# Patient Record
Sex: MALE | Race: WHITE | NOT HISPANIC OR LATINO | Employment: OTHER | URBAN - METROPOLITAN AREA
[De-identification: names, ages, dates, MRNs, and addresses within clinical notes are randomized per-mention and may not be internally consistent; named-entity substitution may affect disease eponyms.]

---

## 2017-08-01 ENCOUNTER — RECORDS - HEALTHEAST (OUTPATIENT)
Dept: LAB | Facility: CLINIC | Age: 67
End: 2017-08-01

## 2017-08-01 LAB
CHOLEST SERPL-MCNC: 177 MG/DL
FASTING STATUS PATIENT QL REPORTED: NO
HDLC SERPL-MCNC: 50 MG/DL
LDLC SERPL CALC-MCNC: 82 MG/DL
TRIGL SERPL-MCNC: 227 MG/DL

## 2018-07-18 ENCOUNTER — RECORDS - HEALTHEAST (OUTPATIENT)
Dept: LAB | Facility: CLINIC | Age: 68
End: 2018-07-18

## 2018-07-18 LAB
ALBUMIN SERPL-MCNC: 4.1 G/DL (ref 3.5–5)
ALP SERPL-CCNC: 67 U/L (ref 45–120)
ALT SERPL W P-5'-P-CCNC: 47 U/L (ref 0–45)
ANION GAP SERPL CALCULATED.3IONS-SCNC: 7 MMOL/L (ref 5–18)
AST SERPL W P-5'-P-CCNC: 34 U/L (ref 0–40)
BILIRUB SERPL-MCNC: 0.9 MG/DL (ref 0–1)
BUN SERPL-MCNC: 19 MG/DL (ref 8–22)
CALCIUM SERPL-MCNC: 9.8 MG/DL (ref 8.5–10.5)
CHLORIDE BLD-SCNC: 103 MMOL/L (ref 98–107)
CHOLEST SERPL-MCNC: 181 MG/DL
CO2 SERPL-SCNC: 27 MMOL/L (ref 22–31)
CREAT SERPL-MCNC: 1.2 MG/DL (ref 0.7–1.3)
FASTING STATUS PATIENT QL REPORTED: YES
GFR SERPL CREATININE-BSD FRML MDRD: 60 ML/MIN/1.73M2
GLUCOSE BLD-MCNC: 102 MG/DL (ref 70–125)
HDLC SERPL-MCNC: 55 MG/DL
LDLC SERPL CALC-MCNC: 93 MG/DL
POTASSIUM BLD-SCNC: 4.9 MMOL/L (ref 3.5–5)
PROT SERPL-MCNC: 7 G/DL (ref 6–8)
PSA SERPL-MCNC: 0.3 NG/ML (ref 0–4.5)
SODIUM SERPL-SCNC: 137 MMOL/L (ref 136–145)
TRIGL SERPL-MCNC: 163 MG/DL

## 2019-07-15 ENCOUNTER — RECORDS - HEALTHEAST (OUTPATIENT)
Dept: LAB | Facility: CLINIC | Age: 69
End: 2019-07-15

## 2019-07-15 LAB
ALBUMIN SERPL-MCNC: 4.1 G/DL (ref 3.5–5)
ALP SERPL-CCNC: 66 U/L (ref 45–120)
ALT SERPL W P-5'-P-CCNC: 36 U/L (ref 0–45)
ANION GAP SERPL CALCULATED.3IONS-SCNC: 11 MMOL/L (ref 5–18)
AST SERPL W P-5'-P-CCNC: 28 U/L (ref 0–40)
BILIRUB SERPL-MCNC: 0.9 MG/DL (ref 0–1)
BUN SERPL-MCNC: 16 MG/DL (ref 8–28)
CALCIUM SERPL-MCNC: 10.2 MG/DL (ref 8.5–10.5)
CHLORIDE BLD-SCNC: 102 MMOL/L (ref 98–107)
CHOLEST SERPL-MCNC: 194 MG/DL
CO2 SERPL-SCNC: 25 MMOL/L (ref 22–31)
CREAT SERPL-MCNC: 1.19 MG/DL (ref 0.7–1.3)
FASTING STATUS PATIENT QL REPORTED: NO
GFR SERPL CREATININE-BSD FRML MDRD: 60 ML/MIN/1.73M2
GLUCOSE BLD-MCNC: 96 MG/DL (ref 70–125)
HDLC SERPL-MCNC: 63 MG/DL
LDLC SERPL CALC-MCNC: 86 MG/DL
POTASSIUM BLD-SCNC: 4.9 MMOL/L (ref 3.5–5)
PROT SERPL-MCNC: 7.2 G/DL (ref 6–8)
SODIUM SERPL-SCNC: 138 MMOL/L (ref 136–145)
TRIGL SERPL-MCNC: 223 MG/DL

## 2020-07-15 ENCOUNTER — RECORDS - HEALTHEAST (OUTPATIENT)
Dept: LAB | Facility: CLINIC | Age: 70
End: 2020-07-15

## 2020-07-15 LAB
ALBUMIN SERPL-MCNC: 3.9 G/DL (ref 3.5–5)
ALP SERPL-CCNC: 72 U/L (ref 45–120)
ALT SERPL W P-5'-P-CCNC: 41 U/L (ref 0–45)
ANION GAP SERPL CALCULATED.3IONS-SCNC: 9 MMOL/L (ref 5–18)
AST SERPL W P-5'-P-CCNC: 33 U/L (ref 0–40)
BILIRUB SERPL-MCNC: 0.6 MG/DL (ref 0–1)
BUN SERPL-MCNC: 14 MG/DL (ref 8–28)
CALCIUM SERPL-MCNC: 9.4 MG/DL (ref 8.5–10.5)
CHLORIDE BLD-SCNC: 103 MMOL/L (ref 98–107)
CHOLEST SERPL-MCNC: 184 MG/DL
CO2 SERPL-SCNC: 27 MMOL/L (ref 22–31)
CREAT SERPL-MCNC: 1.14 MG/DL (ref 0.7–1.3)
FASTING STATUS PATIENT QL REPORTED: NO
GFR SERPL CREATININE-BSD FRML MDRD: >60 ML/MIN/1.73M2
GLUCOSE BLD-MCNC: 115 MG/DL (ref 70–125)
HDLC SERPL-MCNC: 63 MG/DL
LDLC SERPL CALC-MCNC: 88 MG/DL
POTASSIUM BLD-SCNC: 5 MMOL/L (ref 3.5–5)
PROT SERPL-MCNC: 7 G/DL (ref 6–8)
PSA SERPL-MCNC: 0.3 NG/ML (ref 0–6.5)
SODIUM SERPL-SCNC: 139 MMOL/L (ref 136–145)
TRIGL SERPL-MCNC: 167 MG/DL

## 2021-06-01 ENCOUNTER — RECORDS - HEALTHEAST (OUTPATIENT)
Dept: ADMINISTRATIVE | Facility: CLINIC | Age: 71
End: 2021-06-01

## 2021-07-15 ENCOUNTER — HOSPITAL ENCOUNTER (EMERGENCY)
Dept: RADIOLOGY | Facility: CLINIC | Age: 71
End: 2021-07-15
Attending: EMERGENCY MEDICINE
Payer: COMMERCIAL

## 2021-07-15 ENCOUNTER — HOSPITAL ENCOUNTER (EMERGENCY)
Facility: CLINIC | Age: 71
Discharge: HOME | End: 2021-07-16
Attending: EMERGENCY MEDICINE | Admitting: EMERGENCY MEDICINE
Payer: COMMERCIAL

## 2021-07-15 VITALS
TEMPERATURE: 98.5 F | RESPIRATION RATE: 28 BRPM | OXYGEN SATURATION: 95 % | SYSTOLIC BLOOD PRESSURE: 174 MMHG | HEIGHT: 71 IN | WEIGHT: 245 LBS | BODY MASS INDEX: 34.3 KG/M2 | DIASTOLIC BLOOD PRESSURE: 94 MMHG | HEART RATE: 89 BPM

## 2021-07-15 DIAGNOSIS — J06.9 URI WITH COUGH AND CONGESTION: ICD-10-CM

## 2021-07-15 PROCEDURE — 99284 EMERGENCY DEPT VISIT MOD MDM: CPT | Mod: 25

## 2021-07-15 PROCEDURE — C9803 HOPD COVID-19 SPEC COLLECT: HCPCS

## 2021-07-15 PROCEDURE — 87635 SARS-COV-2 COVID-19 AMP PRB: CPT | Performed by: EMERGENCY MEDICINE

## 2021-07-15 PROCEDURE — 71045 X-RAY EXAM CHEST 1 VIEW: CPT

## 2021-07-15 ASSESSMENT — MIFFLIN-ST. JEOR: SCORE: 1883.44

## 2021-07-15 ASSESSMENT — ENCOUNTER SYMPTOMS
COUGH: 1
FEVER: 1
DYSURIA: 0
HEMATURIA: 0
SORE THROAT: 1
SHORTNESS OF BREATH: 0
DIZZINESS: 0
ABDOMINAL PAIN: 0
CHILLS: 0
JOINT SWELLING: 0
DIARRHEA: 0
VOMITING: 0
CONFUSION: 0
NAUSEA: 0

## 2021-07-16 LAB — SARS-COV-2 RNA RESP QL NAA+PROBE: NEGATIVE

## 2021-07-16 NOTE — ED PROVIDER NOTES
Emergency Department Encounter     Evaluation Date & Time:   7/15/2021 11:00 PM    CHIEF COMPLAINT:  Cough, Fever, and Pharyngitis      Triage Note:The patient presents to the ED with c/o cough, sore throat and fever (100 degrees) x3 days. States  night he slept with his fan on and woke the next day with these symptoms. Does report coughing up phlegm. Took Ibuprofen around .        ED COURSE & MEDICAL DECISION MAKIN:20 PM I met with the patient for the initial interview and physical examination. Discussed plan for treatment and workup in the ED. PPE: Provider wore eye protection, N95 mask and gloves.  Pt here with cough, congestion for the past 3-4 days, temp of 99 today, so he came in for evaluation.  He is fully vaccinated for covid with moderna since February, but was concerned it could be covid with temp of 99.  Pt afebrile, normal RA O2 sats. Will get covid test, CXR.  He has no real cp/sob, but cough.  If negative workup, anticipate discharge.    12:15 AM CXR clear. Covid negative.  Will discharge, instruct on f/u, return precautions.    12:18 AM I rechecked and updated the patient. We discussed plans for discharge including supportive cares, symptomatic treatment, outpatient follow up, and reasons to return to the emergency department.     At the conclusion of the encounter I discussed the results of all the tests and the disposition. The questions were answered. The patient or family acknowledged understanding and was agreeable with the care plan.      MEDICATIONS GIVEN IN THE EMERGENCY DEPARTMENT:  Medications - No data to display    NEW PRESCRIPTIONS STARTED AT TODAY'S ED VISIT:  Discharge Medication List as of 2021 12:30 AM          HPI     HPI     Alek Mcneill is a 71 year old male with a pertinent history of hyperlipidemia who presents to this ED by walk in for evaluation of sore throat, congestion, and cough.    Patient reports that he developed sore throat, congestion, and  mild cough on the Monday morning (3 days ago). His cough has persisted since onset. He initially attributed his symptoms to throat irritation caused by sleeping with a fan on at night. The patient takes his temperature everyday and tonight he recorded a temperature of 99 F, causing him to become concerned for COVID-19. He took ibuprofen earlier this evening. He has been fully-vaccinated against COVID-19 with the Moderna two-dose vaccine since February 2021. No known sick contacts. He denies loss of taste or smell, nausea, vomiting, diarrhea, chest pain, shortness of breath, or additional symptoms at this time.            REVIEW OF SYSTEMS:    Review of Systems   Constitutional: Positive for fever. Negative for chills.   HENT: Positive for congestion and sore throat.    Eyes: Negative for visual disturbance.   Respiratory: Positive for cough. Negative for shortness of breath.    Cardiovascular: Negative for chest pain.   Gastrointestinal: Negative for abdominal pain, diarrhea, nausea and vomiting.   Endocrine: Negative for polyuria.   Genitourinary: Negative for dysuria and hematuria.        - urinary changes   Musculoskeletal: Negative for joint swelling.   Skin: Negative for rash.   Neurological: Negative for dizziness.        Negative for loss of taste or smell   Psychiatric/Behavioral: Negative for confusion.   All other systems reviewed and are negative.      Medical History     Past Medical History:   Diagnosis Date     Hypercholesteremia        No past surgical history on file.    No family history on file.    Social History     Tobacco Use     Smoking status: Never Smoker   Substance Use Topics     Alcohol use: Not on file     Drug use: Not on file       aspirin 81 mg chewable tablet        Physical Exam     Triage Vitals:  ED Triage Vitals [07/15/21 2303]   Enc Vitals Group      BP (!) 174/94      Pulse 89      Resp 28      Temp 98.5  F (36.9  C)      Temp src Oral      SpO2 95 %      Weight 111.1 kg (245  "lb)      Height 1.803 m (5' 11\")      Head Circumference       Peak Flow       Pain Score       Pain Loc       Pain Edu?       Excl. in GC?         Vitals:  BP (!) 174/94   Pulse 89   Temp 98.5  F (36.9  C) (Oral)   Resp 28   Ht 1.803 m (5' 11\")   Wt 111.1 kg (245 lb)   SpO2 95%   BMI 34.17 kg/m      PHYSICAL EXAM:   Physical Exam  Vitals and nursing note reviewed.   Constitutional:       General: He is not in acute distress.     Appearance: Normal appearance.   HENT:      Head: Normocephalic and atraumatic.      Nose: Nose normal.      Mouth/Throat:      Mouth: Mucous membranes are moist.      Pharynx: Posterior oropharyngeal erythema (minimal) present. No pharyngeal swelling or oropharyngeal exudate.   Eyes:      Pupils: Pupils are equal, round, and reactive to light.   Neck:      Vascular: No JVD.   Cardiovascular:      Rate and Rhythm: Normal rate and regular rhythm.      Pulses: Normal pulses.           Radial pulses are 2+ on the right side and 2+ on the left side.        Dorsalis pedis pulses are 2+ on the right side and 2+ on the left side.   Pulmonary:      Effort: Pulmonary effort is normal. No respiratory distress.      Breath sounds: Normal breath sounds.   Abdominal:      Palpations: Abdomen is soft.      Tenderness: There is no abdominal tenderness.   Musculoskeletal:      Cervical back: Full passive range of motion without pain and neck supple.      Comments: No calf tenderness or swelling b/l   Skin:     General: Skin is warm.      Findings: No rash.   Neurological:      General: No focal deficit present.      Mental Status: He is alert. Mental status is at baseline.      Comments: Fluent speech, no acute lateralizing deficits   Psychiatric:         Mood and Affect: Mood normal.         Behavior: Behavior normal.         Results     LAB:  All pertinent labs reviewed and interpreted  Labs Ordered and Resulted from Time of ED Arrival Up to the Time of Departure from the ED   SARS-COV2 (COVID-19) " VIRUS RT-PCR - Normal    Narrative:     Testing was performed using the liudmila  SARS-CoV-2 & Influenza A/B Assay on the liudmila  Silvia  System.  This test should be ordered for the detection of SARS-COV-2 in individuals who meet SARS-CoV-2 clinical and/or epidemiological criteria. Test performance is unknown in asymptomatic patients.  This test is for in vitro diagnostic use under the FDA EUA for laboratories certified under CLIA to perform moderate and/or high complexity testing. This test has not been FDA cleared or approved.  A negative test does not rule out the presence of PCR inhibitors in the specimen or target RNA in concentration below the limit of detection for the assay. The possibility of a false negative should be considered if the patient's recent exposure or clinical presentation suggests COVID-19.  [facility] are certified under the Clinical Laboratory Improvement Amendments of 1988 (CLIA-88) as qualified to perform moderate and/or high complexity laboratory testing.   COVID-19 VIRUS (CORONAVIRUS) BY PCR    Narrative:     The following orders were created for panel order Symptomatic COVID-19 Virus (Coronavirus) by PCR Nasopharyngeal.  Procedure                               Abnormality         Status                     ---------                               -----------         ------                     SARS-COV2 (COVID-19) Vir...[[id number]]  Normal              Final result                 Please view results for these tests on the individual orders.       RADIOLOGY:  XR Chest Port 1 View   Final Result   IMPRESSION: Negative chest.                 ECG:  None    PROCEDURES:  None      FINAL IMPRESSION:    ICD-10-CM    1. URI with cough and congestion  J06.9        I, Svetlana Parham, am serving as a scribe to document services personally performed by Dr. Anthony Pierson, based on my observations and the provider's statements to me. I, Anthony Pierson, DO attest that Svetlana Parham is  acting in a scribe capacity, has observed my performance of the services and has documented them in accordance with my direction.      Anthony Pierson DO  Emergency Medicine  [facility] EMERGENCY ROOM  7/15/2021       Anthony Pierson MD  07/16/21 0143

## 2021-07-16 NOTE — DISCHARGE INSTRUCTIONS
Follow up with primary clinic. Try over the counter Zyrtec daily with ibuprofen/tylenol as needed and follow up with primary clinic. Covid test negative and chest xray clear today. Return for new/worsening symptoms/concerns.

## 2021-07-16 NOTE — ED TRIAGE NOTES
The patient presents to the ED with c/o cough, sore throat and fever (100 degrees) x3 days. States Sunday night he slept with his fan on and woke the next day with these symptoms. Does report coughing up phlegm. Took Ibuprofen around 2030.

## 2021-07-19 ENCOUNTER — LAB REQUISITION (OUTPATIENT)
Dept: LAB | Facility: CLINIC | Age: 71
End: 2021-07-19

## 2021-07-19 LAB
ALBUMIN SERPL-MCNC: 4 G/DL (ref 3.5–5)
ALP SERPL-CCNC: 83 U/L (ref 45–120)
ALT SERPL W P-5'-P-CCNC: 53 U/L (ref 0–45)
ANION GAP SERPL CALCULATED.3IONS-SCNC: 12 MMOL/L (ref 5–18)
AST SERPL W P-5'-P-CCNC: 47 U/L (ref 0–40)
BILIRUB SERPL-MCNC: 0.8 MG/DL (ref 0–1)
BUN SERPL-MCNC: 17 MG/DL (ref 8–28)
CALCIUM SERPL-MCNC: 9.9 MG/DL (ref 8.5–10.5)
CHLORIDE BLD-SCNC: 99 MMOL/L (ref 98–107)
CHOLEST SERPL-MCNC: 152 MG/DL
CO2 SERPL-SCNC: 25 MMOL/L (ref 22–31)
CREAT SERPL-MCNC: 1.09 MG/DL (ref 0.7–1.3)
GFR SERPL CREATININE-BSD FRML MDRD: 67 ML/MIN/1.73M2
GLUCOSE BLD-MCNC: 86 MG/DL (ref 70–125)
HDLC SERPL-MCNC: 53 MG/DL
LDLC SERPL CALC-MCNC: 81 MG/DL
POTASSIUM BLD-SCNC: 4.9 MMOL/L (ref 3.5–5)
PROT SERPL-MCNC: 7.3 G/DL (ref 6–8)
SODIUM SERPL-SCNC: 136 MMOL/L (ref 136–145)
TRIGL SERPL-MCNC: 90 MG/DL

## 2021-07-19 PROCEDURE — 36415 COLL VENOUS BLD VENIPUNCTURE: CPT | Performed by: PHYSICIAN ASSISTANT

## 2021-07-19 PROCEDURE — 80061 LIPID PANEL: CPT | Performed by: PHYSICIAN ASSISTANT

## 2021-07-19 PROCEDURE — 80053 COMPREHEN METABOLIC PANEL: CPT | Performed by: PHYSICIAN ASSISTANT

## 2022-08-18 ENCOUNTER — LAB REQUISITION (OUTPATIENT)
Dept: LAB | Facility: CLINIC | Age: 72
End: 2022-08-18

## 2022-08-18 DIAGNOSIS — Z80.42 FAMILY HISTORY OF MALIGNANT NEOPLASM OF PROSTATE: ICD-10-CM

## 2022-08-18 DIAGNOSIS — E78.5 HYPERLIPIDEMIA, UNSPECIFIED: ICD-10-CM

## 2022-08-18 DIAGNOSIS — E66.9 OBESITY, UNSPECIFIED: ICD-10-CM

## 2022-08-18 LAB
ALBUMIN SERPL BCG-MCNC: 4.6 G/DL (ref 3.5–5.2)
ALP SERPL-CCNC: 88 U/L (ref 40–129)
ALT SERPL W P-5'-P-CCNC: 52 U/L (ref 10–50)
ANION GAP SERPL CALCULATED.3IONS-SCNC: 9 MMOL/L (ref 7–15)
AST SERPL W P-5'-P-CCNC: 42 U/L (ref 10–50)
BILIRUB SERPL-MCNC: 0.7 MG/DL
BUN SERPL-MCNC: 14.3 MG/DL (ref 8–23)
CALCIUM SERPL-MCNC: 9.9 MG/DL (ref 8.8–10.2)
CHLORIDE SERPL-SCNC: 97 MMOL/L (ref 98–107)
CHOLEST SERPL-MCNC: 167 MG/DL
CREAT SERPL-MCNC: 1.2 MG/DL (ref 0.67–1.17)
DEPRECATED HCO3 PLAS-SCNC: 29 MMOL/L (ref 22–29)
GFR SERPL CREATININE-BSD FRML MDRD: 64 ML/MIN/1.73M2
GLUCOSE SERPL-MCNC: 139 MG/DL (ref 70–99)
HDLC SERPL-MCNC: 56 MG/DL
LDLC SERPL CALC-MCNC: 72 MG/DL
NONHDLC SERPL-MCNC: 111 MG/DL
POTASSIUM SERPL-SCNC: 4.9 MMOL/L (ref 3.4–5.3)
PROT SERPL-MCNC: 7.1 G/DL (ref 6.4–8.3)
PSA SERPL-MCNC: 0.3 NG/ML (ref 0–6.5)
SODIUM SERPL-SCNC: 135 MMOL/L (ref 136–145)
TRIGL SERPL-MCNC: 193 MG/DL
TSH SERPL DL<=0.005 MIU/L-ACNC: 3.7 UIU/ML (ref 0.3–4.2)

## 2022-08-18 PROCEDURE — G0103 PSA SCREENING: HCPCS | Performed by: NURSE PRACTITIONER

## 2022-08-18 PROCEDURE — 80053 COMPREHEN METABOLIC PANEL: CPT | Performed by: NURSE PRACTITIONER

## 2022-08-18 PROCEDURE — 84443 ASSAY THYROID STIM HORMONE: CPT | Performed by: NURSE PRACTITIONER

## 2022-08-18 PROCEDURE — 80061 LIPID PANEL: CPT | Performed by: NURSE PRACTITIONER

## 2022-08-18 PROCEDURE — 84403 ASSAY OF TOTAL TESTOSTERONE: CPT | Performed by: NURSE PRACTITIONER

## 2022-08-22 LAB — TESTOST SERPL-MCNC: 423 NG/DL (ref 240–950)

## 2022-12-13 ENCOUNTER — LAB REQUISITION (OUTPATIENT)
Dept: LAB | Facility: CLINIC | Age: 72
End: 2022-12-13

## 2022-12-13 DIAGNOSIS — I10 ESSENTIAL (PRIMARY) HYPERTENSION: ICD-10-CM

## 2022-12-13 LAB
ALBUMIN SERPL BCG-MCNC: 4.5 G/DL (ref 3.5–5.2)
ALP SERPL-CCNC: 74 U/L (ref 40–129)
ALT SERPL W P-5'-P-CCNC: 51 U/L (ref 10–50)
ANION GAP SERPL CALCULATED.3IONS-SCNC: 13 MMOL/L (ref 7–15)
AST SERPL W P-5'-P-CCNC: 41 U/L (ref 10–50)
BILIRUB SERPL-MCNC: 0.7 MG/DL
BUN SERPL-MCNC: 19.2 MG/DL (ref 8–23)
CALCIUM SERPL-MCNC: 9.9 MG/DL (ref 8.8–10.2)
CHLORIDE SERPL-SCNC: 95 MMOL/L (ref 98–107)
CREAT SERPL-MCNC: 1.24 MG/DL (ref 0.67–1.17)
DEPRECATED HCO3 PLAS-SCNC: 27 MMOL/L (ref 22–29)
GFR SERPL CREATININE-BSD FRML MDRD: 61 ML/MIN/1.73M2
GLUCOSE SERPL-MCNC: 136 MG/DL (ref 70–99)
POTASSIUM SERPL-SCNC: 5 MMOL/L (ref 3.4–5.3)
PROT SERPL-MCNC: 7.2 G/DL (ref 6.4–8.3)
SODIUM SERPL-SCNC: 135 MMOL/L (ref 136–145)

## 2022-12-13 PROCEDURE — 82040 ASSAY OF SERUM ALBUMIN: CPT | Performed by: NURSE PRACTITIONER

## 2022-12-13 PROCEDURE — 80053 COMPREHEN METABOLIC PANEL: CPT | Performed by: NURSE PRACTITIONER

## 2023-09-08 ENCOUNTER — LAB REQUISITION (OUTPATIENT)
Dept: LAB | Facility: CLINIC | Age: 73
End: 2023-09-08
Payer: COMMERCIAL

## 2023-09-08 DIAGNOSIS — E11.9 TYPE 2 DIABETES MELLITUS WITHOUT COMPLICATIONS (H): ICD-10-CM

## 2023-09-08 DIAGNOSIS — Z11.59 ENCOUNTER FOR SCREENING FOR OTHER VIRAL DISEASES: ICD-10-CM

## 2023-09-08 DIAGNOSIS — Z12.5 ENCOUNTER FOR SCREENING FOR MALIGNANT NEOPLASM OF PROSTATE: ICD-10-CM

## 2023-09-08 LAB
ALBUMIN SERPL BCG-MCNC: 4.6 G/DL (ref 3.5–5.2)
ALP SERPL-CCNC: 59 U/L (ref 40–129)
ALT SERPL W P-5'-P-CCNC: 53 U/L (ref 0–70)
ANION GAP SERPL CALCULATED.3IONS-SCNC: 13 MMOL/L (ref 7–15)
AST SERPL W P-5'-P-CCNC: 47 U/L (ref 0–45)
BILIRUB SERPL-MCNC: 0.7 MG/DL
BUN SERPL-MCNC: 17 MG/DL (ref 8–23)
CALCIUM SERPL-MCNC: 9.8 MG/DL (ref 8.8–10.2)
CHLORIDE SERPL-SCNC: 99 MMOL/L (ref 98–107)
CHOLEST SERPL-MCNC: 156 MG/DL
CREAT SERPL-MCNC: 1.18 MG/DL (ref 0.67–1.17)
DEPRECATED HCO3 PLAS-SCNC: 25 MMOL/L (ref 22–29)
EGFRCR SERPLBLD CKD-EPI 2021: 65 ML/MIN/1.73M2
GLUCOSE SERPL-MCNC: 106 MG/DL (ref 70–99)
HCV AB SERPL QL IA: NONREACTIVE
HDLC SERPL-MCNC: 61 MG/DL
LDLC SERPL CALC-MCNC: 72 MG/DL
NONHDLC SERPL-MCNC: 95 MG/DL
POTASSIUM SERPL-SCNC: 4.7 MMOL/L (ref 3.4–5.3)
PROT SERPL-MCNC: 7.1 G/DL (ref 6.4–8.3)
PSA SERPL DL<=0.01 NG/ML-MCNC: 0.3 NG/ML (ref 0–6.5)
SODIUM SERPL-SCNC: 137 MMOL/L (ref 136–145)
TRIGL SERPL-MCNC: 113 MG/DL

## 2023-09-08 PROCEDURE — 80053 COMPREHEN METABOLIC PANEL: CPT | Mod: ORL | Performed by: STUDENT IN AN ORGANIZED HEALTH CARE EDUCATION/TRAINING PROGRAM

## 2023-09-08 PROCEDURE — 86803 HEPATITIS C AB TEST: CPT | Mod: ORL | Performed by: STUDENT IN AN ORGANIZED HEALTH CARE EDUCATION/TRAINING PROGRAM

## 2023-09-08 PROCEDURE — G0103 PSA SCREENING: HCPCS | Mod: ORL | Performed by: STUDENT IN AN ORGANIZED HEALTH CARE EDUCATION/TRAINING PROGRAM

## 2023-09-08 PROCEDURE — 80061 LIPID PANEL: CPT | Mod: ORL | Performed by: STUDENT IN AN ORGANIZED HEALTH CARE EDUCATION/TRAINING PROGRAM

## 2023-11-19 ENCOUNTER — HOSPITAL ENCOUNTER (EMERGENCY)
Facility: CLINIC | Age: 73
Discharge: HOME | End: 2023-11-19
Attending: EMERGENCY MEDICINE | Admitting: EMERGENCY MEDICINE
Payer: COMMERCIAL

## 2023-11-19 ENCOUNTER — APPOINTMENT (OUTPATIENT)
Dept: CT IMAGING | Facility: CLINIC | Age: 73
End: 2023-11-19
Attending: EMERGENCY MEDICINE
Payer: COMMERCIAL

## 2023-11-19 VITALS
WEIGHT: 233 LBS | DIASTOLIC BLOOD PRESSURE: 100 MMHG | RESPIRATION RATE: 20 BRPM | OXYGEN SATURATION: 97 % | HEART RATE: 72 BPM | SYSTOLIC BLOOD PRESSURE: 180 MMHG | HEIGHT: 68 IN | BODY MASS INDEX: 35.31 KG/M2 | TEMPERATURE: 97.2 F

## 2023-11-19 DIAGNOSIS — M54.50 BILATERAL LOW BACK PAIN WITHOUT SCIATICA, UNSPECIFIED CHRONICITY: ICD-10-CM

## 2023-11-19 DIAGNOSIS — R93.41 ABNORMAL CT SCAN, BLADDER: ICD-10-CM

## 2023-11-19 DIAGNOSIS — R31.0 GROSS HEMATURIA: ICD-10-CM

## 2023-11-19 LAB
ABO/RH(D): NORMAL
ALBUMIN UR-MCNC: 10 MG/DL
ANION GAP SERPL CALCULATED.3IONS-SCNC: 9 MMOL/L (ref 7–15)
ANTIBODY SCREEN: NEGATIVE
APPEARANCE UR: ABNORMAL
APTT PPP: 26 SECONDS (ref 22–38)
BASOPHILS # BLD AUTO: 0.1 10E3/UL (ref 0–0.2)
BASOPHILS NFR BLD AUTO: 1 %
BILIRUB UR QL STRIP: NEGATIVE
BUN SERPL-MCNC: 17.6 MG/DL (ref 8–23)
CALCIUM SERPL-MCNC: 9.7 MG/DL (ref 8.8–10.2)
CHLORIDE SERPL-SCNC: 100 MMOL/L (ref 98–107)
CK SERPL-CCNC: 224 U/L (ref 39–308)
COLOR UR AUTO: ABNORMAL
CREAT SERPL-MCNC: 1.13 MG/DL (ref 0.67–1.17)
DEPRECATED HCO3 PLAS-SCNC: 29 MMOL/L (ref 22–29)
EGFRCR SERPLBLD CKD-EPI 2021: 68 ML/MIN/1.73M2
EOSINOPHIL # BLD AUTO: 0.4 10E3/UL (ref 0–0.7)
EOSINOPHIL NFR BLD AUTO: 4 %
ERYTHROCYTE [DISTWIDTH] IN BLOOD BY AUTOMATED COUNT: 12.3 % (ref 10–15)
GLUCOSE SERPL-MCNC: 158 MG/DL (ref 70–99)
GLUCOSE UR STRIP-MCNC: NEGATIVE MG/DL
HCT VFR BLD AUTO: 44.5 % (ref 40–53)
HGB BLD-MCNC: 15.7 G/DL (ref 13.3–17.7)
HGB UR QL STRIP: ABNORMAL
IMM GRANULOCYTES # BLD: 0 10E3/UL
IMM GRANULOCYTES NFR BLD: 0 %
INR PPP: 0.82 (ref 0.85–1.15)
KETONES UR STRIP-MCNC: NEGATIVE MG/DL
LEUKOCYTE ESTERASE UR QL STRIP: NEGATIVE
LYMPHOCYTES # BLD AUTO: 3.6 10E3/UL (ref 0.8–5.3)
LYMPHOCYTES NFR BLD AUTO: 36 %
MAGNESIUM SERPL-MCNC: 2.3 MG/DL (ref 1.7–2.3)
MCH RBC QN AUTO: 32.6 PG (ref 26.5–33)
MCHC RBC AUTO-ENTMCNC: 35.3 G/DL (ref 31.5–36.5)
MCV RBC AUTO: 93 FL (ref 78–100)
MONOCYTES # BLD AUTO: 1.2 10E3/UL (ref 0–1.3)
MONOCYTES NFR BLD AUTO: 12 %
NEUTROPHILS # BLD AUTO: 4.7 10E3/UL (ref 1.6–8.3)
NEUTROPHILS NFR BLD AUTO: 47 %
NITRATE UR QL: NEGATIVE
NRBC # BLD AUTO: 0 10E3/UL
NRBC BLD AUTO-RTO: 0 /100
PH UR STRIP: 7 [PH] (ref 5–7)
PLATELET # BLD AUTO: 242 10E3/UL (ref 150–450)
POTASSIUM SERPL-SCNC: 4.2 MMOL/L (ref 3.4–5.3)
RBC # BLD AUTO: 4.81 10E6/UL (ref 4.4–5.9)
RBC URINE: >182 /HPF
SODIUM SERPL-SCNC: 138 MMOL/L (ref 135–145)
SP GR UR STRIP: 1.01 (ref 1–1.03)
SPECIMEN EXPIRATION DATE: NORMAL
UROBILINOGEN UR STRIP-MCNC: <2 MG/DL
WBC # BLD AUTO: 10 10E3/UL (ref 4–11)
WBC URINE: 1 /HPF

## 2023-11-19 PROCEDURE — 85610 PROTHROMBIN TIME: CPT | Performed by: EMERGENCY MEDICINE

## 2023-11-19 PROCEDURE — 82550 ASSAY OF CK (CPK): CPT | Performed by: EMERGENCY MEDICINE

## 2023-11-19 PROCEDURE — 86850 RBC ANTIBODY SCREEN: CPT | Performed by: EMERGENCY MEDICINE

## 2023-11-19 PROCEDURE — 80048 BASIC METABOLIC PNL TOTAL CA: CPT | Performed by: EMERGENCY MEDICINE

## 2023-11-19 PROCEDURE — 36415 COLL VENOUS BLD VENIPUNCTURE: CPT | Performed by: EMERGENCY MEDICINE

## 2023-11-19 PROCEDURE — 74177 CT ABD & PELVIS W/CONTRAST: CPT

## 2023-11-19 PROCEDURE — 250N000011 HC RX IP 250 OP 636: Performed by: EMERGENCY MEDICINE

## 2023-11-19 PROCEDURE — 83735 ASSAY OF MAGNESIUM: CPT | Performed by: EMERGENCY MEDICINE

## 2023-11-19 PROCEDURE — 85730 THROMBOPLASTIN TIME PARTIAL: CPT | Performed by: EMERGENCY MEDICINE

## 2023-11-19 PROCEDURE — 81001 URINALYSIS AUTO W/SCOPE: CPT | Performed by: EMERGENCY MEDICINE

## 2023-11-19 PROCEDURE — 86901 BLOOD TYPING SEROLOGIC RH(D): CPT | Performed by: EMERGENCY MEDICINE

## 2023-11-19 PROCEDURE — 99285 EMERGENCY DEPT VISIT HI MDM: CPT | Mod: 25

## 2023-11-19 PROCEDURE — 85004 AUTOMATED DIFF WBC COUNT: CPT | Performed by: EMERGENCY MEDICINE

## 2023-11-19 RX ORDER — IOPAMIDOL 755 MG/ML
100 INJECTION, SOLUTION INTRAVASCULAR ONCE
Status: COMPLETED | OUTPATIENT
Start: 2023-11-19 | End: 2023-11-19

## 2023-11-19 RX ADMIN — IOPAMIDOL 100 ML: 755 INJECTION, SOLUTION INTRAVENOUS at 02:22

## 2023-11-19 ASSESSMENT — ACTIVITIES OF DAILY LIVING (ADL): ADLS_ACUITY_SCORE: 35

## 2023-11-19 NOTE — ED TRIAGE NOTES
PT is coming in tonight with blood in his urine and biladeral flank pain that started on Thursday. PT first thought the pain was from moving furniture but has since had a few episodes of blood in his urine.     No HX of kidney stones.      Triage Assessment (Adult)       Row Name 11/19/23 0129          Triage Assessment    Airway WDL WDL        Respiratory WDL    Respiratory WDL WDL        Skin Circulation/Temperature WDL    Skin Circulation/Temperature WDL WDL        Cardiac WDL    Cardiac WDL WDL        Peripheral/Neurovascular WDL    Peripheral Neurovascular WDL WDL        Cognitive/Neuro/Behavioral WDL    Cognitive/Neuro/Behavioral WDL WDL

## 2023-11-19 NOTE — ED PROVIDER NOTES
Emergency Department Encounter     Evaluation Date & Time:   No admission date for patient encounter.    CHIEF COMPLAINT:  Hematuria and Flank Pain      Triage Note:              ED COURSE & MEDICAL DECISION MAKING:     ED Course as of 11/19/23 0311   Sun Nov 19, 2023   0215 UA with RBCs, no infection. CK wnl.  Hgb 15.7.  Renal function intact. Awaiting CT imaging.     0250 CT results reviewed. Will have pt follow up closely with urology this week for further outpt workup and cares.   0309 Discussed with pt results, including bladder nodules and concerning hematuria that could be from bladder cancer.  Pt needs urology follow up, understands how to arrange for this.  Suspect low back pain is unrelated to hematuria and from moving furniture recently. Advised to hold his prophylactic baby aspirin given hematuria.  Understands return precautions.         Pt here for evaluation of gross hematuria that he noticed once on Friday with a clot as well. Pt able to urinate with no dysuria or frequency.  Having some low back and b/l side pain since moving furniture Monday, but again had some darker urine early this morning, so he came in now for evaluation. Denies fevers, n/v, anticoagulation use. Will get labs, UA, CT abd/pelvis and reassess.  Consideration for UTI, bladder pathology, incidental hematuria, rhabdo and/or ureteral stone.      1:27 AM I met with the patient, obtained history, performed an initial exam, and discussed options and plan for diagnostics and treatment here in the ED.       Medical Decision Making    History:  Supplemental history from: Documented in chart, if applicable  External Record(s) reviewed: Documented in chart, if applicable.    Work Up:  Chart documentation includes differential considered and any EKGs or imaging independently interpreted by provider, where specified.  In additional to work up documented, I considered the following work up: Documented in chart, if applicable.    External  "consultation:  Discussion of management with another provider: Documented in chart, if applicable    Complicating factors:  Care impacted by chronic illness: Diabetes, Hyperlipidemia, and Hypertension  Care affected by social determinants of health: Access to Medical Care    Disposition considerations: Discharge. No recommendations on prescription strength medication(s). I considered admission, but ultimately discharged patient after evaluation, workup, outpatient follow up and treatment plan.      At the conclusion of the encounter I discussed the results of all the tests and the disposition. The questions were answered. The patient or family acknowledged understanding and was agreeable with the care plan.      MEDICATIONS GIVEN IN THE EMERGENCY DEPARTMENT:  Medications   iopamidol (ISOVUE-370) solution 100 mL (100 mLs Intravenous $Given 11/19/23 0222)       NEW PRESCRIPTIONS STARTED AT TODAY'S ED VISIT:  New Prescriptions    No medications on file       HPI   HPI     Alek Mcneill is a 73 year old male with a pertinent history of T2DM, hypertension, hyperlipidemia, who presents to this ED via walk-in for evaluation of hematuria and flank pain.     The patient had an episode of hematuria with small blood clots on 11/17/2023 (2 days ago) and just passed dark urine tonight. He is complaining of lower back pain that wraps to the side of his abdomen bilaterally since 11/13/2023 (6 days ago).    Otherwise, the patient denied having any other medical complaints or concerns at this time.    REVIEW OF SYSTEMS:  See HPI      Medical History     Past Medical History:   Diagnosis Date    Hypercholesteremia        History reviewed. No pertinent surgical history.    No family history on file.    Social History     Tobacco Use    Smoking status: Never       aspirin 81 mg chewable tablet        Physical Exam     Vitals:  BP (!) 172/88   Pulse 80   Temp 97.2  F (36.2  C) (Temporal)   Resp 20   Ht 1.727 m (5' 8\")   Wt 105.7 kg " (233 lb)   SpO2 95%   BMI 35.43 kg/m      PHYSICAL EXAM:   Physical Exam  Vitals and nursing note reviewed.   Constitutional:       General: He is not in acute distress.     Appearance: Normal appearance.   HENT:      Head: Normocephalic and atraumatic.      Nose: Nose normal.      Mouth/Throat:      Mouth: Mucous membranes are moist.   Eyes:      Pupils: Pupils are equal, round, and reactive to light.   Cardiovascular:      Rate and Rhythm: Normal rate and regular rhythm.      Pulses: Normal pulses.           Radial pulses are 2+ on the right side and 2+ on the left side.        Dorsalis pedis pulses are 2+ on the right side and 2+ on the left side.   Pulmonary:      Effort: Pulmonary effort is normal. No respiratory distress.      Breath sounds: Normal breath sounds.   Abdominal:      Palpations: Abdomen is soft.      Tenderness: There is no abdominal tenderness. There is no right CVA tenderness or left CVA tenderness.   Musculoskeletal:      Cervical back: Full passive range of motion without pain and neck supple.      Lumbar back: No tenderness or bony tenderness.      Comments: No calf tenderness or swelling b/l. No CVA tenderness   Skin:     General: Skin is warm.      Findings: No rash.   Neurological:      General: No focal deficit present.      Mental Status: He is alert. Mental status is at baseline.      Comments: Fluent speech, no acute lateralizing deficits   Psychiatric:         Mood and Affect: Mood normal.         Behavior: Behavior normal.         Results     LAB:  All pertinent labs reviewed and interpreted  Labs Ordered and Resulted from Time of ED Arrival to Time of ED Departure   ROUTINE UA WITH MICROSCOPIC REFLEX TO CULTURE - Abnormal       Result Value    Color Urine Pink (*)     Appearance Urine Turbid (*)     Glucose Urine Negative      Bilirubin Urine Negative      Ketones Urine Negative      Specific Gravity Urine 1.011      Blood Urine >1.0 mg/dL (*)     pH Urine 7.0      Protein  Albumin Urine 10 (*)     Urobilinogen Urine <2.0      Nitrite Urine Negative      Leukocyte Esterase Urine Negative      RBC Urine >182 (*)     WBC Urine 1     BASIC METABOLIC PANEL - Abnormal    Sodium 138      Potassium 4.2      Chloride 100      Carbon Dioxide (CO2) 29      Anion Gap 9      Urea Nitrogen 17.6      Creatinine 1.13      GFR Estimate 68      Calcium 9.7      Glucose 158 (*)    INR - Abnormal    INR 0.82 (*)    MAGNESIUM - Normal    Magnesium 2.3     PARTIAL THROMBOPLASTIN TIME - Normal    aPTT 26     CK TOTAL - Normal         CBC WITH PLATELETS AND DIFFERENTIAL    WBC Count 10.0      RBC Count 4.81      Hemoglobin 15.7      Hematocrit 44.5      MCV 93      MCH 32.6      MCHC 35.3      RDW 12.3      Platelet Count 242      % Neutrophils 47      % Lymphocytes 36      % Monocytes 12      % Eosinophils 4      % Basophils 1      % Immature Granulocytes 0      NRBCs per 100 WBC 0      Absolute Neutrophils 4.7      Absolute Lymphocytes 3.6      Absolute Monocytes 1.2      Absolute Eosinophils 0.4      Absolute Basophils 0.1      Absolute Immature Granulocytes 0.0      Absolute NRBCs 0.0     TYPE AND SCREEN, ADULT    ABO/RH(D) A POS      Antibody Screen Negative      SPECIMEN EXPIRATION DATE [id number]     ABO/RH TYPE AND SCREEN       RADIOLOGY:  CT Abdomen Pelvis w Contrast   Final Result   IMPRESSION:    1.  Multiple indeterminate nodular densities in the bladder suspicious for enhancing urothelial lesions. Malignancy not excluded. Given the history, cystoscopy is recommended for further evaluation.      2.  Kidneys and ureters are negative. No hydronephrosis.      3.  Diffuse fatty infiltration of the liver.      4.  No acute bowel findings. Normal appendix.      5.  Fat-containing bilateral inguinal hernias, left greater than right.                   ECG:  none    PROCEDURES:  Procedures:  none      FINAL IMPRESSION:    ICD-10-CM    1. Gross hematuria  R31.0 Adult Urology  Referral       2. Abnormal CT scan, bladder  R93.41 Adult Urology  Referral    nodules seen in bladder      3. Bilateral low back pain without sciatica, unspecified chronicity  M54.50           0 minutes of critical care time      I, Woo Atkins, am serving as a scribe to document services personally performed by Dr. Anthony Pierson, based on my observations and the provider's statements to me. I, Anthony Pierson, DO attest that Woo Atkins is acting in a scribe capacity, has observed my performance of the services and has documented them in accordance with my direction.      Anthony Pierson DO  Emergency Medicine  [facility] EMERGENCY ROOM  11/19/2023  1:27 AM          Anthoyn Pierson MD  11/19/23 0311

## 2023-11-19 NOTE — DISCHARGE INSTRUCTIONS
Stop taking aspirin for now. Follow up with urology - referral placed, but you can also call urology number provided to see if they can see you sooner, as it's a different urology group.  Return for inability to urinate, significant abdominal pain, fevers or other worsening concerns.  Ok to take Tylenol 500-1000mg up to 4 times a day for pain.  You can also use over the counter lidocaine patches for pain as directed.

## 2023-11-20 ENCOUNTER — TELEPHONE (OUTPATIENT)
Dept: UROLOGY | Facility: CLINIC | Age: 73
End: 2023-11-20

## 2023-11-20 NOTE — TELEPHONE ENCOUNTER
This encounter is being sent to inform the clinic that this patient has a referral from Anthony Pierson MD  for the diagnoses of gross hematuria and has requested that this patient be seen within 3-5 days.     Based on the availability of our provider(s), we are unable to accommodate this request.    Were all sites offered this patient?  Yes    Does scheduling algorithm request to schedule next available?  Patient has been scheduled for the first available opening with [facility] on 1/18/24.  We have informed the patient that the clinic will review their referral and reach out if a sooner appointment is medically necessary.

## 2023-11-20 NOTE — TELEPHONE ENCOUNTER
11/20 Patient rescheduled to 11/21/2023 with Dr. Philippe.     [facility]   Dermatology, Surgery, Urology  [facility]

## 2023-11-21 ENCOUNTER — OFFICE VISIT (OUTPATIENT)
Dept: UROLOGY | Facility: CLINIC | Age: 73
End: 2023-11-21
Attending: EMERGENCY MEDICINE
Payer: COMMERCIAL

## 2023-11-21 DIAGNOSIS — R31.0 GROSS HEMATURIA: ICD-10-CM

## 2023-11-21 DIAGNOSIS — R93.41 ABNORMAL CT SCAN, BLADDER: ICD-10-CM

## 2023-11-21 PROCEDURE — 99204 OFFICE O/P NEW MOD 45 MIN: CPT | Performed by: UROLOGY

## 2023-11-21 RX ORDER — HYDROCHLOROTHIAZIDE 25 MG/1
25 TABLET ORAL EVERY MORNING
Status: ON HOLD | COMMUNITY
End: 2024-01-20

## 2023-11-21 RX ORDER — OMEGA-3/DHA/EPA/FISH OIL 60 MG-90MG
2 CAPSULE ORAL DAILY
COMMUNITY

## 2023-11-21 RX ORDER — KETOCONAZOLE 20 MG/G
CREAM TOPICAL PRN
COMMUNITY
Start: 2023-01-30

## 2023-11-21 RX ORDER — SIMVASTATIN 40 MG
40 TABLET ORAL AT BEDTIME
COMMUNITY

## 2023-11-21 NOTE — NURSING NOTE
Alek Mcneill's chief complaint for this visit includes:    Chief Complaint   Patient presents with    New Patient     Alek is a new patient presenting for Gross Hematuria referred by Dr. Pierson.       PCP: Clinic, [facility]         Referring Provider:    Anthony Pierson MD  [address] [place] [address]         There were no vitals taken for this visit.    Data Unavailable          No Known Allergies              Do you need any medication refills at today's visit? No    Dhara Arango LPN on 11/21/2023 at 2:31 PM

## 2023-11-21 NOTE — PROGRESS NOTES
Urology Consult History and Physical  DEJA REESE   Name: Alek Mcneill    MRN: [id number]   YOB: 1950       We were asked to see Alek Mcneill at the request of Dr. Pierson for evaluation and treatment of gross hematuria.        Chief Complaint:   Gross hematuria     History is obtained from the patient            History of Present Illness:   Alek Mcneill is a 73 year old male who is being seen for evaluation of gross hematuria     He started to notice darking urine on 11/16/023  Became more bloody with gross hematuria and blood clots and presented to the ER on 11/19/2023  He has stopped his ASA which he was on for prophylaxis  His urine has improved with no further passage of clots since yesterday    Quit smoking in 1993  Start at age 15  30 pack/year history           Past Medical History:     Past Medical History:   Diagnosis Date    Hypercholesteremia             Past Surgical History:   No past surgical history on file.         Social History:     Social History     Tobacco Use    Smoking status: Never    Smokeless tobacco: Not on file   Substance Use Topics    Alcohol use: Not on file       History   Smoking Status    Never   Smokeless Tobacco    Not on file            Family History:   No family history on file.           Allergies:   No Known Allergies         Medications:     Current Outpatient Medications   Medication Sig    aspirin 81 mg chewable tablet [ASPIRIN 81 MG CHEWABLE TABLET] Chew 81 mg daily.    fish oil-omega-3 fatty acids 500 MG capsule 2 capsules    hydrochlorothiazide (HYDRODIURIL) 25 MG tablet Take 25 mg by mouth every morning    ketoconazole (NIZORAL) 2 % external cream Apply topically as needed    simvastatin (ZOCOR) 40 MG tablet Take 40 mg by mouth at bedtime     No current facility-administered medications for this visit.             Review of Systems:     Negative except for as noted above          Physical Exam:   No data found.  There is no height or weight on file  to calculate BMI.     General: age-appropriate appearing male in NAD  HEENT: Head AT/NC, EOMI, CN Grossly intact  Lungs: no respiratory distress, or pursed lip breathing  Heart: No obvious jugular venous distension present  Back: no bony midline tenderness, no CVAT bilaterally.  Abdomen: soft, obesely-distended, non-tender. No organomegaly  Neuro: Alert, oriented, speech and mentation normal;  moving all 4 extremities equally.  Psych: affect and mood normal          Data:   All laboratory data reviewed:    UA RESULTS:  Recent Labs   Lab Test 11/19/23  0150   COLOR Pink*   APPEARANCE Turbid*   URINEGLC Negative   URINEBILI Negative   URINEKETONE Negative   SG 1.011   UBLD >1.0 mg/dL*   URINEPH 7.0   PROTEIN 10*   NITRITE Negative   LEUKEST Negative   RBCU >182*   WBCU 1      Prostate Specific Antigen Screen   Date Value Ref Range Status   09/08/2023 0.30 0.00 - 6.50 ng/mL Final   07/15/2020 0.3 0.0 - 6.5 ng/mL Final      Lab Results   Component Value Date    CR 1.13 11/19/2023      IMAGING:  All pertinent imaging reviewed:    All imaging studies reviewed by me.  I personally reviewed these imaging films.  A formal report from radiology will follow.    FINDINGS:   LOWER CHEST: Normal.     HEPATOBILIARY: Diffuse fatty infiltration of the liver. Gallbladder is contracted. No calcified gallstones or biliary dilatation.     PANCREAS: Normal.     SPLEEN: Normal.     ADRENAL GLANDS: Normal.     KIDNEYS/BLADDER: Kidneys are negative. No hydronephrosis. There are several nodular foci of increased density seen along the wall of the bladder inferiorly measuring up to 1.2 cm. These are concerning for enhancing urothelial lesions with malignancy not   excluded. Cystoscopy recommended in further evaluation.     BOWEL: Bowel is normal in caliber with no evidence for obstruction. Normal appendix. Moderate to large amount of stool in the colon. No acute bowel findings.     LYMPH NODES: Normal.     VASCULATURE: Aortic calcification  without aneurysm.     PELVIC ORGANS: Fat-containing bilateral inguinal hernias, left greater than right.     MUSCULOSKELETAL: Marked degenerative changes in the spine. Bilateral pars defect at the L4 level with grade 1-2 anterolisthesis of L4 on L5.                                                                      IMPRESSION:   1.  Multiple indeterminate nodular densities in the bladder suspicious for enhancing urothelial lesions. Malignancy not excluded. Given the history, cystoscopy is recommended for further evaluation.     2.  Kidneys and ureters are negative. No hydronephrosis.     3.  Diffuse fatty infiltration of the liver.     4.  No acute bowel findings. Normal appendix.     5.  Fat-containing bilateral inguinal hernias, left greater than right.         Impression and Plan:   Impression:   73-year-old man with recent gross hematuria and concern for a few small bladder masses on CT scan      Plan:   Gross hematuria with concern for bladder masses on CT  - I reviewed his labs which are notable for urinalysis with large red blood cell count and serum creatinine of 1.13 on 11/19/2023  - I reviewed his CT scan from 11/19/2023 and reviewed these images personally.  I agree with radiologist interpretation.  There are a few small nodules on the anterior bladder wall on both the left and right sides of the bladder consistent with urothelial carcinoma  - We discussed the need for an operative cystoscopy with TURBT and we will work to schedule this in the next few weeks  - [place], and I discussed that it would be reasonable to have him have the surgery with my partner Dr. Layton and have further follow-up closer to his home  - I messaged with Dr. Layton who will place surgery orders and arrange for scheduling  - I recommend that he remain off of his aspirin prophylaxis  - This is a new problem with an uncertain prognosis     Thank you for the kind consultation.    Time spent: 20  minutes spent on the date of the encounter doing chart review, history and exam, documentation and further activities as noted above.    Ky Philippe MD   Urology  [facility]  [facility] Phone: [phone]  [facility] Phone: [phone]

## 2023-11-22 ENCOUNTER — PREP FOR PROCEDURE (OUTPATIENT)
Dept: SURGERY | Facility: CLINIC | Age: 73
End: 2023-11-22

## 2023-11-22 DIAGNOSIS — C67.8 MALIGNANT NEOPLASM OF OVERLAPPING SITES OF BLADDER (H): Primary | ICD-10-CM

## 2023-11-22 RX ORDER — CEFAZOLIN SODIUM/WATER 2 G/20 ML
2 SYRINGE (ML) INTRAVENOUS
Status: CANCELLED | OUTPATIENT
Start: 2023-12-06

## 2023-11-22 RX ORDER — CEFAZOLIN SODIUM/WATER 2 G/20 ML
2 SYRINGE (ML) INTRAVENOUS SEE ADMIN INSTRUCTIONS
Status: CANCELLED | OUTPATIENT
Start: 2023-12-06

## 2023-11-27 ENCOUNTER — TELEPHONE (OUTPATIENT)
Dept: UROLOGY | Facility: CLINIC | Age: 73
End: 2023-11-27

## 2023-11-27 NOTE — TELEPHONE ENCOUNTER
Called patient to inform him of the time change for surgery tomorrow 12-6 at [facility] with Dr Layton 9am start arrive 7am patient good with the change- JODY      Scheduled surgery for pt 12/6 at [facility]. Went through instructions over the phone, I told him he doesn't need a pre-op due to recent ER visit. Surgery info emailed via secure email.

## 2023-12-06 ENCOUNTER — TELEPHONE (OUTPATIENT)
Dept: NURSING | Facility: CLINIC | Age: 73
End: 2023-12-06

## 2023-12-06 ENCOUNTER — ANESTHESIA (OUTPATIENT)
Dept: SURGERY | Facility: HOSPITAL | Age: 73
End: 2023-12-06
Payer: COMMERCIAL

## 2023-12-06 ENCOUNTER — ANESTHESIA EVENT (OUTPATIENT)
Dept: SURGERY | Facility: HOSPITAL | Age: 73
End: 2023-12-06
Payer: COMMERCIAL

## 2023-12-06 ENCOUNTER — HOSPITAL ENCOUNTER (OUTPATIENT)
Facility: HOSPITAL | Age: 73
Discharge: HOME | End: 2023-12-06
Attending: STUDENT IN AN ORGANIZED HEALTH CARE EDUCATION/TRAINING PROGRAM | Admitting: STUDENT IN AN ORGANIZED HEALTH CARE EDUCATION/TRAINING PROGRAM
Payer: COMMERCIAL

## 2023-12-06 VITALS
TEMPERATURE: 97.4 F | RESPIRATION RATE: 16 BRPM | DIASTOLIC BLOOD PRESSURE: 93 MMHG | SYSTOLIC BLOOD PRESSURE: 171 MMHG | HEART RATE: 80 BPM | BODY MASS INDEX: 36.13 KG/M2 | WEIGHT: 237.6 LBS | OXYGEN SATURATION: 94 %

## 2023-12-06 DIAGNOSIS — C67.3 MALIGNANT NEOPLASM OF ANTERIOR WALL OF URINARY BLADDER (H): Primary | ICD-10-CM

## 2023-12-06 LAB
GLUCOSE BLDC GLUCOMTR-MCNC: 112 MG/DL (ref 70–99)
GLUCOSE BLDC GLUCOMTR-MCNC: 185 MG/DL (ref 70–99)

## 2023-12-06 PROCEDURE — 710N000012 HC RECOVERY PHASE 2, PER MINUTE: Performed by: STUDENT IN AN ORGANIZED HEALTH CARE EDUCATION/TRAINING PROGRAM

## 2023-12-06 PROCEDURE — 370N000017 HC ANESTHESIA TECHNICAL FEE, PER MIN: Performed by: STUDENT IN AN ORGANIZED HEALTH CARE EDUCATION/TRAINING PROGRAM

## 2023-12-06 PROCEDURE — 250N000011 HC RX IP 250 OP 636: Performed by: STUDENT IN AN ORGANIZED HEALTH CARE EDUCATION/TRAINING PROGRAM

## 2023-12-06 PROCEDURE — 258N000001 HC RX 258: Performed by: STUDENT IN AN ORGANIZED HEALTH CARE EDUCATION/TRAINING PROGRAM

## 2023-12-06 PROCEDURE — 250N000011 HC RX IP 250 OP 636: Performed by: ANESTHESIOLOGY

## 2023-12-06 PROCEDURE — 272N000001 HC OR GENERAL SUPPLY STERILE: Performed by: STUDENT IN AN ORGANIZED HEALTH CARE EDUCATION/TRAINING PROGRAM

## 2023-12-06 PROCEDURE — 999N000141 HC STATISTIC PRE-PROCEDURE NURSING ASSESSMENT: Performed by: STUDENT IN AN ORGANIZED HEALTH CARE EDUCATION/TRAINING PROGRAM

## 2023-12-06 PROCEDURE — 82962 GLUCOSE BLOOD TEST: CPT

## 2023-12-06 PROCEDURE — 88307 TISSUE EXAM BY PATHOLOGIST: CPT | Mod: TC | Performed by: STUDENT IN AN ORGANIZED HEALTH CARE EDUCATION/TRAINING PROGRAM

## 2023-12-06 PROCEDURE — 258N000003 HC RX IP 258 OP 636: Performed by: ANESTHESIOLOGY

## 2023-12-06 PROCEDURE — 52235 CYSTOSCOPY AND TREATMENT: CPT | Performed by: STUDENT IN AN ORGANIZED HEALTH CARE EDUCATION/TRAINING PROGRAM

## 2023-12-06 PROCEDURE — 250N000009 HC RX 250: Performed by: NURSE ANESTHETIST, CERTIFIED REGISTERED

## 2023-12-06 PROCEDURE — 710N000009 HC RECOVERY PHASE 1, LEVEL 1, PER MIN: Performed by: STUDENT IN AN ORGANIZED HEALTH CARE EDUCATION/TRAINING PROGRAM

## 2023-12-06 PROCEDURE — 250N000025 HC SEVOFLURANE, PER MIN: Performed by: STUDENT IN AN ORGANIZED HEALTH CARE EDUCATION/TRAINING PROGRAM

## 2023-12-06 PROCEDURE — 258N000003 HC RX IP 258 OP 636: Performed by: NURSE ANESTHETIST, CERTIFIED REGISTERED

## 2023-12-06 PROCEDURE — 250N000011 HC RX IP 250 OP 636: Performed by: NURSE ANESTHETIST, CERTIFIED REGISTERED

## 2023-12-06 PROCEDURE — 360N000076 HC SURGERY LEVEL 3, PER MIN: Performed by: STUDENT IN AN ORGANIZED HEALTH CARE EDUCATION/TRAINING PROGRAM

## 2023-12-06 RX ORDER — ONDANSETRON 4 MG/1
4 TABLET, ORALLY DISINTEGRATING ORAL EVERY 30 MIN PRN
Status: DISCONTINUED | OUTPATIENT
Start: 2023-12-06 | End: 2023-12-06 | Stop reason: HOSPADM

## 2023-12-06 RX ORDER — SODIUM CHLORIDE, SODIUM LACTATE, POTASSIUM CHLORIDE, CALCIUM CHLORIDE 600; 310; 30; 20 MG/100ML; MG/100ML; MG/100ML; MG/100ML
INJECTION, SOLUTION INTRAVENOUS CONTINUOUS
Status: DISCONTINUED | OUTPATIENT
Start: 2023-12-06 | End: 2023-12-06 | Stop reason: HOSPADM

## 2023-12-06 RX ORDER — CEFAZOLIN SODIUM/WATER 2 G/20 ML
2 SYRINGE (ML) INTRAVENOUS
Status: DISCONTINUED | OUTPATIENT
Start: 2023-12-06 | End: 2023-12-06 | Stop reason: HOSPADM

## 2023-12-06 RX ORDER — KETAMINE HYDROCHLORIDE 10 MG/ML
INJECTION INTRAMUSCULAR; INTRAVENOUS PRN
Status: DISCONTINUED | OUTPATIENT
Start: 2023-12-06 | End: 2023-12-06

## 2023-12-06 RX ORDER — CEFAZOLIN SODIUM/WATER 2 G/20 ML
2 SYRINGE (ML) INTRAVENOUS SEE ADMIN INSTRUCTIONS
Status: DISCONTINUED | OUTPATIENT
Start: 2023-12-06 | End: 2023-12-06 | Stop reason: HOSPADM

## 2023-12-06 RX ORDER — ONDANSETRON 2 MG/ML
4 INJECTION INTRAMUSCULAR; INTRAVENOUS EVERY 30 MIN PRN
Status: DISCONTINUED | OUTPATIENT
Start: 2023-12-06 | End: 2023-12-06 | Stop reason: HOSPADM

## 2023-12-06 RX ORDER — ONDANSETRON 4 MG/1
4 TABLET, ORALLY DISINTEGRATING ORAL EVERY 8 HOURS PRN
Qty: 4 TABLET | Refills: 0 | Status: SHIPPED | OUTPATIENT
Start: 2023-12-06 | End: 2024-01-18

## 2023-12-06 RX ORDER — PROPOFOL 10 MG/ML
INJECTION, EMULSION INTRAVENOUS PRN
Status: DISCONTINUED | OUTPATIENT
Start: 2023-12-06 | End: 2023-12-06

## 2023-12-06 RX ORDER — FENTANYL CITRATE 50 UG/ML
50 INJECTION, SOLUTION INTRAMUSCULAR; INTRAVENOUS EVERY 5 MIN PRN
Status: DISCONTINUED | OUTPATIENT
Start: 2023-12-06 | End: 2023-12-06 | Stop reason: HOSPADM

## 2023-12-06 RX ORDER — OXYBUTYNIN CHLORIDE 5 MG/1
5 TABLET ORAL 3 TIMES DAILY PRN
Qty: 10 TABLET | Refills: 0 | Status: SHIPPED | OUTPATIENT
Start: 2023-12-06 | End: 2023-12-20

## 2023-12-06 RX ORDER — FENTANYL CITRATE 50 UG/ML
INJECTION, SOLUTION INTRAMUSCULAR; INTRAVENOUS PRN
Status: DISCONTINUED | OUTPATIENT
Start: 2023-12-06 | End: 2023-12-06

## 2023-12-06 RX ORDER — SODIUM CHLORIDE, SODIUM LACTATE, POTASSIUM CHLORIDE, CALCIUM CHLORIDE 600; 310; 30; 20 MG/100ML; MG/100ML; MG/100ML; MG/100ML
INJECTION, SOLUTION INTRAVENOUS CONTINUOUS PRN
Status: DISCONTINUED | OUTPATIENT
Start: 2023-12-06 | End: 2023-12-06

## 2023-12-06 RX ORDER — LIDOCAINE HYDROCHLORIDE 10 MG/ML
INJECTION, SOLUTION INFILTRATION; PERINEURAL PRN
Status: DISCONTINUED | OUTPATIENT
Start: 2023-12-06 | End: 2023-12-06

## 2023-12-06 RX ORDER — OXYCODONE HYDROCHLORIDE 5 MG/1
10 TABLET ORAL
Status: DISCONTINUED | OUTPATIENT
Start: 2023-12-06 | End: 2023-12-06 | Stop reason: HOSPADM

## 2023-12-06 RX ORDER — FENTANYL CITRATE 50 UG/ML
25 INJECTION, SOLUTION INTRAMUSCULAR; INTRAVENOUS EVERY 5 MIN PRN
Status: DISCONTINUED | OUTPATIENT
Start: 2023-12-06 | End: 2023-12-06 | Stop reason: HOSPADM

## 2023-12-06 RX ORDER — ACETAMINOPHEN 325 MG/1
650 TABLET ORAL EVERY 4 HOURS PRN
Qty: 50 TABLET | Refills: 0 | Status: SHIPPED | OUTPATIENT
Start: 2023-12-06 | End: 2024-02-09

## 2023-12-06 RX ORDER — AMOXICILLIN 250 MG
1-2 CAPSULE ORAL 2 TIMES DAILY
Qty: 30 TABLET | Refills: 0 | Status: SHIPPED | OUTPATIENT
Start: 2023-12-06 | End: 2023-12-20

## 2023-12-06 RX ORDER — LIDOCAINE 40 MG/G
CREAM TOPICAL
Status: DISCONTINUED | OUTPATIENT
Start: 2023-12-06 | End: 2023-12-06 | Stop reason: HOSPADM

## 2023-12-06 RX ORDER — CIPROFLOXACIN 500 MG/1
500 TABLET, FILM COATED ORAL ONCE
Qty: 1 TABLET | Refills: 0 | Status: SHIPPED | OUTPATIENT
Start: 2023-12-06 | End: 2023-12-06

## 2023-12-06 RX ORDER — HYDROMORPHONE HYDROCHLORIDE 1 MG/ML
0.2 INJECTION, SOLUTION INTRAMUSCULAR; INTRAVENOUS; SUBCUTANEOUS EVERY 5 MIN PRN
Status: DISCONTINUED | OUTPATIENT
Start: 2023-12-06 | End: 2023-12-06 | Stop reason: HOSPADM

## 2023-12-06 RX ORDER — OXYCODONE HYDROCHLORIDE 5 MG/1
5 TABLET ORAL
Status: DISCONTINUED | OUTPATIENT
Start: 2023-12-06 | End: 2023-12-06 | Stop reason: HOSPADM

## 2023-12-06 RX ORDER — HYDROMORPHONE HYDROCHLORIDE 1 MG/ML
0.4 INJECTION, SOLUTION INTRAMUSCULAR; INTRAVENOUS; SUBCUTANEOUS EVERY 5 MIN PRN
Status: DISCONTINUED | OUTPATIENT
Start: 2023-12-06 | End: 2023-12-06 | Stop reason: HOSPADM

## 2023-12-06 RX ADMIN — Medication 2 G: at 09:58

## 2023-12-06 RX ADMIN — PROPOFOL 200 MG: 10 INJECTION, EMULSION INTRAVENOUS at 09:55

## 2023-12-06 RX ADMIN — SODIUM CHLORIDE, POTASSIUM CHLORIDE, SODIUM LACTATE AND CALCIUM CHLORIDE: 600; 310; 30; 20 INJECTION, SOLUTION INTRAVENOUS at 09:43

## 2023-12-06 RX ADMIN — FENTANYL CITRATE 25 MCG: 50 INJECTION, SOLUTION INTRAMUSCULAR; INTRAVENOUS at 11:34

## 2023-12-06 RX ADMIN — SODIUM CHLORIDE, POTASSIUM CHLORIDE, SODIUM LACTATE AND CALCIUM CHLORIDE: 600; 310; 30; 20 INJECTION, SOLUTION INTRAVENOUS at 08:25

## 2023-12-06 RX ADMIN — KETAMINE HYDROCHLORIDE 50 MG: 10 INJECTION INTRAMUSCULAR; INTRAVENOUS at 09:55

## 2023-12-06 RX ADMIN — FENTANYL CITRATE 50 MCG: 50 INJECTION INTRAMUSCULAR; INTRAVENOUS at 10:09

## 2023-12-06 RX ADMIN — LIDOCAINE HYDROCHLORIDE 5 ML: 10 INJECTION, SOLUTION INFILTRATION; PERINEURAL at 09:55

## 2023-12-06 RX ADMIN — FENTANYL CITRATE 25 MCG: 50 INJECTION, SOLUTION INTRAMUSCULAR; INTRAVENOUS at 11:27

## 2023-12-06 ASSESSMENT — ACTIVITIES OF DAILY LIVING (ADL)
ADLS_ACUITY_SCORE: 20
ADLS_ACUITY_SCORE: 20
ADLS_ACUITY_SCORE: 35
ADLS_ACUITY_SCORE: 20

## 2023-12-06 NOTE — ANESTHESIA PREPROCEDURE EVALUATION
Anesthesia Pre-Procedure Evaluation    Patient: Alek Mcneill   MRN: [id number] : 1950        Procedure : Procedure(s):  CYSTOSCOPY, WITH TRANSURETHRAL RESECTION BLADDER TUMOR, (BIPOLAR TURB)          Past Medical History:   Diagnosis Date    Complication of anesthesia     DM2 (diabetes mellitus, type 2) (H)     Hypercholesteremia     Hyperlipidemia     Hypertension       Past Surgical History:   Procedure Laterality Date    COLON SURGERY        No Known Allergies   Social History     Tobacco Use    Smoking status: Former     Types: Cigarettes     Quit date:      Years since quittin.9    Smokeless tobacco: Not on file   Substance Use Topics    Alcohol use: Not on file      Wt Readings from Last 1 Encounters:   23 107.8 kg (237 lb 9.6 oz)        Anesthesia Evaluation   Pt has had prior anesthetic.         ROS/MED HX  ENT/Pulmonary:       Neurologic:       Cardiovascular:     (+)  hypertension- -   -  - -                                      METS/Exercise Tolerance:     Hematologic:       Musculoskeletal:       GI/Hepatic:       Renal/Genitourinary:       Endo:     (+) type I DM,              Obesity,       Psychiatric/Substance Use:       Infectious Disease:       Malignancy:       Other:            Physical Exam    Airway        Mallampati: II    Neck ROM: full     Respiratory Devices and Support         Dental       (+) Minor Abnormalities - some fillings, tiny chips      Cardiovascular   cardiovascular exam normal          Pulmonary   pulmonary exam normal                OUTSIDE LABS:  CBC:   Lab Results   Component Value Date    WBC 10.0 2023    WBC 10.8 2020    HGB 15.7 2023    HGB 15.5 2020    HCT 44.5 2023    HCT 44.2 2020     2023     2020     BMP:   Lab Results   Component Value Date     2023     2023    POTASSIUM 4.2 2023    POTASSIUM 4.7 2023    CHLORIDE 100 2023    CHLORIDE 99  "09/08/2023    CO2 29 11/19/2023    CO2 25 09/08/2023    BUN 17.6 11/19/2023    BUN 17.0 09/08/2023    CR 1.13 11/19/2023    CR 1.18 (H) 09/08/2023     (H) 12/06/2023     (H) 11/19/2023     COAGS:   Lab Results   Component Value Date    PTT 26 11/19/2023    INR 0.82 (L) 11/19/2023     POC: No results found for: \"BGM\", \"HCG\", \"HCGS\"  HEPATIC:   Lab Results   Component Value Date    ALBUMIN 4.6 09/08/2023    PROTTOTAL 7.1 09/08/2023    ALT 53 09/08/2023    AST 47 (H) 09/08/2023    ALKPHOS 59 09/08/2023    BILITOTAL 0.7 09/08/2023     OTHER:   Lab Results   Component Value Date    A1C 5.6 03/10/2016    MILLI 9.7 11/19/2023    MAG 2.3 11/19/2023    TSH 3.70 08/18/2022       Anesthesia Plan    ASA Status:  2       Anesthesia Type: General.     - Airway: LMA              Consents    Anesthesia Plan(s) and associated risks, benefits, and realistic alternatives discussed. Questions answered and patient/representative(s) expressed understanding.     - Discussed: Risks, Benefits and Alternatives for the PROCEDURE were discussed     - Discussed with:  Patient      - Extended Intubation/Ventilatory Support Discussed: No.      - Patient is DNR/DNI Status: No     Use of blood products discussed: No .     Postoperative Care    Pain management: Multi-modal analgesia.   PONV prophylaxis: Ondansetron (or other 5HT-3), Dexamethasone or Solumedrol     Comments:               Vilma Boucher MD    I have reviewed the pertinent notes and labs in the chart from the past 30 days and (re)examined the patient.  Any updates or changes from those notes are reflected in this note.             # Drug Induced Platelet Defect: home medication list includes an antiplatelet medication  # Obesity: Estimated body mass index is 36.13 kg/m  as calculated from the following:    Height as of 11/19/23: 1.727 m (5' 8\").    Weight as of this encounter: 107.8 kg (237 lb 9.6 oz).      "

## 2023-12-06 NOTE — H&P
Urology H and P Update    I have evaluated Alek Mcneill  today and examined him. He does not note any significant issues since her last evaluation. Based on my evaluation, he is fit to proceed ahead with the planned procedure.    Damien Layton MD

## 2023-12-06 NOTE — ANESTHESIA PROCEDURE NOTES
Airway       Patient location during procedure: OR       Procedure Start/Stop Times: 12/6/2023 9:57 AM  Staff -        CRNA: Nilesh Flor APRN CRNA       Performed By: CRNA  Consent for Airway        Urgency: elective  Indications and Patient Condition       Indications for airway management: bernadette-procedural       Induction type:intravenous       Mask difficulty assessment: 1 - vent by mask    Final Airway Details       Final airway type: supraglottic airway    Supraglottic Airway Details        Type: LMA       Brand: Ambu AuraGain       LMA size: 5    Post intubation assessment        Placement verified by: capnometry and chest rise        Number of attempts at approach: 1       Number of other approaches attempted: 0       Secured with: silk tape       Ease of procedure: easy       Dentition: Intact    Medication(s) Administered   Medication Administration Time: 12/6/2023 9:57 AM

## 2023-12-06 NOTE — ANESTHESIA POSTPROCEDURE EVALUATION
Patient: Alek Mcneill    Procedure: Procedure(s):  CYSTOSCOPY, WITH TRANSURETHRAL RESECTION BLADDER TUMOR, (BIPOLAR TURB)       Anesthesia Type:  General    Note:  Disposition: Outpatient   Postop Pain Control: Uneventful            Sign Out: Well controlled pain   PONV: No   Neuro/Psych: Uneventful            Sign Out: Acceptable/Baseline neuro status   Airway/Respiratory: Uneventful            Sign Out: Acceptable/Baseline resp. status   CV/Hemodynamics: Uneventful            Sign Out: Acceptable CV status; No obvious hypovolemia; No obvious fluid overload   Other NRE: NONE   DID A NON-ROUTINE EVENT OCCUR? No           Last vitals:  Vitals Value Taken Time   /92 12/06/23 1130   Temp 36.3  C (97.4  F) 12/06/23 1100   Pulse 69 12/06/23 1144   Resp 14 12/06/23 1144   SpO2 96 % 12/06/23 1144   Vitals shown include unfiled device data.    Electronically Signed By: Vilma Boucher MD  December 6, 2023  11:45 AM   Pt attended afternoon health and wellness group on [employer].

## 2023-12-06 NOTE — OP NOTE
OPERATIVE REPORT    PREOPERATIVE DIAGNOSIS:   1) Bladder tumor      POSTOPERATIVE DIAGNOSIS:  Same    PROCEDURE PERFORMED: Transurethral resection of Bladder Tumor 2 cm to 5 cm     ATTENDING SURGEON: Damien Layton MD  FINDINGS:  multiple 1-2  cm papillary tumors located on the  anterior wall of the urinary bladder    ANESTHESIA: General   INTRAVENOUS FLUIDS: See dictated anesthesia record  ESTIMATED BLOOD LOSS: 5 mL.     SPECIMENS:   1. Bladder tumor chips  2. Base of bladder tumor    DRAINS:   18-Greenlandic 2 way catheter.       INDICATIONS FOR PROCEDURE: Alek Mcneill is a(n) 73 year old male who was seen in consultation for Bladder tumor. After discussion of the risks, benefits and alternatives of the procedure, the patient agreed to proceed with transurethral resection of his bladder tumor.     DESCRIPTION OF PROCEDURE: After verifying informed consent, the patient was taken to the operating room. Adequate anesthesia was induced, the patient was placed in the dorsal lithotomy position and prepped and draped in standard sterile fashion. A timeout was performed to verify correct patient and procedure. Pneumo boots and perioperative antibiotics were in place before the procedure commenced.     A 22-Greenlandic rigid cystoscope was inserted into a well lubricated urethra. We began by performing a white light cystourethroscopy. The urethra was unremarkable. The ureteral orifices were in their orthotopic positions bilaterally. Multiple Bladder tumors appeared papillary and were located on the anterior wall and had some superficial calcifications.    We then introduced the 26-Greenlandic bipolar Gyrus resectoscope. We resected tissue down to muscle using cut electrocautery. Once we had completed our dissection, we evacuated the specimens. We then reintroduced the resectoscope to ensure meticulous hemostasis.     A 18-Greenlandic 2-way catheter was placed, and 10 mL was instilled into the balloon. Catheter was hooked up to continuous  irrigation which was clear;  the patient was awoken from anesthesia and transferred to the recovery room in stable condition.     POSTOPERATIVE PLAN:   1. Radford catheter to remain for 48 hours and to be removed in the clinic.  2. Follow up in 1-2 weeks with me in clinic to review pathology    Damien Layton MD

## 2023-12-06 NOTE — TELEPHONE ENCOUNTER
Nurse Triage SBAR    Is this a 2nd Level Triage? NO    Situation: Trujillo    Background: Pt had a trujillo placed this AM after sx and was sent home with it. Tonight he noticed a little leakage from the tip of his penis.     Assessment: C/o small leakage from the tip of penis. States that it is clear and thin. Denies any pus or yellow/white discharge. Denies any trama or concern that the catheter got pulled out a little. State's the catheter is still filling up well and he feels that his bladder is emptying.     Protocol Recommended Disposition:   No disposition on file.    Recommendation: Nursing judgement used. It doesn't sound like pt's catheter is dislodged at this time. Advised pt to watch for any pus like discharge, and to make sure is bag is always filling up. Advised he call back if leakage becomes worse, if bag starts not filling up, or if he starts to feel like his bladder isn't emptying.  Care advice reviewed. Advised to call back with any new or worsening signs, symptoms, concerns, or questions. They verbalized understanding and agreed to follow advice given.          Taina Pacheco RN on 12/6/2023 at 5:30 PM

## 2023-12-06 NOTE — ANESTHESIA CARE TRANSFER NOTE
Patient: Alek Mcneill    Procedure: Procedure(s):  CYSTOSCOPY, WITH TRANSURETHRAL RESECTION BLADDER TUMOR, (BIPOLAR TURB)       Diagnosis: Malignant neoplasm of overlapping sites of bladder (H) [C67.8]  Diagnosis Additional Information: No value filed.    Anesthesia Type:   General     Note:    Oropharynx: oropharynx clear of all foreign objects and spontaneously breathing  Level of Consciousness: awake  Oxygen Supplementation: face mask  Level of Supplemental Oxygen (L/min / FiO2): 8  Independent Airway: airway patency satisfactory and stable  Dentition: dentition unchanged  Vital Signs Stable: post-procedure vital signs reviewed and stable  Report to RN Given: handoff report given  Patient transferred to: PACU    Handoff Report: Identifed the Patient, Identified the Reponsible Provider, Reviewed the pertinent medical history, Discussed the surgical course, Reviewed Intra-OP anesthesia mangement and issues during anesthesia, Set expectations for post-procedure period and Allowed opportunity for questions and acknowledgement of understanding      Vitals:  Vitals Value Taken Time   BP     Temp     Pulse     Resp     SpO2 86 % 12/06/23 1100   Vitals shown include unfiled device data.    Electronically Signed By: MARIANA Anthony CRNA  December 6, 2023  11:01 AM

## 2023-12-07 LAB
PATH REPORT.COMMENTS IMP SPEC: ABNORMAL
PATH REPORT.COMMENTS IMP SPEC: ABNORMAL
PATH REPORT.COMMENTS IMP SPEC: YES
PATH REPORT.FINAL DX SPEC: ABNORMAL
PATH REPORT.GROSS SPEC: ABNORMAL
PATH REPORT.MICROSCOPIC SPEC OTHER STN: ABNORMAL
PATH REPORT.RELEVANT HX SPEC: ABNORMAL
PATHOLOGY SYNOPTIC REPORT: ABNORMAL
PHOTO IMAGE: ABNORMAL

## 2023-12-07 PROCEDURE — 88307 TISSUE EXAM BY PATHOLOGIST: CPT | Mod: 26 | Performed by: PATHOLOGY

## 2023-12-08 ENCOUNTER — ALLIED HEALTH/NURSE VISIT (OUTPATIENT)
Dept: UROLOGY | Facility: CLINIC | Age: 73
End: 2023-12-08
Payer: COMMERCIAL

## 2023-12-08 DIAGNOSIS — R31.0 GROSS HEMATURIA: Primary | ICD-10-CM

## 2023-12-08 DIAGNOSIS — R93.41 ABNORMAL CT SCAN, BLADDER: ICD-10-CM

## 2023-12-08 PROCEDURE — 99207 PR NO CHARGE NURSE ONLY: CPT

## 2023-12-08 ASSESSMENT — PAIN SCALES - GENERAL: PAINLEVEL: MILD PAIN (2)

## 2023-12-08 NOTE — PROGRESS NOTES
Patient presents to the office today for a trujillo catheter removal post bladder TURBT per Dr. Layton.  Patient has been tolerating the trujillo catheter generally well.  Catheter removal education was reviewed with patient with understanding.  With patient undressed waist down and laying on exam bed the leg bag was disconnected and discarded.  225ml of sterile water was instilled into bladder via trujillo catheter.  The trujillo catheter balloon was deflated and catheter was then removed without difficulty.  Patient tolerated well.  Patient was able to void back 300ml urine.  He is will watch for any urinary retention concerns in the next few days and contact office.  Will follow up with Dr. Layton on 12/20/23.

## 2023-12-20 ENCOUNTER — OFFICE VISIT (OUTPATIENT)
Dept: UROLOGY | Facility: CLINIC | Age: 73
End: 2023-12-20
Payer: COMMERCIAL

## 2023-12-20 VITALS — DIASTOLIC BLOOD PRESSURE: 79 MMHG | SYSTOLIC BLOOD PRESSURE: 143 MMHG | TEMPERATURE: 98 F | HEART RATE: 64 BPM

## 2023-12-20 DIAGNOSIS — C67.3 MALIGNANT NEOPLASM OF ANTERIOR WALL OF URINARY BLADDER (H): Primary | ICD-10-CM

## 2023-12-20 PROCEDURE — 99214 OFFICE O/P EST MOD 30 MIN: CPT | Performed by: STUDENT IN AN ORGANIZED HEALTH CARE EDUCATION/TRAINING PROGRAM

## 2023-12-20 RX ORDER — CEFAZOLIN SODIUM/WATER 2 G/20 ML
2 SYRINGE (ML) INTRAVENOUS SEE ADMIN INSTRUCTIONS
Status: CANCELLED | OUTPATIENT
Start: 2024-01-17

## 2023-12-20 RX ORDER — CEFAZOLIN SODIUM/WATER 2 G/20 ML
2 SYRINGE (ML) INTRAVENOUS
Status: CANCELLED | OUTPATIENT
Start: 2024-01-17

## 2023-12-20 ASSESSMENT — PAIN SCALES - GENERAL: PAINLEVEL: NO PAIN (0)

## 2023-12-20 NOTE — PROGRESS NOTES
CHIEF COMPLAINT   It was my pleasure to see Alek Mcneill who is a 73 year old male for follow-up of bladder cancer.      HPI:  Alek Mcneill is a 73 year old male being seen for follow-up after recent TURBT.  Duration of problem: 2 weeks  Previous treatments: TURBT     accompanied by his spouse  Reviewed previous notes   Alek presents today with his wife  He has been doing well after the surgery  He did have some blood for the first few days but now he has been doing okay  He has some urgency but no incontinence    Exam:  BP (!) 143/79 (BP Location: Right arm, Patient Position: Sitting, Cuff Size: Adult Regular)   Pulse 64   Temp 98  F (36.7  C) (Oral)   General: age-appropriate appearing male in NAD sitting in an exam chair  Resp: no respiratory distress  CV: heart rate regular  Abdomen: Degree of obesity is moderate. Abdomen is soft and nontender. No organomegaly.   Neuro: grossly non focal. Normal reflexes  Motor: excellent strength throughout    Review of Imaging:  The following imaging exams were independently viewed and interpreted by me and discussed with patient:  CT Scan Abd/Pelvis: Abnormal: Multiple indeterminate nodular densities in the bladder suspicious for enhancing urothelial lesions. Malignancy not excluded. Given the history, cystoscopy is recommended for further evaluation.     2.  Kidneys and ureters are negative. No hydronephrosis.     3.  Diffuse fatty infiltration of the liver.     4.  No acute bowel findings. Normal appendix.     5.  Fat-containing bilateral inguinal hernias, left greater than right.    Review of Labs:  The following labs were reviewed by me and discussed with the patient:  Surgical pathology: Abnormal:   PECIMEN   Procedure  Transurethral resection of bladder (TURBT)   TUMOR   Tumor Site  Anterior wall   Histologic Type  Papillary urothelial carcinoma, invasive   Histologic Grade  High-grade   Tumor Extent  Invades lamina propria (subepithelial connective tissue)    Lymphovascular Invasion  Not identified   Tumor Configuration  Papillary     Solid / nodule   Muscularis Propria (detrusor muscle)  Present   ADDITIONAL FINDINGS   Associated Epithelial Lesions  None identified   .       Assessment & Plan     Malignant neoplasm of anterior wall of urinary bladder (H)  We discussed in detail about the findings of the pathology including the high-grade cancer with invasion of lamina propria  Based on recommendations, and guidelines I would recommend restaging TURBT.  We discussed about the rationale of this which would be to detect any incidental involvement of the detrusor layer of the bladder muscle.  I discussed with him that there is about 10 to 20% chance that there may be an upstaging based on the repeat TURBT.  The reason we are doing a restaging procedure is to ensure that he has a nonmuscle invasive cancer as if he had muscle invasive disease he would need to bladder removal.  We also discussed about BCG as one of the modalities of intravesical therapy for treatment of superficial bladder cancer and how it helps in preventing recurrences and progression of nonmuscle invasive high-grade bladder cancer.  Both Alek and his wife expressed understanding and were agreeable.  I discussed about the possible need for catheter and the risks of bleeding and urinary tract infection.  We additionally discussed about the risks of BCG including but not limited to the symptoms of frequency urgency dysuria, recurrent urinary tract infections, hematuria as well as immune mediated complications of BCG and invasive infective complications which are very rare.  Based on the discussions we had I will have him scheduled for restaging TURBT in the next few days and then if it is still nonmuscle invasive we would proceed ahead with BCG treatment.  - Case Request: CYSTOSCOPY, WITH TRANSURETHRAL RESECTION BLADDER TUMOR; Standing  - Case Request: CYSTOSCOPY, WITH TRANSURETHRAL RESECTION BLADDER  TUMOR      Damien Layton MD  [facility]      ==========================    Additional Billing and Coding Information:  Review of external notes as documented above   Review of the result(s) of each unique test - CT abdomen pelvis,    Independent interpretation of a test performed by another physician/other qualified health care professional (not separately reported) -surgical pathology            20 minutes spent by me on the date of the encounter doing chart review, review of test results, interpretation of tests, patient visit, documentation, and discussion with family     ==========================

## 2023-12-20 NOTE — PATIENT INSTRUCTIONS
PLease schedule for TURBT  Discussed about possible Adjuvant BCG treatment  I will call to update them of the path results

## 2023-12-22 ENCOUNTER — TELEPHONE (OUTPATIENT)
Dept: UROLOGY | Facility: CLINIC | Age: 73
End: 2023-12-22

## 2023-12-22 NOTE — TELEPHONE ENCOUNTER
Scheduled surgery for pt 1/17 at [facility], went through instructions over the phone, surgery packet mailed.

## 2023-12-23 ENCOUNTER — HEALTH MAINTENANCE LETTER (OUTPATIENT)
Age: 73
End: 2023-12-23

## 2024-01-16 ENCOUNTER — ANESTHESIA EVENT (OUTPATIENT)
Dept: SURGERY | Facility: HOSPITAL | Age: 74
End: 2024-01-16
Payer: COMMERCIAL

## 2024-01-17 ENCOUNTER — HOSPITAL ENCOUNTER (OUTPATIENT)
Facility: HOSPITAL | Age: 74
Discharge: HOME | End: 2024-01-17
Attending: STUDENT IN AN ORGANIZED HEALTH CARE EDUCATION/TRAINING PROGRAM | Admitting: STUDENT IN AN ORGANIZED HEALTH CARE EDUCATION/TRAINING PROGRAM
Payer: COMMERCIAL

## 2024-01-17 ENCOUNTER — ANESTHESIA (OUTPATIENT)
Dept: SURGERY | Facility: HOSPITAL | Age: 74
End: 2024-01-17
Payer: COMMERCIAL

## 2024-01-17 ENCOUNTER — TELEPHONE (OUTPATIENT)
Dept: UROLOGY | Facility: CLINIC | Age: 74
End: 2024-01-17

## 2024-01-17 VITALS
RESPIRATION RATE: 20 BRPM | HEART RATE: 69 BPM | BODY MASS INDEX: 36.37 KG/M2 | WEIGHT: 240 LBS | HEIGHT: 68 IN | DIASTOLIC BLOOD PRESSURE: 78 MMHG | TEMPERATURE: 97.9 F | OXYGEN SATURATION: 96 % | SYSTOLIC BLOOD PRESSURE: 146 MMHG

## 2024-01-17 DIAGNOSIS — C67.3 MALIGNANT NEOPLASM OF ANTERIOR WALL OF URINARY BLADDER (H): Primary | ICD-10-CM

## 2024-01-17 LAB — GLUCOSE BLDC GLUCOMTR-MCNC: 156 MG/DL (ref 70–99)

## 2024-01-17 PROCEDURE — 272N000001 HC OR GENERAL SUPPLY STERILE: Performed by: STUDENT IN AN ORGANIZED HEALTH CARE EDUCATION/TRAINING PROGRAM

## 2024-01-17 PROCEDURE — 250N000025 HC SEVOFLURANE, PER MIN: Performed by: STUDENT IN AN ORGANIZED HEALTH CARE EDUCATION/TRAINING PROGRAM

## 2024-01-17 PROCEDURE — 710N000009 HC RECOVERY PHASE 1, LEVEL 1, PER MIN: Performed by: STUDENT IN AN ORGANIZED HEALTH CARE EDUCATION/TRAINING PROGRAM

## 2024-01-17 PROCEDURE — 258N000003 HC RX IP 258 OP 636: Performed by: STUDENT IN AN ORGANIZED HEALTH CARE EDUCATION/TRAINING PROGRAM

## 2024-01-17 PROCEDURE — 250N000009 HC RX 250: Performed by: STUDENT IN AN ORGANIZED HEALTH CARE EDUCATION/TRAINING PROGRAM

## 2024-01-17 PROCEDURE — 52235 CYSTOSCOPY AND TREATMENT: CPT | Performed by: STUDENT IN AN ORGANIZED HEALTH CARE EDUCATION/TRAINING PROGRAM

## 2024-01-17 PROCEDURE — 250N000011 HC RX IP 250 OP 636: Performed by: STUDENT IN AN ORGANIZED HEALTH CARE EDUCATION/TRAINING PROGRAM

## 2024-01-17 PROCEDURE — 360N000076 HC SURGERY LEVEL 3, PER MIN: Performed by: STUDENT IN AN ORGANIZED HEALTH CARE EDUCATION/TRAINING PROGRAM

## 2024-01-17 PROCEDURE — 250N000013 HC RX MED GY IP 250 OP 250 PS 637: Performed by: ANESTHESIOLOGY

## 2024-01-17 PROCEDURE — 999N000141 HC STATISTIC PRE-PROCEDURE NURSING ASSESSMENT: Performed by: STUDENT IN AN ORGANIZED HEALTH CARE EDUCATION/TRAINING PROGRAM

## 2024-01-17 PROCEDURE — 258N000003 HC RX IP 258 OP 636: Performed by: ANESTHESIOLOGY

## 2024-01-17 PROCEDURE — 88305 TISSUE EXAM BY PATHOLOGIST: CPT | Mod: TC | Performed by: STUDENT IN AN ORGANIZED HEALTH CARE EDUCATION/TRAINING PROGRAM

## 2024-01-17 PROCEDURE — 82962 GLUCOSE BLOOD TEST: CPT

## 2024-01-17 PROCEDURE — 710N000012 HC RECOVERY PHASE 2, PER MINUTE: Performed by: STUDENT IN AN ORGANIZED HEALTH CARE EDUCATION/TRAINING PROGRAM

## 2024-01-17 PROCEDURE — 258N000001 HC RX 258: Performed by: STUDENT IN AN ORGANIZED HEALTH CARE EDUCATION/TRAINING PROGRAM

## 2024-01-17 PROCEDURE — 370N000017 HC ANESTHESIA TECHNICAL FEE, PER MIN: Performed by: STUDENT IN AN ORGANIZED HEALTH CARE EDUCATION/TRAINING PROGRAM

## 2024-01-17 RX ORDER — ONDANSETRON 2 MG/ML
4 INJECTION INTRAMUSCULAR; INTRAVENOUS EVERY 30 MIN PRN
Status: DISCONTINUED | OUTPATIENT
Start: 2024-01-17 | End: 2024-01-17 | Stop reason: HOSPADM

## 2024-01-17 RX ORDER — ONDANSETRON 2 MG/ML
INJECTION INTRAMUSCULAR; INTRAVENOUS PRN
Status: DISCONTINUED | OUTPATIENT
Start: 2024-01-17 | End: 2024-01-17

## 2024-01-17 RX ORDER — OXYBUTYNIN CHLORIDE 5 MG/1
5 TABLET ORAL 3 TIMES DAILY PRN
Qty: 15 TABLET | Refills: 0 | Status: SHIPPED | OUTPATIENT
Start: 2024-01-17 | End: 2024-02-01

## 2024-01-17 RX ORDER — LIDOCAINE HYDROCHLORIDE 10 MG/ML
INJECTION, SOLUTION INFILTRATION; PERINEURAL PRN
Status: DISCONTINUED | OUTPATIENT
Start: 2024-01-17 | End: 2024-01-17

## 2024-01-17 RX ORDER — FENTANYL CITRATE 50 UG/ML
INJECTION, SOLUTION INTRAMUSCULAR; INTRAVENOUS PRN
Status: DISCONTINUED | OUTPATIENT
Start: 2024-01-17 | End: 2024-01-17

## 2024-01-17 RX ORDER — ONDANSETRON 4 MG/1
4 TABLET, ORALLY DISINTEGRATING ORAL EVERY 30 MIN PRN
Status: DISCONTINUED | OUTPATIENT
Start: 2024-01-17 | End: 2024-01-17 | Stop reason: HOSPADM

## 2024-01-17 RX ORDER — OXYCODONE HYDROCHLORIDE 5 MG/1
10 TABLET ORAL
Status: DISCONTINUED | OUTPATIENT
Start: 2024-01-17 | End: 2024-01-17 | Stop reason: HOSPADM

## 2024-01-17 RX ORDER — SODIUM CHLORIDE, SODIUM LACTATE, POTASSIUM CHLORIDE, CALCIUM CHLORIDE 600; 310; 30; 20 MG/100ML; MG/100ML; MG/100ML; MG/100ML
INJECTION, SOLUTION INTRAVENOUS CONTINUOUS
Status: DISCONTINUED | OUTPATIENT
Start: 2024-01-17 | End: 2024-01-17 | Stop reason: HOSPADM

## 2024-01-17 RX ORDER — FENTANYL CITRATE 50 UG/ML
25 INJECTION, SOLUTION INTRAMUSCULAR; INTRAVENOUS EVERY 5 MIN PRN
Status: DISCONTINUED | OUTPATIENT
Start: 2024-01-17 | End: 2024-01-17 | Stop reason: HOSPADM

## 2024-01-17 RX ORDER — LIDOCAINE 40 MG/G
CREAM TOPICAL
Status: DISCONTINUED | OUTPATIENT
Start: 2024-01-17 | End: 2024-01-17 | Stop reason: HOSPADM

## 2024-01-17 RX ORDER — CEFAZOLIN SODIUM/WATER 2 G/20 ML
2 SYRINGE (ML) INTRAVENOUS SEE ADMIN INSTRUCTIONS
Status: DISCONTINUED | OUTPATIENT
Start: 2024-01-17 | End: 2024-01-17 | Stop reason: HOSPADM

## 2024-01-17 RX ORDER — PROPOFOL 10 MG/ML
INJECTION, EMULSION INTRAVENOUS PRN
Status: DISCONTINUED | OUTPATIENT
Start: 2024-01-17 | End: 2024-01-17

## 2024-01-17 RX ORDER — FENTANYL CITRATE 50 UG/ML
50 INJECTION, SOLUTION INTRAMUSCULAR; INTRAVENOUS EVERY 5 MIN PRN
Status: DISCONTINUED | OUTPATIENT
Start: 2024-01-17 | End: 2024-01-17 | Stop reason: HOSPADM

## 2024-01-17 RX ORDER — ACETAMINOPHEN 325 MG/1
975 TABLET ORAL ONCE
Status: COMPLETED | OUTPATIENT
Start: 2024-01-17 | End: 2024-01-17

## 2024-01-17 RX ORDER — ONDANSETRON 4 MG/1
4 TABLET, ORALLY DISINTEGRATING ORAL EVERY 8 HOURS PRN
Qty: 4 TABLET | Refills: 0 | Status: SHIPPED | OUTPATIENT
Start: 2024-01-17 | End: 2024-02-09

## 2024-01-17 RX ORDER — DEXAMETHASONE SODIUM PHOSPHATE 4 MG/ML
INJECTION, SOLUTION INTRA-ARTICULAR; INTRALESIONAL; INTRAMUSCULAR; INTRAVENOUS; SOFT TISSUE PRN
Status: DISCONTINUED | OUTPATIENT
Start: 2024-01-17 | End: 2024-01-17

## 2024-01-17 RX ORDER — AMOXICILLIN 250 MG
1-2 CAPSULE ORAL 2 TIMES DAILY
Qty: 30 TABLET | Refills: 0 | Status: SHIPPED | OUTPATIENT
Start: 2024-01-17 | End: 2024-02-13

## 2024-01-17 RX ORDER — OXYCODONE HYDROCHLORIDE 5 MG/1
5 TABLET ORAL
Status: DISCONTINUED | OUTPATIENT
Start: 2024-01-17 | End: 2024-01-17 | Stop reason: HOSPADM

## 2024-01-17 RX ORDER — CEFAZOLIN SODIUM/WATER 2 G/20 ML
2 SYRINGE (ML) INTRAVENOUS
Status: DISCONTINUED | OUTPATIENT
Start: 2024-01-17 | End: 2024-01-17 | Stop reason: HOSPADM

## 2024-01-17 RX ADMIN — ACETAMINOPHEN 975 MG: 325 TABLET ORAL at 06:41

## 2024-01-17 RX ADMIN — LIDOCAINE HYDROCHLORIDE 5 ML: 10 INJECTION, SOLUTION INFILTRATION; PERINEURAL at 07:32

## 2024-01-17 RX ADMIN — Medication 2 G: at 07:31

## 2024-01-17 RX ADMIN — FENTANYL CITRATE 50 MCG: 50 INJECTION, SOLUTION INTRAMUSCULAR; INTRAVENOUS at 08:24

## 2024-01-17 RX ADMIN — FENTANYL CITRATE 50 MCG: 50 INJECTION INTRAMUSCULAR; INTRAVENOUS at 07:32

## 2024-01-17 RX ADMIN — SODIUM CHLORIDE, POTASSIUM CHLORIDE, SODIUM LACTATE AND CALCIUM CHLORIDE: 600; 310; 30; 20 INJECTION, SOLUTION INTRAVENOUS at 06:41

## 2024-01-17 RX ADMIN — ROCURONIUM BROMIDE 50 MG: 50 INJECTION, SOLUTION INTRAVENOUS at 07:32

## 2024-01-17 RX ADMIN — SUGAMMADEX 200 MG: 100 INJECTION, SOLUTION INTRAVENOUS at 08:05

## 2024-01-17 RX ADMIN — DEXAMETHASONE SODIUM PHOSPHATE 4 MG: 4 INJECTION, SOLUTION INTRA-ARTICULAR; INTRALESIONAL; INTRAMUSCULAR; INTRAVENOUS; SOFT TISSUE at 07:32

## 2024-01-17 RX ADMIN — PHENYLEPHRINE HYDROCHLORIDE 100 MCG: 10 INJECTION INTRAVENOUS at 08:04

## 2024-01-17 RX ADMIN — ONDANSETRON 4 MG: 2 INJECTION INTRAMUSCULAR; INTRAVENOUS at 08:04

## 2024-01-17 RX ADMIN — PROPOFOL 200 MG: 10 INJECTION, EMULSION INTRAVENOUS at 07:32

## 2024-01-17 ASSESSMENT — ACTIVITIES OF DAILY LIVING (ADL)
ADLS_ACUITY_SCORE: 35
ADLS_ACUITY_SCORE: 35

## 2024-01-17 NOTE — H&P
Urology Hand P    Name:  Alek Mcneill  MRN:  [id number]  Age/: 73 year old, 1950    CC: Preop for surgery for bladder cancer    HPI: Alek Mcneill is a(n) 73 year old male with High grade T1 bladder cancer.Here for restage TURBT  Doing well in general  No recent change in overall health    Past Medical History:  Past Medical History:   Diagnosis Date    Complication of anesthesia     DM2 (diabetes mellitus, type 2) (H)     Hypercholesteremia     Hyperlipidemia     Hypertension        Past Surgical History:  Past Surgical History:   Procedure Laterality Date    COLON SURGERY      CYSTOSCOPY, TRANSURETHRAL RESECTION (TUR) TUMOR BLADDER, COMBINED N/A 2023    Procedure: CYSTOSCOPY, WITH TRANSURETHRAL RESECTION BLADDER TUMOR,;  Surgeon: Damien Layton MD;  Location: [facility] OR       Allergies:   No Known Allergies    Medications:  No current facility-administered medications on file prior to encounter.  acetaminophen (TYLENOL) 325 MG tablet, Take 2 tablets (650 mg) by mouth every 4 hours as needed for mild pain  hydrochlorothiazide (HYDRODIURIL) 25 MG tablet, Take 25 mg by mouth every morning  ketoconazole (NIZORAL) 2 % external cream, Apply topically as needed  ondansetron (ZOFRAN ODT) 4 MG ODT tab, Take 1 tablet (4 mg) by mouth every 8 hours as needed for nausea  simvastatin (ZOCOR) 40 MG tablet, Take 40 mg by mouth at bedtime  aspirin 81 mg chewable tablet, [ASPIRIN 81 MG CHEWABLE TABLET] Chew 81 mg daily.  fish oil-omega-3 fatty acids 500 MG capsule, 2 capsules        Social History:  Social History     Socioeconomic History    Marital status:      Spouse name: Not on file    Number of children: Not on file    Years of education: Not on file    Highest education level: Not on file   Occupational History    Not on file   Tobacco Use    Smoking status: Former     Types: Cigarettes     Quit date:      Years since quittin.0    Smokeless tobacco: Not on file   Vaping Use    Vaping  "Use: Never used   Substance and Sexual Activity    Alcohol use: Yes     Comment: daily    Drug use: Never    Sexual activity: Not on file   Other Topics Concern    Not on file   Social History Narrative    Not on file     Social Determinants of Health     Financial Resource Strain: Not on file   Food Insecurity: Not on file   Transportation Needs: Not on file   Physical Activity: Not on file   Stress: Not on file   Social Connections: Not on file   Interpersonal Safety: Not on file   Housing Stability: Not on file       Family History:  History reviewed. No pertinent family history.    ROS:  The remainder of the complete ROS was negative unless noted in the HPI.    Exam:  /72 (BP Location: Right arm)   Pulse 76   Temp 98.6  F (37  C) (Oral)   Resp 18   Ht 1.727 m (5' 8\")   Wt 108.9 kg (240 lb)   SpO2 96%   BMI 36.49 kg/m    General: Alert, interactive, & in NAD  Resp: CTAB, no crackles or wheezes  Cardiac: Regular rate; extremities warm;   Abdomen: Soft, nontender, nondistended. .  : Normal   Extremities: No LE edema or obvious joint abnormalities  Skin: Warm and dry, no jaundice or rash    Labs:  Lab Results   Component Value Date    WBC 10.0 11/19/2023    HGB 15.7 11/19/2023    HCT 44.5 11/19/2023     11/19/2023     11/19/2023    POTASSIUM 4.2 11/19/2023    CHLORIDE 100 11/19/2023    CO2 29 11/19/2023    BUN 17.6 11/19/2023    CR 1.13 11/19/2023     (H) 01/17/2024    AST 47 (H) 09/08/2023    ALT 53 09/08/2023    ALKPHOS 59 09/08/2023    BILITOTAL 0.7 09/08/2023    INR 0.82 (L) 11/19/2023             Assessment and Plan: Alek Mcneill is a(n) 73 year old male here today for cysto and TURBT. Based on my evaluation today, he is fit to undergo the aforementioned  procedure.      Damien Layton MD  [facility]    "

## 2024-01-17 NOTE — ANESTHESIA PREPROCEDURE EVALUATION
Anesthesia Pre-Procedure Evaluation    Patient: Alek Mcneill   MRN: [id number] : 1950        Procedure : Procedure(s):  CYSTOSCOPY, WITH TRANSURETHRAL RESECTION BLADDER TUMOR          Past Medical History:   Diagnosis Date     Complication of anesthesia      DM2 (diabetes mellitus, type 2) (H)      Hypercholesteremia      Hyperlipidemia      Hypertension       Past Surgical History:   Procedure Laterality Date     COLON SURGERY       CYSTOSCOPY, TRANSURETHRAL RESECTION (TUR) TUMOR BLADDER, COMBINED N/A 2023    Procedure: CYSTOSCOPY, WITH TRANSURETHRAL RESECTION BLADDER TUMOR,;  Surgeon: Damien Layton MD;  Location: [facility] [place] [place] OR      No Known Allergies   Social History     Tobacco Use     Smoking status: Former     Types: Cigarettes     Quit date:      Years since quittin.0     Smokeless tobacco: Not on file   Substance Use Topics     Alcohol use: Yes     Comment: daily      Wt Readings from Last 1 Encounters:   24 108.9 kg (240 lb)        Anesthesia Evaluation   Pt has had prior anesthetic.         ROS/MED HX  ENT/Pulmonary:       Neurologic:       Cardiovascular:     (+)  hypertension- -   -  - -                                      METS/Exercise Tolerance:     Hematologic:       Musculoskeletal:       GI/Hepatic:       Renal/Genitourinary:       Endo:     (+)  type II DM,                    Psychiatric/Substance Use:       Infectious Disease:       Malignancy:       Other:            Physical Exam    Airway        Mallampati: III   TM distance: > 3 FB   Neck ROM: full     Respiratory Devices and Support         Dental       (+) Minor Abnormalities - some fillings, tiny chips      Cardiovascular   cardiovascular exam normal          Pulmonary   pulmonary exam normal            OUTSIDE LABS:  CBC:   Lab Results   Component Value Date    WBC 10.0 2023    WBC 10.8 2020    HGB 15.7 2023    HGB 15.5 2020    HCT 44.5 2023    HCT 44.2 2020    PLT  "242 11/19/2023     12/23/2020     BMP:   Lab Results   Component Value Date     11/19/2023     09/08/2023    POTASSIUM 4.2 11/19/2023    POTASSIUM 4.7 09/08/2023    CHLORIDE 100 11/19/2023    CHLORIDE 99 09/08/2023    CO2 29 11/19/2023    CO2 25 09/08/2023    BUN 17.6 11/19/2023    BUN 17.0 09/08/2023    CR 1.13 11/19/2023    CR 1.18 (H) 09/08/2023     (H) 01/17/2024     (H) 12/06/2023     COAGS:   Lab Results   Component Value Date    PTT 26 11/19/2023    INR 0.82 (L) 11/19/2023     POC: No results found for: \"BGM\", \"HCG\", \"HCGS\"  HEPATIC:   Lab Results   Component Value Date    ALBUMIN 4.6 09/08/2023    PROTTOTAL 7.1 09/08/2023    ALT 53 09/08/2023    AST 47 (H) 09/08/2023    ALKPHOS 59 09/08/2023    BILITOTAL 0.7 09/08/2023     OTHER:   Lab Results   Component Value Date    A1C 5.6 03/10/2016    MILLI 9.7 11/19/2023    MAG 2.3 11/19/2023    TSH 3.70 08/18/2022       Anesthesia Plan    ASA Status:  3       Anesthesia Type: General.     - Airway: LMA              Consents    Anesthesia Plan(s) and associated risks, benefits, and realistic alternatives discussed. Questions answered and patient/representative(s) expressed understanding.     - Discussed: Risks, Benefits and Alternatives for BOTH SEDATION and the PROCEDURE were discussed     - Discussed with:  Patient            Postoperative Care    Pain management: IV analgesics, Oral pain medications.   PONV prophylaxis: Ondansetron (or other 5HT-3), Dexamethasone or Solumedrol     Comments:               Sean Contreras DO    I have reviewed the pertinent notes and labs in the chart from the past 30 days and (re)examined the patient.  Any updates or changes from those notes are reflected in this note.             # Drug Induced Platelet Defect: home medication list includes an antiplatelet medication  # Obesity: Estimated body mass index is 36.49 kg/m  as calculated from the following:    Height as of this encounter: 1.727 m " "(5' 8\").    Weight as of this encounter: 108.9 kg (240 lb).      "

## 2024-01-17 NOTE — OP NOTE
OPERATIVE REPORT    PREOPERATIVE DIAGNOSIS:   1) HG T1 Bladder Cancer      POSTOPERATIVE DIAGNOSIS:  Same    PROCEDURE PERFORMED: Transurethral resection of Bladder Tumor 2 cm to 5 cm     ATTENDING SURGEON: Damien Layton MD  FINDINGS:  3 cm tumor area which was previously resected, located on the anterior all near the dome     ANESTHESIA: General   INTRAVENOUS FLUIDS: See dictated anesthesia record  ESTIMATED BLOOD LOSS: 1 mL.     SPECIMENS:   1. Base of bladder tumor    DRAINS:   None    INDICATIONS FOR PROCEDURE: Alek Mcneill is a(n) 73 year old male who was seen in consultation for HG T1 bladder cancer. He is here for restaging surgery after initial resection. After discussion of the risks, benefits and alternatives of the procedure, the patient agreed to proceed with transurethral resection of his bladder tumor.     DESCRIPTION OF PROCEDURE: After verifying informed consent, the patient was taken to the operating room. Adequate anesthesia was induced, the patient was placed in the dorsal lithotomy position and prepped and draped in standard sterile fashion. A timeout was performed to verify correct patient and procedure. Pneumo boots and perioperative antibiotics were in place before the procedure commenced.     A 22-Indian rigid cystoscope was inserted into a well lubricated urethra. We began by performing a white light cystourethroscopy. The urethra was unremarkable. The ureteral orifices were in their orthotopic positions bilaterally. Area of prior resection appears well healed on the anterior wall and near the bladder dome, measuring around 3 cm in size. No new tumors were seen.    We then introduced the 26-Indian bipolar Gyrus resectoscope. We resected tissue down to muscle using cut electrocautery. Once we had completed our resection, we evacuated the specimens. We then reintroduced the resectoscope to ensure meticulous hemostasis.     No catheters were kept, bladder was drained. The patient was awoken  from anesthesia and transferred to the recovery room in stable condition.     POSTOPERATIVE PLAN:   1. I will update him the results of the procedure in the next 1-2 weeks  2. Possible plan for intravesical BCG treatment.    Damien Layton MD

## 2024-01-17 NOTE — ANESTHESIA PROCEDURE NOTES
Airway       Patient location during procedure: OR       Procedure Start/Stop Times: 1/17/2024 7:35 AM  Staff -        CRNA: Katy Galvan APRN CRNA       Performed By: CRNA  Consent for Airway        Urgency: elective  Indications and Patient Condition       Indications for airway management: bernadette-procedural       Induction type:intravenous       Mask difficulty assessment: 2 - vent by mask + OA or adjuvant +/- NMBA    Final Airway Details       Final airway type: endotracheal airway       Successful airway: ETT - single  Endotracheal Airway Details        ETT size (mm): 7.5       Cuffed: yes       Successful intubation technique: direct laryngoscopy       DL Blade Type: MAC 4       Grade View of Cords: 1       Adjucts: stylet       Position: Center       Measured from: gums/teeth       Secured at (cm): 23       Bite block used: None    Post intubation assessment        Placement verified by: capnometry, equal breath sounds and chest rise        Number of attempts at approach: 1       Number of other approaches attempted: 0       Secured with: tape       Ease of procedure: easy       Dentition: Intact and Unchanged    Medication(s) Administered   Medication Administration Time: 1/17/2024 7:35 AM

## 2024-01-17 NOTE — TELEPHONE ENCOUNTER
Patient called complaining of a burning and rash near the site of the prep for his surgery, I added it to his allergy list.  Andria Braden LPN

## 2024-01-17 NOTE — ANESTHESIA CARE TRANSFER NOTE
Patient: Alek Mcneill    Procedure: Procedure(s):  CYSTOSCOPY, WITH RESTAGE TRANSURETHRAL RESECTION BLADDER TUMOR       Diagnosis: Malignant neoplasm of anterior wall of urinary bladder (H) [C67.3]  Diagnosis Additional Information: No value filed.    Anesthesia Type:   General     Note:    Oropharynx: oropharynx clear of all foreign objects  Level of Consciousness: awake  Oxygen Supplementation: face mask (oxymask)  Level of Supplemental Oxygen (L/min / FiO2): 10  Independent Airway: airway patency satisfactory and stable  Dentition: dentition unchanged  Vital Signs Stable: post-procedure vital signs reviewed and stable  Report to RN Given: handoff report given  Patient transferred to: PACU    Handoff Report: Identifed the Patient, Identified the Reponsible Provider, Reviewed the pertinent medical history, Discussed the surgical course, Reviewed Intra-OP anesthesia mangement and issues during anesthesia, Set expectations for post-procedure period and Allowed opportunity for questions and acknowledgement of understanding    Vitals:  Vitals Value Taken Time   /91 01/17/24 0818   Temp     Pulse 74 01/17/24 0819   Resp 16 01/17/24 0819   SpO2 100 % 01/17/24 0819   Vitals shown include unfiled device data.    Electronically Signed By: MARIANA Javire CRNA  January 17, 2024  8:21 AM

## 2024-01-17 NOTE — ANESTHESIA POSTPROCEDURE EVALUATION
Patient: Alek Mcneill    Procedure: Procedure(s):  CYSTOSCOPY, WITH RESTAGE TRANSURETHRAL RESECTION BLADDER TUMOR       Anesthesia Type:  General    Note:  Disposition: Outpatient   Postop Pain Control: Uneventful            Sign Out: Well controlled pain   PONV: No   Neuro/Psych: Uneventful            Sign Out: Acceptable/Baseline neuro status   Airway/Respiratory: Uneventful            Sign Out: Acceptable/Baseline resp. status   CV/Hemodynamics: Uneventful            Sign Out: Acceptable CV status; No obvious hypovolemia; No obvious fluid overload   Other NRE: NONE   DID A NON-ROUTINE EVENT OCCUR? No    Event details/Postop Comments:  Patient recovering comfortably.        Last vitals:  Vitals Value Taken Time   /84 01/17/24 0900   Temp 36.3  C (97.4  F) 01/17/24 0910   Pulse 69 01/17/24 0910   Resp 19 01/17/24 0907   SpO2 94 % 01/17/24 0910   Vitals shown include unfiled device data.    Electronically Signed By: Sean Contreras DO  January 17, 2024  9:40 AM

## 2024-01-18 ENCOUNTER — HOSPITAL ENCOUNTER (OUTPATIENT)
Facility: HOSPITAL | Age: 74
Setting detail: OBSERVATION
Discharge: HOME | End: 2024-01-20
Attending: EMERGENCY MEDICINE | Admitting: EMERGENCY MEDICINE
Payer: COMMERCIAL

## 2024-01-18 ENCOUNTER — TELEPHONE (OUTPATIENT)
Dept: NURSING | Facility: CLINIC | Age: 74
End: 2024-01-18

## 2024-01-18 DIAGNOSIS — R31.0 GROSS HEMATURIA: ICD-10-CM

## 2024-01-18 DIAGNOSIS — R33.9 URINARY RETENTION: ICD-10-CM

## 2024-01-18 DIAGNOSIS — D49.4 BLADDER TUMOR: ICD-10-CM

## 2024-01-18 LAB
ALBUMIN UR-MCNC: ABNORMAL G/DL
ANION GAP SERPL CALCULATED.3IONS-SCNC: 9 MMOL/L (ref 7–15)
APPEARANCE UR: ABNORMAL
BASOPHILS # BLD AUTO: 0.1 10E3/UL (ref 0–0.2)
BASOPHILS NFR BLD AUTO: 0 %
BILIRUB UR QL STRIP: ABNORMAL
BUN SERPL-MCNC: 19.3 MG/DL (ref 8–23)
CALCIUM SERPL-MCNC: 8.7 MG/DL (ref 8.8–10.2)
CHLORIDE SERPL-SCNC: 96 MMOL/L (ref 98–107)
COLOR UR AUTO: ABNORMAL
CORTIS SERPL-MCNC: 3.6 UG/DL
CREAT SERPL-MCNC: 1.18 MG/DL (ref 0.67–1.17)
DEPRECATED HCO3 PLAS-SCNC: 27 MMOL/L (ref 22–29)
EGFRCR SERPLBLD CKD-EPI 2021: 65 ML/MIN/1.73M2
EOSINOPHIL # BLD AUTO: 0.2 10E3/UL (ref 0–0.7)
EOSINOPHIL NFR BLD AUTO: 1 %
ERYTHROCYTE [DISTWIDTH] IN BLOOD BY AUTOMATED COUNT: 12.6 % (ref 10–15)
GLUCOSE SERPL-MCNC: 139 MG/DL (ref 70–99)
GLUCOSE UR STRIP-MCNC: ABNORMAL MG/DL
HCT VFR BLD AUTO: 38.4 % (ref 40–53)
HGB BLD-MCNC: 13.7 G/DL (ref 13.3–17.7)
HGB UR QL STRIP: ABNORMAL
IMM GRANULOCYTES # BLD: 0.1 10E3/UL
IMM GRANULOCYTES NFR BLD: 1 %
KETONES UR STRIP-MCNC: ABNORMAL MG/DL
LEUKOCYTE ESTERASE UR QL STRIP: ABNORMAL
LYMPHOCYTES # BLD AUTO: 2.9 10E3/UL (ref 0.8–5.3)
LYMPHOCYTES NFR BLD AUTO: 21 %
MCH RBC QN AUTO: 32.7 PG (ref 26.5–33)
MCHC RBC AUTO-ENTMCNC: 35.7 G/DL (ref 31.5–36.5)
MCV RBC AUTO: 92 FL (ref 78–100)
MONOCYTES # BLD AUTO: 1.3 10E3/UL (ref 0–1.3)
MONOCYTES NFR BLD AUTO: 10 %
NEUTROPHILS # BLD AUTO: 9.3 10E3/UL (ref 1.6–8.3)
NEUTROPHILS NFR BLD AUTO: 67 %
NITRATE UR QL: ABNORMAL
NRBC # BLD AUTO: 0 10E3/UL
NRBC BLD AUTO-RTO: 0 /100
OSMOLALITY UR: 395 MMOL/KG (ref 100–1200)
PATH REPORT.COMMENTS IMP SPEC: ABNORMAL
PATH REPORT.COMMENTS IMP SPEC: YES
PATH REPORT.FINAL DX SPEC: ABNORMAL
PATH REPORT.GROSS SPEC: ABNORMAL
PATH REPORT.MICROSCOPIC SPEC OTHER STN: ABNORMAL
PATH REPORT.RELEVANT HX SPEC: ABNORMAL
PATHOLOGY SYNOPTIC REPORT: ABNORMAL
PH UR STRIP: ABNORMAL [PH]
PHOTO IMAGE: ABNORMAL
PLATELET # BLD AUTO: 208 10E3/UL (ref 150–450)
POTASSIUM SERPL-SCNC: 4.1 MMOL/L (ref 3.4–5.3)
RBC # BLD AUTO: 4.19 10E6/UL (ref 4.4–5.9)
RBC URINE: >182 /HPF
SODIUM SERPL-SCNC: 132 MMOL/L (ref 135–145)
SODIUM UR-SCNC: 45 MMOL/L
SP GR UR STRIP: ABNORMAL
TSH SERPL DL<=0.005 MIU/L-ACNC: 2.84 UIU/ML (ref 0.3–4.2)
UROBILINOGEN UR STRIP-MCNC: ABNORMAL MG/DL
WBC # BLD AUTO: 13.8 10E3/UL (ref 4–11)
WBC URINE: >182 /HPF

## 2024-01-18 PROCEDURE — G0378 HOSPITAL OBSERVATION PER HR: HCPCS

## 2024-01-18 PROCEDURE — 96372 THER/PROPH/DIAG INJ SC/IM: CPT | Performed by: EMERGENCY MEDICINE

## 2024-01-18 PROCEDURE — 96365 THER/PROPH/DIAG IV INF INIT: CPT

## 2024-01-18 PROCEDURE — 51798 US URINE CAPACITY MEASURE: CPT

## 2024-01-18 PROCEDURE — 81001 URINALYSIS AUTO W/SCOPE: CPT | Performed by: EMERGENCY MEDICINE

## 2024-01-18 PROCEDURE — 250N000013 HC RX MED GY IP 250 OP 250 PS 637: Performed by: STUDENT IN AN ORGANIZED HEALTH CARE EDUCATION/TRAINING PROGRAM

## 2024-01-18 PROCEDURE — 51702 INSERT TEMP BLADDER CATH: CPT

## 2024-01-18 PROCEDURE — 96361 HYDRATE IV INFUSION ADD-ON: CPT

## 2024-01-18 PROCEDURE — 84300 ASSAY OF URINE SODIUM: CPT | Performed by: INTERNAL MEDICINE

## 2024-01-18 PROCEDURE — 83935 ASSAY OF URINE OSMOLALITY: CPT | Performed by: INTERNAL MEDICINE

## 2024-01-18 PROCEDURE — 85025 COMPLETE CBC W/AUTO DIFF WBC: CPT | Performed by: EMERGENCY MEDICINE

## 2024-01-18 PROCEDURE — 88307 TISSUE EXAM BY PATHOLOGIST: CPT | Mod: 26 | Performed by: PATHOLOGY

## 2024-01-18 PROCEDURE — 99223 1ST HOSP IP/OBS HIGH 75: CPT | Performed by: INTERNAL MEDICINE

## 2024-01-18 PROCEDURE — 36415 COLL VENOUS BLD VENIPUNCTURE: CPT | Performed by: EMERGENCY MEDICINE

## 2024-01-18 PROCEDURE — 250N000011 HC RX IP 250 OP 636: Performed by: INTERNAL MEDICINE

## 2024-01-18 PROCEDURE — 82533 TOTAL CORTISOL: CPT | Performed by: INTERNAL MEDICINE

## 2024-01-18 PROCEDURE — 84443 ASSAY THYROID STIM HORMONE: CPT | Performed by: INTERNAL MEDICINE

## 2024-01-18 PROCEDURE — 99221 1ST HOSP IP/OBS SF/LOW 40: CPT | Performed by: UROLOGY

## 2024-01-18 PROCEDURE — 250N000011 HC RX IP 250 OP 636: Performed by: EMERGENCY MEDICINE

## 2024-01-18 PROCEDURE — 258N000003 HC RX IP 258 OP 636: Performed by: EMERGENCY MEDICINE

## 2024-01-18 PROCEDURE — 250N000013 HC RX MED GY IP 250 OP 250 PS 637: Performed by: INTERNAL MEDICINE

## 2024-01-18 PROCEDURE — 80048 BASIC METABOLIC PNL TOTAL CA: CPT | Performed by: EMERGENCY MEDICINE

## 2024-01-18 PROCEDURE — 87086 URINE CULTURE/COLONY COUNT: CPT | Performed by: EMERGENCY MEDICINE

## 2024-01-18 PROCEDURE — 258N000001 HC RX 258: Performed by: STUDENT IN AN ORGANIZED HEALTH CARE EDUCATION/TRAINING PROGRAM

## 2024-01-18 PROCEDURE — 99285 EMERGENCY DEPT VISIT HI MDM: CPT | Mod: 25

## 2024-01-18 RX ORDER — HYDROMORPHONE HCL IN WATER/PF 6 MG/30 ML
0.4 PATIENT CONTROLLED ANALGESIA SYRINGE INTRAVENOUS
Status: DISCONTINUED | OUTPATIENT
Start: 2024-01-18 | End: 2024-01-20 | Stop reason: HOSPADM

## 2024-01-18 RX ORDER — COSYNTROPIN 0.25 MG/ML
250 INJECTION, POWDER, FOR SOLUTION INTRAMUSCULAR; INTRAVENOUS ONCE
Status: COMPLETED | OUTPATIENT
Start: 2024-01-19 | End: 2024-01-19

## 2024-01-18 RX ORDER — OXYCODONE HYDROCHLORIDE 5 MG/1
5 TABLET ORAL EVERY 4 HOURS PRN
Status: DISCONTINUED | OUTPATIENT
Start: 2024-01-18 | End: 2024-01-20 | Stop reason: HOSPADM

## 2024-01-18 RX ORDER — NALOXONE HYDROCHLORIDE 0.4 MG/ML
0.4 INJECTION, SOLUTION INTRAMUSCULAR; INTRAVENOUS; SUBCUTANEOUS
Status: DISCONTINUED | OUTPATIENT
Start: 2024-01-18 | End: 2024-01-20 | Stop reason: HOSPADM

## 2024-01-18 RX ORDER — HYDRALAZINE HYDROCHLORIDE 20 MG/ML
10 INJECTION INTRAMUSCULAR; INTRAVENOUS EVERY 4 HOURS PRN
Status: DISCONTINUED | OUTPATIENT
Start: 2024-01-18 | End: 2024-01-18

## 2024-01-18 RX ORDER — ONDANSETRON 2 MG/ML
4 INJECTION INTRAMUSCULAR; INTRAVENOUS EVERY 6 HOURS PRN
Status: DISCONTINUED | OUTPATIENT
Start: 2024-01-18 | End: 2024-01-20 | Stop reason: HOSPADM

## 2024-01-18 RX ORDER — HYDRALAZINE HYDROCHLORIDE 20 MG/ML
10 INJECTION INTRAMUSCULAR; INTRAVENOUS EVERY 4 HOURS PRN
Status: DISCONTINUED | OUTPATIENT
Start: 2024-01-18 | End: 2024-01-20 | Stop reason: HOSPADM

## 2024-01-18 RX ORDER — PROCHLORPERAZINE 25 MG
12.5 SUPPOSITORY, RECTAL RECTAL EVERY 12 HOURS PRN
Status: DISCONTINUED | OUTPATIENT
Start: 2024-01-18 | End: 2024-01-20 | Stop reason: HOSPADM

## 2024-01-18 RX ORDER — NALOXONE HYDROCHLORIDE 0.4 MG/ML
0.2 INJECTION, SOLUTION INTRAMUSCULAR; INTRAVENOUS; SUBCUTANEOUS
Status: DISCONTINUED | OUTPATIENT
Start: 2024-01-18 | End: 2024-01-20 | Stop reason: HOSPADM

## 2024-01-18 RX ORDER — SODIUM CHLORIDE 9 MG/ML
INJECTION, SOLUTION INTRAVENOUS CONTINUOUS
Status: DISCONTINUED | OUTPATIENT
Start: 2024-01-18 | End: 2024-01-18

## 2024-01-18 RX ORDER — PROCHLORPERAZINE MALEATE 5 MG
5 TABLET ORAL EVERY 6 HOURS PRN
Status: DISCONTINUED | OUTPATIENT
Start: 2024-01-18 | End: 2024-01-20 | Stop reason: HOSPADM

## 2024-01-18 RX ORDER — SIMVASTATIN 40 MG
40 TABLET ORAL AT BEDTIME
Status: DISCONTINUED | OUTPATIENT
Start: 2024-01-18 | End: 2024-01-20 | Stop reason: HOSPADM

## 2024-01-18 RX ORDER — HYDROMORPHONE HYDROCHLORIDE 1 MG/ML
0.5 INJECTION, SOLUTION INTRAMUSCULAR; INTRAVENOUS; SUBCUTANEOUS ONCE
Status: COMPLETED | OUTPATIENT
Start: 2024-01-18 | End: 2024-01-18

## 2024-01-18 RX ORDER — HYDROMORPHONE HYDROCHLORIDE 1 MG/ML
0.5 INJECTION, SOLUTION INTRAMUSCULAR; INTRAVENOUS; SUBCUTANEOUS ONCE
Status: DISCONTINUED | OUTPATIENT
Start: 2024-01-18 | End: 2024-01-18

## 2024-01-18 RX ORDER — VITAMIN B COMPLEX
25 TABLET ORAL DAILY
COMMUNITY

## 2024-01-18 RX ORDER — HYDROMORPHONE HCL IN WATER/PF 6 MG/30 ML
0.2 PATIENT CONTROLLED ANALGESIA SYRINGE INTRAVENOUS
Status: DISCONTINUED | OUTPATIENT
Start: 2024-01-18 | End: 2024-01-20 | Stop reason: HOSPADM

## 2024-01-18 RX ORDER — ACETAMINOPHEN 325 MG/1
650 TABLET ORAL EVERY 4 HOURS PRN
Status: DISCONTINUED | OUTPATIENT
Start: 2024-01-18 | End: 2024-01-20 | Stop reason: HOSPADM

## 2024-01-18 RX ORDER — ONDANSETRON 4 MG/1
4 TABLET, ORALLY DISINTEGRATING ORAL EVERY 6 HOURS PRN
Status: DISCONTINUED | OUTPATIENT
Start: 2024-01-18 | End: 2024-01-20 | Stop reason: HOSPADM

## 2024-01-18 RX ORDER — POLYETHYLENE GLYCOL 3350 17 G/17G
17 POWDER, FOR SOLUTION ORAL 2 TIMES DAILY PRN
Status: DISCONTINUED | OUTPATIENT
Start: 2024-01-18 | End: 2024-01-20 | Stop reason: HOSPADM

## 2024-01-18 RX ORDER — TAMSULOSIN HYDROCHLORIDE 0.4 MG/1
0.4 CAPSULE ORAL DAILY
Status: DISCONTINUED | OUTPATIENT
Start: 2024-01-18 | End: 2024-01-19

## 2024-01-18 RX ORDER — BISACODYL 10 MG
10 SUPPOSITORY, RECTAL RECTAL DAILY PRN
Status: DISCONTINUED | OUTPATIENT
Start: 2024-01-18 | End: 2024-01-20 | Stop reason: HOSPADM

## 2024-01-18 RX ORDER — CEFTRIAXONE 2 G/1
2 INJECTION, POWDER, FOR SOLUTION INTRAMUSCULAR; INTRAVENOUS EVERY 24 HOURS
Status: DISCONTINUED | OUTPATIENT
Start: 2024-01-18 | End: 2024-01-19

## 2024-01-18 RX ORDER — LIDOCAINE HYDROCHLORIDE 20 MG/ML
JELLY TOPICAL
Status: DISCONTINUED
Start: 2024-01-18 | End: 2024-01-18 | Stop reason: WASHOUT

## 2024-01-18 RX ORDER — ACETAMINOPHEN 650 MG/1
650 SUPPOSITORY RECTAL EVERY 4 HOURS PRN
Status: DISCONTINUED | OUTPATIENT
Start: 2024-01-18 | End: 2024-01-20 | Stop reason: HOSPADM

## 2024-01-18 RX ORDER — OXYCODONE HYDROCHLORIDE 5 MG/1
10 TABLET ORAL EVERY 4 HOURS PRN
Status: DISCONTINUED | OUTPATIENT
Start: 2024-01-18 | End: 2024-01-20 | Stop reason: HOSPADM

## 2024-01-18 RX ADMIN — SODIUM CHLORIDE 3000 ML: 900 IRRIGANT IRRIGATION at 08:36

## 2024-01-18 RX ADMIN — TAMSULOSIN HYDROCHLORIDE 0.4 MG: 0.4 CAPSULE ORAL at 08:35

## 2024-01-18 RX ADMIN — SODIUM CHLORIDE 3000 ML: 900 IRRIGANT IRRIGATION at 14:55

## 2024-01-18 RX ADMIN — SODIUM CHLORIDE 3000 ML: 900 IRRIGANT IRRIGATION at 11:34

## 2024-01-18 RX ADMIN — SODIUM CHLORIDE: 9 INJECTION, SOLUTION INTRAVENOUS at 06:48

## 2024-01-18 RX ADMIN — SODIUM CHLORIDE 3000 ML: 900 IRRIGANT IRRIGATION at 11:35

## 2024-01-18 RX ADMIN — CEFTRIAXONE SODIUM 2 G: 2 INJECTION, POWDER, FOR SOLUTION INTRAMUSCULAR; INTRAVENOUS at 08:36

## 2024-01-18 RX ADMIN — SODIUM CHLORIDE 3000 ML: 900 IRRIGANT IRRIGATION at 14:56

## 2024-01-18 RX ADMIN — HYDROMORPHONE HYDROCHLORIDE 0.5 MG: 1 INJECTION, SOLUTION INTRAMUSCULAR; INTRAVENOUS; SUBCUTANEOUS at 05:40

## 2024-01-18 RX ADMIN — SIMVASTATIN 40 MG: 40 TABLET, FILM COATED ORAL at 20:47

## 2024-01-18 ASSESSMENT — ACTIVITIES OF DAILY LIVING (ADL)
ADLS_ACUITY_SCORE: 35
ADLS_ACUITY_SCORE: 36
ADLS_ACUITY_SCORE: 35
DEPENDENT_IADLS:: INDEPENDENT
ADLS_ACUITY_SCORE: 35
ADLS_ACUITY_SCORE: 36
ADLS_ACUITY_SCORE: 36

## 2024-01-18 NOTE — PHARMACY-ADMISSION MEDICATION HISTORY
Pharmacist Admission Medication History    Admission medication history is complete. The information provided in this note is only as accurate as the sources available at the time of the update.    Information Source(s): Patient and CareEverywhere/SureScripts via in-person    Pertinent Information: none    Changes made to PTA medication list:  Added: vit d and vit c  Deleted: None  Changed: None    Medication Affordability:       Allergies reviewed with patient and updates made in EHR: yes    Medication History Completed By: Ana Lyn [facility] 1/18/2024 7:39 AM    PTA Med List   Medication Sig Last Dose    acetaminophen (TYLENOL) 325 MG tablet Take 2 tablets (650 mg) by mouth every 4 hours as needed for mild pain Unknown at prn    Ascorbic Acid (VITAMIN C) 500 MG CHEW Take 1 chew tab by mouth daily Past Month at held 10 days prior to procedure    aspirin 81 mg chewable tablet [ASPIRIN 81 MG CHEWABLE TABLET] Chew 81 mg daily. Past Month at held 10 days prior to procedure    fish oil-omega-3 fatty acids 500 MG capsule 2 capsules Past Month at held 10 days prior to procedure    hydrochlorothiazide (HYDRODIURIL) 25 MG tablet Take 25 mg by mouth every morning Past Month at held 10 days prior to procedure    ketoconazole (NIZORAL) 2 % external cream Apply topically as needed Unknown at prn    ondansetron (ZOFRAN ODT) 4 MG ODT tab Take 1 tablet (4 mg) by mouth every 8 hours as needed for nausea Unknown at prn, hasn't taken    oxyBUTYnin (DITROPAN) 5 MG tablet Take 1 tablet (5 mg) by mouth 3 times daily as needed for bladder spasms 1/17/2024 at 1500    senna-docusate (SENOKOT-S/PERICOLACE) 8.6-50 MG tablet Take 1-2 tablets by mouth 2 times daily 1/17/2024 at pm    simvastatin (ZOCOR) 40 MG tablet Take 40 mg by mouth at bedtime 1/17/2024 at pm    Vitamin D3 (VITAMIN D, CHOLECALCIFEROL,) 25 mcg (1000 units) tablet Take 25 mcg by mouth daily Past Month at held 10 days prior to procedure

## 2024-01-18 NOTE — CONSULTS
UROLOGY CONSULT NOTE     Chief Complaint:   Hematuria      Synopsis    Alek Mcneill is a very pleasant AGE: 73 year old year old person    He underwent transurethral resection of bladder tumor yesterday with Dr. Layton.  Initially was doing well but hematuria worsened over the course of the day and night and he presented to the emergency room this morning with clot retention.  A catheter was placed and he has been running on continuous bladder irrigation for the last 2 hours feeling much better.  He notes that he has had difficulty emptying after similar procedures in the past though this is the first time he left without a catheter.    On exam his urine is light pink on a moderate drip.  Catheter is a 20 Irish three-way.  I recommended upsizing the catheter to ensure that there are no clots in the bladder but he was very uncomfortable with initial catheter insertion and deferred.    I irrigated the catheter by hand with 1 L of normal saline.  I did get out several small clots in the catheter flushed quite easily.  I have a low suspicion that there is a large amount of clot in the bladder.  Urine cleared entirely and was faint pink, grade 1 on a moderate drip after irrigation.           Medications     Current Facility-Administered Medications   Medication    acetaminophen (TYLENOL) tablet 650 mg    Or    acetaminophen (TYLENOL) Suppository 650 mg    bisacodyl (DULCOLAX) suppository 10 mg    cefTRIAXone (ROCEPHIN) 2 g vial to attach to  ml bag for ADULTS or NS 50 ml bag for PEDS    hydrALAZINE (APRESOLINE) injection 10 mg    HYDROmorphone (DILAUDID) injection 0.2 mg    HYDROmorphone (DILAUDID) injection 0.4 mg    naloxone (NARCAN) injection 0.2 mg    Or    naloxone (NARCAN) injection 0.4 mg    Or    naloxone (NARCAN) injection 0.2 mg    Or    naloxone (NARCAN) injection 0.4 mg    ondansetron (ZOFRAN ODT) ODT tab 4 mg    Or    ondansetron (ZOFRAN) injection 4 mg    oxyCODONE (ROXICODONE) tablet 10 mg     oxyCODONE (ROXICODONE) tablet 5 mg    polyethylene glycol (MIRALAX) Packet 17 g    prochlorperazine (COMPAZINE) injection 5 mg    Or    prochlorperazine (COMPAZINE) tablet 5 mg    Or    prochlorperazine (COMPAZINE) suppository 12.5 mg    simvastatin (ZOCOR) tablet 40 mg    sodium chloride 0.9% irrigation (bag)    tamsulosin (FLOMAX) capsule 0.4 mg     Current Outpatient Medications   Medication    acetaminophen (TYLENOL) 325 MG tablet    Ascorbic Acid (VITAMIN C) 500 MG CHEW    aspirin 81 mg chewable tablet    fish oil-omega-3 fatty acids 500 MG capsule    hydrochlorothiazide (HYDRODIURIL) 25 MG tablet    ketoconazole (NIZORAL) 2 % external cream    ondansetron (ZOFRAN ODT) 4 MG ODT tab    oxyBUTYnin (DITROPAN) 5 MG tablet    senna-docusate (SENOKOT-S/PERICOLACE) 8.6-50 MG tablet    simvastatin (ZOCOR) 40 MG tablet    Vitamin D3 (VITAMIN D, CHOLECALCIFEROL,) 25 mcg (1000 units) tablet         The following  distinct labs were reviewed    I personally reviewed all applicable laboratory data and went over findings with patient  Significant for:    CBC RESULTS:  Recent Labs   Lab Test 01/18/24  0558 11/19/23  0146 12/23/20  2146   WBC 13.8* 10.0 10.8   HGB 13.7 15.7 15.5    242 243        BMP RESULTS:  Recent Labs   Lab Test 01/18/24  0558 01/17/24  0607 12/06/23  1139 12/06/23  0702 11/19/23  0146 09/08/23  1102 12/13/22  0919 07/19/21  1439 12/23/20  2146 07/15/20  1039 07/15/19  1208 07/18/18  1121   *  --   --   --  138 137 135*   < > 136 139 138 137   POTASSIUM 4.1  --   --   --  4.2 4.7 5.0   < > 4.2 5.0 4.9 4.9   CHLORIDE 96*  --   --   --  100 99 95*   < > 102 103 102 103   CO2 27  --   --   --  29 25 27   < > 23 27 25 27   ANIONGAP 9  --   --   --  9 13 13   < > 11 9 11 7   * 156* 112* 185* 158* 106* 136*   < > 196* 115 96 102   BUN 19.3  --   --   --  17.6 17.0 19.2   < > 14 14 16 19   CR 1.18*  --   --   --  1.13 1.18* 1.24*   < > 1.28 1.14 1.19 1.20   GFRESTIMATED 65  --   --   --  68 65  61   < > 55* >60 60* 60*   GFRESTBLACK  --   --   --   --   --   --   --   --  >60 >60 >60 >60    < > = values in this interval not displayed.       CALCIUM RESULTS:  Recent Labs   Lab Test 01/18/24  0558 11/19/23  0146 09/08/23  1102 12/13/22  0919   MILLI 8.7* 9.7 9.8 9.9       HGB A1C RESULTS:  Lab Results   Component Value Date    A1C 5.6 03/10/2016       UA RESULTS:   Recent Labs   Lab Test 01/18/24  0603 11/19/23  0150   SG  --  1.011   URINEPH  --  7.0   NITRITE  --  Negative   RBCU >182* >182*   WBCU >182* 1       PSA RESULTS  Prostate Specific Antigen Screen   Date Value Ref Range Status   09/08/2023 0.30 0.00 - 6.50 ng/mL Final   08/18/2022 0.30 0.00 - 6.50 ng/mL Final   07/15/2020 0.3 0.0 - 6.5 ng/mL Final   07/18/2018 0.3 0.0 - 4.5 ng/mL Final   05/11/2015 0.4 0.0 - 4.5 ng/mL Final         Recent Imaging Report    I personally reviewed all applicable imaging and went over the below findings with patient.    Results for orders placed or performed during the hospital encounter of 11/19/23   CT Abdomen Pelvis w Contrast    Narrative    EXAM: CT ABDOMEN PELVIS W CONTRAST  LOCATION: [facility]  DATE: 11/19/2023    INDICATION: Low back and bilateral side pain with recent hematuria.  COMPARISON: None.  TECHNIQUE: CT scan of the abdomen and pelvis was performed following injection of IV contrast. Multiplanar reformats were obtained. Dose reduction techniques were used.  CONTRAST: 99 mL Isovue-[id number].    FINDINGS:   LOWER CHEST: Normal.    HEPATOBILIARY: Diffuse fatty infiltration of the liver. Gallbladder is contracted. No calcified gallstones or biliary dilatation.    PANCREAS: Normal.    SPLEEN: Normal.    ADRENAL GLANDS: Normal.    KIDNEYS/BLADDER: Kidneys are negative. No hydronephrosis. There are several nodular foci of increased density seen along the wall of the bladder inferiorly measuring up to 1.2 cm. These are concerning for enhancing urothelial lesions with malignancy not    excluded. Cystoscopy recommended in further evaluation.    BOWEL: Bowel is normal in caliber with no evidence for obstruction. Normal appendix. Moderate to large amount of stool in the colon. No acute bowel findings.    LYMPH NODES: Normal.    VASCULATURE: Aortic calcification without aneurysm.    PELVIC ORGANS: Fat-containing bilateral inguinal hernias, left greater than right.    MUSCULOSKELETAL: Marked degenerative changes in the spine. Bilateral pars defect at the L4 level with grade 1-2 anterolisthesis of L4 on L5.      Impression    IMPRESSION:   1.  Multiple indeterminate nodular densities in the bladder suspicious for enhancing urothelial lesions. Malignancy not excluded. Given the history, cystoscopy is recommended for further evaluation.    2.  Kidneys and ureters are negative. No hydronephrosis.    3.  Diffuse fatty infiltration of the liver.    4.  No acute bowel findings. Normal appendix.    5.  Fat-containing bilateral inguinal hernias, left greater than right.              Assessment/Plan   73 year old year old person with urinary retention after transurethral resection of bladder tumor  -At this point urine looks much improved and is light pink on a moderate drip.  Agree with admission and observation overnight, appreciate comanagement by the hospitalist team.  Can continue to wean bladder irrigation but at this point can have a regular diet as I do not anticipate a need for surgery at this moment.  We will plan to see him again tomorrow, if urine is cleared sufficiently can consider discharge with catheter versus voiding trial.    CC:  Clinic, [facility]

## 2024-01-18 NOTE — ED PROVIDER NOTES
EMERGENCY DEPARTMENT ENCOUNTER      NAME: Alek Mcneill  AGE: 73 year old male  YOB: 1950  MRN: [id number]  EVALUATION DATE & TIME: No admission date for patient encounter.    PCP: [facility], [facility]    ED PROVIDER: Dhara Hanna MD      Chief Complaint   Patient presents with    Urinary Retention    Hematuria         FINAL IMPRESSION:  1. Urinary retention    2. Gross hematuria    3. Bladder tumor          ED COURSE & MEDICAL DECISION MAKING:    Pertinent Labs & Imaging studies reviewed. (See chart for details)  4:40 AM I introduced myself to the patient, obtained patient history, performed a physical exam, and discussed plan for ED workup including potential diagnostic laboratory/imaging studies and interventions.    6:21 AM Paged POLLO Stewart.  6:32 AM Paged hospitalist.  6:35 AM I spoke with the hospitalist to admit patients.  At the conclusion of the encounter I discussed the results of all of the tests and the disposition. The questions were answered. The patient or family acknowledged understanding and was agreeable with the care plan.     73 year old male with a pertinent history of s/p cystoscopy with restage transurethral resection bladder tumor (1/17/24), s/p cytoscopy and transurethral resection (Dec 2023), malignant neoplasm of urinary bladder, type II diabetes, hyperlipidemia, and hypertension who presents to this ED for evaluation of urinary retention and hematuria.  On exam, patient is in no acute distress at this point.  Does get intermittent bladder spasms with the urinary retention.  States he has been having gross hematuria since his procedure yesterday but then started passing clots about midnight and then developed urinary retention where he cannot urinate at all.  Does not typically have issues with urinary retention.  Does state that with his last procedure back in December he had a Radford for 2 days that they had previously left in.  He states he did not place a  Radford this time.  Bladder scan reveals 580 mL of urine in the bladder and he is unable to spontaneously urinate here.  Do have concern that with the bleeding there is a clot causing obstruction and urinary retention.  Thus Radford was placed and he was given 0.5 mg of IV Dilaudid for pain control during this.  Also Uro-Jet used.  Radford was placed and drained 600 mL of hematuria.  Continuous bladder irrigation was started.  Laboratory studies obtained.  Spoke with Dr. Layton with the [facility] service who is actually this urologist who performed his cystoscopy yesterday he recommended the patient get admitted for continuous bladder irrigation and monitoring.  He stated he did not require any ultrasound or imaging at this point.  He reported their group will come see him and no further recommendations at this time other than to continue the continuous bladder irrigation.  Patient was vitally stable and within normal limits here.  Urinalysis pending but grossly bloody.  CBC reveals a hemoglobin of 13.7.  White blood cell count 13.8.  BMP with creatinine of 1.18 Which is near his recent baseline.  Platelets are within normal limits.  He is afebrile.  He is in no acute distress.  On reevaluation after the continuous bladder irrigation he is now feeling much improved.  Spoke with the hospitalist who accepted the patient for admission.  Patient was in agreement with this plan.  He was admitted in stable condition.               Medical Decision Making  Obtained supplemental history:Supplemental history obtained?: Documented in chart  Reviewed external records: External records reviewed?: Documented in chart  Care impacted by chronic illness:Diabetes, Hyperlipidemia, Hypertension, and Peripheral Vascular Disease  Care significantly affected by social determinants of health:Access to Medical Care  Did you consider but not order tests?: Work up considered but not performed and documented in chart, if applicable  Did you interpret  images independently?: Independent interpretation of ECG and images noted in documentation, when applicable.  Consultation discussion with other provider:Did you involve another provider (consultant, , pharmacy, etc.)?: I discussed the care with another health care provider, see documentation for details.  Admit.      MEDICATIONS GIVEN IN THE EMERGENCY:  Medications   ondansetron (ZOFRAN ODT) ODT tab 4 mg (has no administration in time range)     Or   ondansetron (ZOFRAN) injection 4 mg (has no administration in time range)   tamsulosin (FLOMAX) capsule 0.4 mg (has no administration in time range)   acetaminophen (TYLENOL) tablet 650 mg (has no administration in time range)     Or   acetaminophen (TYLENOL) Suppository 650 mg (has no administration in time range)   oxyCODONE (ROXICODONE) tablet 5 mg (has no administration in time range)   oxyCODONE (ROXICODONE) tablet 10 mg (has no administration in time range)   HYDROmorphone (DILAUDID) injection 0.2 mg (has no administration in time range)   HYDROmorphone (DILAUDID) injection 0.4 mg (has no administration in time range)   polyethylene glycol (MIRALAX) Packet 17 g (has no administration in time range)   bisacodyl (DULCOLAX) suppository 10 mg (has no administration in time range)   prochlorperazine (COMPAZINE) injection 5 mg (has no administration in time range)     Or   prochlorperazine (COMPAZINE) tablet 5 mg (has no administration in time range)     Or   prochlorperazine (COMPAZINE) suppository 12.5 mg (has no administration in time range)   sodium chloride 0.9% irrigation (bag) (has no administration in time range)   hydrALAZINE (APRESOLINE) injection 10 mg (has no administration in time range)   naloxone (NARCAN) injection 0.2 mg (has no administration in time range)     Or   naloxone (NARCAN) injection 0.4 mg (has no administration in time range)     Or   naloxone (NARCAN) injection 0.2 mg (has no administration in time range)     Or   naloxone (NARCAN)  injection 0.4 mg (has no administration in time range)   cefTRIAXone (ROCEPHIN) 2 g vial to attach to  ml bag for ADULTS or NS 50 ml bag for PEDS (has no administration in time range)   HYDROmorphone (PF) (DILAUDID) injection 0.5 mg (0.5 mg Intramuscular $Given 1/18/24 5115)       NEW PRESCRIPTIONS STARTED AT TODAY'S ER VISIT  New Prescriptions    No medications on file          =================================================================    HPI    Patient information was obtained from: Patient    Use of : N/A     Alek Mcneill is a 73 year old male with a pertinent history of s/p cystoscopy with restage transurethral resection bladder tumor (1/17/24), s/p cytoscopy and transurethral resection (Dec 2023), malignant neoplasm of urinary bladder, type II diabetes, hyperlipidemia, and hypertension who presents to this ED for evaluation of urinary retention and hematuria.    Patient reports that he has been having urinary retention since about 1200 this evening (1/18/24). Prior to that he has been having gross hematuria with clots since his procedure yesterday. He states he had felt a burning sensation and had seen blood clots come out then had trouble urinating voluntarily. When he last was able to urinate he was able to pass a full stream of urine. Notes history of s/p cytoscopy and transurethral resection of a tumor bladder and had his bladder scoped again yesterday (1/17/24).  He has had trujillo catheter insertion in the past after being scoped but was not placed this time. Bladder scan during the encounter revealed patient retained about 586 cc of urine. Patient also reports he had been taking oxybutynin around 2100 last night (1/1/24) and 0300 AM. He reports intermittent cramping sensation in the lower abdomen when it feels like he needs to urinate and can't.     Patient denied any fever, nausea, vomiting, diarrhea, chest pain, or trouble breathing. Notes he is on water pills but not  "anticoagulation. He reports he has held his baby aspirin for 10 days prior to the procedure. No other complaints at this time.    REVIEW OF SYSTEMS    Pertinent positives and negatives are documented in the HPI. All other systems reviewed and are negative.      PAST MEDICAL HISTORY:  Past Medical History:   Diagnosis Date    Complication of anesthesia     DM2 (diabetes mellitus, type 2) (H)     Hypercholesteremia     Hyperlipidemia     Hypertension        PAST SURGICAL HISTORY:  Past Surgical History:   Procedure Laterality Date    COLON SURGERY      CYSTOSCOPY, TRANSURETHRAL RESECTION (TUR) TUMOR BLADDER, COMBINED N/A 2023    Procedure: CYSTOSCOPY, WITH TRANSURETHRAL RESECTION BLADDER TUMOR,;  Surgeon: Damien Layton MD;  Location: [facility] Main OR         CURRENT MEDICATIONS:    acetaminophen (TYLENOL) 325 MG tablet  Ascorbic Acid (VITAMIN C) 500 MG CHEW  aspirin 81 mg chewable tablet  fish oil-omega-3 fatty acids 500 MG capsule  hydrochlorothiazide (HYDRODIURIL) 25 MG tablet  ketoconazole (NIZORAL) 2 % external cream  ondansetron (ZOFRAN ODT) 4 MG ODT tab  oxyBUTYnin (DITROPAN) 5 MG tablet  senna-docusate (SENOKOT-S/PERICOLACE) 8.6-50 MG tablet  simvastatin (ZOCOR) 40 MG tablet  Vitamin D3 (VITAMIN D, CHOLECALCIFEROL,) 25 mcg (1000 units) tablet        ALLERGIES:  Allergies   Allergen Reactions    Chlorhexidine Dermatitis       FAMILY HISTORY:  History reviewed. No pertinent family history.    SOCIAL HISTORY:   Social History     Socioeconomic History    Marital status:    Tobacco Use    Smoking status: Former     Types: Cigarettes     Quit date:      Years since quittin.0   Vaping Use    Vaping Use: Never used   Substance and Sexual Activity    Alcohol use: Yes     Comment: daily    Drug use: Never       VITALS:  /83   Pulse 91   Temp 99.2  F (37.3  C) (Temporal)   Resp 22   Ht 1.727 m (5' 8\")   Wt 113.4 kg (250 lb)   SpO2 95%   BMI 38.01 kg/m      PHYSICAL EXAM  "   Physical Exam  Constitutional: Well developed, Well nourished, NAD, GCS 15  HENT: Normocephalic, Atraumatic, Bilateral external ears normal, Oropharynx normal, mucous membranes moist, Nose normal. Neck-  Normal range of motion, No tenderness, Supple, No stridor.    Eyes: PERRL, EOMI, Conjunctiva normal, No discharge.   Respiratory: Normal breath sounds, No respiratory distress, No wheezing or crackles, Speaks in full sentences easily.    Cardiovascular: Normal heart rate, Regular rhythm,  No murmurs, No rubs, No gallops. 2+ radial pulses bilaterally  GI: Bowel sounds normal, Soft, No tenderness, No masses, No rebound or guarding. No CVA tenderness bilaterally   Musculoskeletal: 2+ DP pulses. No notable lower extremity edema.  No cyanosis, No clubbing. Good range of motion in all major joints.   Integument: Warm, Dry, No erythema, No rash. No petechiae.    Neurologic: Alert & oriented x 3, 5/5 strength in all 4 extremities bilaterally. Sensation intact to light touch in all 4 extremities and the face bilaterally. No focal deficits noted. Normal gait.     Psychiatric: Affect normal, Judgment normal, Mood normal. Cooperative.      LAB:  All pertinent labs reviewed and interpreted.  Results for orders placed or performed during the hospital encounter of 01/18/24   UA with Microscopic reflex to Culture    Specimen: Urine, Catheter   Result Value Ref Range    Color Urine Dark Red (A) Colorless, Straw, Light Yellow, Yellow    Appearance Urine Bloody (A) Clear    Glucose Urine      Bilirubin Urine      Ketones Urine      Specific Gravity Urine      Blood Urine      pH Urine      Protein Albumin Urine      Urobilinogen Urine      Nitrite Urine      Leukocyte Esterase Urine      RBC Urine >182 (H) <=2 /HPF    WBC Urine >182 (H) <=5 /HPF   Basic metabolic panel   Result Value Ref Range    Sodium 132 (L) 135 - 145 mmol/L    Potassium 4.1 3.4 - 5.3 mmol/L    Chloride 96 (L) 98 - 107 mmol/L    Carbon Dioxide (CO2) 27 22 - 29  mmol/L    Anion Gap 9 7 - 15 mmol/L    Urea Nitrogen 19.3 8.0 - 23.0 mg/dL    Creatinine 1.18 (H) 0.67 - 1.17 mg/dL    GFR Estimate 65 >60 mL/min/1.73m2    Calcium 8.7 (L) 8.8 - 10.2 mg/dL    Glucose 139 (H) 70 - 99 mg/dL   CBC with platelets and differential   Result Value Ref Range    WBC Count 13.8 (H) 4.0 - 11.0 10e3/uL    RBC Count 4.19 (L) 4.40 - 5.90 10e6/uL    Hemoglobin 13.7 13.3 - 17.7 g/dL    Hematocrit 38.4 (L) 40.0 - 53.0 %    MCV 92 78 - 100 fL    MCH 32.7 26.5 - 33.0 pg    MCHC 35.7 31.5 - 36.5 g/dL    RDW 12.6 10.0 - 15.0 %    Platelet Count 208 150 - 450 10e3/uL    % Neutrophils 67 %    % Lymphocytes 21 %    % Monocytes 10 %    % Eosinophils 1 %    % Basophils 0 %    % Immature Granulocytes 1 %    NRBCs per 100 WBC 0 <1 /100    Absolute Neutrophils 9.3 (H) 1.6 - 8.3 10e3/uL    Absolute Lymphocytes 2.9 0.8 - 5.3 10e3/uL    Absolute Monocytes 1.3 0.0 - 1.3 10e3/uL    Absolute Eosinophils 0.2 0.0 - 0.7 10e3/uL    Absolute Basophils 0.1 0.0 - 0.2 10e3/uL    Absolute Immature Granulocytes 0.1 <=0.4 10e3/uL    Absolute NRBCs 0.0 10e3/uL       RADIOLOGY:  Reviewed all pertinent imaging. Please see official radiology report.  No orders to display         PROCEDURES:   N/A               I, Chantal Little , am serving as a scribe to document services personally performed by Dhara Hanna MD based on my observation and the provider's statements to me. I, Dhara Hanna MD, attest that Chantal Little  is acting in a scribe capacity, has observed my performance of the services and has documented them in accordance with my direction.    Dhara Hanna MD  [facility] EMERGENCY DEPARTMENT  [address] [place] [id number] [phone]     Dhara Hanna MD  01/18/24 0757

## 2024-01-18 NOTE — PROGRESS NOTES
"PRIMARY DIAGNOSIS: \"GENERIC\" NURSING  OUTPATIENT/OBSERVATION GOALS TO BE MET BEFORE DISCHARGE:  ADLs back to baseline: Yes    Activity and level of assistance: Up with standby assistance.    Pain status: Pain free.    Return to near baseline physical activity: Yes     Discharge Planner Nurse   Safe discharge environment identified: Yes  Barriers to discharge: Yes       Entered by: Souleymane Ugalde RN 01/18/2024 3:05 PM   CBI manually irrigated x 1 again with multiple clots removed. Radford catheter intact with clear UOP thereafter, CBI rate slowed down. Denies pain.   Please review provider order for any additional goals.   Nurse to notify provider when observation goals have been met and patient is ready for discharge.  "

## 2024-01-18 NOTE — ED TRIAGE NOTES
Reports hematuria since 11pm. States urinary retention since about midnight. Had his bladder scrapped yesterday.      Triage Assessment (Adult)       Row Name 01/18/24 0442          Triage Assessment    Airway WDL WDL        Respiratory WDL    Respiratory WDL WDL        Cardiac WDL    Cardiac Rhythm NSR

## 2024-01-18 NOTE — H&P
"[facility]    History and Physical - Hospitalist Service       Date of Admission:  1/18/2024    Assessment & Plan   Principal Problem:    Gross hematuria  Active Problems:    Urinary retention    Bladder tumor       Alek Mcneill is a 73 year old male with history of hypertension, hyperlipidemia, CKD 2 and history of bladder cancer status post transurethral resection 1/17/2024 admitted on 1/18/2024 with postop hematuria and urinary retention.      Postop urinary retention and hematuria  Status post transurethral resection of bladder tumor 1/17/2024  -- Defer management to urology  -- avoid home ASA and oxybutynin   -- flomax started on admission  -- trujillo placed and CBI started on admission      Hyponatremia: Mild  Likely contribution of elevated ADH state from pain, recent stress of surgery and hydrochlorothiazide   TSH acceptable  Cortisol pending  -- start fluid restriction and focus on pain control for management of elevated ADH state  -- hold home hydrochlorothiazide for now  -- follow up cortisol  -- trend Na in AM      CKD 2:  GFR stable from previous, trend      Abnormal UA: unclear if related to infection  -- will cover with empiric IV ceftriaxone for now  -- follow up urine culture      Leukocytosis: mild  -- trend for improvement       HTN: hold home hydrochlorothiazide      HLP: continue home statin          Diet: NPO per Anesthesia Guidelines for Procedure/Surgery Except for: Meds, Ice Chips    DVT Prophylaxis: Pneumatic Compression Devices  Trujillo Catheter: PRESENT, indication:    Lines: None     Cardiac Monitoring: None  Code Status: Full Code      Clinically Significant Risk Factors Present on Admission                # Drug Induced Platelet Defect: home medication list includes an antiplatelet medication        # Obesity: Estimated body mass index is 38.01 kg/m  as calculated from the following:    Height as of this encounter: 1.727 m (5' 8\").    Weight as of this encounter: " 113.4 kg (250 lb).              Disposition Plan      Expected Discharge Date: 2024                  Vikas Espinal DO  Hospitalist [facility]  Securely message with [employer] (more info)  Text page via [facility] Paging/Directory     ______________________________________________________________________    Chief Complaint     Hematuria    History is obtained from the patient    History of Present Illness   Alek Mcneill is a 73 year old male who presents with hematuria and urinary retention < 24 hours after transurethral resection of the bladder tumor. Radford was not placed post-op.        Past Medical History    Past Medical History:   Diagnosis Date    Complication of anesthesia     DM2 (diabetes mellitus, type 2) (H)     Hypercholesteremia     Hyperlipidemia     Hypertension        Past Surgical History   Past Surgical History:   Procedure Laterality Date    COLON SURGERY      CYSTOSCOPY, TRANSURETHRAL RESECTION (TUR) TUMOR BLADDER, COMBINED N/A 2023    Procedure: CYSTOSCOPY, WITH TRANSURETHRAL RESECTION BLADDER TUMOR,;  Surgeon: Damien Layton MD;  [facility]: [facility] OR       Prior to Admission Medications   Prior to Admission Medications   Prescriptions Last Dose Informant Patient Reported? Taking?   acetaminophen (TYLENOL) 325 MG tablet   No No   Sig: Take 2 tablets (650 mg) by mouth every 4 hours as needed for mild pain   aspirin 81 mg chewable tablet   Yes No   Sig: [ASPIRIN 81 MG CHEWABLE TABLET] Chew 81 mg daily.   fish oil-omega-3 fatty acids 500 MG capsule   Yes No   Si capsules   hydrochlorothiazide (HYDRODIURIL) 25 MG tablet   Yes No   Sig: Take 25 mg by mouth every morning   ketoconazole (NIZORAL) 2 % external cream   Yes No   Sig: Apply topically as needed   ondansetron (ZOFRAN ODT) 4 MG ODT tab   No No   Sig: Take 1 tablet (4 mg) by mouth every 8 hours as needed for nausea   ondansetron (ZOFRAN ODT) 4 MG ODT tab   No No   Sig: Take 1 tablet (4  mg) by mouth every 8 hours as needed for nausea   oxyBUTYnin (DITROPAN) 5 MG tablet   No No   Sig: Take 1 tablet (5 mg) by mouth 3 times daily as needed for bladder spasms   senna-docusate (SENOKOT-S/PERICOLACE) 8.6-50 MG tablet   No No   Sig: Take 1-2 tablets by mouth 2 times daily   simvastatin (ZOCOR) 40 MG tablet   Yes No   Sig: Take 40 mg by mouth at bedtime      Facility-Administered Medications: None           Physical Exam   Vital Signs: Temp: 99.2  F (37.3  C) Temp src: Temporal BP: 139/83 Pulse: 91   Resp: 22 SpO2: 95 % O2 Device: None (Room air)    Weight: 250 lbs 0 oz    General Appearance: In no acute distress  EYES: Clear, without inflammation   HEENT: Without congestion or inflammation  RESPIRATORY: respirations nonlabored  CARDIOVASCULAR: No le edema bilat.  ABDOMEN: soft and non-tender  NEUROLOGIC: No focal arm or leg  weakness, speech is clear         Medical Decision Making       >75 MINUTES SPENT BY ME on the date of service doing chart review, history, exam, documentation & further activities per the note.      Data

## 2024-01-18 NOTE — PROGRESS NOTES
"PRIMARY DIAGNOSIS: \"GENERIC\" NURSING  OUTPATIENT/OBSERVATION GOALS TO BE MET BEFORE DISCHARGE:  ADLs back to baseline: Yes    Activity and level of assistance: Ambulating SBA.    Pain status: Pain free.    Return to near baseline physical activity: Yes     Discharge Planner Nurse   Safe discharge environment identified: Yes  Barriers to discharge: Yes       Entered by: Souleymane Ugalde RN 01/18/2024 12:51 PM  CBI manually irrigated x 1 by writer with multiple clots drained. Radford catheter with pink urine output.    Please review provider order for any additional goals.   Nurse to notify provider when observation goals have been met and patient is ready for discharge.  "

## 2024-01-18 NOTE — CONSULTS
Care Management Initial Consult    General Information  Assessment completed with: Alek Almazan  Type of CM/SW Visit: Initial Assessment    Primary Care Provider verified and updated as needed: Yes   Readmission within the last 30 days:           Advance Care Planning: Advance Care Planning Reviewed: other (see comments) (no acps docs)          Communication Assessment  Patient's communication style: spoken language (English or Bilingual)             Cognitive  Cognitive/Neuro/Behavioral: WDL  Level of Consciousness: alert  Arousal Level: opens eyes spontaneously  Orientation: oriented x 4  Mood/Behavior: calm, cooperative     Speech: clear, spontaneous, logical    Living Environment:   People in home: spouse  Christine  Current living Arrangements: house      Able to return to prior arrangements: yes       Family/Social Support:  Care provided by: self  Provides care for: no one  Marital Status:   Wife  Christine       Description of Support System: Supportive         Current Resources:   Patient receiving home care services: No     Community Resources: None  Equipment currently used at home:    Supplies currently used at home: None    Employment/Financial:  Employment Status: retired        Financial Concerns:             Does the patient's insurance plan have a 3 day qualifying hospital stay waiver?  No    Lifestyle & Psychosocial Needs:  Social Determinants of Health     Food Insecurity: Not on file   Depression: Not on file   Housing Stability: Not on file   Tobacco Use: Medium Risk (1/18/2024)    Patient History     Smoking Tobacco Use: Former     Smokeless Tobacco Use: Unknown     Passive Exposure: Not on file   Financial Resource Strain: Not on file   Alcohol Use: Not on file   Transportation Needs: Not on file   Physical Activity: Not on file   Interpersonal Safety: Not on file   Stress: Not on file   Social Connections: Not on file       Functional Status:  Prior to admission patient needed assistance:    Dependent ADLs:: Independent  Dependent IADLs:: Independent       Mental Health Status:          Chemical Dependency Status:                Values/Beliefs:  Spiritual, Cultural Beliefs, Sikh Practices, Values that affect care:                 Additional Information:  Assessed. RNCM introduced self and CM role to pt and his wife. Pt lives with wife Christine in a house. Ind with ADLs, and IADLS. Pt is retired. Drives. Has established PCP. No home care svcs prior. No mobility aides. MCCANN given to pt and explained, pt stated understanding. Family to transport.      CM will continue to follow plan of care,review recommendations, and assist with any discharge needs anticipated.       Lizbeth Dunn RN

## 2024-01-18 NOTE — ED NOTES
CBI initiated. Approx 600 ml dark red urine collected in the bag before the CBI was initiated. CBI color is now a light red.

## 2024-01-18 NOTE — TELEPHONE ENCOUNTER
Patient calling reports having bladder surgery yesterday with ongoing bleeding until midnight. Patient also reporting he is unable to urinate. Reports last urinating at 11 PM and that it was all blood. Reviewed after visit summary with notes advising patient return to the ER emergently if unable to urinate. Patient agreeable and will go to the ER now.     Dhara Bello RN 01/18/24 4:18 AM   [facility] Triage Nurse Advisor

## 2024-01-19 ENCOUNTER — APPOINTMENT (OUTPATIENT)
Dept: ULTRASOUND IMAGING | Facility: HOSPITAL | Age: 74
End: 2024-01-19
Attending: UROLOGY
Payer: COMMERCIAL

## 2024-01-19 LAB
ACTH PLAS-MCNC: 31 PG/ML
ANION GAP SERPL CALCULATED.3IONS-SCNC: 8 MMOL/L (ref 7–15)
BUN SERPL-MCNC: 15 MG/DL (ref 8–23)
CALCIUM SERPL-MCNC: 8.4 MG/DL (ref 8.8–10.2)
CHLORIDE SERPL-SCNC: 102 MMOL/L (ref 98–107)
CORTICOSTER 1H P 250 UG ACTH SERPL-SCNC: 23.5 UG/DL
CORTIS SERPL-MCNC: 13.1 UG/DL
CREAT SERPL-MCNC: 1.16 MG/DL (ref 0.67–1.17)
DEPRECATED HCO3 PLAS-SCNC: 28 MMOL/L (ref 22–29)
EGFRCR SERPLBLD CKD-EPI 2021: 66 ML/MIN/1.73M2
ERYTHROCYTE [DISTWIDTH] IN BLOOD BY AUTOMATED COUNT: 12.8 % (ref 10–15)
GLUCOSE SERPL-MCNC: 126 MG/DL (ref 70–99)
HCT VFR BLD AUTO: 39.6 % (ref 40–53)
HGB BLD-MCNC: 13.8 G/DL (ref 13.3–17.7)
HGB BLD-MCNC: 14.1 G/DL (ref 13.3–17.7)
MCH RBC QN AUTO: 32.8 PG (ref 26.5–33)
MCHC RBC AUTO-ENTMCNC: 34.8 G/DL (ref 31.5–36.5)
MCV RBC AUTO: 94 FL (ref 78–100)
PLATELET # BLD AUTO: 190 10E3/UL (ref 150–450)
POTASSIUM SERPL-SCNC: 4.1 MMOL/L (ref 3.4–5.3)
RBC # BLD AUTO: 4.21 10E6/UL (ref 4.4–5.9)
SODIUM SERPL-SCNC: 138 MMOL/L (ref 135–145)
TIME OF COSYNTROPIN INJECTION: NORMAL
WBC # BLD AUTO: 11 10E3/UL (ref 4–11)

## 2024-01-19 PROCEDURE — 82533 TOTAL CORTISOL: CPT | Mod: 91 | Performed by: INTERNAL MEDICINE

## 2024-01-19 PROCEDURE — 96375 TX/PRO/DX INJ NEW DRUG ADDON: CPT

## 2024-01-19 PROCEDURE — 76857 US EXAM PELVIC LIMITED: CPT

## 2024-01-19 PROCEDURE — 36415 COLL VENOUS BLD VENIPUNCTURE: CPT | Performed by: STUDENT IN AN ORGANIZED HEALTH CARE EDUCATION/TRAINING PROGRAM

## 2024-01-19 PROCEDURE — 250N000013 HC RX MED GY IP 250 OP 250 PS 637: Performed by: STUDENT IN AN ORGANIZED HEALTH CARE EDUCATION/TRAINING PROGRAM

## 2024-01-19 PROCEDURE — 36415 COLL VENOUS BLD VENIPUNCTURE: CPT | Performed by: INTERNAL MEDICINE

## 2024-01-19 PROCEDURE — 85027 COMPLETE CBC AUTOMATED: CPT | Performed by: STUDENT IN AN ORGANIZED HEALTH CARE EDUCATION/TRAINING PROGRAM

## 2024-01-19 PROCEDURE — G0378 HOSPITAL OBSERVATION PER HR: HCPCS

## 2024-01-19 PROCEDURE — 99232 SBSQ HOSP IP/OBS MODERATE 35: CPT | Performed by: INTERNAL MEDICINE

## 2024-01-19 PROCEDURE — 250N000013 HC RX MED GY IP 250 OP 250 PS 637: Performed by: INTERNAL MEDICINE

## 2024-01-19 PROCEDURE — 250N000013 HC RX MED GY IP 250 OP 250 PS 637: Performed by: UROLOGY

## 2024-01-19 PROCEDURE — 96376 TX/PRO/DX INJ SAME DRUG ADON: CPT

## 2024-01-19 PROCEDURE — 36415 COLL VENOUS BLD VENIPUNCTURE: CPT

## 2024-01-19 PROCEDURE — 250N000009 HC RX 250: Performed by: UROLOGY

## 2024-01-19 PROCEDURE — 80048 BASIC METABOLIC PNL TOTAL CA: CPT | Performed by: STUDENT IN AN ORGANIZED HEALTH CARE EDUCATION/TRAINING PROGRAM

## 2024-01-19 PROCEDURE — 250N000011 HC RX IP 250 OP 636: Mod: JZ | Performed by: INTERNAL MEDICINE

## 2024-01-19 PROCEDURE — 85018 HEMOGLOBIN: CPT

## 2024-01-19 PROCEDURE — 82533 TOTAL CORTISOL: CPT | Performed by: INTERNAL MEDICINE

## 2024-01-19 PROCEDURE — 82024 ASSAY OF ACTH: CPT | Performed by: INTERNAL MEDICINE

## 2024-01-19 RX ORDER — OXYBUTYNIN CHLORIDE 5 MG/1
5 TABLET ORAL 3 TIMES DAILY
Status: DISCONTINUED | OUTPATIENT
Start: 2024-01-19 | End: 2024-01-20 | Stop reason: HOSPADM

## 2024-01-19 RX ORDER — TRANEXAMIC ACID 10 MG/ML
1 INJECTION, SOLUTION INTRAVENOUS ONCE
Status: DISCONTINUED | OUTPATIENT
Start: 2024-01-19 | End: 2024-01-19

## 2024-01-19 RX ORDER — TRANEXAMIC ACID 10 MG/ML
1 INJECTION, SOLUTION INTRAVENOUS ONCE
Qty: 100 ML | Refills: 0 | Status: COMPLETED | OUTPATIENT
Start: 2024-01-19 | End: 2024-01-19

## 2024-01-19 RX ADMIN — POLYETHYLENE GLYCOL 3350 17 G: 17 POWDER, FOR SOLUTION ORAL at 11:19

## 2024-01-19 RX ADMIN — COSYNTROPIN 250 MCG: 0.25 INJECTION, POWDER, LYOPHILIZED, FOR SOLUTION INTRAVENOUS at 05:16

## 2024-01-19 RX ADMIN — CEFTRIAXONE SODIUM 2 G: 2 INJECTION, POWDER, FOR SOLUTION INTRAMUSCULAR; INTRAVENOUS at 08:12

## 2024-01-19 RX ADMIN — OXYBUTYNIN CHLORIDE 5 MG: 5 TABLET ORAL at 11:13

## 2024-01-19 RX ADMIN — TRANEXAMIC ACID 1 G: 10 INJECTION, SOLUTION INTRAVENOUS at 14:37

## 2024-01-19 RX ADMIN — SIMVASTATIN 40 MG: 40 TABLET, FILM COATED ORAL at 20:43

## 2024-01-19 RX ADMIN — TAMSULOSIN HYDROCHLORIDE 0.4 MG: 0.4 CAPSULE ORAL at 08:12

## 2024-01-19 RX ADMIN — OXYBUTYNIN CHLORIDE 5 MG: 5 TABLET ORAL at 20:43

## 2024-01-19 RX ADMIN — OXYBUTYNIN CHLORIDE 5 MG: 5 TABLET ORAL at 17:23

## 2024-01-19 ASSESSMENT — ACTIVITIES OF DAILY LIVING (ADL)
ADLS_ACUITY_SCORE: 20
ADLS_ACUITY_SCORE: 36
ADLS_ACUITY_SCORE: 36
ADLS_ACUITY_SCORE: 20
ADLS_ACUITY_SCORE: 35
ADLS_ACUITY_SCORE: 36
ADLS_ACUITY_SCORE: 36
ADLS_ACUITY_SCORE: 35
ADLS_ACUITY_SCORE: 36
ADLS_ACUITY_SCORE: 20
ADLS_ACUITY_SCORE: 36
ADLS_ACUITY_SCORE: 36

## 2024-01-19 NOTE — PLAN OF CARE
Patient continues on CBI for hematuria. Pain at times when needing to irrigate, otherwise denies pain. Radford irrigated x1, small blood clots noted, light pink urine. SBA with ambulation. Tolerating diet.    Margarita Martinez RN

## 2024-01-19 NOTE — PLAN OF CARE
Problem: Adult Inpatient Plan of Care  Goal: Readiness for Transition of Care  Outcome: Progressing   Goal Outcome Evaluation:    Urology came early this morning and irrigated bladder.  No clots evident.  CBI restarted.  Occasional leaking around trujillo.  MD aware, oxybutinin started.  Urine is light pink.  Trujillo patent.  US done.

## 2024-01-19 NOTE — PROGRESS NOTES
[facility]    Medicine Progress Note - Hospitalist Service    Date of Admission:  1/18/2024    Assessment & Plan   73 year old male with history of hypertension, hyperlipidemia, CKD 2 and history of bladder cancer status post transurethral resection 1/17/2024 admitted on 1/18/2024 with postop hematuria and urinary retention.        Postop urinary retention and hematuria  Status post transurethral resection of bladder tumor 1/17/2024  -- Defer management to urology  -- avoid home ASA   -- resumed home oxybutynin   -- stop flomax started on admission  -- trujillo placed and CBI started on admission  -- wean off CBI per urology  -- follow up bladder US 1/19 did not demonstrate a free-flowing clot in the bladder, there is a presumed adherent clot along the superior and right side of the bladder dome   -- received 1 dose IV TXA 1/19.         Hyponatremia: Mild  Likely contribution of elevated ADH state from pain, recent stress of surgery and hydrochlorothiazide   TSH acceptable  Cortisol level slightly low  -- will further eval for AI with stim test 1/19  -- hyponatremia now resolved, will continue fluid restriction and focus on pain control for management of elevated ADH state  -- hold home hydrochlorothiazide but likely can resume after discharge  -- trend Na in AM        CKD 2:  GFR stable          Abnormal UA:    -- UC NGTD, will stop empiric IV ceftriaxone for now  -- follow up urine culture        Leukocytosis: resolved       HTN: hold home hydrochlorothiazide        HLP: continue home statin           Diet: Regular Diet Adult  Fluid restriction 1500 ML FLUID    DVT Prophylaxis: Pneumatic Compression Devices  Trujillo Catheter: PRESENT, indication: Gross Hematuria  Lines: None     Cardiac Monitoring: None  Code Status: Full Code      Clinically Significant Risk Factors Present on Admission                # Drug Induced Platelet Defect: home medication list includes an antiplatelet medication       "  # Obesity: Estimated body mass index is 38.01 kg/m  as calculated from the following:    Height as of this encounter: 1.727 m (5' 8\").    Weight as of this encounter: 113.4 kg (250 lb).              Disposition Plan      Expected Discharge Date: 01/20/2024, 12:00 PM    Destination: home with family  Discharge Comments: Plan to wean off CBI around noon and discharge if urine remains clear 4 hours after          Vikas Espinal DO  Hospitalist Service  [facility]  Securely message with [employer] (more info)  Text page via [facility] Paging/Directory   ______________________________________________________________________    Interval History   NAD. Denies any complaints     Physical Exam   Vital Signs: Temp: 97.9  F (36.6  C) Temp src: Oral BP: 131/77 Pulse: 75   Resp: 18 SpO2: 94 % O2 Device: None (Room air)    Weight: 250 lbs 0 oz  General: NAD  RESPIRATORY: Breathing nonlabored  CARDIOVASCULAR: No le edema bilat.   NEUROLOGIC: Motor and sensory intact, speech clear         Medical Decision Making       >45 MINUTES SPENT BY ME on the date of service doing chart review, history, exam, documentation & further activities per the note.      Data       "

## 2024-01-19 NOTE — PROGRESS NOTES
"Patient stated \"I feel like I'm peeing myself.\" Assess all tubings including the catheter site. Observed that the patient is actively leaking from catheter site. Drainage is noted to be a pink fluid along with a white purulent string. No odor detected.     Paged house officer, Dr. Ordonez and suggested paging urology. Ok to temporarily stop CBI for now until further orders from Urology.     Paged Dr. Oakley from Urology. Awaiting call back.     0645 - Dr. Oakley returned called and stated will need to do some interventions. For the time being will need to prep a catheter. Dr. Oakley is heading to see patient soon.     Mirtha Lombardo RN    "

## 2024-01-19 NOTE — PROGRESS NOTES
"PRIMARY DIAGNOSIS: \"GENERIC\" NURSING  OUTPATIENT/OBSERVATION GOALS TO BE MET BEFORE DISCHARGE:  ADLs back to baseline: Yes    Activity and level of assistance: Up with standby assistance.    Pain status: Pain free.    Return to near baseline physical activity: Yes     Discharge Planner Nurse   Safe discharge environment identified: Yes  Barriers to discharge: Yes       Entered by: Mirtha Lombardo RN 01/19/2024 12:37 AM     Please review provider order for any additional goals.   Nurse to notify provider when observation goals have been met and patient is ready for discharge.    A&O x4. RA. Denies pain during this time. Tolerating continuous CBI well. UOP has been clear and pink in color. Resting well in bed. Careplan ongoing.     /75 (BP Location: Right arm)   Pulse 77   Temp 98.1  F (36.7  C) (Oral)   Resp 17   Ht 1.727 m (5' 8\")   Wt 113.4 kg (250 lb)   SpO2 95%   BMI 38.01 kg/m      "

## 2024-01-19 NOTE — DISCHARGE INSTRUCTIONS
"Urology Discharge Instructions    Diet and Fluids:    Eating your normal diet is fine.  Drink a little more than normal but you don not have to \"flush\" your system.    Activity:    No lifting >10 pounds until blood in urine has cleared up.    Call the Clinic Immediately If You Have Any Of The Following Symptoms:   Nausea/vomiting that is uncontrolled with medications   You have a fever over 100.0   Chills   Catheter stops draining and you have uncomfortable feeling of being full   Increasing back pain that is not relieved with pain medications   Large amounts of blood in urine or large clots    We can respond to your questions or concerns 24 hours a day at [phone].   For after hours phone calls please wait on call to be connected with the \"care connection\" service for after hours care.       Follow up:  We will have you follow-up in clinic in 2 to 3 months after kidney {dkastoneimagin}.   "

## 2024-01-19 NOTE — PROGRESS NOTES
"PRIMARY DIAGNOSIS: \"GENERIC\" NURSING  OUTPATIENT/OBSERVATION GOALS TO BE MET BEFORE DISCHARGE:  ADLs back to baseline: Yes    Activity and level of assistance: Up with standby assistance.    Pain status: Pain free.    Return to near baseline physical activity: Yes     Discharge Planner Nurse   Safe discharge environment identified: Yes  Barriers to discharge: Yes       Entered by: Margarita Martinez RN 01/18/2024 9:47 PM     Please review provider order for any additional goals.   Nurse to notify provider when observation goals have been met and patient is ready for discharge.  "

## 2024-01-19 NOTE — PROGRESS NOTES
"PRIMARY DIAGNOSIS: \"GENERIC\" NURSING  OUTPATIENT/OBSERVATION GOALS TO BE MET BEFORE DISCHARGE:  ADLs back to baseline: Yes    Activity and level of assistance: Up with standby assistance.    Pain status: w/ urinary blood clots only    Return to near baseline physical activity: Yes     Discharge Planner Nurse   Safe discharge environment identified: Yes  Barriers to discharge: Yes       Entered by: Mirtha Lombardo RN 01/19/2024 4:13 AM     Please review provider order for any additional goals.   Nurse to notify provider when observation goals have been met and patient is ready for discharge.    Afebrile. Tolerating moderate flow of CBI well. Irrigated trujillo x1. Tolerated procedure well. Denies n/v/d. Urine was bright red during the clot however has been running clear light pink. Careplan ongoing.  "

## 2024-01-19 NOTE — PROGRESS NOTES
"[facility] Urology Progress Note    S: 74 yo male who is postop day 2 after TURBT for reresection of high-grade T1 currently admitted for gross hematuria.  He he had a 20 French three-way catheter placed which was irrigated by Dr. Oakley yesterday.    His urine was clear until this morning and had some catheter dysfunction and leakage around the catheter.  CBI was stopped around 6 AM.    This morning patient is relatively comfortable and the leakage around the catheter stopped      O:  BP (!) 144/72 (BP Location: Right arm)   Pulse 75   Temp 98.6  F (37  C) (Oral)   Resp 16   Ht 1.727 m (5' 8\")   Wt 113.4 kg (250 lb)   SpO2 95%   BMI 38.01 kg/m      Latest Reference Range & Units 01/19/24 05:01   Sodium 135 - 145 mmol/L 138   Potassium 3.4 - 5.3 mmol/L 4.1   Chloride 98 - 107 mmol/L 102   Carbon Dioxide (CO2) 22 - 29 mmol/L 28   Urea Nitrogen 8.0 - 23.0 mg/dL 15.0   Creatinine 0.67 - 1.17 mg/dL 1.16   GFR Estimate >60 mL/min/1.73m2 66   Calcium 8.8 - 10.2 mg/dL 8.4 (L)   Anion Gap 7 - 15 mmol/L 8   Glucose 70 - 99 mg/dL 126 (H)   WBC 4.0 - 11.0 10e3/uL 11.0   Hemoglobin 13.3 - 17.7 g/dL 13.8   Hematocrit 40.0 - 53.0 % 39.6 (L)   Platelet Count 150 - 450 10e3/uL 190   RBC Count 4.40 - 5.90 10e6/uL 4.21 (L)   MCV 78 - 100 fL 94   MCH 26.5 - 33.0 pg 32.8   MCHC 31.5 - 36.5 g/dL 34.8   RDW 10.0 - 15.0 % 12.8   (L): Data is abnormally low  (H): Data is abnormally high    Physical exam  General: No acute distress  : Catheter with grade 3 hematuria with CBI stopped.  Irrigated catheter with 500 cc of water without any return of clot.  CBI restarted and grade 1 hematuria on CBI.        A: 73-year-old male admitted for gross hematuria and clot retention after TURBT.  Does not appear to have any clots within the bladder.  I believe catheter dysfunction is morning is likely a bladder spasm on the catheter.    Plan:  Will start oxybutynin 5 mg p.o. 3 times daily as needed for bladder spasms.    Will obtain " bladder ultrasound today.  If no clots, will give 1 g of IV TXA.    CBI can be stopped at 12 noon if urine is relatively clear and if remains clear for 4 hours can discharge with a catheter and will arrange outpatient voiding trial next week.    Gurdeep Song MD  Please page [facility] Pager during day with questions/concerns    ADDENDUM 3:47 PM:  I reviewed bladder ultrasound which did not demonstrate a free-flowing clot in the bladder.  He was given 1 g of TXA.    His CBI is running close to crystal-clear.  I turn it down to about 1 drop/s and remained clear.    CBI should stop at midnight.  If catheter is clear to Grade 3 (on chart below) off CBI he can discharge tomorrow and we will arrange for catheter removal next week.

## 2024-01-20 VITALS
WEIGHT: 250 LBS | OXYGEN SATURATION: 96 % | BODY MASS INDEX: 37.89 KG/M2 | TEMPERATURE: 98.1 F | DIASTOLIC BLOOD PRESSURE: 79 MMHG | RESPIRATION RATE: 19 BRPM | HEIGHT: 68 IN | HEART RATE: 82 BPM | SYSTOLIC BLOOD PRESSURE: 139 MMHG

## 2024-01-20 LAB
BACTERIA UR CULT: NO GROWTH
HOLD SPECIMEN: NORMAL
SODIUM SERPL-SCNC: 134 MMOL/L (ref 135–145)

## 2024-01-20 PROCEDURE — 99239 HOSP IP/OBS DSCHRG MGMT >30: CPT | Performed by: INTERNAL MEDICINE

## 2024-01-20 PROCEDURE — 84295 ASSAY OF SERUM SODIUM: CPT | Performed by: INTERNAL MEDICINE

## 2024-01-20 PROCEDURE — 250N000013 HC RX MED GY IP 250 OP 250 PS 637: Performed by: INTERNAL MEDICINE

## 2024-01-20 PROCEDURE — 250N000013 HC RX MED GY IP 250 OP 250 PS 637: Performed by: UROLOGY

## 2024-01-20 PROCEDURE — G0378 HOSPITAL OBSERVATION PER HR: HCPCS

## 2024-01-20 PROCEDURE — 36415 COLL VENOUS BLD VENIPUNCTURE: CPT | Performed by: INTERNAL MEDICINE

## 2024-01-20 RX ORDER — HYDRALAZINE HYDROCHLORIDE 10 MG/1
10 TABLET, FILM COATED ORAL 2 TIMES DAILY
Status: DISCONTINUED | OUTPATIENT
Start: 2024-01-20 | End: 2024-01-20

## 2024-01-20 RX ORDER — AMLODIPINE BESYLATE 2.5 MG/1
2.5 TABLET ORAL DAILY
Status: DISCONTINUED | OUTPATIENT
Start: 2024-01-20 | End: 2024-01-20

## 2024-01-20 RX ORDER — HYDRALAZINE HYDROCHLORIDE 10 MG/1
10 TABLET, FILM COATED ORAL 2 TIMES DAILY
Status: DISCONTINUED | OUTPATIENT
Start: 2024-01-21 | End: 2024-01-20 | Stop reason: HOSPADM

## 2024-01-20 RX ADMIN — OXYBUTYNIN CHLORIDE 5 MG: 5 TABLET ORAL at 08:06

## 2024-01-20 RX ADMIN — AMLODIPINE BESYLATE 2.5 MG: 2.5 TABLET ORAL at 09:55

## 2024-01-20 ASSESSMENT — ACTIVITIES OF DAILY LIVING (ADL)
ADLS_ACUITY_SCORE: 24
ADLS_ACUITY_SCORE: 20
ADLS_ACUITY_SCORE: 24
ADLS_ACUITY_SCORE: 20
ADLS_ACUITY_SCORE: 20

## 2024-01-20 NOTE — PROGRESS NOTES
"Slept well. Denies pain. Per urology note to clamp CBI at midnight- urine has gone from watermelon color to more of a cherry color. Stage 3 per pictures in notes, will continue to monitor. Plan to discharge home today if CBI can be discontinued fully.  Temp: 98.5  F (36.9  C) Temp src: Oral BP: (!) 124/91 Pulse: 73   Resp: 18 SpO2: 94 % O2 Device: None (Room air)    PRIMARY DIAGNOSIS: \"GENERIC\" NURSING  OUTPATIENT/OBSERVATION GOALS TO BE MET BEFORE DISCHARGE:  ADLs back to baseline: Yes    Activity and level of assistance: Ambulating independently.    Pain status: Pain free.    Return to near baseline physical activity: Yes     Discharge Planner Nurse   Safe discharge environment identified: Yes  Barriers to discharge: Yes- monitoring of urine with CBI clamped.       Entered by: Corrine Dunn RN 01/20/2024 4:46 AM     Please review provider order for any additional goals.   Nurse to notify provider when observation goals have been met and patient is ready for discharge.  "

## 2024-01-20 NOTE — PROGRESS NOTES
"PRIMARY DIAGNOSIS: \"GENERIC\" NURSING  OUTPATIENT/OBSERVATION GOALS TO BE MET BEFORE DISCHARGE:  ADLs back to baseline: Yes    Activity and level of assistance: Up with standby assistance due to IV pole with CBI running.     Pain status: Pain free.    Return to near baseline physical activity: Yes     Discharge Planner Nurse   Safe discharge environment identified: Yes  Barriers to discharge: Yes       Entered by: Alphonso Rizvi RN 01/19/2024 8:36 PM     Please review provider order for any additional goals.   Nurse to notify provider when observation goals have been met and patient is ready for discharge.    CBI running, keeping urine light pink. No clots. Spoke with Urology, clamp catheter at midnight. Denies pain.   "

## 2024-01-20 NOTE — PLAN OF CARE
"PRIMARY DIAGNOSIS: \"GENERIC\" NURSING  OUTPATIENT/OBSERVATION GOALS TO BE MET BEFORE DISCHARGE:  ADLs back to baseline: Yes    Activity and level of assistance: Ambulating independently.    Pain status: Pain free.    Return to near baseline physical activity: Yes     Discharge Planner Nurse   Safe discharge environment identified: Yes  Barriers to discharge: No       Entered by: Ana Ernst RN 01/20/2024 11:45 AM     Please review provider order for any additional goals.   Nurse to notify provider when observation goals have been met and patient is ready for discharge.Goal Outcome Evaluation:    CBI clamped at midnight.  Urine patent to trujillo bag.  Pink output, (level 2 in tubing).  MD aware.    /81 this morning.  New order for Norvasc,  BP within normal on re-check after given.                       "

## 2024-01-20 NOTE — PROGRESS NOTES
"PRIMARY DIAGNOSIS: \"GENERIC\" NURSING  OUTPATIENT/OBSERVATION GOALS TO BE MET BEFORE DISCHARGE:  ADLs back to baseline: Yes    Activity and level of assistance: Ambulating independently.    Pain status: Pain free.    Return to near baseline physical activity: Yes     Discharge Planner Nurse   Safe discharge environment identified: Yes  Barriers to discharge: Yes       Entered by: Alphonso Rizvi RN 01/19/2024 11:24 PM     Please review provider order for any additional goals.   Nurse to notify provider when observation goals have been met and patient is ready for discharge.    Clamp CBI at midnight. Possibly discharge tomorrow depending on how pt tolerates this.   "

## 2024-01-20 NOTE — DISCHARGE SUMMARY
[facility]  Hospitalist Discharge Summary      Date of Admission:  1/18/2024  Date of Discharge:  1/20/2024  1:59 PM  Discharging Provider: Vikas Espinal,   Discharge Service: Hospitalist Service        Follow-ups Needed After Discharge   Follow-up Appointments     Follow-up and recommended labs and tests       Follow up with primary care provider, [facility],   within 3-5 days for hospital follow- up.  Check BP and BMP to ensure   sodium stability.   Follow up with urology as directed            Unresulted Labs Ordered in the Past 30 Days of this Admission       No orders found from 12/19/2023 to 1/19/2024.        These results will be followed up by Hospitalist results pool    Discharge Disposition   Discharged to home  Condition at discharge: Stable      Hospital Course   73 year old male with history of hypertension, hyperlipidemia, CKD 2 and history of bladder cancer status post transurethral resection 1/17/2024 admitted on 1/18/2024 with postop hematuria and urinary retention.        Postop urinary retention and hematuria  Status post transurethral resection of bladder tumor 1/17/2024  -- improved with CBI. Bladder US 1/19 did not demonstrate a free-flowing clot in the bladder, there is a presumed adherent clot along the superior and right side of the bladder dome   -- received 1 dose IV TXA 1/19.   -- hold home ASA after discharge  -- resumed home oxybutynin   -- keep trujillo in place after discharge  -- outpatient urology follow up    Hyponatremia: Mild  Likely contribution of elevated ADH state from pain, recent stress of surgery and hydrochlorothiazide   TSH acceptable  Cortisol level slightly low on first check and then improved the following day with acceptable stim test 1/19, no current concern for AI.   -- hold hydrochlorothiazide after discharge  -- follow up with PCP 3-5 days for Na recheck        CKD 2:  GFR stable          Abnormal UA:    -- UC  NGTD, stopped empiric IV ceftriaxone         Leukocytosis: resolved        HTN: hold home hydrochlorothiazide        HLP: continue home statin     Consultations This Hospital Stay   UROLOGY IP CONSULT  CARE MANAGEMENT / SOCIAL WORK IP CONSULT    Code Status   Full Code    Time Spent on this Encounter   I, Vikas Espinal DO, personally saw the patient today and spent greater than 30 minutes discharging this patient.       Vikas Espinal DO  [address] [address]  Phone: [phone]  Fax: [phone]  ______________________________________________________________________    Physical Exam   Vital Signs: Temp: 98.1  F (36.7  C) Temp src: Oral BP: 139/79 Pulse: 82   Resp: 19 SpO2: 96 % O2 Device: None (Room air)    Weight: 250 lbs 0 oz    General: NAD  RESPIRATORY: Respirations nonlabored  CARDIOVASCULAR: No le edema bilat.  NEUROLOGIC: No focal arm or leg weakness, speech is clear    Primary Care Physician   [facility]    Discharge Orders      Reason for your hospital stay    Post op complication     Follow-up and recommended labs and tests     Follow up with primary care provider, [facility], within 3-5 days for hospital follow- up.  Check BP and BMP to ensure sodium stability.   Follow up with urology as directed     Activity    Your activity upon discharge: activity as tolerated     Discharge Instructions    Stop hydrochlorothiazide for now given low sodium levels  Stop asa given recent bleeding and resume when instructed by urology or your PCP     Diet    Follow this diet upon discharge: Orders Placed This Encounter      Fluid restriction 1500 ML FLUID      Regular Diet Adult     \    Discharge Medications   Discharge Medication List as of 1/20/2024 12:57 PM        CONTINUE these medications which have NOT CHANGED    Details   acetaminophen (TYLENOL) 325 MG tablet Take 2 tablets (650 mg) by mouth every 4  hours as needed for mild pain, Disp-50 tablet, R-0, E-Prescribe      Ascorbic Acid (VITAMIN C) 500 MG CHEW Take 1 chew tab by mouth daily, Historical      fish oil-omega-3 fatty acids 500 MG capsule 2 capsules, Historical      ketoconazole (NIZORAL) 2 % external cream Apply topically as neededHistorical      ondansetron (ZOFRAN ODT) 4 MG ODT tab Take 1 tablet (4 mg) by mouth every 8 hours as needed for nausea, Disp-4 tablet, R-0, E-Prescribe      oxyBUTYnin (DITROPAN) 5 MG tablet Take 1 tablet (5 mg) by mouth 3 times daily as needed for bladder spasms, Disp-15 tablet, R-0, E-Prescribe      senna-docusate (SENOKOT-S/PERICOLACE) 8.6-50 MG tablet Take 1-2 tablets by mouth 2 times daily, Disp-30 tablet, R-0, E-Prescribe      simvastatin (ZOCOR) 40 MG tablet Take 40 mg by mouth at bedtime, Historical      Vitamin D3 (VITAMIN D, CHOLECALCIFEROL,) 25 mcg (1000 units) tablet Take 25 mcg by mouth daily, Historical           STOP taking these medications       aspirin 81 mg chewable tablet Comments:   Reason for Stopping:         hydrochlorothiazide (HYDRODIURIL) 25 MG tablet Comments:   Reason for Stopping:             Allergies   Allergies   Allergen Reactions    Chlorhexidine Dermatitis

## 2024-01-20 NOTE — PROGRESS NOTES
"PRIMARY DIAGNOSIS: \"GENERIC\" NURSING  OUTPATIENT/OBSERVATION GOALS TO BE MET BEFORE DISCHARGE:  ADLs back to baseline: Yes    Activity and level of assistance: Ambulating independently.    Pain status: Pain free.    Return to near baseline physical activity: Yes     Discharge Planner Nurse   Safe discharge environment identified: Yes  Barriers to discharge: Yes- clamping CBI per urology and monitoring urine output overnight.       Entered by: Corrine Dunn RN 01/20/2024 2:02 AM     Please review provider order for any additional goals.   Nurse to notify provider when observation goals have been met and patient is ready for discharge.  "

## 2024-01-20 NOTE — PROGRESS NOTES
Care Management Discharge Note    Discharge Date: 01/20/2024       Discharge Disposition: Home    Discharge Services: None    Discharge DME: None    Discharge Transportation: family or friend will provide    Private pay costs discussed: Not applicable    Does the patient's insurance plan have a 3 day qualifying hospital stay waiver?  No    PAS Confirmation Code: N/A  Patient/family educated on Medicare website which has current facility and service quality ratings: no    Education Provided on the Discharge Plan: Yes  Persons Notified of Discharge Plans: per team  Patient/Family in Agreement with the Plan: yes    Handoff Referral Completed: Yes    Additional Information:  12:40 PM  Plan is for Pt to discharge home where he lives with his wife. Pt is independent. Pt will follow-up with outpatient providers after discharge. Family will transport. No CM needs requested or anticipated prior to discharge.    CM will sign off. Please contact CM if any additional needs arise prior to discharge.      ANDRES Sen

## 2024-01-24 ENCOUNTER — OFFICE VISIT (OUTPATIENT)
Dept: UROLOGY | Facility: CLINIC | Age: 74
End: 2024-01-24
Payer: COMMERCIAL

## 2024-01-24 VITALS — HEART RATE: 75 BPM | TEMPERATURE: 97.8 F | SYSTOLIC BLOOD PRESSURE: 138 MMHG | DIASTOLIC BLOOD PRESSURE: 63 MMHG

## 2024-01-24 DIAGNOSIS — C67.3 MALIGNANT NEOPLASM OF ANTERIOR WALL OF URINARY BLADDER (H): Primary | ICD-10-CM

## 2024-01-24 PROCEDURE — 99213 OFFICE O/P EST LOW 20 MIN: CPT | Performed by: STUDENT IN AN ORGANIZED HEALTH CARE EDUCATION/TRAINING PROGRAM

## 2024-01-24 ASSESSMENT — PAIN SCALES - GENERAL: PAINLEVEL: NO PAIN (0)

## 2024-01-24 NOTE — PROGRESS NOTES
Trial of void procedure education reviewed with patient with understanding.  Patient was undressed from waist down and laying on exam bed.  The leg bag was discarded.  250ml of sterile water was then instilled into bladder via existing 3 way trujillo catheter.  Patient had strong urges to urinate.  30ml of water was deflated from the trujillo catheter balloon and the catheter was then removed without difficulty.  Patient tolerated well.  He was able to void back 275ml of urine.

## 2024-01-29 ENCOUNTER — PATIENT OUTREACH (OUTPATIENT)
Dept: UROLOGY | Facility: CLINIC | Age: 74
End: 2024-01-29
Payer: COMMERCIAL

## 2024-01-29 NOTE — TELEPHONE ENCOUNTER
Writer reached out to pt to assist with scheduling with provider to discuss the clinical trial.     Pt agreeable to 2/6 at 11:00 for an in person appt.     Will continue to follow as needed.

## 2024-01-30 ENCOUNTER — LAB REQUISITION (OUTPATIENT)
Dept: LAB | Facility: CLINIC | Age: 74
End: 2024-01-30
Payer: COMMERCIAL

## 2024-01-30 DIAGNOSIS — I10 ESSENTIAL (PRIMARY) HYPERTENSION: ICD-10-CM

## 2024-01-30 PROCEDURE — 80048 BASIC METABOLIC PNL TOTAL CA: CPT | Mod: ORL | Performed by: STUDENT IN AN ORGANIZED HEALTH CARE EDUCATION/TRAINING PROGRAM

## 2024-01-31 ENCOUNTER — PRE VISIT (OUTPATIENT)
Dept: UROLOGY | Facility: CLINIC | Age: 74
End: 2024-01-31
Payer: COMMERCIAL

## 2024-01-31 LAB
ANION GAP SERPL CALCULATED.3IONS-SCNC: 10 MMOL/L (ref 7–15)
BUN SERPL-MCNC: 14.9 MG/DL (ref 8–23)
CALCIUM SERPL-MCNC: 9.3 MG/DL (ref 8.8–10.2)
CHLORIDE SERPL-SCNC: 103 MMOL/L (ref 98–107)
CREAT SERPL-MCNC: 1.12 MG/DL (ref 0.67–1.17)
DEPRECATED HCO3 PLAS-SCNC: 24 MMOL/L (ref 22–29)
EGFRCR SERPLBLD CKD-EPI 2021: 69 ML/MIN/1.73M2
GLUCOSE SERPL-MCNC: 103 MG/DL (ref 70–99)
POTASSIUM SERPL-SCNC: 4.1 MMOL/L (ref 3.4–5.3)
SODIUM SERPL-SCNC: 137 MMOL/L (ref 135–145)

## 2024-01-31 NOTE — TELEPHONE ENCOUNTER
"Reason for visit: consult to discuss \"sunrise study\" per brennen    Dx/Hx/Sx: bladder cancer     Records/imaging/labs/orders: in epic    At Rooming: standard   "

## 2024-02-06 ENCOUNTER — ALLIED HEALTH/NURSE VISIT (OUTPATIENT)
Dept: ONCOLOGY | Facility: CLINIC | Age: 74
End: 2024-02-06

## 2024-02-06 ENCOUNTER — MYC MEDICAL ADVICE (OUTPATIENT)
Dept: ONCOLOGY | Facility: CLINIC | Age: 74
End: 2024-02-06

## 2024-02-06 ENCOUNTER — OFFICE VISIT (OUTPATIENT)
Dept: UROLOGY | Facility: CLINIC | Age: 74
End: 2024-02-06
Payer: COMMERCIAL

## 2024-02-06 VITALS
HEIGHT: 69 IN | WEIGHT: 234 LBS | BODY MASS INDEX: 34.66 KG/M2 | DIASTOLIC BLOOD PRESSURE: 83 MMHG | HEART RATE: 65 BPM | SYSTOLIC BLOOD PRESSURE: 153 MMHG

## 2024-02-06 DIAGNOSIS — C67.3 MALIGNANT NEOPLASM OF ANTERIOR WALL OF URINARY BLADDER (H): ICD-10-CM

## 2024-02-06 DIAGNOSIS — C67.3 MALIGNANT NEOPLASM OF ANTERIOR WALL OF URINARY BLADDER (H): Primary | ICD-10-CM

## 2024-02-06 DIAGNOSIS — E66.01 MORBID (SEVERE) OBESITY DUE TO EXCESS CALORIES (H): Primary | ICD-10-CM

## 2024-02-06 DIAGNOSIS — C67.9 BLADDER CANCER (H): Primary | ICD-10-CM

## 2024-02-06 PROCEDURE — 99215 OFFICE O/P EST HI 40 MIN: CPT | Mod: GC | Performed by: UROLOGY

## 2024-02-06 ASSESSMENT — PAIN SCALES - GENERAL: PAINLEVEL: NO PAIN (0)

## 2024-02-06 NOTE — PROGRESS NOTES
Chief Complaint:   Bladder Cancer         History of Present Illness:   Alek Mcneill is a very pleasant 74 year old male who presents with a history of bladder cancer. 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 His previous procedures have been completed by Dr. Layton. He is here today to discuss intravesical therapy options, including the [place] 3 trial.    He did have hematuria and clot retention after his re-staging TURBT, but this has since resolved.     Re-staging TURBT 1/17/2024  SPECIMEN   Procedure  Transurethral resection of bladder (TURBT)   TUMOR   Tumor Site  Anterior wall   Histologic Type  Urothelial carcinoma, in situ   Histologic Grade  High-grade   Tumor Extent  Flat carcinoma in situ   Lymphovascular Invasion  Not identified   Tumor Configuration  Flat   Muscularis Propria (detrusor muscle)  Cannot be determined: Biopsy site changes are identified in the current sample; no residual viable invasive carcinoma is identified.  Please see previous biopsy report for further information, case [id number], [facility]..   ADDITIONAL FINDINGS   Associated Epithelial Lesions  Chronic inflammation, histiocytic proliferation, consistent with biopsy site changes.     TURBT 12/6/2023  Final Diagnosis   A) URINARY BLADDER, BASE OF TUMOR, TRANSURETHRAL RESECTION BLADDER TUMOR:  -NONINVASIVE LOW-GRADE PAPILLARY UROTHELIAL CARCINOMA  -MULTIPLE FRAGMENTS OF MUSCULARIS PROPRIA  -SEE SYNOPTIC REPORT     B) URINARY BLADDER, TUMOR, TRANSURETHRAL RESECTION BLADDER TUMOR:  -HIGH-GRADE PAPILLARY UROTHELIAL CARCINOMA WITH LAMINA PROPRIA INVASION  -MUSCULARIS PROPRIA PRESENT AND UNINVOLVED  -SEE SYNOPTIC REPORT          Past Medical History:     Past Medical History:   Diagnosis Date    Complication of anesthesia     DM2 (diabetes mellitus, type 2) (H)     Hypercholesteremia     Hyperlipidemia     Hypertension           Past Surgical History:     Past  Surgical History:   Procedure Laterality Date    COLON SURGERY      CYSTOSCOPY, TRANSURETHRAL RESECTION (TUR) TUMOR BLADDER, COMBINED N/A 2023    Procedure: CYSTOSCOPY, WITH TRANSURETHRAL RESECTION BLADDER TUMOR,;  Surgeon: Damien Layton MD;  Location: [facility]    CYSTOSCOPY, TRANSURETHRAL RESECTION (TUR) TUMOR BLADDER, COMBINED N/A 2024    Procedure: CYSTOSCOPY, WITH TRANSURETHRAL RESECTION BLADDER TUMOR, RESTAGING;  Surgeon: Damien Layton MD;  Location: [facility]          Medications     Current Outpatient Medications   Medication    acetaminophen (TYLENOL) 325 MG tablet    Ascorbic Acid (VITAMIN C) 500 MG CHEW    fish oil-omega-3 fatty acids 500 MG capsule    ketoconazole (NIZORAL) 2 % external cream    ondansetron (ZOFRAN ODT) 4 MG ODT tab    senna-docusate (SENOKOT-S/PERICOLACE) 8.6-50 MG tablet    simvastatin (ZOCOR) 40 MG tablet    Vitamin D3 (VITAMIN D, CHOLECALCIFEROL,) 25 mcg (1000 units) tablet     No current facility-administered medications for this visit.          Family History:   No known family history of  malignancy.         Social History:     Social History     Socioeconomic History    Marital status:      Spouse name: Not on file    Number of children: Not on file    Years of education: Not on file    Highest education level: Not on file   Occupational History    Not on file   Tobacco Use    Smoking status: Former     Types: Cigarettes     Quit date:      Years since quittin.1    Smokeless tobacco: Not on file   Vaping Use    Vaping Use: Never used   Substance and Sexual Activity    Alcohol use: Yes     Comment: daily    Drug use: Never    Sexual activity: Not on file   Other Topics Concern    Not on file   Social History Narrative    Not on file     Social Determinants of Health     Financial Resource Strain: Not on file   Food Insecurity: Not on file   Transportation Needs: Not on file   Physical Activity: Not on file   Stress: Not on  "file   Social Connections: Not on file   Interpersonal Safety: Not on file   Housing Stability: Not on file          Allergies:   Chlorhexidine         Review of Systems:  From intake questionnaire     Skin: negative  Eyes: negative  Ears/Nose/Throat: negative  Respiratory: No shortness of breath, dyspnea on exertion, cough, or hemoptysis  Cardiovascular: No chest pain or palpitations  Gastrointestinal: negative; no nausea/vomiting, constipation or diarrhea  Genitourinary: as per HPI  Musculoskeletal: negative  Neurologic: negative  Psychiatric: negative  Hematologic/Lymphatic/Immunologic: negative  Endocrine: negative         Physical Exam:     Patient is a 74 year old  male   Vitals: Blood pressure (!) 153/83, pulse 65, height 1.74 m (5' 8.5\"), weight 106.1 kg (234 lb).  Constitutional: Body mass index is 35.06 kg/m .  Alert, no acute distress, oriented, conversant  Eyes: no scleral icterus; extraocular muscles intact, moist conjunctivae  Respiratory: no respiratory distress, or pursed lip breathing  Cardiovascular: pulses strong and intact; no obvious jugular venous distension present  Gastrointestinal: soft, nontender, no organomegaly or masses,   Musculoskeletal: extremities normal, no peripheral edema  Skin: no suspicious lesions or rashes  Neuro: Alert, oriented, speech and mentation normal  Psych: affect and mood normal, alert and oriented to person, place and time      Labs and Pathology:    I reviewed all applicable laboratory and pathology data and went over findings with patient  Significant for   Lab Results   Component Value Date    CR 1.12 01/30/2024    CR 1.16 01/19/2024    CR 1.18 01/18/2024    CR 1.13 11/19/2023    CR 1.18 09/08/2023    CR 1.24 12/13/2022    CR 1.20 08/18/2022    CR 1.09 07/19/2021    CR 1.28 12/23/2020    CR 1.14 07/15/2020     Prostate Specific Antigen Screen   Date Value Ref Range Status   09/08/2023 0.30 0.00 - 6.50 ng/mL Final   08/18/2022 0.30 0.00 - 6.50 ng/mL Final "   07/15/2020 0.3 0.0 - 6.5 ng/mL Final   07/18/2018 0.3 0.0 - 4.5 ng/mL Final   05/11/2015 0.4 0.0 - 4.5 ng/mL Final       Imaging:    The following imaging exams were independently viewed and interpreted by me and discussed with patient:    CT abd/pelvis 11/19/2023  MPRESSION:   1.  Multiple indeterminate nodular densities in the bladder suspicious for enhancing urothelial lesions. Malignancy not excluded. Given the history, cystoscopy is recommended for further evaluation.     2.  Kidneys and ureters are negative. No hydronephrosis.     3.  Diffuse fatty infiltration of the liver.     4.  No acute bowel findings. Normal appendix.     5.  Fat-containing bilateral inguinal hernias, left greater than right.      Outside and Past Medical records:    Review of the result(s) of each unique test - CT, creat,          Assessment and Plan:      74 year old male with history of HG non-muscle-invasive bladder cancer. We reviewed his pathology today and discussed options for treatment. We discussed the natural history of bladder cancer and the risk of recurrence. With this in mind, we reviewed that standard of care in this setting is intravesical therapy, most commonly with BCG. We discussed the role of BCG as an immunotherapy and the potential risks of this. We reviewed that BCG substantially reduces the risk of recurrence and progression.    We also discussed the option of enrolling in the [place] 3 clinical trial, using TAR-200 (intravesical gemcitabine). We discussed the design of this trial, including randomization to BCG alone, TAR-200 alone, or TAR-200 with cetrelimab (intravenous immunotherapy). We discussed the potential pros and cons of clinical trial enrollment, and possible second line treatments, should his disease recur. We discussed the potential side effects of  and how this might compare to standard of care BCG. He is interested in proceeding with the [place] 3 trial. With that in mind, he met with our  research nurse to discuss this in greater detail, and information about the trial was provided. We will refer him to medical oncology to further discuss the IV cetrelimab prior to formal consent.    Plan:  Plan [place] 3 Trial enrollment  Med Onc referral  CT urogram  CT chest  Schedule office cystoscopy to evaluate bladder prior to enrollment    Marques Brower MD  PGY-2    Orders  Orders Placed This Encounter   Procedures    CT Urogram wo & w Contrast    CT Chest w Contrast     Attestation:  I, Harman Kaiser MD, saw this patient with the resident and agree with the resident's findings and plan of care. I personally reviewed the medications, vital signs, labs, and imaging. I have reviewed and edited the note above to reflect my findings. The history and physical exam were performed by me and the pertinent details are outlined above.    45 total minutes spent on the date of the encounter including direct interaction with the patient, performing chart review, documentation and further activities as noted above.    Harman Kaiser MD  Urology  [facility]

## 2024-02-06 NOTE — LETTER
2/6/2024       RE: Alek Mcneill  [address] [place] [address]     Dear Colleague,    Thank you for referring your patient, Alek Mcneill, to the [facility] [place] [facility]. Please see a copy of my visit note below.          Chief Complaint:   Bladder Cancer         History of Present Illness:   Alek Mcneill is a very pleasant 74 year old male who presents with a history of bladder cancer. 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 His previous procedures have been completed by Dr. Layton. He is here today to discuss intravesical therapy options, including the [facility] 3 trial.    He did have hematuria and clot retention after his re-staging TURBT, but this has since resolved.     Re-staging TURBT 1/17/2024  SPECIMEN   Procedure  Transurethral resection of bladder (TURBT)   TUMOR   Tumor Site  Anterior wall   Histologic Type  Urothelial carcinoma, in situ   Histologic Grade  High-grade   Tumor Extent  Flat carcinoma in situ   Lymphovascular Invasion  Not identified   Tumor Configuration  Flat   Muscularis Propria (detrusor muscle)  Cannot be determined: Biopsy site changes are identified in the current sample; no residual viable invasive carcinoma is identified.  Please see previous biopsy report for further information, case [id number], [facility]..   ADDITIONAL FINDINGS   Associated Epithelial Lesions  Chronic inflammation, histiocytic proliferation, consistent with biopsy site changes.     TURBT 12/6/2023  Final Diagnosis   A) URINARY BLADDER, BASE OF TUMOR, TRANSURETHRAL RESECTION BLADDER TUMOR:  -NONINVASIVE LOW-GRADE PAPILLARY UROTHELIAL CARCINOMA  -MULTIPLE FRAGMENTS OF MUSCULARIS PROPRIA  -SEE SYNOPTIC REPORT     B) URINARY BLADDER, TUMOR, TRANSURETHRAL RESECTION BLADDER TUMOR:  -HIGH-GRADE PAPILLARY UROTHELIAL CARCINOMA WITH LAMINA PROPRIA  INVASION  -MUSCULARIS PROPRIA PRESENT AND UNINVOLVED  -SEE SYNOPTIC REPORT          Past Medical History:     Past Medical History:   Diagnosis Date    Complication of anesthesia     DM2 (diabetes mellitus, type 2) (H)     Hypercholesteremia     Hyperlipidemia     Hypertension           Past Surgical History:     Past Surgical History:   Procedure Laterality Date    COLON SURGERY      CYSTOSCOPY, TRANSURETHRAL RESECTION (TUR) TUMOR BLADDER, COMBINED N/A 2023    Procedure: CYSTOSCOPY, WITH TRANSURETHRAL RESECTION BLADDER TUMOR,;  Surgeon: Damien Layton MD;  Location: [facility]    CYSTOSCOPY, TRANSURETHRAL RESECTION (TUR) TUMOR BLADDER, COMBINED N/A 2024    Procedure: CYSTOSCOPY, WITH TRANSURETHRAL RESECTION BLADDER TUMOR, RESTAGING;  Surgeon: Damien Layton MD;  Location: [facility]          Medications     Current Outpatient Medications   Medication    acetaminophen (TYLENOL) 325 MG tablet    Ascorbic Acid (VITAMIN C) 500 MG CHEW    fish oil-omega-3 fatty acids 500 MG capsule    ketoconazole (NIZORAL) 2 % external cream    ondansetron (ZOFRAN ODT) 4 MG ODT tab    senna-docusate (SENOKOT-S/PERICOLACE) 8.6-50 MG tablet    simvastatin (ZOCOR) 40 MG tablet    Vitamin D3 (VITAMIN D, CHOLECALCIFEROL,) 25 mcg (1000 units) tablet     No current facility-administered medications for this visit.          Family History:   No known family history of  malignancy.         Social History:     Social History     Socioeconomic History    Marital status:      Spouse name: Not on file    Number of children: Not on file    Years of education: Not on file    Highest education level: Not on file   Occupational History    Not on file   Tobacco Use    Smoking status: Former     Types: Cigarettes     Quit date:      Years since quittin.1    Smokeless tobacco: Not on file   Vaping Use    Vaping Use: Never used   Substance and Sexual Activity    Alcohol use: Yes     Comment: daily    Drug  "use: Never    Sexual activity: Not on file   Other Topics Concern    Not on file   Social History Narrative    Not on file     Social Determinants of Health     Financial Resource Strain: Not on file   Food Insecurity: Not on file   Transportation Needs: Not on file   Physical Activity: Not on file   Stress: Not on file   Social Connections: Not on file   Interpersonal Safety: Not on file   Housing Stability: Not on file          Allergies:   Chlorhexidine         Review of Systems:  From intake questionnaire     Skin: negative  Eyes: negative  Ears/Nose/Throat: negative  Respiratory: No shortness of breath, dyspnea on exertion, cough, or hemoptysis  Cardiovascular: No chest pain or palpitations  Gastrointestinal: negative; no nausea/vomiting, constipation or diarrhea  Genitourinary: as per HPI  Musculoskeletal: negative  Neurologic: negative  Psychiatric: negative  Hematologic/Lymphatic/Immunologic: negative  Endocrine: negative         Physical Exam:     Patient is a 74 year old  male   Vitals: Blood pressure (!) 153/83, pulse 65, height 1.74 m (5' 8.5\"), weight 106.1 kg (234 lb).  Constitutional: Body mass index is 35.06 kg/m .  Alert, no acute distress, oriented, conversant  Eyes: no scleral icterus; extraocular muscles intact, moist conjunctivae  Respiratory: no respiratory distress, or pursed lip breathing  Cardiovascular: pulses strong and intact; no obvious jugular venous distension present  Gastrointestinal: soft, nontender, no organomegaly or masses,   Musculoskeletal: extremities normal, no peripheral edema  Skin: no suspicious lesions or rashes  Neuro: Alert, oriented, speech and mentation normal  Psych: affect and mood normal, alert and oriented to person, place and time      Labs and Pathology:    I reviewed all applicable laboratory and pathology data and went over findings with patient  Significant for   Lab Results   Component Value Date    CR 1.12 01/30/2024    CR 1.16 01/19/2024    CR 1.18 " 01/18/2024    CR 1.13 11/19/2023    CR 1.18 09/08/2023    CR 1.24 12/13/2022    CR 1.20 08/18/2022    CR 1.09 07/19/2021    CR 1.28 12/23/2020    CR 1.14 07/15/2020     Prostate Specific Antigen Screen   Date Value Ref Range Status   09/08/2023 0.30 0.00 - 6.50 ng/mL Final   08/18/2022 0.30 0.00 - 6.50 ng/mL Final   07/15/2020 0.3 0.0 - 6.5 ng/mL Final   07/18/2018 0.3 0.0 - 4.5 ng/mL Final   05/11/2015 0.4 0.0 - 4.5 ng/mL Final       Imaging:    The following imaging exams were independently viewed and interpreted by me and discussed with patient:    CT abd/pelvis 11/19/2023  MPRESSION:   1.  Multiple indeterminate nodular densities in the bladder suspicious for enhancing urothelial lesions. Malignancy not excluded. Given the history, cystoscopy is recommended for further evaluation.     2.  Kidneys and ureters are negative. No hydronephrosis.     3.  Diffuse fatty infiltration of the liver.     4.  No acute bowel findings. Normal appendix.     5.  Fat-containing bilateral inguinal hernias, left greater than right.      Outside and Past Medical records:    Review of the result(s) of each unique test - CT, creat,          Assessment and Plan:      74 year old male with history of HG non-muscle-invasive bladder cancer. We reviewed his pathology today and discussed options for treatment. We discussed the natural history of bladder cancer and the risk of recurrence. With this in mind, we reviewed that standard of care in this setting is intravesical therapy, most commonly with BCG. We discussed the role of BCG as an immunotherapy and the potential risks of this. We reviewed that BCG substantially reduces the risk of recurrence and progression.    We also discussed the option of enrolling in the [place] 3 clinical trial, using TAR-200 (intravesical gemcitabine). We discussed the design of this trial, including randomization to BCG alone, TAR-200 alone, or TAR-200 with cetrelimab (intravenous immunotherapy). We discussed  the potential pros and cons of clinical trial enrollment, and possible second line treatments, should his disease recur. We discussed the potential side effects of  and how this might compare to standard of care BCG. He is interested in proceeding with the [place] 3 trial. With that in mind, he met with our research nurse to discuss this in greater detail, and information about the trial was provided. We will refer him to medical oncology to further discuss the IV cetrelimab prior to formal consent.    Plan:  Plan [place] 3 Trial enrollment  Med Onc referral  CT urogram  CT chest  Schedule office cystoscopy to evaluate bladder prior to enrollment    Marques Brower MD  PGY-2    Orders  Orders Placed This Encounter   Procedures    CT Urogram wo & w Contrast    CT Chest w Contrast     Attestation:  I, Harman Kaiser MD, saw this patient with the resident and agree with the resident's findings and plan of care. I personally reviewed the medications, vital signs, labs, and imaging. I have reviewed and edited the note above to reflect my findings. The history and physical exam were performed by me and the pertinent details are outlined above.    45 total minutes spent on the date of the encounter including direct interaction with the patient, performing chart review, documentation and further activities as noted above.    Harman Kaiser MD  Urology  [facility]

## 2024-02-06 NOTE — NURSING NOTE
[id number]: Study Visit Note   Subject name: Alek Mcneill     Visit: Pre-screening     Met with patient and discussed logistics and details of the [place]-3 trial. Patient at this time wants to move forward. Gave him a copy of the consent form for review. Will call if questions. Writer will set up follow up appointment to discus Cetrelimab arm with patient with soonest available medical oncologist.     Araceli Diaz RN   Clinical Research Coordinator Nurse-Solid Tumor   Phone: [phone] Pgr: [phone]

## 2024-02-06 NOTE — NURSING NOTE
"Chief Complaint   Patient presents with    Follow Up       Blood pressure (!) 153/83, pulse 65, height 1.74 m (5' 8.5\"), weight 106.1 kg (234 lb). Body mass index is 35.06 kg/m .    Patient Active Problem List   Diagnosis    Urinary retention    Bladder tumor    Gross hematuria       Allergies   Allergen Reactions    Chlorhexidine Dermatitis       Current Outpatient Medications   Medication Sig Dispense Refill    acetaminophen (TYLENOL) 325 MG tablet Take 2 tablets (650 mg) by mouth every 4 hours as needed for mild pain 50 tablet 0    Ascorbic Acid (VITAMIN C) 500 MG CHEW Take 1 chew tab by mouth daily      fish oil-omega-3 fatty acids 500 MG capsule 2 capsules      ketoconazole (NIZORAL) 2 % external cream Apply topically as needed      ondansetron (ZOFRAN ODT) 4 MG ODT tab Take 1 tablet (4 mg) by mouth every 8 hours as needed for nausea 4 tablet 0    senna-docusate (SENOKOT-S/PERICOLACE) 8.6-50 MG tablet Take 1-2 tablets by mouth 2 times daily 30 tablet 0    simvastatin (ZOCOR) 40 MG tablet Take 40 mg by mouth at bedtime      Vitamin D3 (VITAMIN D, CHOLECALCIFEROL,) 25 mcg (1000 units) tablet Take 25 mcg by mouth daily         Social History     Tobacco Use    Smoking status: Former     Types: Cigarettes     Quit date:      Years since quittin.1   Vaping Use    Vaping Use: Never used   Substance Use Topics    Alcohol use: Yes     Comment: daily    Drug use: Never       Deedee Dyson  2024  10:30 AM     "

## 2024-02-09 ENCOUNTER — DOCUMENTATION ONLY (OUTPATIENT)
Dept: ONCOLOGY | Facility: CLINIC | Age: 74
End: 2024-02-09
Payer: COMMERCIAL

## 2024-02-09 ENCOUNTER — PRE VISIT (OUTPATIENT)
Dept: UROLOGY | Facility: CLINIC | Age: 74
End: 2024-02-09
Payer: COMMERCIAL

## 2024-02-09 DIAGNOSIS — C67.3 MALIGNANT NEOPLASM OF ANTERIOR WALL OF URINARY BLADDER (H): Primary | ICD-10-CM

## 2024-02-09 PROBLEM — I73.9 PERIPHERAL VASCULAR DISEASE (H): Status: ACTIVE | Noted: 2024-02-09

## 2024-02-09 PROBLEM — E78.5 HYPERLIPIDEMIA: Status: ACTIVE | Noted: 2024-02-09

## 2024-02-09 PROBLEM — M54.16 LUMBAR RADICULOPATHY: Status: ACTIVE | Noted: 2024-02-09

## 2024-02-09 PROBLEM — M48.07 SPINAL STENOSIS OF LUMBOSACRAL REGION: Status: ACTIVE | Noted: 2024-02-09

## 2024-02-09 PROBLEM — E11.9 TYPE 2 DIABETES MELLITUS WITHOUT COMPLICATION, WITHOUT LONG-TERM CURRENT USE OF INSULIN (H): Status: ACTIVE | Noted: 2024-02-09

## 2024-02-09 PROBLEM — R33.9 URINARY RETENTION: Status: RESOLVED | Noted: 2024-01-18 | Resolved: 2024-02-09

## 2024-02-09 PROBLEM — H25.11 AGE-RELATED NUCLEAR CATARACT OF RIGHT EYE: Status: ACTIVE | Noted: 2024-02-09

## 2024-02-09 PROBLEM — N52.9 IMPOTENCE OF ORGANIC ORIGIN: Status: ACTIVE | Noted: 2024-02-09

## 2024-02-09 PROBLEM — R31.0 GROSS HEMATURIA: Status: RESOLVED | Noted: 2024-01-18 | Resolved: 2024-02-09

## 2024-02-09 PROBLEM — Z91.09 ENVIRONMENTAL ALLERGIES: Status: ACTIVE | Noted: 2024-02-09

## 2024-02-09 PROBLEM — I87.2 VENOUS STASIS DERMATITIS OF LEFT LOWER EXTREMITY: Status: ACTIVE | Noted: 2024-02-09

## 2024-02-09 PROBLEM — I10 BENIGN HYPERTENSION: Status: ACTIVE | Noted: 2024-02-09

## 2024-02-09 PROBLEM — E66.01 MORBID (SEVERE) OBESITY DUE TO EXCESS CALORIES (H): Status: ACTIVE | Noted: 2024-02-09

## 2024-02-09 PROBLEM — D49.4 BLADDER TUMOR: Status: RESOLVED | Noted: 2024-01-18 | Resolved: 2024-02-09

## 2024-02-09 NOTE — NURSING NOTE
Called patient to notify he needs to be fasting for labs on Monday if he goes ahead with the clinical trial. Patient agreeable.     Araceli Diaz RN

## 2024-02-09 NOTE — TELEPHONE ENCOUNTER
RECORDS STATUS - ALL OTHER DIAGNOSIS    Bladder Cancer   RECORDS RECEIVED FROM:    [facility] Date: 2/12/2024    NOTES STATUS DETAILS   OFFICE NOTE from referring provider    Harman Kaiser MD      OFFICE NOTE from medical oncologist Complete EPIC   DISCHARGE SUMMARY from hospital     DISCHARGE REPORT from the ER     OPERATIVE REPORT Complete EPIC   MEDICATION LIST Complete [facility]   CLINICAL TRIAL TREATMENTS TO DATE     LABS     PATHOLOGY REPORTS See Biopsy in EPIC 12/6/2023   A) URINARY BLADDER, BASE OF TUMOR, TRANSURETHRAL RESECTION BLADDER TUMOR:  -NONINVASIVE LOW-GRADE PAPILLARY UROTHELIAL CARCINOMA  -MULTIPLE FRAGMENTS OF MUSCULARIS PROPRIA  -SEE SYNOPTIC REPORT  B) URINARY BLADDER, TUMOR, TRANSURETHRAL RESECTION BLADDER TUMOR:  -HIGH-GRADE PAPILLARY UROTHELIAL CARCINOMA WITH LAMINA PROPRIA INVASION  -MUSCULARIS PROPRIA PRESENT AND UNINVOLVED  -SEE SYNOPTIC REPORT  Electronically signed b   ANYTHING RELATED TO DIAGNOSIS Complete Labs last updated on 2/9/2024    GENONOMIC TESTING     TYPE:     IMAGING (NEED IMAGES & REPORT)     CT SCANS Complete CT Chest 2/16/2024     CT Urogram 2/16/2024    CT abdomen Pelvis 11/19/2023    MRI     MAMMO     ULTRASOUND Complete US Bladder 1/19/2024    PET

## 2024-02-09 NOTE — CONFIDENTIAL NOTE
Reason for nurse visit: pvr    Diagnosis: hx bladder cancer, for vapor study    Ordering provider: Dr. Kaiser    Last seen by provider: 2/6/2024       Allergies   Allergen Reactions    Chlorhexidine Dermatitis        Roseann Garvey

## 2024-02-11 NOTE — PROGRESS NOTES
[facility] Medical Oncology Note       Date of visit: February 12, 2024  New Outpatient Clinic Note        Assessment:     New diagnosis of Stage 1 (T1NxMx) superficial poorly differentiated bladder cancer. T1 lesions are high risk. Recurrence rates at one, three, and five years can reach up to 50, 70 to 80, and 90 percent, respectively, and 20 to 25 percent progress to more invasive (T2 or greater) disease. Given the high risk here, therapy is indicated.  After TURBT, standard treatment in this setting is intravesicular BCG. A recent meta-analysis has shown that this approach results in a 10 year PFS of 67%. The questioni s, is there any other treatment that may result in improved outcome? The answer is possibly.  We have open the SUNRISE-3 study, that takes patients with T1 disease and randomized them to one of 3 arms: TAR-200 (a device inserted in to the bladder that contains gemcitabine)+ Cetrelimab IV OR TAR-200 Alone OR BCG Alone. Alek is here tody to discuss cetrelimab.  Cetrelimb is a PD-1 antibody and has been extensively studied. Per an ASCO abstract,  the side effect profile includes  asthenia (19%), fatigue (19%), dyspnea (16%) and diarrhea (16%). Grade ?3 AEs, regardless of causality, were reported in 45% of pts; most common were anemia (6%), dyspnea (4%), increased ALT (3%) and increased AST (3%). Alek is understandably concerned with possible side effects.  From my perspective, there is no contraindication for Alek to participate in SUNRISE-3. He has no history of autoimmune disease. His exam is normal. He is bright and ready to learn.  He did mention how traumatic it is to get a bladder catheter placed. He was never offered a lidocaine Uro-jet. Hopefully, this will be offered prior to his next catheter placement.        Plan:     Baseline labs today  Study nurse Araceli Diaz will go over the consent with him today.  If he is randomized to the Cetrelimab arm, our  medical oncology will  "continue to see him for his infusions, and to monitor side effects.    Seen with Flores Louie, MS4    Julio Tobin MD, MSc  Associate Professor of Medicine  [employer]  [address] [address]  [phone]    __________________________________________________________________    CHIEF COMPLAINT     I was asked to see this patient by Dr. Gore to provide background on Cemiplimab, in this patient with newly diagnosed T1 poorly differentiated invasive bladder cancer.  The patient is considering participation in our [place]-3 trial which could include exposure to Cemiplimab.      History of Present Illness:   11/19/2023: Went to ED for subacute onset of gross hematuria, and a passing of clots. Stopped ASA. CT showed multiple indeterminate nodular densities in the bladder suspicious for enhancing urothelial lesions   12/6/2023: Cystoscopy , with finding of multiple bladder tumors appeared papillary and were located on the anterior wall and had some superficial calcifications. \"We resected tissue down to muscle using cut electrocautery. \" Pathology showed high grade papillary carcinoma, that did not invade the muscle.  1/17/2024: Cystoscopy with resection showed CIS, but no invasive cancer.  Now here to talk aabout participation in the SUNRISE -3 trial.        Interval history:     Alek is here with his wife Christine discussed participation in the trial.  He has been doing okay recently.  He did have an episode of hematuria yesterday that did not last very long.  His urine was bj-colored.  Today it is clear.  He knows that this is associated with his fluid intake and admits that he does not like to drink water very much.  He does drink few beers a day.  He has no new pain except for chronic left shoulder issues.  He denies cough or shortness of breath.  There are no new lumps or bumps.  He still is interested in participating in the trial.  He " relates a traumatic story of how multiple attempts were made to place a Radford catheter without a Uro-Jet.  It has left him traumatized and a little leery of having a catheter placed in the future, even if it is just for BCG.  The rest of the multiorgan review of physical symptoms is negative.      Past Medical History:   I have reviewed this patient's past medical history   Past Medical History:   Diagnosis Date    Complication of anesthesia     DM2 (diabetes mellitus, type 2) (H)     Hypercholesteremia     Hyperlipidemia     Hypertension           Past Surgical History:    I have reviewed this patient's past surgical history       Social History:   Tobacco, ETOH, and rec drugs reviewed and as noted below with the following exceptions:  Drinks a few beers a day. Quit smoking in .  Alek was born in [place], and graduated from [employer] (no longer exists) in .  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  He is  to Christine, his third wife.  She worked with his sister-in-law and they were introduced.  They have been  for 25 years.  Jose has 5 children, Golden, Cr, Joon, Radha, and Kayla.  Kayla is the youngest and a PA who lives in [place].  The other children are in the service.  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          Family History:     Grandma with skin cancer (s/p removal of her nose)  Uncle with sarcoma  Dad had bone cancer/lung cancer  Brother Casimiro had bladder cancer  Ned had bladder cacner ( in car accident)  Brother Lenny had bladder cancer  No family history on file.         Medications:     Current Outpatient Medications   Medication Sig Dispense Refill    Ascorbic Acid (VITAMIN C) 500 MG CHEW Take 1 chew tab by mouth daily      fish oil-omega-3 fatty acids 500 MG capsule 2 capsules      ketoconazole  (NIZORAL) 2 % external cream Apply topically as needed      senna-docusate (SENOKOT-S/PERICOLACE) 8.6-50 MG tablet Take 1-2 tablets by mouth 2 times daily 30 tablet 0    simvastatin (ZOCOR) 40 MG tablet Take 40 mg by mouth at bedtime      Vitamin D3 (VITAMIN D, CHOLECALCIFEROL,) 25 mcg (1000 units) tablet Take 25 mcg by mouth daily                Physical Exam:   There were no vitals taken for this visit.    ECOG PS: 0  Constitutional: WDWN male in NAD, pleasant and appropriate  HEENT:  NC/AT, no icterus, OP clear, MMM  Skin: No jaundice nor ecchymoses  Lungs: CTAB, no w/r/r, nonlabored breathing  Cardiovascular: RRR, S1, S2, no m/r/g  Abdomen: +BS, soft, nontender, nondistended, no organomegaly nor masses  MSK/Extremities: Warm, well perfused. No edema  LN: no cervical, supraclavicular, axillary, nor inguinal lymphadenopathy  Neurologic: alert, answering questions appropriately, moving all extremities spontaneously. CN 2-12 grossly intact.  Psych: appropriate affect  Data:     Recent Labs   Lab Test 01/19/24  0734 01/19/24  0501 01/18/24  0558 11/19/23  0146 12/23/20  2146   WBC  --  11.0 13.8* 10.0 10.8   NEUTROPHIL  --   --  67 47 55   HGB 14.1 13.8 13.7 15.7 15.5   PLT  --  190 208 242 243     Recent Labs   Lab Test 01/30/24  1202 01/20/24  0737 01/19/24  0501 01/18/24  0558 11/19/23  0146 09/08/23  1102    134* 138 132* 138 137   POTASSIUM 4.1  --  4.1 4.1 4.2 4.7   CHLORIDE 103  --  102 96* 100 99   CO2 24  --  28 27 29 25   ANIONGAP 10  --  8 9 9 13   BUN 14.9  --  15.0 19.3 17.6 17.0   CR 1.12  --  1.16 1.18* 1.13 1.18*   MILLI 9.3  --  8.4* 8.7* 9.7 9.8     Recent Labs   Lab Test 11/19/23  0146   MAG 2.3     Recent Labs   Lab Test 09/08/23  1102 12/13/22  0919 08/18/22  0956   BILITOTAL 0.7 0.7 0.7   ALKPHOS 59 74 88   ALT 53 51* 52*   AST 47* 41 42   ALBUMIN 4.6 4.5 4.6     [facility](PSAtumormarker:7)    No results found for this or any previous visit (from the past 24 hour(s)).    Other Data     TURBT  12/6/2023  Final Diagnosis   A) URINARY BLADDER, BASE OF TUMOR, TRANSURETHRAL RESECTION BLADDER TUMOR:  -NONINVASIVE LOW-GRADE PAPILLARY UROTHELIAL CARCINOMA  -MULTIPLE FRAGMENTS OF MUSCULARIS PROPRIA  -SEE SYNOPTIC REPORT     B) URINARY BLADDER, TUMOR, TRANSURETHRAL RESECTION BLADDER TUMOR:  -HIGH-GRADE PAPILLARY UROTHELIAL CARCINOMA WITH LAMINA PROPRIA INVASION  -MUSCULARIS PROPRIA PRESENT AND UNINVOLVED  -SEE SYNOPTIC REPORT     CT abd/pelvis 11/19/2023  MPRESSION:   1.  Multiple indeterminate nodular densities in the bladder suspicious for enhancing urothelial lesions. Malignancy not excluded. Given the history, cystoscopy is recommended for further evaluation.     2.  Kidneys and ureters are negative. No hydronephrosis.     3.  Diffuse fatty infiltration of the liver.     4.  No acute bowel findings. Normal appendix.     5.  Fat-containing bilateral inguinal hernias, left greater than right.            Labs, imaging and treatment plan reviewed with patient. All questions answered.        60 minutes spent on the date of the encounter doing chart review, review of outside records, review of test results, interpretation of tests, patient visit, documentation, discussion with other provider(s), and discussion with family

## 2024-02-12 ENCOUNTER — LAB (OUTPATIENT)
Dept: LAB | Facility: CLINIC | Age: 74
End: 2024-02-12
Attending: INTERNAL MEDICINE
Payer: COMMERCIAL

## 2024-02-12 ENCOUNTER — ALLIED HEALTH/NURSE VISIT (OUTPATIENT)
Dept: ONCOLOGY | Facility: CLINIC | Age: 74
End: 2024-02-12

## 2024-02-12 ENCOUNTER — OFFICE VISIT (OUTPATIENT)
Dept: UROLOGY | Facility: CLINIC | Age: 74
End: 2024-02-12
Payer: COMMERCIAL

## 2024-02-12 ENCOUNTER — ONCOLOGY VISIT (OUTPATIENT)
Dept: ONCOLOGY | Facility: CLINIC | Age: 74
End: 2024-02-12
Attending: INTERNAL MEDICINE
Payer: COMMERCIAL

## 2024-02-12 ENCOUNTER — PRE VISIT (OUTPATIENT)
Dept: ONCOLOGY | Facility: CLINIC | Age: 74
End: 2024-02-12

## 2024-02-12 VITALS
DIASTOLIC BLOOD PRESSURE: 88 MMHG | RESPIRATION RATE: 20 BRPM | SYSTOLIC BLOOD PRESSURE: 155 MMHG | OXYGEN SATURATION: 95 % | TEMPERATURE: 98.4 F | HEART RATE: 85 BPM | HEIGHT: 68 IN | WEIGHT: 244 LBS | BODY MASS INDEX: 36.98 KG/M2

## 2024-02-12 DIAGNOSIS — Z00.6 EXAMINATION OF PARTICIPANT IN CLINICAL TRIAL: ICD-10-CM

## 2024-02-12 DIAGNOSIS — Z11.59 ENCOUNTER FOR SCREENING FOR OTHER VIRAL DISEASES: ICD-10-CM

## 2024-02-12 DIAGNOSIS — C67.3 MALIGNANT NEOPLASM OF ANTERIOR WALL OF URINARY BLADDER (H): ICD-10-CM

## 2024-02-12 DIAGNOSIS — C67.3 MALIGNANT NEOPLASM OF ANTERIOR WALL OF URINARY BLADDER (H): Primary | ICD-10-CM

## 2024-02-12 DIAGNOSIS — R33.9 URINARY RETENTION: Primary | ICD-10-CM

## 2024-02-12 LAB
ALBUMIN SERPL BCG-MCNC: 4.5 G/DL (ref 3.5–5.2)
ALBUMIN UR-MCNC: NEGATIVE MG/DL
ALP SERPL-CCNC: 77 U/L (ref 40–150)
ALT SERPL W P-5'-P-CCNC: 37 U/L (ref 0–70)
AMYLASE SERPL-CCNC: 75 U/L (ref 28–100)
ANION GAP SERPL CALCULATED.3IONS-SCNC: 12 MMOL/L (ref 7–15)
APPEARANCE UR: CLEAR
APTT PPP: 26 SECONDS (ref 22–38)
AST SERPL W P-5'-P-CCNC: 40 U/L (ref 0–45)
BASOPHILS # BLD AUTO: 0.1 10E3/UL (ref 0–0.2)
BASOPHILS NFR BLD AUTO: 1 %
BILIRUB SERPL-MCNC: 0.7 MG/DL
BILIRUB UR QL STRIP: NEGATIVE
BUN SERPL-MCNC: 14.9 MG/DL (ref 8–23)
CALCIUM SERPL-MCNC: 10.1 MG/DL (ref 8.8–10.2)
CHLORIDE SERPL-SCNC: 99 MMOL/L (ref 98–107)
COLOR UR AUTO: ABNORMAL
CREAT SERPL-MCNC: 1.21 MG/DL (ref 0.67–1.17)
CRP SERPL-MCNC: 3.21 MG/L
DEPRECATED HCO3 PLAS-SCNC: 27 MMOL/L (ref 22–29)
EGFRCR SERPLBLD CKD-EPI 2021: 62 ML/MIN/1.73M2
EOSINOPHIL # BLD AUTO: 0.4 10E3/UL (ref 0–0.7)
EOSINOPHIL NFR BLD AUTO: 4 %
ERYTHROCYTE [DISTWIDTH] IN BLOOD BY AUTOMATED COUNT: 12.7 % (ref 10–15)
GGT SERPL-CCNC: 88 U/L (ref 8–61)
GLUCOSE SERPL-MCNC: 108 MG/DL (ref 70–99)
GLUCOSE UR STRIP-MCNC: NEGATIVE MG/DL
HBA1C MFR BLD: 6.6 %
HBV SURFACE AB SERPL IA-ACNC: 29.8 M[IU]/ML
HBV SURFACE AB SERPL IA-ACNC: REACTIVE M[IU]/ML
HBV SURFACE AG SERPL QL IA: NONREACTIVE
HCT VFR BLD AUTO: 45.2 % (ref 40–53)
HCV AB SERPL QL IA: NONREACTIVE
HGB BLD-MCNC: 15.9 G/DL (ref 13.3–17.7)
HGB UR QL STRIP: ABNORMAL
HIV 1+2 AB+HIV1 P24 AG SERPL QL IA: NONREACTIVE
IMM GRANULOCYTES # BLD: 0 10E3/UL
IMM GRANULOCYTES NFR BLD: 0 %
INR PPP: 1.03 (ref 0.85–1.15)
KETONES UR STRIP-MCNC: NEGATIVE MG/DL
LDH SERPL L TO P-CCNC: 212 U/L (ref 0–250)
LEUKOCYTE ESTERASE UR QL STRIP: NEGATIVE
LIPASE SERPL-CCNC: 36 U/L (ref 13–60)
LYMPHOCYTES # BLD AUTO: 2.3 10E3/UL (ref 0.8–5.3)
LYMPHOCYTES NFR BLD AUTO: 26 %
MCH RBC QN AUTO: 32.1 PG (ref 26.5–33)
MCHC RBC AUTO-ENTMCNC: 35.2 G/DL (ref 31.5–36.5)
MCV RBC AUTO: 91 FL (ref 78–100)
MONOCYTES # BLD AUTO: 1 10E3/UL (ref 0–1.3)
MONOCYTES NFR BLD AUTO: 11 %
MUCOUS THREADS #/AREA URNS LPF: PRESENT /LPF
NEUTROPHILS # BLD AUTO: 5.2 10E3/UL (ref 1.6–8.3)
NEUTROPHILS NFR BLD AUTO: 58 %
NITRATE UR QL: NEGATIVE
NRBC # BLD AUTO: 0 10E3/UL
NRBC BLD AUTO-RTO: 0 /100
PH UR STRIP: 6.5 [PH] (ref 5–7)
PHOSPHATE SERPL-MCNC: 3.1 MG/DL (ref 2.5–4.5)
PLATELET # BLD AUTO: 231 10E3/UL (ref 150–450)
POTASSIUM SERPL-SCNC: 4.1 MMOL/L (ref 3.4–5.3)
PROT SERPL-MCNC: 7.7 G/DL (ref 6.4–8.3)
RBC # BLD AUTO: 4.96 10E6/UL (ref 4.4–5.9)
RBC URINE: 5 /HPF
SODIUM SERPL-SCNC: 138 MMOL/L (ref 135–145)
SP GR UR STRIP: 1.01 (ref 1–1.03)
SQUAMOUS EPITHELIAL: <1 /HPF
T3 SERPL-MCNC: 138 NG/DL (ref 85–202)
T3FREE SERPL-MCNC: 3.1 PG/ML (ref 2–4.4)
T4 FREE SERPL-MCNC: 1.05 NG/DL (ref 0.9–1.7)
TSH SERPL DL<=0.005 MIU/L-ACNC: 4.39 UIU/ML (ref 0.3–4.2)
URATE SERPL-MCNC: 6.2 MG/DL (ref 3.4–7)
UROBILINOGEN UR STRIP-MCNC: NORMAL MG/DL
WBC # BLD AUTO: 9 10E3/UL (ref 4–11)
WBC URINE: 5 /HPF

## 2024-02-12 PROCEDURE — 83690 ASSAY OF LIPASE: CPT

## 2024-02-12 PROCEDURE — 85730 THROMBOPLASTIN TIME PARTIAL: CPT

## 2024-02-12 PROCEDURE — 82248 BILIRUBIN DIRECT: CPT

## 2024-02-12 PROCEDURE — 84481 FREE ASSAY (FT-3): CPT

## 2024-02-12 PROCEDURE — 87389 HIV-1 AG W/HIV-1&-2 AB AG IA: CPT

## 2024-02-12 PROCEDURE — 84480 ASSAY TRIIODOTHYRONINE (T3): CPT

## 2024-02-12 PROCEDURE — 84443 ASSAY THYROID STIM HORMONE: CPT

## 2024-02-12 PROCEDURE — 88112 CYTOPATH CELL ENHANCE TECH: CPT | Mod: TC

## 2024-02-12 PROCEDURE — 87086 URINE CULTURE/COLONY COUNT: CPT

## 2024-02-12 PROCEDURE — 85025 COMPLETE CBC W/AUTO DIFF WBC: CPT

## 2024-02-12 PROCEDURE — 36415 COLL VENOUS BLD VENIPUNCTURE: CPT

## 2024-02-12 PROCEDURE — 86704 HEP B CORE ANTIBODY TOTAL: CPT

## 2024-02-12 PROCEDURE — 99205 OFFICE O/P NEW HI 60 MIN: CPT | Performed by: INTERNAL MEDICINE

## 2024-02-12 PROCEDURE — 83036 HEMOGLOBIN GLYCOSYLATED A1C: CPT

## 2024-02-12 PROCEDURE — 86803 HEPATITIS C AB TEST: CPT

## 2024-02-12 PROCEDURE — 87522 HEPATITIS C REVRS TRNSCRPJ: CPT

## 2024-02-12 PROCEDURE — 82150 ASSAY OF AMYLASE: CPT

## 2024-02-12 PROCEDURE — 84100 ASSAY OF PHOSPHORUS: CPT

## 2024-02-12 PROCEDURE — 93000 ELECTROCARDIOGRAM COMPLETE: CPT | Performed by: INTERNAL MEDICINE

## 2024-02-12 PROCEDURE — 84439 ASSAY OF FREE THYROXINE: CPT

## 2024-02-12 PROCEDURE — 83615 LACTATE (LD) (LDH) ENZYME: CPT

## 2024-02-12 PROCEDURE — 86706 HEP B SURFACE ANTIBODY: CPT

## 2024-02-12 PROCEDURE — 87340 HEPATITIS B SURFACE AG IA: CPT

## 2024-02-12 PROCEDURE — 88112 CYTOPATH CELL ENHANCE TECH: CPT | Mod: 26 | Performed by: PATHOLOGY

## 2024-02-12 PROCEDURE — 80053 COMPREHEN METABOLIC PANEL: CPT

## 2024-02-12 PROCEDURE — 82977 ASSAY OF GGT: CPT

## 2024-02-12 PROCEDURE — 85610 PROTHROMBIN TIME: CPT

## 2024-02-12 PROCEDURE — G0463 HOSPITAL OUTPT CLINIC VISIT: HCPCS | Performed by: INTERNAL MEDICINE

## 2024-02-12 PROCEDURE — 81001 URINALYSIS AUTO W/SCOPE: CPT

## 2024-02-12 PROCEDURE — 84550 ASSAY OF BLOOD/URIC ACID: CPT

## 2024-02-12 PROCEDURE — 86140 C-REACTIVE PROTEIN: CPT

## 2024-02-12 PROCEDURE — 87517 HEPATITIS B DNA QUANT: CPT

## 2024-02-12 PROCEDURE — 51798 US URINE CAPACITY MEASURE: CPT

## 2024-02-12 RX ORDER — HYDROCHLOROTHIAZIDE 25 MG/1
25 TABLET ORAL DAILY
COMMUNITY
Start: 2024-02-11

## 2024-02-12 RX ORDER — ASPIRIN 81 MG/1
TABLET, CHEWABLE ORAL
COMMUNITY
Start: 2024-01-30 | End: 2024-03-05

## 2024-02-12 ASSESSMENT — PAIN SCALES - GENERAL: PAINLEVEL: NO PAIN (0)

## 2024-02-12 NOTE — NURSING NOTE
[id number]: Informed Consent Note     The consent form, including purpose, risks and benefits, was reviewed with Alek Mcneill, and all questions were answered before he signed the consent form. The patient understands that the study involves an active treatment phase as well as a post-treatment follow up phase.     Present during the discussion were Araceli Diaz, Marciano Castle, the patient and the patient's wife. A copy of the signed form was provided to the patient. No procedures specific to this study were performed prior to the patient signing the consent form.    Consent Version Date: [email or web address]  Consent obtained by: Araceli Diaz    Date: 12FEB2024  HIPAA authorization signed?: yes  HIPAA authorization version date: [id number]    Araceli Diaz RN    Form 503.03.01 (Version 2)     Effective date: 01AUG2018     Next Review Date: 01AUG2020        Race and ethnicity identification note     I discussed with Alek Mcneill that the National [employer] [place] (NCI) has asked that information on race and ethnicity be obtained from NCI-sponsored studies' participants whenever possible and that the purpose for this request is to assist the NCI in evaluating whether persons of all races and ethnicity are given the opportunity to participate in new cancer treatment options. It was explained that the information will be kept in a confidential file in the [facility] Clinical Trials Office and will be sent to the NCI in a way in which he cannot be identified. It was also explained that providing this information is voluntary.    Alek Mcneill provided the following responses:    Ethnicity: non-  Race: white  Country of birth: [place]  Country of residence: [place]       Araceli Diaz RN      Form 505.00.01 (Version 3)     Effective date: 19JUN2020     Next Review Date: 19JUN2022        Reproductive Evaluation     Subject name: Alek Mcneill   I discussed with the patient that in order to participate in  this study he must agree to use effective contraception during therapy and continuing for 6 months after the last dose of study drug or study drug comparator . Examples of effective contraception were provided, including hormonal contraception, intrauterine devices, and double barrier contraception. He agreed to use effective contraception per the study's requirements.  Araceli Diaz RN  Form 503.03.03 (Version 1)     Effective date: 01JUL2018     Next Review Date: 01JUL2020

## 2024-02-12 NOTE — NURSING NOTE
Chief Complaint   Patient presents with    Blood Draw    Labs Only     Labs collected from venipuncture by RN.      Labs collected from venipuncture by RN.     Kimberly Lugo RN

## 2024-02-12 NOTE — LETTER
2/12/2024         RE: Alek Mcneill  [address] [place] [address]        Dear Colleague,    Thank you for referring your patient, Alek Mcneill, to the [facility]. Please see a copy of my visit note below.      [facility] Medical Oncology Note       Date of visit: February 12, 2024  New Outpatient Clinic Note        Assessment:     New diagnosis of Stage 1 (T1NxMx) superficial poorly differentiated bladder cancer. T1 lesions are high risk. Recurrence rates at one, three, and five years can reach up to 50, 70 to 80, and 90 percent, respectively, and 20 to 25 percent progress to more invasive (T2 or greater) disease. Given the high risk here, therapy is indicated.  After TURBT, standard treatment in this setting is intravesicular BCG. A recent meta-analysis has shown that this approach results in a 10 year PFS of 67%. The questioni s, is there any other treatment that may result in improved outcome? The answer is possibly.  We have open the SUNRISE-3 study, that takes patients with T1 disease and randomized them to one of 3 arms: TAR-200 (a device inserted in to the bladder that contains gemcitabine)+ Cetrelimab IV OR TAR-200 Alone OR BCG Alone. Alek is here tody to discuss cetrelimab.  Cetrelimb is a PD-1 antibody and has been extensively studied. Per an ASCO abstract,  the side effect profile includes  asthenia (19%), fatigue (19%), dyspnea (16%) and diarrhea (16%). Grade ?3 AEs, regardless of causality, were reported in 45% of pts; most common were anemia (6%), dyspnea (4%), increased ALT (3%) and increased AST (3%). Alek is understandably concerned with possible side effects.  From my perspective, there is no contraindication for Alek to participate in SUNRISE-3. He has no history of autoimmune disease. His exam is normal. He is bright and ready to learn.  He did mention how traumatic it is to get a bladder catheter placed. He was never offered a lidocaine Uro-jet.  "Hopefully, this will be offered prior to his next catheter placement.        Plan:     Baseline labs today  Study nurse Araceli Diaz will go over the consent with him today.  If he is randomized to the Cetrelimab arm, our  medical oncology will continue to see him for his infusions, and to monitor side effects.    Seen with Flores Louie, MS4    Julio Tobin MD, MSc  Associate Professor of Medicine  [employer] [employer]  [address] [address]  [phone]    __________________________________________________________________    CHIEF COMPLAINT     I was asked to see this patient by Dr. Gore to provide background on Cemiplimab, in this patient with newly diagnosed T1 poorly differentiated invasive bladder cancer.  The patient is considering participation in our [place]-3 trial which could include exposure to Cemiplimab.      History of Present Illness:   11/19/2023: Went to ED for subacute onset of gross hematuria, and a passing of clots. Stopped ASA. CT showed multiple indeterminate nodular densities in the bladder suspicious for enhancing urothelial lesions   12/6/2023: Cystoscopy , with finding of multiple bladder tumors appeared papillary and were located on the anterior wall and had some superficial calcifications. \"We resected tissue down to muscle using cut electrocautery. \" Pathology showed high grade papillary carcinoma, that did not invade the muscle.  1/17/2024: Cystoscopy with resection showed CIS, but no invasive cancer.  Now here to talk aabout participation in the SUNRISE -3 trial.        Interval history:     Alek is here with his wife Christine discussed participation in the trial.  He has been doing okay recently.  He did have an episode of hematuria yesterday that did not last very long.  His urine was bj-colored.  Today it is clear.  He knows that this is associated with his fluid intake and admits that he does not like to drink " water very much.  He does drink few beers a day.  He has no new pain except for chronic left shoulder issues.  He denies cough or shortness of breath.  There are no new lumps or bumps.  He still is interested in participating in the trial.  He relates a traumatic story of how multiple attempts were made to place a Radford catheter without a Uro-Jet.  It has left him traumatized and a little leery of having a catheter placed in the future, even if it is just for BCG.  The rest of the multiorgan review of physical symptoms is negative.      Past Medical History:   I have reviewed this patient's past medical history   Past Medical History:   Diagnosis Date    Complication of anesthesia     DM2 (diabetes mellitus, type 2) (H)     Hypercholesteremia     Hyperlipidemia     Hypertension           Past Surgical History:    I have reviewed this patient's past surgical history       Social History:   Tobacco, ETOH, and rec drugs reviewed and as noted below with the following exceptions:  Drinks a few beers a day. Quit smoking in .  Alek was born in [place], and graduated from [employer] (no longer [employer]) in .  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  He is  to Christine, his third wife.  She worked with his sister-in-law and they were introduced.  They have been  for 25 years.  Jose has 5 children, Golden, Cr, Joon, Radha, and Kayla.  Kayla is the youngest and a PA who lives in [place].  The other children are in the service.  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          Family History:     Grandma with skin cancer (s/p removal of her nose)  Uncle with sarcoma  Dad had bone cancer/lung cancer  Brother Casimiro had bladder cancer  Ned had bladder cacner ( in car accident)  Brother Lenny had bladder cancer  No family history on  file.         Medications:     Current Outpatient Medications   Medication Sig Dispense Refill    Ascorbic Acid (VITAMIN C) 500 MG CHEW Take 1 chew tab by mouth daily      fish oil-omega-3 fatty acids 500 MG capsule 2 capsules      ketoconazole (NIZORAL) 2 % external cream Apply topically as needed      senna-docusate (SENOKOT-S/PERICOLACE) 8.6-50 MG tablet Take 1-2 tablets by mouth 2 times daily 30 tablet 0    simvastatin (ZOCOR) 40 MG tablet Take 40 mg by mouth at bedtime      Vitamin D3 (VITAMIN D, CHOLECALCIFEROL,) 25 mcg (1000 units) tablet Take 25 mcg by mouth daily                Physical Exam:   There were no vitals taken for this visit.    ECOG PS: 0  Constitutional: WDWN male in NAD, pleasant and appropriate  HEENT:  NC/AT, no icterus, OP clear, MMM  Skin: No jaundice nor ecchymoses  Lungs: CTAB, no w/r/r, nonlabored breathing  Cardiovascular: RRR, S1, S2, no m/r/g  Abdomen: +BS, soft, nontender, nondistended, no organomegaly nor masses  MSK/Extremities: Warm, well perfused. No edema  LN: no cervical, supraclavicular, axillary, nor inguinal lymphadenopathy  Neurologic: alert, answering questions appropriately, moving all extremities spontaneously. CN 2-12 grossly intact.  Psych: appropriate affect  Data:     Recent Labs   Lab Test 01/19/24  0734 01/19/24  0501 01/18/24  0558 11/19/23  0146 12/23/20  2146   WBC  --  11.0 13.8* 10.0 10.8   NEUTROPHIL  --   --  67 47 55   HGB 14.1 13.8 13.7 15.7 15.5   PLT  --  190 208 242 243     Recent Labs   Lab Test 01/30/24  1202 01/20/24  0737 01/19/24  0501 01/18/24  0558 11/19/23  0146 09/08/23  1102    134* 138 132* 138 137   POTASSIUM 4.1  --  4.1 4.1 4.2 4.7   CHLORIDE 103  --  102 96* 100 99   CO2 24  --  28 27 29 25   ANIONGAP 10  --  8 9 9 13   BUN 14.9  --  15.0 19.3 17.6 17.0   CR 1.12  --  1.16 1.18* 1.13 1.18*   MILLI 9.3  --  8.4* 8.7* 9.7 9.8     Recent Labs   Lab Test 11/19/23  0146   MAG 2.3     Recent Labs   Lab Test 09/08/23  1102 12/13/22  0919  08/18/22  0956   BILITOTAL 0.7 0.7 0.7   ALKPHOS 59 74 88   ALT 53 51* 52*   AST 47* 41 42   ALBUMIN 4.6 4.5 4.6     [facility](PSAtumormarker:7)    No results found for this or any previous visit (from the past 24 hour(s)).    Other Data     TURBT 12/6/2023  Final Diagnosis   A) URINARY BLADDER, BASE OF TUMOR, TRANSURETHRAL RESECTION BLADDER TUMOR:  -NONINVASIVE LOW-GRADE PAPILLARY UROTHELIAL CARCINOMA  -MULTIPLE FRAGMENTS OF MUSCULARIS PROPRIA  -SEE SYNOPTIC REPORT     B) URINARY BLADDER, TUMOR, TRANSURETHRAL RESECTION BLADDER TUMOR:  -HIGH-GRADE PAPILLARY UROTHELIAL CARCINOMA WITH LAMINA PROPRIA INVASION  -MUSCULARIS PROPRIA PRESENT AND UNINVOLVED  -SEE SYNOPTIC REPORT     CT abd/pelvis 11/19/2023  MPRESSION:   1.  Multiple indeterminate nodular densities in the bladder suspicious for enhancing urothelial lesions. Malignancy not excluded. Given the history, cystoscopy is recommended for further evaluation.     2.  Kidneys and ureters are negative. No hydronephrosis.     3.  Diffuse fatty infiltration of the liver.     4.  No acute bowel findings. Normal appendix.     5.  Fat-containing bilateral inguinal hernias, left greater than right.      Labs, imaging and treatment plan reviewed with patient. All questions answered.        60 minutes spent on the date of the encounter doing chart review, review of outside records, review of test results, interpretation of tests, patient visit, documentation, discussion with other provider(s), and discussion with family

## 2024-02-12 NOTE — PROGRESS NOTES
Chief Complaint   Patient presents with    Allied Health Visit     PVR       Patient Active Problem List   Diagnosis    Malignant neoplasm of anterior wall of urinary bladder (H)    Benign hypertension    Hyperlipidemia    Impotence of organic origin    Morbid (severe) obesity due to excess calories (H)    Type 2 diabetes mellitus without complication, without long-term current use of insulin (H)    Spinal stenosis of lumbosacral region    Peripheral vascular disease (H24)    Venous stasis dermatitis of left lower extremity    Lumbar radiculopathy    Environmental allergies    Age-related nuclear cataract of right eye       Allergies   Allergen Reactions    Chlorhexidine Dermatitis       Current Outpatient Medications   Medication Sig Dispense Refill    Ascorbic Acid (VITAMIN C) 500 MG CHEW Take 1 chew tab by mouth daily      aspirin (ASA) 81 MG chewable tablet 1 tablet Orally Once a day      Ergocalciferol 50 MCG (2000 UT) CAPS 5000 iu Orally Once a day      fish oil-omega-3 fatty acids 500 MG capsule 2 capsules      hydrochlorothiazide (HYDRODIURIL) 25 MG tablet       ketoconazole (NIZORAL) 2 % external cream Apply topically as needed (Patient not taking: Reported on 2024)      senna-docusate (SENOKOT-S/PERICOLACE) 8.6-50 MG tablet Take 1-2 tablets by mouth 2 times daily (Patient not taking: Reported on 2024) 30 tablet 0    simvastatin (ZOCOR) 40 MG tablet Take 40 mg by mouth at bedtime      Vitamin D3 (VITAMIN D, CHOLECALCIFEROL,) 25 mcg (1000 units) tablet Take 25 mcg by mouth daily         Social History     Tobacco Use    Smoking status: Former     Types: Cigarettes     Quit date:      Years since quittin.1   Vaping Use    Vaping Use: Never used   Substance Use Topics    Alcohol use: Yes     Comment: daily    Drug use: Never       Alek SURENDRA Pickeringprincess comes into clinic today at the request of Dr. Harman Kaiser for pvr.    Patient diagnosis: hx bladder cancer    Residual Volume by Ultrasound:  21    This service provided today was under the direct supervision of Dr. Cr Dukes, who was available if needed.    Roseann Garvey  2/12/2024  1:01 PM

## 2024-02-12 NOTE — NURSING NOTE
"Oncology Rooming Note    February 12, 2024 9:43 AM   Alek Mcneill is a 74 year old male who presents for:    Chief Complaint   Patient presents with    Oncology Clinic Visit     Malignant neoplasm of anterior wall of urinary bladder      Initial Vitals: BP (!) 155/88   Pulse 85   Temp 98.4  F (36.9  C)   Resp 20   Ht 1.727 m (5' 8\")   Wt 110.7 kg (244 lb)   SpO2 95%   BMI 37.10 kg/m   Estimated body mass index is 37.1 kg/m  as calculated from the following:    Height as of this encounter: 1.727 m (5' 8\").    Weight as of this encounter: 110.7 kg (244 lb). Body surface area is 2.3 meters squared.  No Pain (0) Comment: Data Unavailable   No LMP for male patient.  Allergies reviewed: Yes  Medications reviewed: Yes    Medications: Medication refills not needed today.  Pharmacy name entered into [employer]: [facility] [address] [place] [address] AT [place] [address]    Frailty Screening:   Is the patient here for a new oncology consult visit in cancer care? 1. Yes. Over the past month, have you experienced difficulty or required a caregiver to assist with:   1. Balance, walking or general mobility (including any falls)? NO  2. Completion of self-care tasks such as bathing, dressing, toileting, grooming/hygiene?  NO  3. Concentration or memory that affects your daily life?  NO       Clinical concerns: no other complaints      [place]"

## 2024-02-13 ENCOUNTER — TELEPHONE (OUTPATIENT)
Dept: UROLOGY | Facility: CLINIC | Age: 74
End: 2024-02-13

## 2024-02-13 ENCOUNTER — HOSPITAL ENCOUNTER (OUTPATIENT)
Dept: CT IMAGING | Facility: CLINIC | Age: 74
Discharge: HOME | End: 2024-02-13
Attending: UROLOGY
Payer: COMMERCIAL

## 2024-02-13 ENCOUNTER — OFFICE VISIT (OUTPATIENT)
Dept: UROLOGY | Facility: CLINIC | Age: 74
End: 2024-02-13
Payer: COMMERCIAL

## 2024-02-13 VITALS
OXYGEN SATURATION: 96 % | HEART RATE: 65 BPM | WEIGHT: 235.4 LBS | BODY MASS INDEX: 34.87 KG/M2 | HEIGHT: 69 IN | DIASTOLIC BLOOD PRESSURE: 76 MMHG | SYSTOLIC BLOOD PRESSURE: 127 MMHG

## 2024-02-13 DIAGNOSIS — C67.3 MALIGNANT NEOPLASM OF ANTERIOR WALL OF URINARY BLADDER (H): Primary | ICD-10-CM

## 2024-02-13 DIAGNOSIS — C67.3 MALIGNANT NEOPLASM OF ANTERIOR WALL OF URINARY BLADDER (H): ICD-10-CM

## 2024-02-13 LAB
ATRIAL RATE - MUSE: 89 BPM
BACTERIA UR CULT: NORMAL
BILIRUB DIRECT SERPL-MCNC: <0.2 MG/DL (ref 0–0.3)
DIASTOLIC BLOOD PRESSURE - MUSE: NORMAL MMHG
HBV CORE AB SERPL QL IA: NONREACTIVE
HBV DNA SERPL NAA+PROBE-ACNC: NOT DETECTED IU/ML
HCV RNA SERPL NAA+PROBE-ACNC: NOT DETECTED IU/ML
INTERPRETATION ECG - MUSE: NORMAL
P AXIS - MUSE: 36 DEGREES
PR INTERVAL - MUSE: 184 MS
QRS DURATION - MUSE: 92 MS
QT - MUSE: 370 MS
QTC - MUSE: 450 MS
R AXIS - MUSE: 25 DEGREES
SYSTOLIC BLOOD PRESSURE - MUSE: NORMAL MMHG
T AXIS - MUSE: 42 DEGREES
VENTRICULAR RATE- MUSE: 89 BPM

## 2024-02-13 PROCEDURE — 71260 CT THORAX DX C+: CPT

## 2024-02-13 PROCEDURE — 74178 CT ABD&PLV WO CNTR FLWD CNTR: CPT

## 2024-02-13 PROCEDURE — 52000 CYSTOURETHROSCOPY: CPT | Performed by: UROLOGY

## 2024-02-13 PROCEDURE — 99207 PR DROP WITH A PROCEDURE: CPT | Performed by: UROLOGY

## 2024-02-13 PROCEDURE — 250N000011 HC RX IP 250 OP 636: Performed by: UROLOGY

## 2024-02-13 PROCEDURE — 71260 CT THORAX DX C+: CPT | Mod: 26 | Performed by: RADIOLOGY

## 2024-02-13 PROCEDURE — 74178 CT ABD&PLV WO CNTR FLWD CNTR: CPT | Mod: 26 | Performed by: RADIOLOGY

## 2024-02-13 RX ORDER — IOPAMIDOL 755 MG/ML
135 INJECTION, SOLUTION INTRAVASCULAR ONCE
Status: COMPLETED | OUTPATIENT
Start: 2024-02-13 | End: 2024-02-13

## 2024-02-13 RX ORDER — LIDOCAINE HYDROCHLORIDE 20 MG/ML
JELLY TOPICAL ONCE
Status: COMPLETED | OUTPATIENT
Start: 2024-02-13 | End: 2024-02-13

## 2024-02-13 RX ORDER — SULFAMETHOXAZOLE/TRIMETHOPRIM 800-160 MG
1 TABLET ORAL 2 TIMES DAILY
Qty: 1 TABLET | Refills: 0 | Status: SHIPPED | OUTPATIENT
Start: 2024-02-13 | End: 2024-03-28

## 2024-02-13 RX ORDER — TRIAMCINOLONE ACETONIDE 1 MG/G
CREAM TOPICAL
COMMUNITY

## 2024-02-13 RX ORDER — SULFAMETHOXAZOLE/TRIMETHOPRIM 800-160 MG
1 TABLET ORAL ONCE
Status: COMPLETED | OUTPATIENT
Start: 2024-02-13 | End: 2024-02-13

## 2024-02-13 RX ORDER — ASPIRIN 81 MG/1
81 TABLET ORAL DAILY
COMMUNITY

## 2024-02-13 RX ORDER — LANCETS
EACH MISCELLANEOUS DAILY
COMMUNITY
Start: 2023-12-24

## 2024-02-13 RX ADMIN — IOPAMIDOL 135 ML: 755 INJECTION, SOLUTION INTRAVENOUS at 09:21

## 2024-02-13 RX ADMIN — SULFAMETHOXAZOLE AND TRIMETHOPRIM 1 TABLET: 800; 160 TABLET ORAL at 08:24

## 2024-02-13 RX ADMIN — LIDOCAINE HYDROCHLORIDE: 20 JELLY TOPICAL at 07:54

## 2024-02-13 ASSESSMENT — PAIN SCALES - GENERAL: PAINLEVEL: NO PAIN (0)

## 2024-02-13 NOTE — PROGRESS NOTES
"  CHIEF COMPLAINT   It was my pleasure to see Alek Mcneill who is a 74 year old male for follow-up of bladder cancer.      HPI  Alek Mcneill is a very pleasant 74 year old male who presents with a history of bladder cancer. 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 His previous procedures have been completed by Dr. Layton. He is here today to discuss intravesical therapy options, including the Sunrise 3 trial.     He did have hematuria and clot retention after his re-staging TURBT, but this has since resolved.      Re-staging TURBT 1/17/2024       SPECIMEN   Procedure   Transurethral resection of bladder (TURBT)   TUMOR   Tumor Site   Anterior wall   Histologic Type   Urothelial carcinoma, in situ   Histologic Grade   High-grade   Tumor Extent   Flat carcinoma in situ   Lymphovascular Invasion   Not identified   Tumor Configuration   Flat   Muscularis Propria (detrusor muscle)   Cannot be determined: Biopsy site changes are identified in the current sample; no residual viable invasive carcinoma is identified.  Please see previous biopsy report for further information, case [id number], [facility]..   ADDITIONAL FINDINGS   Associated Epithelial Lesions   Chronic inflammation, histiocytic proliferation, consistent with biopsy site changes.      TURBT 12/6/2023  Final Diagnosis   A) URINARY BLADDER, BASE OF TUMOR, TRANSURETHRAL RESECTION BLADDER TUMOR:  -NONINVASIVE LOW-GRADE PAPILLARY UROTHELIAL CARCINOMA  -MULTIPLE FRAGMENTS OF MUSCULARIS PROPRIA  -SEE SYNOPTIC REPORT     B) URINARY BLADDER, TUMOR, TRANSURETHRAL RESECTION BLADDER TUMOR:  -HIGH-GRADE PAPILLARY UROTHELIAL CARCINOMA WITH LAMINA PROPRIA INVASION  -MUSCULARIS PROPRIA PRESENT AND UNINVOLVED  -SEE SYNOPTIC REPORT        PHYSICAL EXAM  Patient is a 74 year old  male   Vitals: Blood pressure 127/76, pulse 65, height 1.74 m (5' 8.5\"), weight 106.8 kg (235 lb 6.4 oz), SpO2 96%.  General " Appearance Adult: Body mass index is 35.27 kg/m .  Alert, no acute distress, oriented  Lungs: no respiratory distress, or pursed lip breathing  Abdomen: soft, nontender, no organomegaly or masses  Back: no CVAT  Neuro: Alert, oriented, speech and mentation normal  Psych: affect and mood normal  : penis, scrotum, testes normal; phimosis noted    OFFICE CYSTOSCOPY 2/13/2024    Pre-procedure diagnosis:  Bladder Cancer  Post-procedure diagnosis: Normal Cystoscopy  Procedure performed:  Cystourethroscopy  Surgeon:    Harman Kaiser MD  Anesthesia:    Local    Description of procedure:   After fully informed, voluntary consent was obtained, the patient was brought into the procedure room, identified and placed in a supine position on the cystoscopy table.  The groin/scrotum were prepped with betadine and draped in a sterile fashion.  Urojet lidocaine gel was introduced.  A 15F flexible cystoscope was inserted into the urethra, and the bladder and urethra were examined in a systematic manner.  The patient tolerated the procedure well and there were no complications.      Cystoscopic findings:  The urethra was normal without strictures.  The prostate was 3 cm long and demonstrated mild bilobar hypertrophy.  There was no median lobe.  The external sphincter coapted well and the bladder neck was open. The bladder was completely surveyed.  There was mild trabeculation.  There were no neoplasms, stones, or diverticula identifed.  The ureteric orifices were normal in position and number and effluxing clear urine. Areas of previous resection were noted at the anterior bladder wall and are healing well. There are no recurrent tumors or suspicious areas on today's cystoscopy.    ASSESSMENT and PLAN  74 year old male with history of HG non-muscle-invasive bladder cancer. He has elected to enroll in the [place] 3 clinical trial for BCG-naive NMIBC. Cystoscopy today with no evidence of visible papillary disease. He will continue  with the randomization process for [place] 3.    - Bactrim given today  - Follow-up pending randomization    Harman Kaiser MD  Urology  [facility]

## 2024-02-13 NOTE — NURSING NOTE
"Chief Complaint   Patient presents with    Cystoscopy       Blood pressure 127/76, pulse 65, height 1.74 m (5' 8.5\"), weight 106.8 kg (235 lb 6.4 oz), SpO2 96%. Body mass index is 35.27 kg/m .    Patient Active Problem List   Diagnosis    Malignant neoplasm of anterior wall of urinary bladder (H)    Benign hypertension    Hyperlipidemia    Impotence of organic origin    Morbid (severe) obesity due to excess calories (H)    Type 2 diabetes mellitus without complication, without long-term current use of insulin (H)    Spinal stenosis of lumbosacral region    Peripheral vascular disease (H24)    Venous stasis dermatitis of left lower extremity    Lumbar radiculopathy    Environmental allergies    Age-related nuclear cataract of right eye       Allergies   Allergen Reactions    Chlorhexidine Dermatitis       Current Outpatient Medications   Medication Sig Dispense Refill    Ascorbic Acid (VITAMIN C) 500 MG CHEW Take 1 chew tab by mouth daily      aspirin (ASPIRIN LOW DOSE) 81 MG EC tablet Take 81 mg by mouth daily ONCE BEFORE BED      blood glucose (NO BRAND SPECIFIED) lancets standard as directed, to use with his Contour Next test blood sugars daily      CONTOUR NEXT TEST test strip USE TO TEST BLOOD SUGAR DAILY      fish oil-omega-3 fatty acids 500 MG capsule 2 capsules      hydrochlorothiazide (HYDRODIURIL) 25 MG tablet       Microlet Lancets MISC daily      simvastatin (ZOCOR) 40 MG tablet Take 40 mg by mouth at bedtime      triamcinolone (KENALOG) 0.1 % external cream Apply as needed      Vitamin D3 (VITAMIN D, CHOLECALCIFEROL,) 25 mcg (1000 units) tablet Take 25 mcg by mouth daily      aspirin (ASA) 81 MG chewable tablet 1 tablet Orally Once a day (Patient not taking: Reported on 2/13/2024)      Ergocalciferol 50 MCG (2000 UT) CAPS 5000 iu Orally Once a day (Patient not taking: Reported on 2/13/2024)      ketoconazole (NIZORAL) 2 % external cream Apply topically as needed (Patient not taking: Reported on " 2024)      senna-docusate (SENOKOT-S/PERICOLACE) 8.6-50 MG tablet Take 1-2 tablets by mouth 2 times daily (Patient not taking: Reported on 2024) 30 tablet 0       Social History     Tobacco Use    Smoking status: Former     Types: Cigarettes     Quit date:      Years since quittin.1   Vaping Use    Vaping Use: Never used   Substance Use Topics    Alcohol use: Yes     Comment: daily    Drug use: Never       What to expect after the procedure reviewed with patient: yes    Phan Gaxiola  2024  7:45 AM     Invasive Procedure Safety Checklist:    Procedure: cystoscopy    Action: Complete sections and checkboxes as appropriate.    Pre-procedure:  1. Patient ID Verified with 2 identifiers (Octavia and  or MRN) : YES    2. Procedure and site verified with patient/designee (when able) : YES    3. Accurate consent documentation in medical record : YES    4. H&P (or appropriate assessment) documented in medical record : YES  H&P must be up to 30 days prior to procedure an updated within 24 hours of                 Procedure as applicable.     5. Relevant diagnostic and radiology test results appropriately labeled and displayed as applicable : YES    6. Blood products, implants, devices, and/or special equipment available for the procedure as applicable : YES    7. Procedure site(s) marked with provider initials [Exclusions: None] : NO    8. Marking not required. Reason : Yes  Procedure does not require site marking    Time Out:     Time-Out performed immediately prior to starting procedure, including verbal and active participation of all team members addressing: YES    1. Correct patient identity.  2. Confirmed that the correct side and site are marked.  3. An accurate procedure to be done.  4. Agreement on the procedure to be done.  5. Correct patient position.  6. Relevant images and results are properly labeled and appropriately displayed.  7. The need to administer antibiotics or fluids for  irrigation purposes during the procedure as applicable.  8. Safety precautions based on patient history or medication use.    During Procedure: Verification of correct person, site, and procedure occurs any time the responsibility for care of the patient is transferred to another member of the care team.      The following medication was given:     MEDICATION:  Uro-jet  ROUTE: Intra-urethral   SITE: Urethra  DOSE: 10 mL 2% lidocaine  LOT #: [id number]  : [employer]  EXPIRATION DATE: 8-25  NDC#: [address]-00   Was there drug waste? No    The following medication was given:     MEDICATION: Bactrim (Trimethoprim / Sulfamethoxazole)  ROUTE: PO  SITE: Medication was given orally  DOSE: 800mg/160mg  LOT #: [id number]  : [employer]  EXPIRATION DATE: 06/2025  NDC#: 7104-1705-22   Was there drug waste? No    Prior to medication administration, verified patient identity using patient's name and date of birth.  Due to medication administration, patient instructed to remain in clinic for 15 minutes  afterwards, and to report any adverse reaction to me immediately.      Prior to administration, verified patient identity using patient's name and date of birth.  Due to administration, patient instructed to remain in clinic for 15 minutes  afterwards, and to report any adverse reaction to me immediately.      Drug Amount Wasted:  None.  Vial/Syringe: Single dose vial    Phan Gaxiola  February 13, 2024

## 2024-02-13 NOTE — TELEPHONE ENCOUNTER
[facility] Call Center    Phone Message    May a detailed message be left on voicemail: no     Reason for Call: Medication Question or concern regarding medication   Prescription Clarification  Name of Medication: sulfamethoxazole-trimethoprim (BACTRIM DS) 800-160 MG tablet  Prescribing Provider: Harman Kaiser MD   Pharmacy: [facility] #[id number] AT [place] [address] (Ph: [phone])   What on the order needs clarification? Pharmacy calling needing clarification on the quantity on the order that was sent over.    Action Taken: Message routed to:  [facility] ([facility] Urology    Travel Screening: Not Applicable

## 2024-02-13 NOTE — Clinical Note
2/13/2024       RE: Alek AGOSTO Raheelprincess  [address] [place] [address]     Dear Colleague,    Thank you for referring your patient, Alek Mcneill, to the [facility]. Please see a copy of my visit note below.    No notes on file    Again, thank you for allowing me to participate in the care of your patient.      Sincerely,    Harman Kaiser MD

## 2024-02-14 ENCOUNTER — DOCUMENTATION ONLY (OUTPATIENT)
Dept: ONCOLOGY | Facility: CLINIC | Age: 74
End: 2024-02-14
Payer: COMMERCIAL

## 2024-02-14 LAB
PATH REPORT.COMMENTS IMP SPEC: NORMAL
PATH REPORT.FINAL DX SPEC: NORMAL
PATH REPORT.GROSS SPEC: NORMAL
PATH REPORT.MICROSCOPIC SPEC OTHER STN: NORMAL
PATH REPORT.RELEVANT HX SPEC: NORMAL

## 2024-02-14 NOTE — NURSING NOTE
Called patient to go over medical history and medications for [id number] Study:     Patient denies any autoimmune disease hx, TB hx, other cancer hx or taking any steroids/autoimmune medications. Denies any hx of heart disease, strokes, heart attacks   Patient states that he does intermittently get dermatitis and had a venous ulcer some time ago, but has not had a problem in a while. Dr. Kaiser assessed patient as well for this and noted that this is not a current issue and his lower extremities were within normal limits per verbal discussion on 13FEB2024.   Patient denies ever having COVID-19.     Araceli Diaz RN   Clinical Research Coordinator Nurse-Solid Tumor   Phone: [phone] Pgr: [phone]

## 2024-02-14 NOTE — TELEPHONE ENCOUNTER
Writer reached out to the pharmacy and noted that there is not a need for this one time dose of antibiotics.     Pt received the one dose while in clinic yesterday.     Will continue to follow as needed.

## 2024-02-22 ENCOUNTER — PRE VISIT (OUTPATIENT)
Dept: UROLOGY | Facility: CLINIC | Age: 74
End: 2024-02-22
Payer: COMMERCIAL

## 2024-02-22 NOTE — TELEPHONE ENCOUNTER
Reason for visit: cystoscopy (ask Dr. Kaiser about research study pt is a possible candidate for & what extra needs to be set up for procedure)     Dx/Hx/Sx: bladder cancer     Records/imaging/labs/orders: in epic     At Rooming: collect urine- consent

## 2024-03-02 NOTE — PROGRESS NOTES
"   FOLLOW UP VISIT     CHIEF COMPLAINT   I was asked to see this patient by Dr. Kaiser to provide background on Cetrelimab, in this patient with newly diagnosed T1 poorly differentiated invasive bladder cancer.  The patient is considering participation in our [place]-3 trial, involving Cetrelimab.     History of Present Illness:   11/19/2023: Presented with subacute onset of gross hematuria, and a passing of clots. Stopped ASA. CT showed multiple indeterminate nodular densities in the bladder suspicious for enhancing urothelial lesions   12/6/2023: Cystoscopy , with finding of multiple bladder tumors appeared papillary and were located on the anterior wall and had some superficial calcifications. \"We resected tissue down to muscle using cut electrocautery. \" Pathology showed high grade papillary carcinoma, not invading muscle.  1/17/2024: Cystoscopy with resection showed CIS, but no invasive cancer.  Now here to talk about participation in the SUNRISE -3 trial as well as receipt of cetrelimab.  He has been randomized to receive Cetrelimab plus Tar-200.     Interval history:    Alek is here with his wife Christine discussed participation in the Sunrise-3 trial.  He has been doing okay recently.  He did have an episode of hematuria yesterday that did not last very long.  His urine was bj-colored.  Today it is clear.  He knows that this is associated with his fluid intake and admits that he does not like to drink water very much.  He does drink few beers a day.  He has no new pain except for chronic left shoulder issues.  He denies cough or shortness of breath.  There are no new lumps or bumps.  He still is interested in participating in the trial.  He relates a traumatic story of how multiple attempts were made to place a [place] catheter without a Uro-Jet.  It has left him traumatized and a little leery of having a catheter placed in the future, even if it is just for BCG.  The rest of the multiorgan review of physical " symptoms is negative.     Past Medical History:     Past Medical History        Past Medical History:   Diagnosis Date    Complication of anesthesia      DM2 (diabetes mellitus, type 2) (H)      Hypercholesteremia      Hyperlipidemia      Hypertension           Medications:     Current Outpatient Prescriptions          Current Outpatient Medications   Medication Sig Dispense Refill    Ascorbic Acid (VITAMIN C) 500 MG CHEW Take 1 chew tab by mouth daily        fish oil-omega-3 fatty acids 500 MG capsule 2 capsules        ketoconazole (NIZORAL) 2 % external cream Apply topically as needed        senna-docusate (SENOKOT-S/PERICOLACE) 8.6-50 MG tablet Take 1-2 tablets by mouth 2 times daily 30 tablet 0    simvastatin (ZOCOR) 40 MG tablet Take 40 mg by mouth at bedtime        Vitamin D3 (VITAMIN D, CHOLECALCIFEROL,) 25 mcg (1000 units) tablet Take 25 mcg by mouth daily             Physical Examination:   ECOG PS: 0  Constitutional: WDWN male in NAD, pleasant and appropriate  HEENT:  NC/AT, no icterus, OP clear, MMM  Skin: No jaundice nor ecchymoses  Lungs: CTAB, no w/r/r, nonlabored breathing  Cardiovascular: RRR, S1, S2, no m/r/g  Abdomen: +BS, soft, nontender, nondistended, no organomegaly nor masses  MSK/Extremities: Warm, well perfused. No edema  LN: no cervical, supraclavicular, axillary, nor inguinal lymphadenopathy  Neurologic: alert, answering questions appropriately, moving all extremities spontaneously. CN 2-12 grossly intact.  Psych: appropriate affect    Laboratories (3/5/24):   His CBC reveals a WBC count of 7.0, hemoglobin 15.8, hematocrit 43.2%, platelets 215K.  His comprehensive metabolic panel is generally within normal limits except for a creatinine level of 1.19 mg/dL, a GGT of 84 U/L, and a glucose level of 119 mg/dL, and a TSH level of 4.41 IU/mL.     Other Data    TURBT 12/6/2023  Final Diagnosis    A) URINARY BLADDER, BASE OF TUMOR, TRANSURETHRAL RESECTION BLADDER TUMOR:  -NONINVASIVE LOW-GRADE  PAPILLARY UROTHELIAL CARCINOMA  -MULTIPLE FRAGMENTS OF MUSCULARIS PROPRIA   B) URINARY BLADDER, TUMOR, TRANSURETHRAL RESECTION BLADDER TUMOR:  -HIGH-GRADE PAPILLARY UROTHELIAL CARCINOMA WITH LAMINA PROPRIA INVASION  -MUSCULARIS PROPRIA PRESENT AND UNINVOLVED      CT abd/pelvis 11/19/2023  1.  Multiple indeterminate nodular densities in the bladder suspicious for enhancing urothelial lesions. Cystoscopy is recommended for evaluation.   2.  Kidneys and ureters are negative. No hydronephrosis.   3.  Diffuse fatty infiltration of the liver.   4.  No acute bowel findings. Normal appendix.   5.  Fat-containing bilateral inguinal hernias, left greater than right.        Assessment:      New diagnosis of Stage 1 (T1NxMx) superficial poorly-differentiated bladder cancer. T1 lesions are high risk. Recurrence rates at one, three, and five years can reach up to 50, 70 to 80, and 90 percent, respectively, and 20 to 25 percent progress to more invasive (T2 or greater) disease. Given the high risk of recurrence here, therapy is indicated.  After TURBT, standard treatment in this setting is intravesicular BCG. A recent meta-analysis has shown that this approach results in a 10 year PFS of 67%. The questioni s, is there any other treatment that may result in improved outcome? The answer is possibly.  We have open the SUNRISE-3 study, that takes patients with T1 disease and randomized them to one of 3 arms: TAR-200 (a device inserted in to the bladder that contains gemcitabine) + Cetrelimab IV OR TAR-200 Alone OR BCG Alone. Alek is here tody to discuss cetrelimab.  Cetrelimb is a PD-1 antibody and has been extensively studied. The side effect profile includes asthenia (19%), fatigue (19%), dyspnea (16%) and diarrhea (16%). Grade ?3 AEs were reported in 45% of pts; most common were anemia (6%), dyspnea (4%), increased ALT (3%) and increased AST (3%). Alek is understandably concerned with possible side effects.  From my perspective,  there is no contraindication for Alek to participate in SUNRISE-3. He has no history of autoimmune disease. His physical examination is normal.  He has been randomized to receive Cetrelimab plus Tar-200.  He did mention how traumatic it is to get a bladder catheter placed. He was never offered a lidocaine Uro-jet. Hopefully, this will be offered prior to his next catheter placement.     Plan:    1. Baseline labs done yesterday; no concerns   2. Study nurse Araceli Diaz will go over the consent with him today.   3. If he is randomized to the Cetrelimab arm, our  medical oncology will continue to see him for his infusions, and to monitor side effects.  4. Potential adverse events of cetrelimab were discussed in detail, and patient wished to proceed.  5. He has been randomized to receive Cetrelimab plus Tar-200 (the Tar-200 pretzel was inserted yesterday, 3/5/24).  6. First cetrelimab infusion will be delivered today; no contraindications noted.       A total of 45 minutes spent on the date of the encounter doing chart review, review of outside records, review of test results, interpretation of tests, patient visit, documentation, discussion with other provider(s), and discussion with family.  The patient was given the opportunity to ask seveeral questions today, all of which were answered to his satisfaction.     Steve Castillo M.D.

## 2024-03-04 DIAGNOSIS — C67.3 MALIGNANT NEOPLASM OF ANTERIOR WALL OF URINARY BLADDER (H): Primary | ICD-10-CM

## 2024-03-05 ENCOUNTER — HOSPITAL ENCOUNTER (OUTPATIENT)
Dept: RESEARCH | Facility: CLINIC | Age: 74
Discharge: HOME | End: 2024-03-05
Attending: UROLOGY | Admitting: UROLOGY
Payer: COMMERCIAL

## 2024-03-05 ENCOUNTER — ALLIED HEALTH/NURSE VISIT (OUTPATIENT)
Dept: ONCOLOGY | Facility: CLINIC | Age: 74
End: 2024-03-05

## 2024-03-05 ENCOUNTER — OFFICE VISIT (OUTPATIENT)
Dept: UROLOGY | Facility: CLINIC | Age: 74
End: 2024-03-05
Payer: COMMERCIAL

## 2024-03-05 VITALS
HEART RATE: 80 BPM | HEIGHT: 70 IN | BODY MASS INDEX: 34.36 KG/M2 | DIASTOLIC BLOOD PRESSURE: 86 MMHG | WEIGHT: 240 LBS | SYSTOLIC BLOOD PRESSURE: 131 MMHG

## 2024-03-05 VITALS
HEART RATE: 77 BPM | DIASTOLIC BLOOD PRESSURE: 93 MMHG | TEMPERATURE: 97.8 F | OXYGEN SATURATION: 95 % | RESPIRATION RATE: 16 BRPM | SYSTOLIC BLOOD PRESSURE: 145 MMHG

## 2024-03-05 DIAGNOSIS — C67.3 MALIGNANT NEOPLASM OF ANTERIOR WALL OF URINARY BLADDER (H): Primary | ICD-10-CM

## 2024-03-05 LAB
ALBUMIN SERPL BCG-MCNC: 4.4 G/DL (ref 3.5–5.2)
ALBUMIN UR-MCNC: NEGATIVE MG/DL
ALP SERPL-CCNC: 76 U/L (ref 40–150)
ALT SERPL W P-5'-P-CCNC: 34 U/L (ref 0–70)
AMYLASE SERPL-CCNC: 64 U/L (ref 28–100)
ANION GAP SERPL CALCULATED.3IONS-SCNC: 10 MMOL/L (ref 7–15)
APPEARANCE UR: CLEAR
APTT PPP: 25 SECONDS (ref 22–38)
AST SERPL W P-5'-P-CCNC: 37 U/L (ref 0–45)
BASOPHILS # BLD AUTO: 0.1 10E3/UL (ref 0–0.2)
BASOPHILS NFR BLD AUTO: 1 %
BILIRUB SERPL-MCNC: 0.7 MG/DL
BILIRUB UR QL STRIP: NEGATIVE
BUN SERPL-MCNC: 16.8 MG/DL (ref 8–23)
CALCIUM SERPL-MCNC: 9.4 MG/DL (ref 8.8–10.2)
CHLORIDE SERPL-SCNC: 99 MMOL/L (ref 98–107)
COLOR UR AUTO: NORMAL
CREAT SERPL-MCNC: 1.19 MG/DL (ref 0.67–1.17)
CRP SERPL-MCNC: <3 MG/L
DEPRECATED HCO3 PLAS-SCNC: 26 MMOL/L (ref 22–29)
EGFRCR SERPLBLD CKD-EPI 2021: 64 ML/MIN/1.73M2
EOSINOPHIL # BLD AUTO: 0.3 10E3/UL (ref 0–0.7)
EOSINOPHIL NFR BLD AUTO: 4 %
ERYTHROCYTE [DISTWIDTH] IN BLOOD BY AUTOMATED COUNT: 12.8 % (ref 10–15)
GGT SERPL-CCNC: 84 U/L (ref 8–61)
GLUCOSE SERPL-MCNC: 119 MG/DL (ref 70–99)
GLUCOSE UR STRIP-MCNC: NEGATIVE MG/DL
HBA1C MFR BLD: 6.4 %
HCT VFR BLD AUTO: 43.2 % (ref 40–53)
HGB BLD-MCNC: 15.8 G/DL (ref 13.3–17.7)
HGB UR QL STRIP: NEGATIVE
IMM GRANULOCYTES # BLD: 0 10E3/UL
IMM GRANULOCYTES NFR BLD: 0 %
INR PPP: 0.94 (ref 0.85–1.15)
KETONES UR STRIP-MCNC: NEGATIVE MG/DL
LDH SERPL L TO P-CCNC: 212 U/L (ref 0–250)
LEUKOCYTE ESTERASE UR QL STRIP: NEGATIVE
LIPASE SERPL-CCNC: 38 U/L (ref 13–60)
LYMPHOCYTES # BLD AUTO: 2.2 10E3/UL (ref 0.8–5.3)
LYMPHOCYTES NFR BLD AUTO: 32 %
MCH RBC QN AUTO: 33.4 PG (ref 26.5–33)
MCHC RBC AUTO-ENTMCNC: 36.6 G/DL (ref 31.5–36.5)
MCV RBC AUTO: 91 FL (ref 78–100)
MONOCYTES # BLD AUTO: 0.8 10E3/UL (ref 0–1.3)
MONOCYTES NFR BLD AUTO: 11 %
NEUTROPHILS # BLD AUTO: 3.6 10E3/UL (ref 1.6–8.3)
NEUTROPHILS NFR BLD AUTO: 52 %
NITRATE UR QL: NEGATIVE
NRBC # BLD AUTO: 0 10E3/UL
NRBC BLD AUTO-RTO: 0 /100
PH UR STRIP: 6 [PH] (ref 5–7)
PHOSPHATE SERPL-MCNC: 2.7 MG/DL (ref 2.5–4.5)
PLATELET # BLD AUTO: 215 10E3/UL (ref 150–450)
POTASSIUM SERPL-SCNC: 4.1 MMOL/L (ref 3.4–5.3)
PROT SERPL-MCNC: 7.3 G/DL (ref 6.4–8.3)
RBC # BLD AUTO: 4.73 10E6/UL (ref 4.4–5.9)
RBC URINE: 0 /HPF
SODIUM SERPL-SCNC: 135 MMOL/L (ref 135–145)
SP GR UR STRIP: 1 (ref 1–1.03)
SQUAMOUS EPITHELIAL: <1 /HPF
T3FREE SERPL-MCNC: 3.2 PG/ML (ref 2–4.4)
T4 FREE SERPL-MCNC: 1.11 NG/DL (ref 0.9–1.7)
TSH SERPL DL<=0.005 MIU/L-ACNC: 4.41 UIU/ML (ref 0.3–4.2)
URATE SERPL-MCNC: 6.8 MG/DL (ref 3.4–7)
UROBILINOGEN UR STRIP-MCNC: NORMAL MG/DL
WBC # BLD AUTO: 7 10E3/UL (ref 4–11)
WBC URINE: <1 /HPF

## 2024-03-05 PROCEDURE — 83690 ASSAY OF LIPASE: CPT | Performed by: UROLOGY

## 2024-03-05 PROCEDURE — 300N000010 HC RESEARCH B&I SPECIMEN PACKAGING, COMPLEX

## 2024-03-05 PROCEDURE — 81001 URINALYSIS AUTO W/SCOPE: CPT | Performed by: UROLOGY

## 2024-03-05 PROCEDURE — 86140 C-REACTIVE PROTEIN: CPT | Performed by: UROLOGY

## 2024-03-05 PROCEDURE — 52000 CYSTOURETHROSCOPY: CPT | Performed by: UROLOGY

## 2024-03-05 PROCEDURE — 36415 COLL VENOUS BLD VENIPUNCTURE: CPT | Performed by: UROLOGY

## 2024-03-05 PROCEDURE — 84100 ASSAY OF PHOSPHORUS: CPT | Performed by: UROLOGY

## 2024-03-05 PROCEDURE — 510N000023 HC CRU B&I PATIENT CARE, PER 15 MIN

## 2024-03-05 PROCEDURE — 84550 ASSAY OF BLOOD/URIC ACID: CPT | Performed by: UROLOGY

## 2024-03-05 PROCEDURE — 85730 THROMBOPLASTIN TIME PARTIAL: CPT | Performed by: UROLOGY

## 2024-03-05 PROCEDURE — 87086 URINE CULTURE/COLONY COUNT: CPT | Performed by: UROLOGY

## 2024-03-05 PROCEDURE — 82150 ASSAY OF AMYLASE: CPT | Performed by: UROLOGY

## 2024-03-05 PROCEDURE — 84481 FREE ASSAY (FT-3): CPT | Performed by: UROLOGY

## 2024-03-05 PROCEDURE — 82977 ASSAY OF GGT: CPT | Performed by: UROLOGY

## 2024-03-05 PROCEDURE — 85025 COMPLETE CBC W/AUTO DIFF WBC: CPT | Performed by: UROLOGY

## 2024-03-05 PROCEDURE — 80053 COMPREHEN METABOLIC PANEL: CPT | Performed by: UROLOGY

## 2024-03-05 PROCEDURE — 300N000007 HC RESEARCH B&I SPECIMEN PROCESSING, SIMPLE

## 2024-03-05 PROCEDURE — 84439 ASSAY OF FREE THYROXINE: CPT | Performed by: UROLOGY

## 2024-03-05 PROCEDURE — 85610 PROTHROMBIN TIME: CPT | Performed by: UROLOGY

## 2024-03-05 PROCEDURE — 83615 LACTATE (LD) (LDH) ENZYME: CPT | Performed by: UROLOGY

## 2024-03-05 PROCEDURE — 84443 ASSAY THYROID STIM HORMONE: CPT | Performed by: UROLOGY

## 2024-03-05 PROCEDURE — 83036 HEMOGLOBIN GLYCOSYLATED A1C: CPT | Performed by: UROLOGY

## 2024-03-05 PROCEDURE — 510N000009 HC RESEARCH FACILITY, PER 15 MIN

## 2024-03-05 RX ORDER — SULFAMETHOXAZOLE/TRIMETHOPRIM 800-160 MG
1 TABLET ORAL ONCE
Status: COMPLETED | OUTPATIENT
Start: 2024-03-05 | End: 2024-03-05

## 2024-03-05 RX ORDER — LIDOCAINE HYDROCHLORIDE 20 MG/ML
JELLY TOPICAL ONCE
Status: COMPLETED | OUTPATIENT
Start: 2024-03-05 | End: 2024-03-05

## 2024-03-05 RX ADMIN — LIDOCAINE HYDROCHLORIDE: 20 JELLY TOPICAL at 11:01

## 2024-03-05 RX ADMIN — SULFAMETHOXAZOLE AND TRIMETHOPRIM 1 TABLET: 800; 160 TABLET ORAL at 11:44

## 2024-03-05 ASSESSMENT — PAIN SCALES - GENERAL: PAINLEVEL: NO PAIN (0)

## 2024-03-05 NOTE — NURSING NOTE
"Chief Complaint   Patient presents with    Cystoscopy     Possible candidate for research        Blood pressure 131/86, pulse 80, height 1.778 m (5' 10\"), weight 108.9 kg (240 lb). Body mass index is 34.44 kg/m .    Patient Active Problem List   Diagnosis    Malignant neoplasm of anterior wall of urinary bladder (H)    Benign hypertension    Hyperlipidemia    Impotence of organic origin    Morbid (severe) obesity due to excess calories (H)    Type 2 diabetes mellitus without complication, without long-term current use of insulin (H)    Spinal stenosis of lumbosacral region    Peripheral vascular disease (H24)    Venous stasis dermatitis of left lower extremity    Lumbar radiculopathy    Environmental allergies    Age-related nuclear cataract of right eye       Allergies   Allergen Reactions    Chlorhexidine Dermatitis       Current Outpatient Medications   Medication Sig Dispense Refill    Ascorbic Acid (VITAMIN C) 500 MG CHEW Take 1 chew tab by mouth daily      aspirin (ASA) 81 MG chewable tablet 1 tablet Orally Once a day (Patient not taking: Reported on 2/13/2024)      aspirin (ASPIRIN LOW DOSE) 81 MG EC tablet Take 81 mg by mouth daily ONCE BEFORE BED      blood glucose (NO BRAND SPECIFIED) lancets standard as directed, to use with his Contour Next test blood sugars daily      CONTOUR NEXT TEST test strip USE TO TEST BLOOD SUGAR DAILY      Ergocalciferol 50 MCG (2000 UT) CAPS 5000 iu Orally Once a day (Patient not taking: Reported on 2/13/2024)      fish oil-omega-3 fatty acids 500 MG capsule 2 capsules      hydrochlorothiazide (HYDRODIURIL) 25 MG tablet       ketoconazole (NIZORAL) 2 % external cream Apply topically as needed (Patient not taking: Reported on 2/12/2024)      Microlet Lancets MISC daily      simvastatin (ZOCOR) 40 MG tablet Take 40 mg by mouth at bedtime      sulfamethoxazole-trimethoprim (BACTRIM DS) 800-160 MG tablet Take 1 tablet by mouth 2 times daily 1 tablet 0    triamcinolone (KENALOG) 0.1 " % external cream Apply as needed      Vitamin D3 (VITAMIN D, CHOLECALCIFEROL,) 25 mcg (1000 units) tablet Take 25 mcg by mouth daily         Social History     Tobacco Use    Smoking status: Former     Types: Cigarettes     Quit date:      Years since quittin.1   Vaping Use    Vaping Use: Never used   Substance Use Topics    Alcohol use: Yes     Comment: daily    Drug use: Never       Invasive Procedure Safety Checklist:    Procedure: Cystoscopy    Action: Complete sections and checkboxes as appropriate.    Pre-procedure:  1. Patient ID Verified with 2 identifiers (Octavia and  or MRN) : YES    2. Procedure and site verified with patient/designee (when able) : YES    3. Accurate consent documentation in medical record : YES    4. H&P (or appropriate assessment) documented in medical record : N/A  H&P must be up to 30 days prior to procedure an updated within 24 hours of                 Procedure as applicable.     5. Relevant diagnostic and radiology test results appropriately labeled and displayed as applicable : YES    6. Blood products, implants, devices, and/or special equipment available for the procedure as applicable : YES    7. Procedure site(s) marked with provider initials [Exclusions: none] : NO    8. Marking not required. Reason : Yes  Procedure does not require site marking    Time Out:     Time-Out performed immediately prior to starting procedure, including verbal and active participation of all team members addressing: YES    1. Correct patient identity.  2. Confirmed that the correct side and site are marked.  3. An accurate procedure to be done.  4. Agreement on the procedure to be done.  5. Correct patient position.  6. Relevant images and results are properly labeled and appropriately displayed.  7. The need to administer antibiotics or fluids for irrigation purposes during the procedure as applicable.  8. Safety precautions based on patient history or medication use.    During  Procedure: Verification of correct person, site, and procedure occurs any time the responsibility for care of the patient is transferred to another member of the care team.    The following medication was given:     MEDICATION:  Lidocaine without epinephrine 2% jelly  ROUTE: urethral   SITE: urethral   DOSE: 10 mL  LOT #: [id number]  : [employer]  EXPIRATION DATE: 9-25  NDC#: [address]-9   Was there drug waste? No    Prior to med admin, verified patient identity using patient's name and date of birth.  Due to med administration, patient instructed to remain in clinic for 15 minutes  afterwards, and to report any adverse reaction to me immediately.    Drug Amount Wasted:  None.  Vial/Syringe: Syringe      Deedee Dyson  3/5/2024  10:52 AM

## 2024-03-05 NOTE — Clinical Note
3/5/2024       RE: Alek AGOSTO Raheelprincess  [address] [place] [address]     Dear Colleague,    Thank you for referring your patient, Alek Mcneill, to the [facility]. Please see a copy of my visit note below.    No notes on file    Again, thank you for allowing me to participate in the care of your patient.      Sincerely,    Harman Kaiser MD

## 2024-03-05 NOTE — PROGRESS NOTES
"  CHIEF COMPLAINT   It was my pleasure to see Alek Mcneill who is a 74 year old male for follow-up of bladder cancer.      HPI  Alek Mcneill is a very pleasant 74 year old male who presents with a history of bladder cancer. 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     He has enrolled in [employer] and is here today for TAR-200 insertion.      Re-staging TURBT 1/17/2024          SPECIMEN   Procedure   Transurethral resection of bladder (TURBT)   TUMOR   Tumor Site   Anterior wall   Histologic Type   Urothelial carcinoma, in situ   Histologic Grade   High-grade   Tumor Extent   Flat carcinoma in situ   Lymphovascular Invasion   Not identified   Tumor Configuration   Flat   Muscularis Propria (detrusor muscle)   Cannot be determined: Biopsy site changes are identified in the current sample; no residual viable invasive carcinoma is identified.  Please see previous biopsy report for further information, case [id number], [facility]..   ADDITIONAL FINDINGS   Associated Epithelial Lesions   Chronic inflammation, histiocytic proliferation, consistent with biopsy site changes.      TURBT 12/6/2023  Final Diagnosis   A) URINARY BLADDER, BASE OF TUMOR, TRANSURETHRAL RESECTION BLADDER TUMOR:  -NONINVASIVE LOW-GRADE PAPILLARY UROTHELIAL CARCINOMA  -MULTIPLE FRAGMENTS OF MUSCULARIS PROPRIA  -SEE SYNOPTIC REPORT     B) URINARY BLADDER, TUMOR, TRANSURETHRAL RESECTION BLADDER TUMOR:  -HIGH-GRADE PAPILLARY UROTHELIAL CARCINOMA WITH LAMINA PROPRIA INVASION  -MUSCULARIS PROPRIA PRESENT AND UNINVOLVED  -SEE SYNOPTIC REPORT     PHYSICAL EXAM  Patient is a 74 year old  male   Vitals: Blood pressure 131/86, pulse 80, height 1.778 m (5' 10\"), weight 108.9 kg (240 lb).  General Appearance Adult: Body mass index is 34.44 kg/m .  Alert, no acute distress, oriented  Lungs: no respiratory distress, or pursed lip breathing  Abdomen: soft, nontender, no organomegaly or " masses  Back: no CVAT  Neuro: Alert, oriented, speech and mentation normal  Psych: affect and mood normal  : penis, scrotum, testes normal    OFFICE CYSTOSCOPY 3/5/2024     Pre-procedure diagnosis:       Bladder Cancer  Post-procedure diagnosis:       device in good position  Procedure performed:             Cystourethroscopy  Surgeon:                                 Harman Kaiser MD  Anesthesia:                             Local     Description of procedure:   After fully informed, voluntary consent was obtained, the patient was brought into the procedure room, identified and placed in a supine position on the cystoscopy table.  The groin/scrotum were prepped with betadine and draped in a sterile fashion. The urinary placement catheter was advanced 33 cm into the bladder with clear urine return. Lubrication, the device, and additional lubrication were introduced and the device was deployed with the stylet. Catheter removed.  A 15F flexible cystoscope was inserted into the urethra, and the bladder and urethra were examined in a systematic manner.  The patient tolerated the procedure well and there were no complications.       Cystoscopic findings:  The urethra was normal without strictures.  The prostate was 3 cm long and demonstrated mild bilobar hypertrophy.  There was no median lobe.  The external sphincter coapted well and the bladder neck was open. The bladder was completely surveyed.  There was mild trabeculation.  There were no neoplasms, stones, or diverticula identifed.  The ureteric orifices were normal in position and number and effluxing clear urine. Areas of previous resection were noted at the anterior bladder wall and are healing well. There are no recurrent tumors or suspicious areas on today's cystoscopy.  The  device was deployed within the bladder.    ASSESSMENT and PLAN  74 year old male with history of HG non-muscle-invasive bladder cancer. He has elected to enroll in the  [place] 3 clinical trial for BCG-naive NMIBC. Cystoscopy today with no evidence of visible papillary disease.    He remains eligible for enrollment in the trial.  placed today. Planning cetrelimab tomorrow. Will plan follow-up  for  removal and reinsertion in 3 weeks. ECOG 0.    Insertion of  at 11:49 AM.    - Follow-up medical oncology tomorrow  - Follow-up in urology in 3 weeks for  removal and replacement    Harman Kaiser MD  Urology  [facility]

## 2024-03-05 NOTE — ADDENDUM NOTE
Encounter addended by: Hellen Myers on: 3/5/2024 2:47 PM   Actions taken: Charge Capture section accepted

## 2024-03-05 NOTE — PROGRESS NOTES
The following medication was given per Dr. Kaiser and the  protocol:     MEDICATION: Bactrim (Trimethoprim / Sulfamethoxazole)  ROUTE: PO  SITE: Medication was given orally  DOSE: 800mg/160mg  LOT #: [id number]  : [employer]  EXPIRATION DATE: 6/2025  NDC#: 1670-6392-79   Was there drug waste? No    Prior to medication administration, verified patient identity using patient's name and date of birth.  Due to medication administration, patient instructed to remain in clinic for 15 minutes  afterwards, and to report any adverse reaction to me immediately.     JONO JOLLY RN

## 2024-03-05 NOTE — PATIENT INSTRUCTIONS
"Please schedule an in person cystoscopy + research procedure with Dr. Kaiser in 3 weeks.    It was a pleasure meeting with you today.  Thank you for allowing me and my team the privilege of caring for you today.  YOU are the reason we are here, and I truly hope we provided you with the excellent service you deserve.  Please let us know if there is anything else we can do for you so that we can be sure you are leaving completely satisfied with your care experience.            AFTER YOUR CYSTOSCOPY        You have just completed a cystoscopy, or \"cysto\", which allowed your physician to learn more about your bladder (or to remove a stent placed after surgery). We suggest that you continue to avoid caffeine, fruit juice, and alcohol for the next 24 hours, however, you are encouraged to return to your normal activities.         A few things that are considered normal after your cystoscopy:     * Small amount of bleeding (or spotting) that clears within the next 24 hours     * Slight burning sensation with urination     * Sensation to of needing to avoid more frequently     * The feeling of \"air\" in your urine     * Mild discomfort that is relieved with Tylenol        Please contact our office promptly if you:     * Develop a fever above 101 degrees     * Are unable to urinate     * Develop bright red blood that does not stop     * Severe pain or swelling         Please contact our office with any concerns or questions @[email or web address].  "

## 2024-03-06 ENCOUNTER — INFUSION THERAPY VISIT (OUTPATIENT)
Dept: ONCOLOGY | Facility: CLINIC | Age: 74
End: 2024-03-06
Attending: INTERNAL MEDICINE
Payer: COMMERCIAL

## 2024-03-06 ENCOUNTER — ALLIED HEALTH/NURSE VISIT (OUTPATIENT)
Dept: ONCOLOGY | Facility: CLINIC | Age: 74
End: 2024-03-06

## 2024-03-06 VITALS
DIASTOLIC BLOOD PRESSURE: 90 MMHG | HEART RATE: 74 BPM | RESPIRATION RATE: 18 BRPM | SYSTOLIC BLOOD PRESSURE: 158 MMHG | OXYGEN SATURATION: 96 % | TEMPERATURE: 97.5 F

## 2024-03-06 VITALS
TEMPERATURE: 97.2 F | RESPIRATION RATE: 18 BRPM | BODY MASS INDEX: 34.52 KG/M2 | OXYGEN SATURATION: 99 % | WEIGHT: 240.6 LBS | HEART RATE: 85 BPM | DIASTOLIC BLOOD PRESSURE: 80 MMHG | SYSTOLIC BLOOD PRESSURE: 123 MMHG

## 2024-03-06 DIAGNOSIS — C67.3 MALIGNANT NEOPLASM OF ANTERIOR WALL OF URINARY BLADDER (H): Primary | ICD-10-CM

## 2024-03-06 LAB — BACTERIA UR CULT: NO GROWTH

## 2024-03-06 PROCEDURE — 258N000003 HC RX IP 258 OP 636: Performed by: INTERNAL MEDICINE

## 2024-03-06 PROCEDURE — G0463 HOSPITAL OUTPT CLINIC VISIT: HCPCS | Performed by: INTERNAL MEDICINE

## 2024-03-06 PROCEDURE — 99215 OFFICE O/P EST HI 40 MIN: CPT | Performed by: INTERNAL MEDICINE

## 2024-03-06 PROCEDURE — 96365 THER/PROPH/DIAG IV INF INIT: CPT

## 2024-03-06 RX ORDER — EPINEPHRINE 1 MG/ML
0.3 INJECTION, SOLUTION INTRAMUSCULAR; SUBCUTANEOUS EVERY 5 MIN PRN
Status: CANCELLED | OUTPATIENT
Start: 2024-03-06

## 2024-03-06 RX ORDER — HEPARIN SODIUM,PORCINE 10 UNIT/ML
5-20 VIAL (ML) INTRAVENOUS DAILY PRN
Status: CANCELLED | OUTPATIENT
Start: 2024-03-06

## 2024-03-06 RX ORDER — DIPHENHYDRAMINE HYDROCHLORIDE 50 MG/ML
50 INJECTION INTRAMUSCULAR; INTRAVENOUS
Status: CANCELLED
Start: 2024-03-06

## 2024-03-06 RX ORDER — LIDOCAINE HYDROCHLORIDE 20 MG/ML
10 JELLY TOPICAL
Status: CANCELLED | OUTPATIENT
Start: 2024-03-06

## 2024-03-06 RX ORDER — METHYLPREDNISOLONE SODIUM SUCCINATE 125 MG/2ML
125 INJECTION, POWDER, LYOPHILIZED, FOR SOLUTION INTRAMUSCULAR; INTRAVENOUS
Status: CANCELLED
Start: 2024-03-06

## 2024-03-06 RX ORDER — MEPERIDINE HYDROCHLORIDE 25 MG/ML
25 INJECTION INTRAMUSCULAR; INTRAVENOUS; SUBCUTANEOUS EVERY 30 MIN PRN
Status: CANCELLED | OUTPATIENT
Start: 2024-03-06

## 2024-03-06 RX ORDER — LORAZEPAM 2 MG/ML
0.5 INJECTION INTRAMUSCULAR EVERY 4 HOURS PRN
Status: CANCELLED | OUTPATIENT
Start: 2024-03-06

## 2024-03-06 RX ORDER — ALBUTEROL SULFATE 90 UG/1
1-2 AEROSOL, METERED RESPIRATORY (INHALATION)
Status: CANCELLED
Start: 2024-03-06

## 2024-03-06 RX ORDER — ACETAMINOPHEN 325 MG/1
650 TABLET ORAL
Status: CANCELLED | OUTPATIENT
Start: 2024-03-06

## 2024-03-06 RX ORDER — ALBUTEROL SULFATE 0.83 MG/ML
2.5 SOLUTION RESPIRATORY (INHALATION)
Status: CANCELLED | OUTPATIENT
Start: 2024-03-06

## 2024-03-06 RX ORDER — HEPARIN SODIUM (PORCINE) LOCK FLUSH IV SOLN 100 UNIT/ML 100 UNIT/ML
5 SOLUTION INTRAVENOUS
Status: CANCELLED | OUTPATIENT
Start: 2024-03-06

## 2024-03-06 RX ADMIN — SODIUM CHLORIDE 250 ML: 9 INJECTION, SOLUTION INTRAVENOUS at 14:18

## 2024-03-06 ASSESSMENT — PAIN SCALES - GENERAL: PAINLEVEL: NO PAIN (0)

## 2024-03-06 NOTE — PROGRESS NOTES
Infusion Nursing Note:  Alek Mcneill presents today for W0D1 Cetrelimab (IDS# [id number]).    Patient seen by provider today: Yes: Dr. Castillo.   present during visit today: Not Applicable.    Note: Patient arrives in infusion post provider visit. No new complaints or concerns. Patient denies having pain today.    Per research nurse Araceli Diaz Rn:  PK lab draw with 30 min of starting infusion and redraw on the opposite arm of infusion at the end of infusion (including flush).    Vital signs immediately prior to infusion and every 15 min(+/- 5 min) until the end of infusion and flush.    TORB: 3/6/2024 Araceli Diaz/Tim Avila: Cetrelimab (IDS#6160) came from [facility] with infusion rate of 110 ml/hr on label and in the MAR. Per research nurse communication with [facility] pharmacy the infusion rate has been approved at 110 ml/hr by the research sponsor to account for volume overfill and still infuse at 60 minutes.     Two hour observation post infusion (+/- 15 min)  Infusion start time:1416  Infusion stop time (including NS flush):1519    Intravenous Access:  Peripheral IV placed.    Treatment Conditions:  Lab Results   Component Value Date    HGB 15.8 03/05/2024    WBC 7.0 03/05/2024    ANEUTAUTO 3.6 03/05/2024     03/05/2024        Lab Results   Component Value Date     03/05/2024    POTASSIUM 4.1 03/05/2024    MAG 2.3 11/19/2023    CR 1.19 (H) 03/05/2024    MILLI 9.4 03/05/2024    BILITOTAL 0.7 03/05/2024    ALBUMIN 4.4 03/05/2024    ALT 34 03/05/2024    AST 37 03/05/2024       Results reviewed, labs MET treatment parameters, ok to proceed with treatment.      Post Infusion Assessment:  Patient tolerated infusion without incident.  Patient observed for 120 minutes post Cetrelimab (IDS#6160) per protocol.  Blood return noted pre and post infusion.  Site patent and intact, free from redness, edema or discomfort.  No evidence of extravasations.  Access discontinued per protocol.       Discharge  Plan:     Patient declined prescription refills.  Discharge instructions reviewed with: Patient and Family.  Patient and/or family verbalized understanding of discharge instructions and all questions answered.  AVS to patient via [employer].  Patient will return 3/26/2024 for next appointment.   Patient discharged in stable condition accompanied by: self and wife.  Departure Mode: Ambulatory.      Tim Avila RN

## 2024-03-06 NOTE — LETTER
"    3/6/2024         RE: Alek Mcneill  [address] [place] [address]        Dear Colleague,    Thank you for referring your patient, Alek Mcneill, to the [facility]. Please see a copy of my visit note below.       FOLLOW UP VISIT     CHIEF COMPLAINT   I was asked to see this patient by Dr. Kaiser to provide background on Cetrelimab, in this patient with newly diagnosed T1 poorly differentiated invasive bladder cancer.  The patient is considering participation in our [place]-3 trial, involving Cetrelimab.     History of Present Illness:   11/19/2023: Presented with subacute onset of gross hematuria, and a passing of clots. Stopped ASA. CT showed multiple indeterminate nodular densities in the bladder suspicious for enhancing urothelial lesions   12/6/2023: Cystoscopy , with finding of multiple bladder tumors appeared papillary and were located on the anterior wall and had some superficial calcifications. \"We resected tissue down to muscle using cut electrocautery. \" Pathology showed high grade papillary carcinoma, not invading muscle.  1/17/2024: Cystoscopy with resection showed CIS, but no invasive cancer.  Now here to talk about participation in the [facility] -3 trial as well as receipt of cetrelimab.  He has been randomized to receive Cetrelimab plus Tar-200.     Interval history:    Alek is here with his wife Christine discussed participation in the [place]-3 trial.  He has been doing okay recently.  He did have an episode of hematuria yesterday that did not last very long.  His urine was bj-colored.  Today it is clear.  He knows that this is associated with his fluid intake and admits that he does not like to drink water very much.  He does drink few beers a day.  He has no new pain except for chronic left shoulder issues.  He denies cough or shortness of breath.  There are no new lumps or bumps.  He still is interested in participating in the trial.  He relates a traumatic " story of how multiple attempts were made to place a [place] catheter without a Uro-Jet.  It has left him traumatized and a little leery of having a catheter placed in the future, even if it is just for BCG.  The rest of the multiorgan review of physical symptoms is negative.     Past Medical History:     Past Medical History        Past Medical History:   Diagnosis Date    Complication of anesthesia      DM2 (diabetes mellitus, type 2) (H)      Hypercholesteremia      Hyperlipidemia      Hypertension           Medications:     Current Outpatient Prescriptions          Current Outpatient Medications   Medication Sig Dispense Refill    Ascorbic Acid (VITAMIN C) 500 MG CHEW Take 1 chew tab by mouth daily        fish oil-omega-3 fatty acids 500 MG capsule 2 capsules        ketoconazole (NIZORAL) 2 % external cream Apply topically as needed        senna-docusate (SENOKOT-S/PERICOLACE) 8.6-50 MG tablet Take 1-2 tablets by mouth 2 times daily 30 tablet 0    simvastatin (ZOCOR) 40 MG tablet Take 40 mg by mouth at bedtime        Vitamin D3 (VITAMIN D, CHOLECALCIFEROL,) 25 mcg (1000 units) tablet Take 25 mcg by mouth daily             Physical Examination:   ECOG PS: 0  Constitutional: WDWN male in NAD, pleasant and appropriate  HEENT:  NC/AT, no icterus, OP clear, MMM  Skin: No jaundice nor ecchymoses  Lungs: CTAB, no w/r/r, nonlabored breathing  Cardiovascular: RRR, S1, S2, no m/r/g  Abdomen: +BS, soft, nontender, nondistended, no organomegaly nor masses  MSK/Extremities: Warm, well perfused. No edema  LN: no cervical, supraclavicular, axillary, nor inguinal lymphadenopathy  Neurologic: alert, answering questions appropriately, moving all extremities spontaneously. CN 2-12 grossly intact.  Psych: appropriate affect    Laboratories (3/5/24):   His CBC reveals a WBC count of 7.0, hemoglobin 15.8, hematocrit 43.2%, platelets 215K.  His comprehensive metabolic panel is generally within normal limits except for a creatinine level  of 1.19 mg/dL, a GGT of 84 U/L, and a glucose level of 119 mg/dL, and a TSH level of 4.41 IU/mL.     Other Data    TURBT 12/6/2023  Final Diagnosis    A) URINARY BLADDER, BASE OF TUMOR, TRANSURETHRAL RESECTION BLADDER TUMOR:  -NONINVASIVE LOW-GRADE PAPILLARY UROTHELIAL CARCINOMA  -MULTIPLE FRAGMENTS OF MUSCULARIS PROPRIA   B) URINARY BLADDER, TUMOR, TRANSURETHRAL RESECTION BLADDER TUMOR:  -HIGH-GRADE PAPILLARY UROTHELIAL CARCINOMA WITH LAMINA PROPRIA INVASION  -MUSCULARIS PROPRIA PRESENT AND UNINVOLVED      CT abd/pelvis 11/19/2023  1.  Multiple indeterminate nodular densities in the bladder suspicious for enhancing urothelial lesions. Cystoscopy is recommended for evaluation.   2.  Kidneys and ureters are negative. No hydronephrosis.   3.  Diffuse fatty infiltration of the liver.   4.  No acute bowel findings. Normal appendix.   5.  Fat-containing bilateral inguinal hernias, left greater than right.        Assessment:      New diagnosis of Stage 1 (T1NxMx) superficial poorly-differentiated bladder cancer. T1 lesions are high risk. Recurrence rates at one, three, and five years can reach up to 50, 70 to 80, and 90 percent, respectively, and 20 to 25 percent progress to more invasive (T2 or greater) disease. Given the high risk of recurrence here, therapy is indicated.  After TURBT, standard treatment in this setting is intravesicular BCG. A recent meta-analysis has shown that this approach results in a 10 year PFS of 67%. The questioni s, is there any other treatment that may result in improved outcome? The answer is possibly.  We have open the [facility]-3 study, that takes patients with T1 disease and randomized them to one of 3 arms: TAR-200 (a device inserted in to the bladder that contains gemcitabine) + Cetrelimab IV OR TAR-200 Alone OR BCG Alone. Alek is here tody to discuss cetrelimab.  Cetrelimb is a PD-1 antibody and has been extensively studied. The side effect profile includes asthenia (19%), fatigue (19%),  dyspnea (16%) and diarrhea (16%). Grade ?3 AEs were reported in 45% of pts; most common were anemia (6%), dyspnea (4%), increased ALT (3%) and increased AST (3%). Alek is understandably concerned with possible side effects.  From my perspective, there is no contraindication for Alek to participate in [facility]-3. He has no history of autoimmune disease. His physical examination is normal.  He has been randomized to receive Cetrelimab plus Tar-200.  He did mention how traumatic it is to get a bladder catheter placed. He was never offered a lidocaine Uro-jet. Hopefully, this will be offered prior to his next catheter placement.     Plan:    1. Baseline labs done yesterday; no concerns   2. Study nurse Araceli Diaz will go over the consent with him today.   3. If he is randomized to the Cetrelimab arm, our  medical oncology will continue to see him for his infusions, and to monitor side effects.  4. Potential adverse events of cetrelimab were discussed in detail, and patient wished to proceed.  5. He has been randomized to receive Cetrelimab plus Tar-200 (the Tar-200 pretzel was inserted yesterday, 3/5/24).  6. First cetrelimab infusion will be delivered today; no contraindications noted.       A total of 45 minutes spent on the date of the encounter doing chart review, review of outside records, review of test results, interpretation of tests, patient visit, documentation, discussion with other provider(s), and discussion with family.  The patient was given the opportunity to ask seveeral questions today, all of which were answered to his satisfaction.     Steve Castillo M.D.

## 2024-03-06 NOTE — NURSING NOTE
"Oncology Rooming Note    March 6, 2024 11:27 AM   Alek Mcneill is a 74 year old male who presents for:    Chief Complaint   Patient presents with    Oncology Clinic Visit     Malignant neoplasm of anterior wall of urinary bladder      Initial Vitals: /80   Pulse 85   Temp 97.2  F (36.2  C) (Oral)   Resp 18   Wt 109.1 kg (240 lb 9.6 oz)   SpO2 99%   BMI 34.52 kg/m   Estimated body mass index is 34.52 kg/m  as calculated from the following:    Height as of 3/5/24: 1.778 m (5' 10\").    Weight as of this encounter: 109.1 kg (240 lb 9.6 oz). Body surface area is 2.32 meters squared.  No Pain (0) Comment: Data Unavailable   No LMP for male patient.  Allergies reviewed: Yes  Medications reviewed: Yes    Medications: Medication refills not needed today.  Pharmacy name entered into M2G: [facility] [address] [place] [address] AT [place] [id number]    Frailty Screening:   Is the patient here for a new oncology consult visit in cancer care? 2. No      Clinical concerns: None       Temitope Pope CMA            "

## 2024-03-06 NOTE — NURSING NOTE
Infusion RN contacted writer in regards to infusion rate- per pharmacy manual document RN provided the Cetrelimab is to be infused at 100 mL/hr, however, the ordered dose in Epic is 110 ml/hr. Instructed infusion RN to decrease rate to 100 ml/hr until discussed with pharmacy.     Discussed infusion rate with Dariel from [facility] pharmacy; Pharmacy manual says infuse at 100 mL/hr however, it does not account for the overfill in the bag so in order to infuse the entire bag over 60 minutes per protocol the rate needs to be at 110 mL/hr. Dariel confirmed that 110 ml/hr was the correct rate for the drug to infuse over due to the overfill. Instructed infusion RN to change rate back to ordered rate of 110 ml/hr per pharmacy. Pharmacist stated they discussed this with the study sponsor at the [facility] and this was Okay' d. Requested a note to file from the pharmacist. The drug will be administered per protocol over 60 minutes.

## 2024-03-06 NOTE — NURSING NOTE
[id number]  Study Visit Note   Subject name: Alek Mcneill     Visit: Week 0    Did the study visit occur within the appropriate window allowed by the protocol? yes    Since the last study visit, He has been doing no changes.     Assessed for below symptoms specifically   Dysuria no  Pollakiuria no  Nocturia no  Urgency no  Incontinence no  Hematuria no  Urinary hesitation no  Bladder pain/spasm no  Urethral pain no   Bladder discomfort no  Feeling of incomplete bladder emptying  no  Lower abdominal pain no  Fever no  Flu-like symptoms no  Fatigue no  Diarrhea no  Nausea no  Rash no  Arthralgia  no    Time of TAR-200 Insertion: 3/5/34- see previous note  Time of TAR-200 Removal: N/A    Where bernadette procedural abx given? N/A    Verbal handover provided to infusion RN; reminded RN to document actual start time of infusion and actual stop time of the infusion. If infusion deviates from protocol directed infusion time, please document rationale in your infusion note.    Infusion nurse instructed that there are research kit Pks drawn within 30 min of start of infusion and at the end of the infusion. Additionally, For the first infusion, vital signs should be monitored immediately before the start of the infusion, every 15 to 20 minutes during the infusion, at the end of infusion (+10 min), and 2 hours ( 15 min) after the end of infusion.     I have personally interviewed Alek Mcneill and reviewed his medical record for adverse events and concomitant medications and these have been recorded on the corresponding logs in Alek Mcneill's research file.     Alek Mcneill was given the opportunity to ask any trial related questions.  Please see provider progress note for physical exam and other clinical information. Labs were reviewed - any significant lab values were addressed and reviewed.    Araceli Diaz RN     PROGRESS NOTE



DATE OF SERVICE:

12/26/2018



REASON FOR FOLLOWUP:

1. Sacral pressure ulcer.

2. Leukocytosis.



INTERVAL HISTORY:

The patient is currently afebrile.  She was noticed to have low hemoglobin and is

getting blood transfusion now. The patient has been complaining of hurting everywhere.

No nausea, no vomiting.  No diarrhea.



PHYSICAL EXAMINATION:

Blood pressure is 102/73 with a pulse of 78 temperature 97.8, she is 97% on 2 L nasal

cannula.

General description is a middle-aged female, lying in bed in no distress.

RESPIRATORY SYSTEM:  Unlabored breathing, clear to auscultation anteriorly.

HEART:  S1, S2.  Regular rate and rhythm.

ABDOMEN:  Soft, no tenderness.  Bilateral legs did have a ecchymosis bilaterally and

mostly marking the thigh area, no worsening from the _____ yesterday.



LABS:

Hemoglobin is 6, white count 5.8, this morning, white was 21.  Culture has been

negative.



DIAGNOSTIC IMPRESSION AND PLAN:

1. Patient with a pressure ulcer in the sacral area, status post debridement.  Local

    care to continue with the center.

2. Patient with unexplained leukocytosis and patient did have a no fever.  Culture has

    been negative and has been on broad-spectrum antibiotic in the form of vanco, Zosyn

    and Eraxis. Patient's white count has slightly dropped down to 21.  She also

    noticed to have evidence of a possible blood loss anemia being managed by the

    admitting team.  Overall prognosis remains guarded and family is talking about

    possible offloaded care to improve it for her.  Family was present at bedside

    including sister and their questions were answered.



MMODL / IJN: [id number] / Job#: [id number]

## 2024-03-07 ENCOUNTER — DOCUMENTATION ONLY (OUTPATIENT)
Dept: ONCOLOGY | Facility: CLINIC | Age: 74
End: 2024-03-07
Payer: COMMERCIAL

## 2024-03-07 NOTE — NURSING NOTE
Called patient to check in on how he is feeling, patient states that he is feeling well no changes. Offered to write all of the education items he learned about the TAR_200 and Cetelimab over the past couple of days in a [employer] message, patient would like this to have something to refer back to.     Araceli Diaz RN   Clinical Research Coordinator Nurse-Solid Tumor   Phone: [phone] Pgr: [phone]

## 2024-03-11 ENCOUNTER — DOCUMENTATION ONLY (OUTPATIENT)
Dept: ONCOLOGY | Facility: CLINIC | Age: 74
End: 2024-03-11
Payer: COMMERCIAL

## 2024-03-11 NOTE — NURSING NOTE
Patient called to tell RN that he met with [facility] and they recommended that he get a MRI of his right shoulder. Instructed patient that he must show the MRI techs and let the scheduling team know that he has a device inserted, and he must give them the card with MRI scanning instructions on it. Instructed patient that they should call me if they have any questions. Patient verbalized understanding of this teaching.     Araceli Diaz RN   Clinical Research Coordinator Nurse-Solid Tumor   Phone: [phone] Pgr: [phone]

## 2024-03-12 ENCOUNTER — ALLIED HEALTH/NURSE VISIT (OUTPATIENT)
Dept: ONCOLOGY | Facility: CLINIC | Age: 74
End: 2024-03-12
Payer: COMMERCIAL

## 2024-03-12 DIAGNOSIS — C67.3 MALIGNANT NEOPLASM OF ANTERIOR WALL OF URINARY BLADDER (H): Primary | ICD-10-CM

## 2024-03-12 NOTE — NURSING NOTE
Patient reported via phone call that he is having a mild runny nose and mild cough. States otherwise he is feeling great, unsure if this is just allergies. Notified Dr. Tobin via inbasket. Patient denies fever, instructed patient to call RN if gets worse or if RN does not answer the triage numbers.     Araceli Diaz RN   Clinical Research Coordinator Nurse-Solid Tumor   Phone: [phone] Pgr: [phone]

## 2024-03-14 ENCOUNTER — ALLIED HEALTH/NURSE VISIT (OUTPATIENT)
Dept: ONCOLOGY | Facility: CLINIC | Age: 74
End: 2024-03-14
Payer: COMMERCIAL

## 2024-03-14 DIAGNOSIS — C67.3 MALIGNANT NEOPLASM OF ANTERIOR WALL OF URINARY BLADDER (H): Primary | ICD-10-CM

## 2024-03-14 NOTE — PROGRESS NOTES
[id number] SunRISe-3 Study Note   Subject name: Alek Mcneill     Date: 3/14/2024    Alek Mcneill contacted me via phone in regards to issues registering the ClinCards that were provided to him and his wife as part of the SunRISe-3 study. We were able to get both cards successfully registered.     He says he has some mild cold-like symptoms, he is not sure whether these are due to a cold or seasonal allergies. Otherwise he reports feeling good.    Alek Mcneill was given the opportunity to ask any trial related questions.    Did Alek Mcneill expressed any concerns: theresa Castle  Clinical Research Coordinator III  [phone]  [email or web address]

## 2024-03-15 ENCOUNTER — ALLIED HEALTH/NURSE VISIT (OUTPATIENT)
Dept: ONCOLOGY | Facility: CLINIC | Age: 74
End: 2024-03-15
Payer: COMMERCIAL

## 2024-03-15 DIAGNOSIS — C67.3 MALIGNANT NEOPLASM OF ANTERIOR WALL OF URINARY BLADDER (H): Primary | ICD-10-CM

## 2024-03-15 NOTE — NURSING NOTE
Called patient to check in he states his cough and runny nose are still present but improving and he is feeling great. Denies SOB.     Araceli Diaz RN   Clinical Research Coordinator Nurse-Solid Tumor   Phone: [phone] Pgr: [phone]

## 2024-03-17 ENCOUNTER — TRANSFERRED RECORDS (OUTPATIENT)
Dept: HEALTH INFORMATION MANAGEMENT | Facility: CLINIC | Age: 74
End: 2024-03-17
Payer: COMMERCIAL

## 2024-03-18 DIAGNOSIS — C67.3 MALIGNANT NEOPLASM OF ANTERIOR WALL OF URINARY BLADDER (H): Primary | ICD-10-CM

## 2024-03-22 ENCOUNTER — DOCUMENTATION ONLY (OUTPATIENT)
Dept: ONCOLOGY | Facility: CLINIC | Age: 74
End: 2024-03-22
Payer: COMMERCIAL

## 2024-03-22 ENCOUNTER — PRE VISIT (OUTPATIENT)
Dept: UROLOGY | Facility: CLINIC | Age: 74
End: 2024-03-22
Payer: COMMERCIAL

## 2024-03-22 DIAGNOSIS — R31.0 GROSS HEMATURIA: Primary | ICD-10-CM

## 2024-03-22 RX ORDER — LIDOCAINE HYDROCHLORIDE 20 MG/ML
JELLY TOPICAL ONCE
Status: COMPLETED | OUTPATIENT
Start: 2024-03-27 | End: 2024-03-27

## 2024-03-22 NOTE — TELEPHONE ENCOUNTER
Reason for visit: cystoscopy, TAR-200 study     Relevant information: urothelial carcinoma    Records/imaging/labs/orders: in epic    Pt called: No need for a call    At Rooming: standard cysto setup, give urojet, have urine cup and luer carson syringe available during cysto, study personnel will bring other supplies and setup    Pilo Dos Santos  3/22/2024  3:58 PM

## 2024-03-22 NOTE — NURSING NOTE
Patient called RN to check prohibited medication, patient states that on his recent MRI they found that the source of his pain in his right shoulder was coming from his neck, and the Mds recommended he do corticosteroid injections. This is prohibited while the patient is on Cetrelimab if they exceed 10 mg/day. The patient was notfiied of this and states he will notify Tria. Encouraged patient to give his providers Rns phone number to discuss this further if needed.         Araceli Diaz RN   Clinical Research Coordinator Nurse-Solid Tumor   Phone: [phone] Pgr: [phone]

## 2024-03-26 NOTE — NURSING NOTE
[facility] called in regards to doing an epidural space corticosteroid injection. Checked with medical monitor of the [id number] and this injection would be considered localized and is allowed per the clinical trial. Relayed this message to nursing staff (Riya) at [facility].     Araceli Diaz RN   Clinical Research Coordinator Nurse-Solid Tumor   Phone: [phone] Pgr: [phone]

## 2024-03-27 ENCOUNTER — LAB (OUTPATIENT)
Dept: LAB | Facility: CLINIC | Age: 74
End: 2024-03-27
Payer: COMMERCIAL

## 2024-03-27 ENCOUNTER — PRE VISIT (OUTPATIENT)
Dept: UROLOGY | Facility: CLINIC | Age: 74
End: 2024-03-27

## 2024-03-27 ENCOUNTER — OFFICE VISIT (OUTPATIENT)
Dept: UROLOGY | Facility: CLINIC | Age: 74
End: 2024-03-27
Payer: COMMERCIAL

## 2024-03-27 ENCOUNTER — ANCILLARY PROCEDURE (OUTPATIENT)
Dept: CT IMAGING | Facility: CLINIC | Age: 74
End: 2024-03-27
Attending: INTERNAL MEDICINE
Payer: COMMERCIAL

## 2024-03-27 ENCOUNTER — ALLIED HEALTH/NURSE VISIT (OUTPATIENT)
Dept: ONCOLOGY | Facility: CLINIC | Age: 74
End: 2024-03-27

## 2024-03-27 VITALS
SYSTOLIC BLOOD PRESSURE: 143 MMHG | HEART RATE: 89 BPM | RESPIRATION RATE: 18 BRPM | OXYGEN SATURATION: 95 % | BODY MASS INDEX: 34.85 KG/M2 | TEMPERATURE: 97.4 F | WEIGHT: 242.9 LBS | DIASTOLIC BLOOD PRESSURE: 96 MMHG

## 2024-03-27 VITALS
BODY MASS INDEX: 33.87 KG/M2 | SYSTOLIC BLOOD PRESSURE: 155 MMHG | OXYGEN SATURATION: 95 % | HEART RATE: 91 BPM | HEIGHT: 70 IN | WEIGHT: 236.6 LBS | DIASTOLIC BLOOD PRESSURE: 92 MMHG

## 2024-03-27 DIAGNOSIS — C67.3 MALIGNANT NEOPLASM OF ANTERIOR WALL OF URINARY BLADDER (H): Primary | ICD-10-CM

## 2024-03-27 DIAGNOSIS — C67.3 MALIGNANT NEOPLASM OF ANTERIOR WALL OF URINARY BLADDER (H): ICD-10-CM

## 2024-03-27 DIAGNOSIS — Z00.6 EXAMINATION OF PARTICIPANT OR CONTROL IN CLINICAL RESEARCH: ICD-10-CM

## 2024-03-27 PROCEDURE — 71260 CT THORAX DX C+: CPT | Mod: GC | Performed by: RADIOLOGY

## 2024-03-27 PROCEDURE — 52000 CYSTOURETHROSCOPY: CPT | Performed by: UROLOGY

## 2024-03-27 RX ORDER — DIPHENHYDRAMINE HCL 25 MG
50 CAPSULE ORAL
Status: CANCELLED | OUTPATIENT
Start: 2024-03-27

## 2024-03-27 RX ORDER — ACETAMINOPHEN 325 MG/1
650 TABLET ORAL
Status: CANCELLED
Start: 2024-03-27

## 2024-03-27 RX ORDER — METHYLPREDNISOLONE SODIUM SUCCINATE 125 MG/2ML
125 INJECTION, POWDER, LYOPHILIZED, FOR SOLUTION INTRAMUSCULAR; INTRAVENOUS
Status: CANCELLED
Start: 2024-03-27

## 2024-03-27 RX ORDER — MEPERIDINE HYDROCHLORIDE 25 MG/ML
25 INJECTION INTRAMUSCULAR; INTRAVENOUS; SUBCUTANEOUS EVERY 30 MIN PRN
Status: CANCELLED | OUTPATIENT
Start: 2024-03-27

## 2024-03-27 RX ORDER — LIDOCAINE HYDROCHLORIDE 20 MG/ML
10 JELLY TOPICAL
Status: CANCELLED | OUTPATIENT
Start: 2024-03-27

## 2024-03-27 RX ORDER — HEPARIN SODIUM,PORCINE 10 UNIT/ML
5-20 VIAL (ML) INTRAVENOUS DAILY PRN
Status: CANCELLED | OUTPATIENT
Start: 2024-03-27

## 2024-03-27 RX ORDER — IOPAMIDOL 755 MG/ML
116 INJECTION, SOLUTION INTRAVASCULAR ONCE
Status: COMPLETED | OUTPATIENT
Start: 2024-03-27 | End: 2024-03-27

## 2024-03-27 RX ORDER — SULFAMETHOXAZOLE/TRIMETHOPRIM 800-160 MG
1 TABLET ORAL ONCE
Status: DISCONTINUED | OUTPATIENT
Start: 2024-03-27 | End: 2024-03-27

## 2024-03-27 RX ORDER — DIPHENHYDRAMINE HYDROCHLORIDE 50 MG/ML
50 INJECTION INTRAMUSCULAR; INTRAVENOUS
Status: CANCELLED
Start: 2024-03-27

## 2024-03-27 RX ORDER — SULFAMETHOXAZOLE/TRIMETHOPRIM 800-160 MG
1 TABLET ORAL ONCE
Qty: 1 TABLET | Refills: 0 | Status: SHIPPED | OUTPATIENT
Start: 2024-03-27 | End: 2024-03-27

## 2024-03-27 RX ORDER — EPINEPHRINE 1 MG/ML
0.3 INJECTION, SOLUTION, CONCENTRATE INTRAVENOUS EVERY 5 MIN PRN
Status: CANCELLED | OUTPATIENT
Start: 2024-03-27

## 2024-03-27 RX ORDER — ALBUTEROL SULFATE 90 UG/1
1-2 AEROSOL, METERED RESPIRATORY (INHALATION)
Status: CANCELLED
Start: 2024-03-27

## 2024-03-27 RX ORDER — ACETAMINOPHEN 325 MG/1
650 TABLET ORAL
Status: CANCELLED | OUTPATIENT
Start: 2024-03-27

## 2024-03-27 RX ORDER — LORAZEPAM 2 MG/ML
0.5 INJECTION INTRAMUSCULAR EVERY 4 HOURS PRN
Status: CANCELLED | OUTPATIENT
Start: 2024-03-27

## 2024-03-27 RX ORDER — ALBUTEROL SULFATE 0.83 MG/ML
2.5 SOLUTION RESPIRATORY (INHALATION)
Status: CANCELLED | OUTPATIENT
Start: 2024-03-27

## 2024-03-27 RX ORDER — HEPARIN SODIUM (PORCINE) LOCK FLUSH IV SOLN 100 UNIT/ML 100 UNIT/ML
5 SOLUTION INTRAVENOUS
Status: CANCELLED | OUTPATIENT
Start: 2024-03-27

## 2024-03-27 RX ADMIN — SULFAMETHOXAZOLE AND TRIMETHOPRIM 1 TABLET: 800; 160 TABLET ORAL at 14:51

## 2024-03-27 RX ADMIN — LIDOCAINE HYDROCHLORIDE: 20 JELLY TOPICAL at 14:02

## 2024-03-27 RX ADMIN — IOPAMIDOL 116 ML: 755 INJECTION, SOLUTION INTRAVASCULAR at 15:11

## 2024-03-27 ASSESSMENT — PAIN SCALES - GENERAL
PAINLEVEL: NO PAIN (0)
PAINLEVEL: NO PAIN (0)

## 2024-03-27 NOTE — NURSING NOTE
[id number]: Study Visit Note   Subject name: Alek Mcneill     Visit: Week 3    Did the study visit occur within the appropriate window allowed by the protocol? yes    Since the last study visit, He has been doing well.  About 1 week ago he states that he has been having some wheezing when he gets into bed, notified Dr. Izaguirre, Dr. Tobin and Clarita Owen. Verbal order from Dr. Tobin and Clarita to order a CT chest.     Patient denies any other symptoms and has been feeling great. TSH low and T4 elevated, patient denies Heart rate changes (including pounding), urine issues, bowel changes, abdominal pain, flank pain, joint pain, nausea, appetite changes, tremors, anxiety, sweating, tiredness, muscle weakness.     ECOG 1    Assessed for below symptoms specifically:   Dysuria no  Pollakiuria no  Nocturia no  Urgency no  Incontinence no  Hematuria no  Urinary hesitation no  Bladder pain/spasm no  Urethral pain no   Bladder discomfort no  Feeling of incomplete bladder emptying  no  Lower abdominal pain no  Fever no  Flu-like symptoms no  Fatigue no  Diarrhea no  Nausea no  Rash no  Arthralgia  no      TAR-200 Insertion/Removal:  Time of TAR-200 Insertion: 1437   Medication #: [id number]    Time of TAR-200 Removal: 1435    Were bernadette-procedural antibiotics given? yes    I have personally interviewed Alek Mcneill and reviewed his medical record for adverse events and concomitant medications and these have been recorded on the corresponding logs in Alek Mcneill's research file.     Alek Mcneill was given the opportunity to ask any trial related questions.  Please see provider progress note for physical exam and other clinical information. Labs were reviewed - any significant lab values were addressed and reviewed.    Araceli Diaz RN

## 2024-03-27 NOTE — PROGRESS NOTES
Cystoscopy procedure     Pre procedural diagnosis Bladder cancer    Post procedural diagnosis Same     Indication 74 year old male with history of bladder cancer currently enrolled in [place] 3 trial. Here for removal and insertion of TAR-200 pretzel         Procedure:      After fully informed, voluntary consent was obtained, the patient was brought into the procedure room, identified and placed in a supine position on the cystoscopy table. The groin/scrotum were prepped with betadine and draped in a sterile fashion. The scope was placed and advanced to the bladder. There are no recurrent tumors or suspicious areas on today's cystoscopy. The bladder was completely surveyed and the existing pretzel was grabbed using a flexible grasper and was removed entirely. The pretzel was intact. It was removed at 14:35. The urinary placement catheter was advanced 28 cm into the bladder with clear urine return. Lubrication, the device, and additional lubrication were introduced and the device was deployed with the stylet. Catheter removed.  A 15F flexible cystoscope was inserted into the urethra, and the bladder and urethra were examined in a systematic manner.  This confirmed the accurate placement of the pretzel. The pretzel was placed at 14:37. The patient tolerated the procedure well and there were no complications.       Bruce Izaguirre MD   Department of Urology   [facility]

## 2024-03-27 NOTE — TELEPHONE ENCOUNTER
Reason for visit: cystoscopy + study/ research      Dx/Hx/Sx: bladder cancer     Records/imaging/labs/orders: in epic     At Rooming: collect urine - consent - ask pt if they want lido

## 2024-03-27 NOTE — NURSING NOTE
Chief Complaint   Patient presents with    Blood Draw     Labs drawn via  by RN. VS taken.     Labs collected from venipuncture by RN. Vitals taken.     Bre Sarah RN

## 2024-03-27 NOTE — LETTER
3/27/2024       RE: Alek Mcneill  [address] [place] [address]     Dear Colleague,    Thank you for referring your patient, Alek Mcneill, to the [facility] [place] at [facility]. Please see a copy of my visit note below.    Cystoscopy procedure     Pre procedural diagnosis Bladder cancer    Post procedural diagnosis Same     Indication 74 year old male with history of bladder cancer currently enrolled in [place] 3 trial. Here for removal and insertion of TAR-200 pretzel     Procedure:      After fully informed, voluntary consent was obtained, the patient was brought into the procedure room, identified and placed in a supine position on the cystoscopy table. The groin/scrotum were prepped with betadine and draped in a sterile fashion. The scope was placed and advanced to the bladder. There are no recurrent tumors or suspicious areas on today's cystoscopy. The bladder was completely surveyed and the existing pretzel was grabbed using a flexible grasper and was removed entirely. The pretzel was intact. It was removed at 14:35. The urinary placement catheter was advanced 28 cm into the bladder with clear urine return. Lubrication, the device, and additional lubrication were introduced and the device was deployed with the stylet. Catheter removed.  A 15F flexible cystoscope was inserted into the urethra, and the bladder and urethra were examined in a systematic manner.  This confirmed the accurate placement of the pretzel. The pretzel was placed at 14:37. The patient tolerated the procedure well and there were no complications.       Bruce Izaguirre MD   Department of Urology   [facility]

## 2024-03-27 NOTE — PROGRESS NOTES
"Mar 28, 2024    FOLLOW UP VISIT     CHIEF COMPLAINT   I was asked to see this patient by Dr. Kaiser to provide background on Cetrelimab, in this patient with newly diagnosed T1 poorly differentiated invasive bladder cancer.  The patient is considering participation in our [place]-3 trial, involving Cetrelimab.     History of Present Illness:   11/19/2023: Presented with subacute onset of gross hematuria, and a passing of clots. Stopped ASA. CT showed multiple indeterminate nodular densities in the bladder suspicious for enhancing urothelial lesions   12/6/2023: Cystoscopy , with finding of multiple bladder tumors appeared papillary and were located on the anterior wall and had some superficial calcifications. \"We resected tissue down to muscle using cut electrocautery. \" Pathology showed high grade papillary carcinoma, not invading muscle.  1/17/2024: Cystoscopy with resection showed CIS, but no invasive cancer.  Now here to talk about participation in the SUNRISE -3 trial as well as receipt of cetrelimab.  He has been randomized to receive Cetrelimab plus Tar-200.     Interval history:    Alek is here per trial protocol with participation in the Natalia-3 trial for week 3.      He returns to clinic in consideration for his second dose of Cetrelimab.  The Tar-200 place yesterday with urology.    Reports that he is feeling well today.  He has noticed a new dry cough over the past week that is present at bedtime when he is trying to exert himself to get up into his bed.  Cough lasts for about 10 minutes or less in duration.  Can feel little bit short of breath when the coughing occurs.  He has no chest pain.  He does not have cough. He denies sinus pressure or congestion.  No sore throat.  No recent viral illnesses or URIs that he is aware of. He had a CT Chest completed yesterday.   He otherwise does not notice any side effects with immunotherapy.  He reports that he had low back pain this morning and has a long " standing issue intermittently.  He took 2 Aleve and is proved.  No other new myalgias or arthralgias.  No bone pains.    No hematuria or dysuria.    He has mild constipation which increasing water gives him improvement.  No diarrhea.    No rashes.  No headaches or vision changes.  No fevers or chills.  Energy is stable.       ROS: 10 point ROS neg other than the symptoms noted above in the HPI.       Past Medical History:     Past Medical History        Past Medical History:   Diagnosis Date    Complication of anesthesia      DM2 (diabetes mellitus, type 2) (H)      Hypercholesteremia      Hyperlipidemia      Hypertension           Medications:     Current Outpatient Prescriptions          Current Outpatient Medications   Medication Sig Dispense Refill    Ascorbic Acid (VITAMIN C) 500 MG CHEW Take 1 chew tab by mouth daily        fish oil-omega-3 fatty acids 500 MG capsule 2 capsules        ketoconazole (NIZORAL) 2 % external cream Apply topically as needed        senna-docusate (SENOKOT-S/PERICOLACE) 8.6-50 MG tablet Take 1-2 tablets by mouth 2 times daily 30 tablet 0    simvastatin (ZOCOR) 40 MG tablet Take 40 mg by mouth at bedtime        Vitamin D3 (VITAMIN D, CHOLECALCIFEROL,) 25 mcg (1000 units) tablet Take 25 mcg by mouth daily             Physical Examination:   ECOG PS: 0  /81 (BP Location: Left arm, Patient Position: Sitting, Cuff Size: Adult Large)   Pulse 92   Temp 98.4  F (36.9  C) (Tympanic)   Resp 18   Wt 109.9 kg (242 lb 3.2 oz)   SpO2 92%   BMI 34.75 kg/m    General: No acute distress  HEENT: Sclera anicteric. Oral mucosa pink and moist.  No mucositis or thrush  Lymph: No lymphadenopathy in neck  Heart: Regular, rate, and rhythm  Lungs: Clear to ascultation bilaterally  Abdomen: Positive bowel sounds. Soft, non-distended, non-tender. No organomegaly or mass.   Extremities: no lower extremity edema  Neuro: Cranial nerves grossly intact  Rash: none    Laboratories (3/28/24):   Most Recent  3 CBC's:  Recent Labs   Lab Test 03/27/24  1204 03/05/24  0926 02/12/24  1149   WBC 7.6 7.0 9.0   HGB 16.2 15.8 15.9   MCV 92 91 91    215 231   ANEUTAUTO 3.6 3.6 5.2     Most Recent 3 BMP's:  Recent Labs   Lab Test 03/27/24  1204 03/05/24  0926 02/12/24  1149    135 138   POTASSIUM 4.1 4.1 4.1   CHLORIDE 100 99 99   CO2 27 26 27   BUN 14.4 16.8 14.9   CR 1.04 1.19* 1.21*   ANIONGAP 10 10 12   MILLI 9.9 9.4 10.1   * 119* 108*   PROTTOTAL 7.8 7.3 7.7   ALBUMIN 4.4 4.4 4.5    Most Recent 3 LFT's:  Recent Labs   Lab Test 03/27/24  1204 03/05/24  0926 02/12/24  1149   AST 36 37 40   ALT 39 34 37   ALKPHOS 80 76 77   BILITOTAL 0.6 0.7 0.7    Most Recent 2 TSH and T4:  Recent Labs   Lab Test 03/27/24  1204 03/05/24  0926   TSH 0.05* 4.41*   T4 2.15* 1.11     I reviewed the above labs today.       Other Data   FINDINGS:        LINES/TUBES: None.        MEDIASTINUM: Enlarged mediastinal lymph nodes, increased compared to  prior. For example, there is a precarinal lymph node with retained  fatty hilum measuring 1.5 cm short axis (2/20), previously 1.0 cm.  Additionally, there are numerous enlarged subcarinal lymph nodes with  irregular morphology.. Normal size/configuration of the great vessels.  Atherosclerotic plaques of the aortic arch, including the origins of  its major branches. Normal heart size. No pericardial effusion. No  incidental central pulmonary embolism. Coronary artery calcifications.     LUNG: Patent central infiltrate. Focal bronchial wall thickening and  filling defect in the left lower lobe (4/198-210). Asymmetric  bronchial wall thickening in the right lower lobe (series 4/155-160).  These findings are new compared to 2/13/2024 and likely represent  areas of mucous plugging. No focal consolidation. Paraseptal right  apical cystic changes.Mild scattered subpleural predominant  groundglass and reticular opacities, similar compared to prior.     Pulmonary nodules:  - 4 mm juxtapleural  left lower lobe solid nodule (4/138), unchanged  - 6 mm juxtapleural right lower lobe solid nodule (4/181), unchanged  - 4 mm juxtapleural left lower lobe solid nodule (4/197), unchanged     PLEURA: No pneumothorax or pleural effusion.        LOWER NECK: Thyroid unremarkable.     UPPER ABDOMEN: Esophagus appears unremarkable. Fatty infiltration of  the pancreas. Accessory splenule.     BONES: No acute osseous abnormality. No suspicious bony lesions.  Degenerative changes of the spine and left greater than right shoulder  joints.     SOFT TISSUES: Unremarkable.                                                                         IMPRESSION:   1.  Mediastinal lymphadenopathy, indeterminate, increased compared to  2/13/2024.  2.  Stable pulmonary nodules.  3.  No new or suspicious pulmonary opacities.     Assessment:      New diagnosis of Stage 1 (T1NxMx) superficial poorly-differentiated bladder cancer. T1 lesions are high risk. Recurrence rates at one, three, and five years can reach up to 50, 70 to 80, and 90 percent, respectively, and 20 to 25 percent progress to more invasive (T2 or greater) disease. Given the high risk of recurrence further therapy is recommend. He has been enrolled in the [facility]RISE-3 study, that takes patients with T1 disease and randomized them to different arms. He has been randomized to TAR-200 (a device inserted in to the bladder that contains gemcitabine) + Cetrelimab IV. He tolerated his first dose of Cetrelimab well. He returns today for week 3, his second cycle of Cetrelimab. He had TAR-200 yesterday 3/27 with urology.   He reports ~1.5 week of dry cough, CT Chest 3/27/24 without signs of pneumonitis or pneumonia. Unclear the source of his cough but discussed it may be viral. Alek is not interested in further supportive measures at this time. Alek will let us know right away if his symptoms worsen. Counseled on red flags and when to present to the ER. If worsens, will repeat CT chest  and obtain viral studies. Reviewed images with Dr. Tobin who agree's with this plan.   Labs reviewed and stable.      Plan:   1. Proceed with week 3 (second cycle) cetrelimab per trial protocol today. Tar-200 was placed 3/27/24.   2. Monitor cough. Mild today, patient declines need for supportive measures.    Patient was seen and evaluated with study RNCC, Araceli Diaz.      30 minutes spent on the date of the encounter doing chart review, review of test results, interpretation of tests, patient visit, and documentation     Clarita Owen PA-C

## 2024-03-27 NOTE — PATIENT INSTRUCTIONS
"AFTER YOUR CYSTOSCOPY        You have just completed a cystoscopy, or \"cysto\", which allowed your physician to learn more about your bladder (or to remove a stent placed after surgery). We suggest that you continue to avoid caffeine, fruit juice, and alcohol for the next 24 hours, however, you are encouraged to return to your normal activities.         A few things that are considered normal after your cystoscopy:     * Small amount of bleeding (or spotting) that clears within the next 24 hours     * Slight burning sensation with urination     * Sensation to of needing to avoid more frequently     * The feeling of \"air\" in your urine     * Mild discomfort that is relieved with Tylenol        Please contact our office promptly if you:     * Develop a fever above 101 degrees     * Are unable to urinate     * Develop bright red blood that does not stop     * Severe pain or swelling         Please contact our office with any concerns or questions @ [phone]   "

## 2024-03-27 NOTE — NURSING NOTE
"Chief Complaint   Patient presents with    Cystoscopy     TAR-200 study       Blood pressure (!) 155/92, pulse 91, height 1.778 m (5' 10\"), weight 107.3 kg (236 lb 9.6 oz), SpO2 95%. Body mass index is 33.95 kg/m .    Patient Active Problem List   Diagnosis    Malignant neoplasm of anterior wall of urinary bladder (H)    Benign hypertension    Hyperlipidemia    Impotence of organic origin    Morbid (severe) obesity due to excess calories (H)    Type 2 diabetes mellitus without complication, without long-term current use of insulin (H)    Spinal stenosis of lumbosacral region    Peripheral vascular disease (H24)    Venous stasis dermatitis of left lower extremity    Lumbar radiculopathy    Environmental allergies    Age-related nuclear cataract of right eye       Allergies   Allergen Reactions    Chlorhexidine Dermatitis       Current Outpatient Medications   Medication Sig Dispense Refill    Ascorbic Acid (VITAMIN C) 500 MG CHEW Take 1 chew tab by mouth daily      aspirin (ASPIRIN LOW DOSE) 81 MG EC tablet Take 81 mg by mouth daily ONCE BEFORE BED      blood glucose (NO BRAND SPECIFIED) lancets standard as directed, to use with his Contour Next test blood sugars daily      CONTOUR NEXT TEST test strip USE TO TEST BLOOD SUGAR DAILY      Ergocalciferol 50 MCG (2000 UT) CAPS       fish oil-omega-3 fatty acids 500 MG capsule 2 capsules      hydrochlorothiazide (HYDRODIURIL) 25 MG tablet       ketoconazole (NIZORAL) 2 % external cream Apply topically as needed      Microlet Lancets MISC daily      simvastatin (ZOCOR) 40 MG tablet Take 40 mg by mouth at bedtime      sulfamethoxazole-trimethoprim (BACTRIM DS) 800-160 MG tablet Take 1 tablet by mouth 2 times daily 1 tablet 0    triamcinolone (KENALOG) 0.1 % external cream Apply as needed      Vitamin D3 (VITAMIN D, CHOLECALCIFEROL,) 25 mcg (1000 units) tablet Take 25 mcg by mouth daily      Ascorbic Acid 500 MG CAPS 1 tablet Orally Once a day         Social History "     Tobacco Use    Smoking status: Former     Types: Cigarettes     Quit date:      Years since quittin.2   Vaping Use    Vaping Use: Never used   Substance Use Topics    Alcohol use: Yes     Comment: daily    Drug use: Never       What to expect after the procedure reviewed with patient: Yes    Theresa HerronCOLEEN  3/27/2024  1:39 PM     Invasive Procedure Safety Checklist:    Procedure: Cystoscopy    Action: Complete sections and checkboxes as appropriate.    Pre-procedure:  1. Patient ID Verified with 2 identifiers (Octavia and  or MRN) : YES    2. Procedure and site verified with patient/designee (when able) : YES    3. Accurate consent documentation in medical record : YES    4. H&P (or appropriate assessment) documented in medical record : YES  H&P must be up to 30 days prior to procedure an updated within 24 hours of                 Procedure as applicable.     5. Relevant diagnostic and radiology test results appropriately labeled and displayed as applicable : YES    6. Blood products, implants, devices, and/or special equipment available for the procedure as applicable : YES    7. Procedure site(s) marked with provider initials [Exclusions: None] : NO    8. Marking not required. Reason : Yes  Procedure does not require site marking    Time Out:     Time-Out performed immediately prior to starting procedure, including verbal and active participation of all team members addressing: YES    1. Correct patient identity.  2. Confirmed that the correct side and site are marked.  3. An accurate procedure to be done.  4. Agreement on the procedure to be done.  5. Correct patient position.  6. Relevant images and results are properly labeled and appropriately displayed.  7. The need to administer antibiotics or fluids for irrigation purposes during the procedure as applicable.  8. Safety precautions based on patient history or medication use.    During Procedure: Verification of correct person, site, and  procedure occurs any time the responsibility for care of the patient is transferred to another member of the care team.      The following medication was given:     MEDICATION:  Uro-jet  ROUTE: Intra-urethral   SITE: Urethra  DOSE: 10 mL 2% lidocaine  LOT #: [id number]  : [employer]  EXPIRATION DATE: 10/2025  NDC#: [address]-43   Was there drug waste? No    Prior to administration, verified patient identity using patient's name and date of birth.  Due to administration, patient instructed to remain in clinic for 15 minutes  afterwards, and to report any adverse reaction to me immediately.      Drug Amount Wasted:  None.  Vial/Syringe: Single dose vial    Theresa Herron LPN  March 27, 2024

## 2024-03-27 NOTE — PROGRESS NOTES
"Chief Complaint   Patient presents with    Cystoscopy     TAR-200 study       Blood pressure (!) 155/92, pulse 91, height 1.778 m (5' 10\"), weight 107.3 kg (236 lb 9.6 oz), SpO2 95%. Body mass index is 33.95 kg/m .    Patient Active Problem List   Diagnosis    Malignant neoplasm of anterior wall of urinary bladder (H)    Benign hypertension    Hyperlipidemia    Impotence of organic origin    Morbid (severe) obesity due to excess calories (H)    Type 2 diabetes mellitus without complication, without long-term current use of insulin (H)    Spinal stenosis of lumbosacral region    Peripheral vascular disease (H24)    Venous stasis dermatitis of left lower extremity    Lumbar radiculopathy    Environmental allergies    Age-related nuclear cataract of right eye       Allergies   Allergen Reactions    Chlorhexidine Dermatitis       Current Outpatient Medications   Medication Sig Dispense Refill    Ascorbic Acid (VITAMIN C) 500 MG CHEW Take 1 chew tab by mouth daily      aspirin (ASPIRIN LOW DOSE) 81 MG EC tablet Take 81 mg by mouth daily ONCE BEFORE BED      blood glucose (NO BRAND SPECIFIED) lancets standard as directed, to use with his Contour Next test blood sugars daily      CONTOUR NEXT TEST test strip USE TO TEST BLOOD SUGAR DAILY      Ergocalciferol 50 MCG (2000 UT) CAPS       fish oil-omega-3 fatty acids 500 MG capsule 2 capsules      hydrochlorothiazide (HYDRODIURIL) 25 MG tablet       ketoconazole (NIZORAL) 2 % external cream Apply topically as needed      Microlet Lancets MISC daily      simvastatin (ZOCOR) 40 MG tablet Take 40 mg by mouth at bedtime      sulfamethoxazole-trimethoprim (BACTRIM DS) 800-160 MG tablet Take 1 tablet by mouth 2 times daily 1 tablet 0    triamcinolone (KENALOG) 0.1 % external cream Apply as needed      Vitamin D3 (VITAMIN D, CHOLECALCIFEROL,) 25 mcg (1000 units) tablet Take 25 mcg by mouth daily      Ascorbic Acid 500 MG CAPS 1 tablet Orally Once a day         Social History "     Tobacco Use    Smoking status: Former     Types: Cigarettes     Quit date:      Years since quittin.2   Vaping Use    Vaping Use: Never used   Substance Use Topics    Alcohol use: Yes     Comment: daily    Drug use: Never       What to expect after the procedure reviewed with patient.     Invasive Procedure Safety Checklist:    Procedure: Cystoscopy/TAR-200 study.     Action: Complete sections and checkboxes as appropriate.    Pre-procedure:  1. Patient ID Verified with 2 identifiers (Octavia and  or MRN) : YES    2. Procedure and site verified with patient/designee (when able) : YES    3. Accurate consent documentation in medical record : YES    4. H&P (or appropriate assessment) documented in medical record : YES  H&P must be up to 30 days prior to procedure an updated within 24 hours of                 Procedure as applicable.     5. Relevant diagnostic and radiology test results appropriately labeled and displayed as applicable : YES    6. Blood products, implants, devices, and/or special equipment available for the procedure as applicable : YES    7. Procedure site(s) marked with provider initials [Exclusions: None] : NO    8. Marking not required. Reason : Yes  Procedure does not require site marking    Time Out:     Time-Out performed immediately prior to starting procedure, including verbal and active participation of all team members addressing: YES    1. Correct patient identity.  2. Confirmed that the correct side and site are marked.  3. An accurate procedure to be done.  4. Agreement on the procedure to be done.  5. Correct patient position.  6. Relevant images and results are properly labeled and appropriately displayed.  7. The need to administer antibiotics or fluids for irrigation purposes during the procedure as applicable.  8. Safety precautions based on patient history or medication use.    During Procedure: Verification of correct person, site, and procedure occurs any time the  responsibility for care of the patient is transferred to another member of the care team.    The following medication was given:     MEDICATION: Bactrim (Trimethoprim / Sulfamethoxazole)  ROUTE: PO  SITE: Medication was given orally  DOSE: 800mg/160mg  LOT #: [id number]  : [employer]  EXPIRATION DATE: 06/2025  NDC#: 7901-4421-49   Was there drug waste? No    MEDICATION:  Lidocaine 1%  ROUTE: Provider Administered  SITE: Provider Administered  DOSE: 10mL  LOT #: [id number]  :[employer]  EXPIRATION DATE: 10-25  NDC#: [address]-5  Was there drug waste? No    Prior to medication administration, verified patient identity using patient's name and date of birth.  Due to medication administration, patient instructed to remain in clinic for 15 minutes  afterwards, and to report any adverse reaction to me immediately.     JONO JOLLY RN  March 27, 2024

## 2024-03-27 NOTE — DISCHARGE INSTRUCTIONS

## 2024-03-28 ENCOUNTER — INFUSION THERAPY VISIT (OUTPATIENT)
Dept: ONCOLOGY | Facility: CLINIC | Age: 74
End: 2024-03-28
Attending: INTERNAL MEDICINE
Payer: COMMERCIAL

## 2024-03-28 ENCOUNTER — ALLIED HEALTH/NURSE VISIT (OUTPATIENT)
Dept: ONCOLOGY | Facility: CLINIC | Age: 74
End: 2024-03-28

## 2024-03-28 VITALS
BODY MASS INDEX: 34.75 KG/M2 | HEART RATE: 92 BPM | WEIGHT: 242.2 LBS | OXYGEN SATURATION: 92 % | RESPIRATION RATE: 18 BRPM | SYSTOLIC BLOOD PRESSURE: 130 MMHG | DIASTOLIC BLOOD PRESSURE: 81 MMHG | TEMPERATURE: 98.4 F

## 2024-03-28 VITALS
RESPIRATION RATE: 18 BRPM | DIASTOLIC BLOOD PRESSURE: 90 MMHG | OXYGEN SATURATION: 98 % | SYSTOLIC BLOOD PRESSURE: 154 MMHG | TEMPERATURE: 97.6 F | HEART RATE: 76 BPM

## 2024-03-28 DIAGNOSIS — C67.3 MALIGNANT NEOPLASM OF ANTERIOR WALL OF URINARY BLADDER (H): Primary | ICD-10-CM

## 2024-03-28 DIAGNOSIS — Z00.6 EXAMINATION OF PARTICIPANT IN CLINICAL TRIAL: ICD-10-CM

## 2024-03-28 PROCEDURE — G0463 HOSPITAL OUTPT CLINIC VISIT: HCPCS

## 2024-03-28 PROCEDURE — 96365 THER/PROPH/DIAG IV INF INIT: CPT

## 2024-03-28 PROCEDURE — 99214 OFFICE O/P EST MOD 30 MIN: CPT

## 2024-03-28 ASSESSMENT — PAIN SCALES - GENERAL: PAINLEVEL: NO PAIN (0)

## 2024-03-28 NOTE — NURSING NOTE
"Oncology Rooming Note    March 28, 2024 8:15 AM   Alek Mcneill is a 74 year old male who presents for:    Chief Complaint   Patient presents with    Oncology Clinic Visit     Malignant Neoplasm of Anterior Wall of Urinary Bladder     Initial Vitals: /81 (BP Location: Left arm, Patient Position: Sitting, Cuff Size: Adult Large)   Pulse 92   Temp 98.4  F (36.9  C) (Tympanic)   Resp 18   Wt 109.9 kg (242 lb 3.2 oz)   SpO2 92%   BMI 34.75 kg/m   Estimated body mass index is 34.75 kg/m  as calculated from the following:    Height as of 3/27/24: 1.778 m (5' 10\").    Weight as of this encounter: 109.9 kg (242 lb 3.2 oz). Body surface area is 2.33 meters squared.  No Pain (0) Comment: Data Unavailable   No LMP for male patient.  Allergies reviewed: Yes  Medications reviewed: Yes    Medications: Medication refills not needed today.  Pharm[facility] entered into [facility]: [id number] [address] [facility] [place] [address]    Frailty Screening:   Is the patient here for a new oncology consult visit in cancer care? 2. No      Clinical concerns: None       Maria De Jesus Shepherd LPN  3/28/2024                "

## 2024-03-28 NOTE — Clinical Note
"    3/28/2024         RE: Alek Mcneill  [address] [place] [address]        Dear Colleague,    Thank you for referring your patient, Alek Mcneill, to the [facility]. Please see a copy of my visit note below.    Mar 28, 2024    FOLLOW UP VISIT     CHIEF COMPLAINT   I was asked to see this patient by Dr. Kaiser to provide background on Cetrelimab, in this patient with newly diagnosed T1 poorly differentiated invasive bladder cancer.  The patient is considering participation in our [place]-3 trial, involving Cetrelimab.     History of Present Illness:   11/19/2023: Presented with subacute onset of gross hematuria, and a passing of clots. Stopped ASA. CT showed multiple indeterminate nodular densities in the bladder suspicious for enhancing urothelial lesions   12/6/2023: Cystoscopy , with finding of multiple bladder tumors appeared papillary and were located on the anterior wall and had some superficial calcifications. \"We resected tissue down to muscle using cut electrocautery. \" Pathology showed high grade papillary carcinoma, not invading muscle.  1/17/2024: Cystoscopy with resection showed CIS, but no invasive cancer.  Now here to talk about participation in the SUNRISE -3 trial as well as receipt of cetrelimab.  He has been randomized to receive Cetrelimab plus Tar-200.     Interval history:    Alek is here with his wife Christine discussed participation in the [place]-3 trial.     1/5 WEEK dry cough and short   - low back pain down his leg which he has had before this AM. Took 2 aleve.   - no urine or b  -       He has been doing okay recently.  He did have an episode of hematuria yesterday that did not last very long.  His urine was bj-colored.  Today it is clear.  He knows that this is associated with his fluid intake and admits that he does not like to drink water very much.  He does drink few beers a day.  He has no new pain except for chronic left shoulder issues.  He " denies cough or shortness of breath.  There are no new lumps or bumps.  He still is interested in participating in the trial.  He relates a traumatic story of how multiple attempts were made to place a [place] catheter without a Uro-Jet.  It has left him traumatized and a little leery of having a catheter placed in the future, even if it is just for BCG.  The rest of the multiorgan review of physical symptoms is negative.     Past Medical History:     Past Medical History        Past Medical History:   Diagnosis Date    Complication of anesthesia      DM2 (diabetes mellitus, type 2) (H)      Hypercholesteremia      Hyperlipidemia      Hypertension           Medications:     Current Outpatient Prescriptions          Current Outpatient Medications   Medication Sig Dispense Refill    Ascorbic Acid (VITAMIN C) 500 MG CHEW Take 1 chew tab by mouth daily        fish oil-omega-3 fatty acids 500 MG capsule 2 capsules        ketoconazole (NIZORAL) 2 % external cream Apply topically as needed        senna-docusate (SENOKOT-S/PERICOLACE) 8.6-50 MG tablet Take 1-2 tablets by mouth 2 times daily 30 tablet 0    simvastatin (ZOCOR) 40 MG tablet Take 40 mg by mouth at bedtime        Vitamin D3 (VITAMIN D, CHOLECALCIFEROL,) 25 mcg (1000 units) tablet Take 25 mcg by mouth daily             Physical Examination:   ECOG PS: 0  /81 (BP Location: Left arm, Patient Position: Sitting, Cuff Size: Adult Large)   Pulse 92   Temp 98.4  F (36.9  C) (Tympanic)   Resp 18   Wt 109.9 kg (242 lb 3.2 oz)   SpO2 92%   BMI 34.75 kg/m    General: No acute distress  HEENT: Sclera anicteric. Oral mucosa pink and moist.  No mucositis or thrush  Lymph: No lymphadenopathy in neck  Heart: Regular, rate, and rhythm  Lungs: Clear to ascultation bilaterally  Abdomen: Positive bowel sounds. Soft, non-distended, non-tender. No organomegaly or mass.   Extremities: no lower extremity edema  Neuro: Cranial nerves grossly intact  Rash: none    Laboratories  (3/5/24):   His CBC reveals a WBC count of 7.0, hemoglobin 15.8, hematocrit 43.2%, platelets 215K.  His comprehensive metabolic panel is generally within normal limits except for a creatinine level of 1.19 mg/dL, a GGT of 84 U/L, and a glucose level of 119 mg/dL, and a TSH level of 4.41 IU/mL.     Other Data    TURBT 12/6/2023  Final Diagnosis    A) URINARY BLADDER, BASE OF TUMOR, TRANSURETHRAL RESECTION BLADDER TUMOR:  -NONINVASIVE LOW-GRADE PAPILLARY UROTHELIAL CARCINOMA  -MULTIPLE FRAGMENTS OF MUSCULARIS PROPRIA   B) URINARY BLADDER, TUMOR, TRANSURETHRAL RESECTION BLADDER TUMOR:  -HIGH-GRADE PAPILLARY UROTHELIAL CARCINOMA WITH LAMINA PROPRIA INVASION  -MUSCULARIS PROPRIA PRESENT AND UNINVOLVED      CT abd/pelvis 11/19/2023  1.  Multiple indeterminate nodular densities in the bladder suspicious for enhancing urothelial lesions. Cystoscopy is recommended for evaluation.   2.  Kidneys and ureters are negative. No hydronephrosis.   3.  Diffuse fatty infiltration of the liver.   4.  No acute bowel findings. Normal appendix.   5.  Fat-containing bilateral inguinal hernias, left greater than right.        Assessment:      New diagnosis of Stage 1 (T1NxMx) superficial poorly-differentiated bladder cancer. T1 lesions are high risk. Recurrence rates at one, three, and five years can reach up to 50, 70 to 80, and 90 percent, respectively, and 20 to 25 percent progress to more invasive (T2 or greater) disease. Given the high risk of recurrence here, therapy is indicated.  After TURBT, standard treatment in this setting is intravesicular BCG. A recent meta-analysis has shown that this approach results in a 10 year PFS of 67%. The questioni s, is there any other treatment that may result in improved outcome? The answer is possibly.  We have open the SUNRISE-3 study, that takes patients with T1 disease and randomized them to one of 3 arms: TAR-200 (a device inserted in to the bladder that contains gemcitabine) + Cetrelimab IV  OR TAR-200 Alone OR BCG Alone. Alek is here tody to discuss cetrelimab.  Cetrelimb is a PD-1 antibody and has been extensively studied. The side effect profile includes asthenia (19%), fatigue (19%), dyspnea (16%) and diarrhea (16%). Grade ?3 AEs were reported in 45% of pts; most common were anemia (6%), dyspnea (4%), increased ALT (3%) and increased AST (3%). Alek is understandably concerned with possible side effects.  From my perspective, there is no contraindication for Alek to participate in SUNRISE-3. He has no history of autoimmune disease. His physical examination is normal.  He has been randomized to receive Cetrelimab plus Tar-200.  He did mention how traumatic it is to get a bladder catheter placed. He was never offered a lidocaine Uro-jet. Hopefully, this will be offered prior to his next catheter placement.     Plan:    1. Baseline labs done yesterday; no concerns   2. Study nurse Araceli Diaz will go over the consent with him today.   3. If he is randomized to the Cetrelimab arm, our  medical oncology will continue to see him for his infusions, and to monitor side effects.  4. Potential adverse events of cetrelimab were discussed in detail, and patient wished to proceed.  5. He has been randomized to receive Cetrelimab plus Tar-200 (the Tar-200 pretzel was inserted yesterday, 3/5/24).  6. First cetrelimab infusion will be delivered today; no contraindications noted.           Mar 28, 2024    FOLLOW UP VISIT     CHIEF COMPLAINT   I was asked to see this patient by Dr. Kaiser to provide background on Cetrelimab, in this patient with newly diagnosed T1 poorly differentiated invasive bladder cancer.  The patient is considering participation in our [place]-3 trial, involving Cetrelimab.     History of Present Illness:   11/19/2023: Presented with subacute onset of gross hematuria, and a passing of clots. Stopped ASA. CT showed multiple indeterminate nodular densities in the bladder suspicious for  "enhancing urothelial lesions   12/6/2023: Cystoscopy , with finding of multiple bladder tumors appeared papillary and were located on the anterior wall and had some superficial calcifications. \"We resected tissue down to muscle using cut electrocautery. \" Pathology showed high grade papillary carcinoma, not invading muscle.  1/17/2024: Cystoscopy with resection showed CIS, but no invasive cancer.  Now here to talk about participation in the SUNRISE -3 trial as well as receipt of cetrelimab.  He has been randomized to receive Cetrelimab plus Tar-200.     Interval history:    Alek is here per trial protocol with participation in the [place]-3 trial for week 3.      He returns to clinic in consideration for his second dose of Cetrelimab.  The Tar-200 place yesterday with urology.    Reports that he is feeling well today.  He has noticed a new dry cough over the past week that is present at bedtime when he is trying to exert himself to get up into his bed.  Cough lasts for about 10 minutes or less in duration.  Can feel little bit short of breath when the coughing occurs.  He has no chest pain.  He does not have cough. He denies sinus pressure or congestion.  No sore throat.  No recent viral illnesses or URIs that he is aware of. He had a CT Chest completed yesterday.   He otherwise does not notice any side effects with immunotherapy.  He reports that he had low back pain this morning and has a long standing issue intermittently.  He took 2 Aleve and is proved.  No other new myalgias or arthralgias.  No bone pains.    No hematuria or dysuria.    He has mild constipation which increasing water gives him improvement.  No diarrhea.    No rashes.  No headaches or vision changes.  No fevers or chills.  Energy is stable.       ROS: 10 point ROS neg other than the symptoms noted above in the HPI.       Past Medical History:     Past Medical History        Past Medical History:   Diagnosis Date     Complication of anesthesia   "     DM2 (diabetes mellitus, type 2) (H)       Hypercholesteremia       Hyperlipidemia       Hypertension           Medications:     Current Outpatient Prescriptions          Current Outpatient Medications   Medication Sig Dispense Refill     Ascorbic Acid (VITAMIN C) 500 MG CHEW Take 1 chew tab by mouth daily         fish oil-omega-3 fatty acids 500 MG capsule 2 capsules         ketoconazole (NIZORAL) 2 % external cream Apply topically as needed         senna-docusate (SENOKOT-S/PERICOLACE) 8.6-50 MG tablet Take 1-2 tablets by mouth 2 times daily 30 tablet 0     simvastatin (ZOCOR) 40 MG tablet Take 40 mg by mouth at bedtime         Vitamin D3 (VITAMIN D, CHOLECALCIFEROL,) 25 mcg (1000 units) tablet Take 25 mcg by mouth daily             Physical Examination:   ECOG PS: 0  /81 (BP Location: Left arm, Patient Position: Sitting, Cuff Size: Adult Large)   Pulse 92   Temp 98.4  F (36.9  C) (Tympanic)   Resp 18   Wt 109.9 kg (242 lb 3.2 oz)   SpO2 92%   BMI 34.75 kg/m    General: No acute distress  HEENT: Sclera anicteric. Oral mucosa pink and moist.  No mucositis or thrush  Lymph: No lymphadenopathy in neck  Heart: Regular, rate, and rhythm  Lungs: Clear to ascultation bilaterally  Abdomen: Positive bowel sounds. Soft, non-distended, non-tender. No organomegaly or mass.   Extremities: no lower extremity edema  Neuro: Cranial nerves grossly intact  Rash: none    Laboratories (3/28/24):   Most Recent 3 CBC's:  Recent Labs   Lab Test 03/27/24  1204 03/05/24  0926 02/12/24  1149   WBC 7.6 7.0 9.0   HGB 16.2 15.8 15.9   MCV 92 91 91    215 231   ANEUTAUTO 3.6 3.6 5.2     Most Recent 3 BMP's:  Recent Labs   Lab Test 03/27/24  1204 03/05/24  0926 02/12/24  1149    135 138   POTASSIUM 4.1 4.1 4.1   CHLORIDE 100 99 99   CO2 27 26 27   BUN 14.4 16.8 14.9   CR 1.04 1.19* 1.21*   ANIONGAP 10 10 12   MILLI 9.9 9.4 10.1   * 119* 108*   PROTTOTAL 7.8 7.3 7.7   ALBUMIN 4.4 4.4 4.5    Most Recent 3  LFT's:  Recent Labs   Lab Test 03/27/24  1204 03/05/24  0926 02/12/24  1149   AST 36 37 40   ALT 39 34 37   ALKPHOS 80 76 77   BILITOTAL 0.6 0.7 0.7    Most Recent 2 TSH and T4:  Recent Labs   Lab Test 03/27/24  1204 03/05/24  0926   TSH 0.05* 4.41*   T4 2.15* 1.11     I reviewed the above labs today.       Other Data   FINDINGS:        LINES/TUBES: None.        MEDIASTINUM: Enlarged mediastinal lymph nodes, increased compared to  prior. For example, there is a precarinal lymph node with retained  fatty hilum measuring 1.5 cm short axis (2/20), previously 1.0 cm.  Additionally, there are numerous enlarged subcarinal lymph nodes with  irregular morphology.. Normal size/configuration of the great vessels.  Atherosclerotic plaques of the aortic arch, including the origins of  its major branches. Normal heart size. No pericardial effusion. No  incidental central pulmonary embolism. Coronary artery calcifications.     LUNG: Patent central infiltrate. Focal bronchial wall thickening and  filling defect in the left lower lobe (4/198-210). Asymmetric  bronchial wall thickening in the right lower lobe (series 4/155-160).  These findings are new compared to 2/13/2024 and likely represent  areas of mucous plugging. No focal consolidation. Paraseptal right  apical cystic changes.Mild scattered subpleural predominant  groundglass and reticular opacities, similar compared to prior.     Pulmonary nodules:  - 4 mm juxtapleural left lower lobe solid nodule (4/138), unchanged  - 6 mm juxtapleural right lower lobe solid nodule (4/181), unchanged  - 4 mm juxtapleural left lower lobe solid nodule (4/197), unchanged     PLEURA: No pneumothorax or pleural effusion.        LOWER NECK: Thyroid unremarkable.     UPPER ABDOMEN: Esophagus appears unremarkable. Fatty infiltration of  the pancreas. Accessory splenule.     BONES: No acute osseous abnormality. No suspicious bony lesions.  Degenerative changes of the spine and left greater than  right shoulder  joints.     SOFT TISSUES: Unremarkable.                                                                         IMPRESSION:   1.  Mediastinal lymphadenopathy, indeterminate, increased compared to  2/13/2024.  2.  Stable pulmonary nodules.  3.  No new or suspicious pulmonary opacities.     Assessment:      New diagnosis of Stage 1 (T1NxMx) superficial poorly-differentiated bladder cancer. T1 lesions are high risk. Recurrence rates at one, three, and five years can reach up to 50, 70 to 80, and 90 percent, respectively, and 20 to 25 percent progress to more invasive (T2 or greater) disease. Given the high risk of recurrence further therapy is recommend. He has been enrolled in the SUNRISE-3 study, that takes patients with T1 disease and randomized them to different arms. He has been randomized to TAR-200 (a device inserted in to the bladder that contains gemcitabine) + Cetrelimab IV. He toleratered his first dose of Cetrelimab well. He returns today for week 3, his second cycle of Cetrelimab. He had TAR-200 yesterdaty 3/27 with urology.   He reports ~1.5 week of dry cough, CT Chest 3/27/24 without signs of pneumonitis or pneumonia. Unclear the source of his cough but discussed it may be viral. Alek is not interested in further supportive measures at this time. Alek will let us know right away if his symptoms worsen. Counseled on red flags and when to present to the ER. If worsens, will repeat CT chest and obtain viral studies. Reviewed images with Dr. Tobin who agree's with this plan.   Labs reviewed and stable.      Plan:   1. Proceed with week 3 (second cycle) cetrelimab per trial protocol today. Tar-200 was placed 3/27/24.   2. Monitor cough. Mild today, patient declines need for supportive measures.    Patient was seen and evaluated with study RNCC, Araceli Diaz.      30 minutes spent on the date of the encounter doing chart review, review of test results, interpretation of tests, patient visit,  and documentation     Clarita Owen PA-C          Again, thank you for allowing me to participate in the care of your patient.        Sincerely,        Clarita Owen PA-C

## 2024-03-28 NOTE — PATIENT INSTRUCTIONS
[facility] Triage and after hours / weekends / holidays:  [phone]    Please call the triage or after hours line if you experience a temperature greater than or equal to 100.4, shaking chills, have uncontrolled nausea, vomiting and/or diarrhea, dizziness, shortness of breath, chest pain, bleeding, unexplained bruising, or if you have any other new/concerning symptoms, questions or concerns.      If you are having any concerning symptoms or wish to speak to a provider before your next infusion visit, please call triage to notify them so we can adequately serve you.     If you need a refill on a narcotic prescription or other medication, please call before your infusion appointment.

## 2024-03-28 NOTE — NURSING NOTE
[id number]: Study Visit Note   Subject name: Alek Mcneill     Visit: Week 3    Did the study visit occur within the appropriate window allowed by the protocol? Yes    Since the last study visit, He has been doing well. No new symptoms. Wheezing/cough continues at night, these symptoms do not happen during the day. Dr. Tobin and Clarita MANZO reviewed patient's CT chest from yesterday and is negative for pneumonitis at this time. Confirmed verbally with Dr. Tobin that patient's respiratory symptoms are not due to the cetrelimab and are unrelated to the drug. Patient denies symptoms of hyperthyroidism, per Clarita at this time we will continue to monitor and not intervene at this time (Hyperthyroidism grade 1) this grade does not require a dose hold.     Cetrelimab infusion:  Were premedications given? No     Verbal handover provided to infusion RN; reminded RN to document actual start time of infusion and actual stop time of the infusion. If infusion deviates from protocol directed infusion time, please document rationale in your infusion note. Instructed infusion RN that the end of the infusion is considered the end of the 14 mL post infusion flush. Instructed to perform VS before, once during and after the infusion. Additionally, instructed RN on pre-dose PK and post-dose PK. CRC-RN is available for questions if needed.     Total volume: 14 mL NaCl flush+ 110 mL Cetrelimab medication.       I have personally interviewed Alek Mcneill and reviewed his medical record for adverse events and concomitant medications and these have been recorded on the corresponding logs in Alek Mcneill's research file.     Alek Mcneill was given the opportunity to ask any trial related questions.  Please see provider progress note for physical exam and other clinical information. Labs were reviewed - any significant lab values were addressed and reviewed.    Araceli Diaz RN   Clinical Research Coordinator Nurse-Solid Tumor   Phone:  [phone] [facility]: [phone]

## 2024-03-28 NOTE — PROGRESS NOTES
Infusion Nursing Note: Week 3 Study-cetrelimab (IDS 6160)  Alek Mcneill presents today for   Patient seen and examined by Clarita FRAUSTO in clinic prior to infusion    Note: Study-cetrelimab (IDS 6160) started at 1023 and stopped at 1058.    Pre drug PK was drawn immediately prior to starting drug at 1015 from L arm  Pre drug Vitals taken at 1017  Mid drug vitals taken at 1042  Post drug PK was drawn at 1100 from R arm  Post drug vitals taken at 1105      Intravenous Access:  Peripheral IV placed.    Treatment Conditions:  Results reviewed, labs MET treatment parameters, ok to proceed with treatment.      Post Infusion Assessment:  Patient tolerated infusion without incident.  Patient observed for 15 minutes post cetrelimab per protocol.       Discharge Plan:   Patient declined prescription refills.  AVS to patient via [employer].  Patient will return 4/15/24 for week 6  Patient discharged in stable condition accompanied by: self.  Departure Mode: Ambulatory.      Ruth Jacob RN

## 2024-03-29 ENCOUNTER — NURSE TRIAGE (OUTPATIENT)
Dept: ONCOLOGY | Facility: CLINIC | Age: 74
End: 2024-03-29
Payer: COMMERCIAL

## 2024-03-29 ENCOUNTER — TRANSFERRED RECORDS (OUTPATIENT)
Dept: HEALTH INFORMATION MANAGEMENT | Facility: CLINIC | Age: 74
End: 2024-03-29
Payer: COMMERCIAL

## 2024-03-29 NOTE — TELEPHONE ENCOUNTER
"Oncology Nurse Triage    Situation:   Alek reporting the following: calling to update on how pt is doing from yesterdays infusion and has questions for care team.     Background:   Treating Provider:   Dr. Tobin's study    Date of last office visit: 3/28/2024 Clarita Owen PA-C    Recent Treatments:3/28/2024 Study  OP ONC Research HR-NMIBC - Group A - TAR-200 / Cetrelimab ([id number]) (Version: 12/6/22)  Day 1, Week 3  study - cetrelimab (IDS# 6160) 360 mg in sodium... 360 mg    Assessment:     Pt was calling in to report update on condition from yesterday.  Pt states, \"around 2:30pm started feeling dizziness, nauseated, and sweating, but temperature was normal. Pt took last Tylenol taken last night around 6:00pm and was starting to feel better by 9:00pm.   Pt slept well overnight and this morning took a shower and feels much better.   No additional symptom concerns at time of this triage call.     Pt main question s it okay to still get Cortisone injection in neck for pain relief at [facility]. Appt is at 9:00am.     Recommendations:   Secure chat sent Clarita Owen PA-C and RNCC's.      0834 Per Clarita sam for pt to receive Cortisone injection.     0816 This writer updated Alek ok to get cortisone and Alek verified feeling better and knows to call triage if any additional s/s arise, provided triage phone for future reference [phone] Opt 5 Opt 2.        "

## 2024-04-01 ENCOUNTER — TELEPHONE (OUTPATIENT)
Dept: ONCOLOGY | Facility: CLINIC | Age: 74
End: 2024-04-01
Payer: COMMERCIAL

## 2024-04-01 NOTE — TELEPHONE ENCOUNTER
Called patient to assess how he was feeling, no answer left  for return call. Patient called back at 1000, he states that he has not had any issues or symptoms since Thursday afternoon.     Araceli Diaz RN   Clinical Research Coordinator Nurse-Solid Tumor   Phone: [phone] Pgr: [phone]

## 2024-04-03 ENCOUNTER — PRE VISIT (OUTPATIENT)
Dept: UROLOGY | Facility: CLINIC | Age: 74
End: 2024-04-03
Payer: COMMERCIAL

## 2024-04-03 NOTE — TELEPHONE ENCOUNTER
Reason for visit: cystoscopy + study removal      Dx/Hx/Sx: bladder cancer     Records/imaging/labs/orders: in epic     At Rooming: consent - collect urine --ask pt if they want lido

## 2024-04-10 ENCOUNTER — DOCUMENTATION ONLY (OUTPATIENT)
Dept: ONCOLOGY | Facility: CLINIC | Age: 74
End: 2024-04-10
Payer: COMMERCIAL

## 2024-04-10 NOTE — NURSING NOTE
Called patient to check in; patient states that he is feeling great. His cough has completely resolved. Encouraged patient to call with follow up questions/concerns.     Araceli Diaz RN   Clinical Research Coordinator Nurse-Solid Tumor   Phone: [phone] Pgr: [phone]

## 2024-04-12 ENCOUNTER — TRANSFERRED RECORDS (OUTPATIENT)
Dept: HEALTH INFORMATION MANAGEMENT | Facility: CLINIC | Age: 74
End: 2024-04-12
Payer: COMMERCIAL

## 2024-04-16 ENCOUNTER — DOCUMENTATION ONLY (OUTPATIENT)
Dept: ONCOLOGY | Facility: CLINIC | Age: 74
End: 2024-04-16

## 2024-04-16 NOTE — PROGRESS NOTES
"  [facility] Medical Oncology Note  Date of visit: April 18, 2024  Outpatient Clinic Note        Assessment:     New diagnosis of Stage 1 (T1NxMx) superficial poorly differentiated bladder cancer. T1 lesions are high risk. Recurrence rates at one, three, and five years can reach up to 50, 70 to 80, and 90 percent, respectively, and 20 to 25 percent progress to more invasive (T2 or greater) disease. Given the high risk here, therapy is indicated.  Alek is on the SUNRISE-3 study, for patients with T1 disease and has been randomized to the TAR-200 (a device inserted in to the bladder that contains gemcitabine)+ Cetrelimab Cetrelimab arm.  S/P one dose of cetrelimab, with lab evidence of thryoiditis.  The NCCN recommends continuing on with treatment if patients are asymptomatic, and rechecking labs in six weeks. Alek has no symptoms, such as tachycardia, weight loss or sweats. Per the study protocol we just move forward with his next dose today.  He also had some equivocal respiratory symptoms, but a CT scan showed no evidence of pneumonitis.      Plan:     Next cetrelimab/TAR-200 today  RTC in 3 weeks for his next cycle.    Seen with Study nurse Araceli Tobin MD, MSc  Associate Professor of Medicine  [employer] [facility] [employer]  [facility]  [address] [place] [address]  [phone]    __________________________________________________________________    DIAGNOSIS:     Recently diagnosed T1 poorly differentiated invasive bladder cancer.    Respiratory symptoms concerning for pneumonitis, but CT chest 3/27/2024 showed \"No new or suspicious pulmonary opacities.\"       History of Present Illness/Therapy to Date:   11/19/2023: Went to ED for subacute onset of gross hematuria, and a passing of clots. Stopped ASA. CT showed multiple indeterminate nodular densities in the bladder suspicious for enhancing urothelial lesions   12/6/2023: Cystoscopy , with finding of " "multiple bladder tumors appeared papillary and were located on the anterior wall and had some superficial calcifications. \"We resected tissue down to muscle using cut electrocautery. \" Pathology showed high grade papillary carcinoma, that did not invade the muscle.  1/17/2024: Cystoscopy with resection showed CIS, but no invasive cancer.  Alek has agreed to participate in the SUNRISE -3 trial. He has been randomized to receive cetrelimab and TAR-200 (an investigational intravesical drug delivery system for gemcitabine). His first dose of cetrelimab and TAR-200 was 3/6/2024. He is back today for his next administration.        Interval history:     Alek is here without his wife Christine   Has really had no issues with the treatments. He is delighted that now that lidocaine is being used that placement of the TAR-200 is no longer traumatic. In fact, \"it tickles!\"  No hematuria.  Did get short of breath when he changed the bathtub drain the other day. Didn't last long.  He has no new pain except for chronic left shoulder issues.  He denies cough or shortness of breath.  There are no new lumps or bumps.  A nurse from [facility] came out and suggested he get a colonoscopy \"because I have the bladder cancer.\" (This notion was disabused by his oncologist today).  The rest of the multiorgan review of physical symptoms is negative.      Past Medical History:   I have reviewed this patient's past medical history   Past Medical History:   Diagnosis Date    Complication of anesthesia     DM2 (diabetes mellitus, type 2) (H)     Hypercholesteremia     Hyperlipidemia     Hypertension           Past Surgical History:    I have reviewed this patient's past surgical history       Social History:   Tobacco, ETOH, and rec drugs reviewed and as noted below with the following exceptions:  Drinks a few beers a day. Quit smoking in 1992.  Alek was born in [place], and graduated from [employer] (no longer exists) in 1967.  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  He is  to Christine, his third wife.  She worked with his sister-in-law and they were introduced.  They have been  for 25 years.  Jose has 5 children, Golden, Cr, Joon, Radha, and Kayla.  Kayla is the youngest and a PA who lives in [place].  The other children are in the service.  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          Family History:     Grandma with skin cancer (s/p removal of her nose)  Uncle with sarcoma  Dad had bone cancer/lung cancer  Brother Casimiro had bladder cancer  Buddy had bladder cacner ( in car accident)  Brother Lenny had bladder cancer  No family history on file.         Medications:     Current Outpatient Medications   Medication Sig Dispense Refill    Ascorbic Acid (VITAMIN C) 500 MG CHEW Take 1 chew tab by mouth daily      Ascorbic Acid 500 MG CAPS 1 tablet Orally Once a day      aspirin (ASPIRIN LOW DOSE) 81 MG EC tablet Take 81 mg by mouth daily ONCE BEFORE BED      blood glucose (NO BRAND SPECIFIED) lancets standard as directed, to use with his Contour Next test blood sugars daily      CONTOUR NEXT TEST test strip USE TO TEST BLOOD SUGAR DAILY      Ergocalciferol 50 MCG (2000 UT) CAPS Take 50 mcg by mouth daily      fish oil-omega-3 fatty acids 500 MG capsule Take 2 capsules by mouth daily      hydrochlorothiazide (HYDRODIURIL) 25 MG tablet Take 25 mg by mouth daily      ketoconazole (NIZORAL) 2 % external cream Apply topically as needed      Microlet Lancets MISC daily      simvastatin (ZOCOR) 40 MG tablet Take 40 mg by mouth at bedtime      triamcinolone (KENALOG) 0.1 % external cream Apply as needed      Vitamin D3 (VITAMIN D, CHOLECALCIFEROL,) 25 mcg (1000 units) tablet Take 25 mcg by mouth daily                Physical Exam:   There were no vitals taken for this  visit.    ECOG PS: 0  Constitutional: WDWN male in NAD, pleasant and appropriate  HEENT:  NC/AT, no icterus, OP clear, MMM  Skin: No jaundice nor ecchymoses  Lungs: CTAB, no w/r/r, nonlabored breathing  Cardiovascular: RRR, S1, S2, no m/r/g  Abdomen: +BS, soft, nontender, nondistended, no organomegaly nor masses  MSK/Extremities: Warm, well perfused. No edema  LN: no cervical, supraclavicular, axillary, nor inguinal lymphadenopathy  Neurologic: alert, answering questions appropriately, moving all extremities spontaneously. CN 2-12 grossly intact.  Psych: appropriate affect  Data:     Review of blood work 3/27/2024 (baseline in parenthesis)    Free T3     5.2    (3.2)  Free T4     2.15  (1.11)  TSH            0.05  (4.41)    Rest if CMP and CBC un remarkable          Recent Labs   Lab Test 01/19/24  0734 01/19/24  0501 01/18/24  0558 11/19/23  0146 12/23/20  2146   WBC  --  11.0 13.8* 10.0 10.8   NEUTROPHIL  --   --  67 47 55   HGB 14.1 13.8 13.7 15.7 15.5   PLT  --  190 208 242 243     Recent Labs   Lab Test 01/30/24  1202 01/20/24  0737 01/19/24  0501 01/18/24  0558 11/19/23  0146 09/08/23  1102    134* 138 132* 138 137   POTASSIUM 4.1  --  4.1 4.1 4.2 4.7   CHLORIDE 103  --  102 96* 100 99   CO2 24  --  28 27 29 25   ANIONGAP 10  --  8 9 9 13   BUN 14.9  --  15.0 19.3 17.6 17.0   CR 1.12  --  1.16 1.18* 1.13 1.18*   MILLI 9.3  --  8.4* 8.7* 9.7 9.8     Recent Labs   Lab Test 11/19/23  0146   MAG 2.3     Recent Labs   Lab Test 09/08/23  1102 12/13/22  0919 08/18/22  0956   BILITOTAL 0.7 0.7 0.7   ALKPHOS 59 74 88   ALT 53 51* 52*   AST 47* 41 42   ALBUMIN 4.6 4.5 4.6     [facility](PSAtumormarker:7)    No results found for this or any previous visit (from the past 24 hour(s)).    Other Data     TURBT 12/6/2023  Final Diagnosis   A) URINARY BLADDER, BASE OF TUMOR, TRANSURETHRAL RESECTION BLADDER TUMOR:  -NONINVASIVE LOW-GRADE PAPILLARY UROTHELIAL CARCINOMA  -MULTIPLE FRAGMENTS OF MUSCULARIS PROPRIA  -SEE SYNOPTIC  REPORT     B) URINARY BLADDER, TUMOR, TRANSURETHRAL RESECTION BLADDER TUMOR:  -HIGH-GRADE PAPILLARY UROTHELIAL CARCINOMA WITH LAMINA PROPRIA INVASION  -MUSCULARIS PROPRIA PRESENT AND UNINVOLVED  -SEE SYNOPTIC REPORT     CT abd/pelvis 11/19/2023  MPRESSION:   1.  Multiple indeterminate nodular densities in the bladder suspicious for enhancing urothelial lesions. Malignancy not excluded. Given the history, cystoscopy is recommended for further evaluation.     2.  Kidneys and ureters are negative. No hydronephrosis.     3.  Diffuse fatty infiltration of the liver.     4.  No acute bowel findings. Normal appendix.     5.  Fat-containing bilateral inguinal hernias, left greater than right.            Labs, imaging and treatment plan reviewed with patient. All questions answered.      30 minutes spent on the date of the encounter doing chart review, review of outside records, review of test results, interpretation of tests, patient visit, documentation, discussion with other provider(s), and discussion with family

## 2024-04-16 NOTE — NURSING NOTE
Patient called and wanted to verify per [facility] if he was getting antibiotics as part of the trial. Patient is getting 1 dose of antibiotics on the days he gets the TAR-200 inserted, verbalized and explained this to patient. Patient verbalized understanding.     Araceli Diaz RN   Clinical Research Coordinator Nurse-Solid Tumor   Phone: [phone] Pgr: [phone]

## 2024-04-18 ENCOUNTER — LAB (OUTPATIENT)
Dept: LAB | Facility: CLINIC | Age: 74
End: 2024-04-18
Attending: INTERNAL MEDICINE
Payer: COMMERCIAL

## 2024-04-18 ENCOUNTER — TELEPHONE (OUTPATIENT)
Dept: UROLOGY | Facility: CLINIC | Age: 74
End: 2024-04-18

## 2024-04-18 ENCOUNTER — ONCOLOGY VISIT (OUTPATIENT)
Dept: ONCOLOGY | Facility: CLINIC | Age: 74
End: 2024-04-18
Payer: COMMERCIAL

## 2024-04-18 ENCOUNTER — ALLIED HEALTH/NURSE VISIT (OUTPATIENT)
Dept: ONCOLOGY | Facility: CLINIC | Age: 74
End: 2024-04-18

## 2024-04-18 ENCOUNTER — INFUSION THERAPY VISIT (OUTPATIENT)
Dept: ONCOLOGY | Facility: CLINIC | Age: 74
End: 2024-04-18
Attending: INTERNAL MEDICINE
Payer: COMMERCIAL

## 2024-04-18 VITALS
RESPIRATION RATE: 18 BRPM | HEART RATE: 71 BPM | OXYGEN SATURATION: 96 % | SYSTOLIC BLOOD PRESSURE: 131 MMHG | TEMPERATURE: 97.4 F | DIASTOLIC BLOOD PRESSURE: 84 MMHG

## 2024-04-18 VITALS
SYSTOLIC BLOOD PRESSURE: 149 MMHG | BODY MASS INDEX: 34.51 KG/M2 | OXYGEN SATURATION: 95 % | RESPIRATION RATE: 18 BRPM | DIASTOLIC BLOOD PRESSURE: 83 MMHG | HEART RATE: 83 BPM | WEIGHT: 240.5 LBS | TEMPERATURE: 98.3 F

## 2024-04-18 DIAGNOSIS — C67.3 MALIGNANT NEOPLASM OF ANTERIOR WALL OF URINARY BLADDER (H): Primary | ICD-10-CM

## 2024-04-18 DIAGNOSIS — C67.9 MALIGNANT NEOPLASM OF URINARY BLADDER, UNSPECIFIED SITE (H): Primary | ICD-10-CM

## 2024-04-18 LAB
ALBUMIN SERPL BCG-MCNC: 4.2 G/DL (ref 3.5–5.2)
ALBUMIN UR-MCNC: NEGATIVE MG/DL
ALP SERPL-CCNC: 77 U/L (ref 40–150)
ALT SERPL W P-5'-P-CCNC: 37 U/L (ref 0–70)
AMYLASE SERPL-CCNC: 80 U/L (ref 28–100)
ANION GAP SERPL CALCULATED.3IONS-SCNC: 13 MMOL/L (ref 7–15)
APPEARANCE UR: CLEAR
AST SERPL W P-5'-P-CCNC: 33 U/L (ref 0–45)
BASOPHILS # BLD AUTO: 0.1 10E3/UL (ref 0–0.2)
BASOPHILS NFR BLD AUTO: 1 %
BILIRUB SERPL-MCNC: 0.6 MG/DL
BILIRUB UR QL STRIP: NEGATIVE
BUN SERPL-MCNC: 16.3 MG/DL (ref 8–23)
CALCIUM SERPL-MCNC: 9.8 MG/DL (ref 8.8–10.2)
CHLORIDE SERPL-SCNC: 100 MMOL/L (ref 98–107)
COLOR UR AUTO: ABNORMAL
CREAT SERPL-MCNC: 1.2 MG/DL (ref 0.67–1.17)
CRP SERPL-MCNC: 3.25 MG/L
DEPRECATED HCO3 PLAS-SCNC: 24 MMOL/L (ref 22–29)
EGFRCR SERPLBLD CKD-EPI 2021: 63 ML/MIN/1.73M2
EOSINOPHIL # BLD AUTO: 0.4 10E3/UL (ref 0–0.7)
EOSINOPHIL NFR BLD AUTO: 6 %
ERYTHROCYTE [DISTWIDTH] IN BLOOD BY AUTOMATED COUNT: 12.4 % (ref 10–15)
GGT SERPL-CCNC: 120 U/L (ref 8–61)
GLUCOSE SERPL-MCNC: 140 MG/DL (ref 70–99)
GLUCOSE UR STRIP-MCNC: NEGATIVE MG/DL
HBA1C MFR BLD: 6.8 %
HCT VFR BLD AUTO: 45.7 % (ref 40–53)
HGB BLD-MCNC: 16.5 G/DL (ref 13.3–17.7)
HGB UR QL STRIP: NEGATIVE
IMM GRANULOCYTES # BLD: 0 10E3/UL
IMM GRANULOCYTES NFR BLD: 0 %
KETONES UR STRIP-MCNC: NEGATIVE MG/DL
LDH SERPL L TO P-CCNC: 197 U/L (ref 0–250)
LEUKOCYTE ESTERASE UR QL STRIP: NEGATIVE
LIPASE SERPL-CCNC: 46 U/L (ref 13–60)
LYMPHOCYTES # BLD AUTO: 1.9 10E3/UL (ref 0.8–5.3)
LYMPHOCYTES NFR BLD AUTO: 29 %
MCH RBC QN AUTO: 32 PG (ref 26.5–33)
MCHC RBC AUTO-ENTMCNC: 36.1 G/DL (ref 31.5–36.5)
MCV RBC AUTO: 89 FL (ref 78–100)
MONOCYTES # BLD AUTO: 0.9 10E3/UL (ref 0–1.3)
MONOCYTES NFR BLD AUTO: 14 %
NEUTROPHILS # BLD AUTO: 3.3 10E3/UL (ref 1.6–8.3)
NEUTROPHILS NFR BLD AUTO: 50 %
NITRATE UR QL: NEGATIVE
NRBC # BLD AUTO: 0 10E3/UL
NRBC BLD AUTO-RTO: 0 /100
PH UR STRIP: 6 [PH] (ref 5–7)
PHOSPHATE SERPL-MCNC: 3.2 MG/DL (ref 2.5–4.5)
PLATELET # BLD AUTO: 241 10E3/UL (ref 150–450)
POTASSIUM SERPL-SCNC: 4.1 MMOL/L (ref 3.4–5.3)
PROT SERPL-MCNC: 7.1 G/DL (ref 6.4–8.3)
RBC # BLD AUTO: 5.16 10E6/UL (ref 4.4–5.9)
RBC URINE: <1 /HPF
SODIUM SERPL-SCNC: 137 MMOL/L (ref 135–145)
SP GR UR STRIP: 1 (ref 1–1.03)
SQUAMOUS EPITHELIAL: <1 /HPF
T4 FREE SERPL-MCNC: 1.24 NG/DL (ref 0.9–1.7)
TSH SERPL DL<=0.005 MIU/L-ACNC: 0.91 UIU/ML (ref 0.3–4.2)
URATE SERPL-MCNC: 6.4 MG/DL (ref 3.4–7)
UROBILINOGEN UR STRIP-MCNC: NORMAL MG/DL
WBC # BLD AUTO: 6.5 10E3/UL (ref 4–11)
WBC URINE: 1 /HPF

## 2024-04-18 PROCEDURE — 84550 ASSAY OF BLOOD/URIC ACID: CPT | Performed by: INTERNAL MEDICINE

## 2024-04-18 PROCEDURE — 83036 HEMOGLOBIN GLYCOSYLATED A1C: CPT | Performed by: INTERNAL MEDICINE

## 2024-04-18 PROCEDURE — 258N000003 HC RX IP 258 OP 636: Performed by: INTERNAL MEDICINE

## 2024-04-18 PROCEDURE — 82977 ASSAY OF GGT: CPT | Performed by: INTERNAL MEDICINE

## 2024-04-18 PROCEDURE — 96365 THER/PROPH/DIAG IV INF INIT: CPT

## 2024-04-18 PROCEDURE — 84443 ASSAY THYROID STIM HORMONE: CPT | Performed by: INTERNAL MEDICINE

## 2024-04-18 PROCEDURE — 36415 COLL VENOUS BLD VENIPUNCTURE: CPT | Performed by: INTERNAL MEDICINE

## 2024-04-18 PROCEDURE — 86140 C-REACTIVE PROTEIN: CPT | Performed by: INTERNAL MEDICINE

## 2024-04-18 PROCEDURE — 83615 LACTATE (LD) (LDH) ENZYME: CPT | Performed by: INTERNAL MEDICINE

## 2024-04-18 PROCEDURE — 80053 COMPREHEN METABOLIC PANEL: CPT | Performed by: INTERNAL MEDICINE

## 2024-04-18 PROCEDURE — 96413 CHEMO IV INFUSION 1 HR: CPT

## 2024-04-18 PROCEDURE — 87086 URINE CULTURE/COLONY COUNT: CPT | Performed by: INTERNAL MEDICINE

## 2024-04-18 PROCEDURE — 84100 ASSAY OF PHOSPHORUS: CPT | Performed by: INTERNAL MEDICINE

## 2024-04-18 PROCEDURE — 99214 OFFICE O/P EST MOD 30 MIN: CPT | Performed by: INTERNAL MEDICINE

## 2024-04-18 PROCEDURE — 83690 ASSAY OF LIPASE: CPT | Performed by: INTERNAL MEDICINE

## 2024-04-18 PROCEDURE — 84439 ASSAY OF FREE THYROXINE: CPT | Performed by: INTERNAL MEDICINE

## 2024-04-18 PROCEDURE — G0463 HOSPITAL OUTPT CLINIC VISIT: HCPCS | Performed by: INTERNAL MEDICINE

## 2024-04-18 PROCEDURE — 81001 URINALYSIS AUTO W/SCOPE: CPT | Performed by: INTERNAL MEDICINE

## 2024-04-18 PROCEDURE — 84481 FREE ASSAY (FT-3): CPT | Performed by: INTERNAL MEDICINE

## 2024-04-18 PROCEDURE — 82150 ASSAY OF AMYLASE: CPT | Performed by: INTERNAL MEDICINE

## 2024-04-18 PROCEDURE — 85025 COMPLETE CBC W/AUTO DIFF WBC: CPT | Performed by: INTERNAL MEDICINE

## 2024-04-18 RX ORDER — EPINEPHRINE 1 MG/ML
0.3 INJECTION, SOLUTION INTRAMUSCULAR; SUBCUTANEOUS EVERY 5 MIN PRN
Status: CANCELLED | OUTPATIENT
Start: 2024-04-18

## 2024-04-18 RX ORDER — HEPARIN SODIUM (PORCINE) LOCK FLUSH IV SOLN 100 UNIT/ML 100 UNIT/ML
5 SOLUTION INTRAVENOUS
Status: CANCELLED | OUTPATIENT
Start: 2024-04-18

## 2024-04-18 RX ORDER — LIDOCAINE HYDROCHLORIDE 20 MG/ML
10 JELLY TOPICAL
Status: CANCELLED | OUTPATIENT
Start: 2024-04-18

## 2024-04-18 RX ORDER — ALBUTEROL SULFATE 90 UG/1
1-2 AEROSOL, METERED RESPIRATORY (INHALATION)
Status: CANCELLED
Start: 2024-04-18

## 2024-04-18 RX ORDER — METHYLPREDNISOLONE SODIUM SUCCINATE 125 MG/2ML
125 INJECTION, POWDER, LYOPHILIZED, FOR SOLUTION INTRAMUSCULAR; INTRAVENOUS
Status: CANCELLED
Start: 2024-04-18

## 2024-04-18 RX ORDER — HEPARIN SODIUM,PORCINE 10 UNIT/ML
5-20 VIAL (ML) INTRAVENOUS DAILY PRN
Status: CANCELLED | OUTPATIENT
Start: 2024-04-18

## 2024-04-18 RX ORDER — ALBUTEROL SULFATE 0.83 MG/ML
2.5 SOLUTION RESPIRATORY (INHALATION)
Status: CANCELLED | OUTPATIENT
Start: 2024-04-18

## 2024-04-18 RX ORDER — ACETAMINOPHEN 325 MG/1
650 TABLET ORAL
Status: CANCELLED | OUTPATIENT
Start: 2024-04-18

## 2024-04-18 RX ORDER — DIPHENHYDRAMINE HCL 25 MG
50 CAPSULE ORAL
Status: CANCELLED | OUTPATIENT
Start: 2024-04-18

## 2024-04-18 RX ORDER — LORAZEPAM 2 MG/ML
0.5 INJECTION INTRAMUSCULAR EVERY 4 HOURS PRN
Status: CANCELLED | OUTPATIENT
Start: 2024-04-18

## 2024-04-18 RX ORDER — ACETAMINOPHEN 325 MG/1
650 TABLET ORAL
Status: CANCELLED
Start: 2024-04-18

## 2024-04-18 RX ORDER — DIPHENHYDRAMINE HYDROCHLORIDE 50 MG/ML
50 INJECTION INTRAMUSCULAR; INTRAVENOUS
Status: CANCELLED
Start: 2024-04-18

## 2024-04-18 RX ORDER — MEPERIDINE HYDROCHLORIDE 25 MG/ML
25 INJECTION INTRAMUSCULAR; INTRAVENOUS; SUBCUTANEOUS EVERY 30 MIN PRN
Status: CANCELLED | OUTPATIENT
Start: 2024-04-18

## 2024-04-18 RX ADMIN — SODIUM CHLORIDE 250 ML: 9 INJECTION, SOLUTION INTRAVENOUS at 10:54

## 2024-04-18 ASSESSMENT — PAIN SCALES - GENERAL: PAINLEVEL: NO PAIN (1)

## 2024-04-18 NOTE — NURSING NOTE
"Oncology Rooming Note    April 18, 2024 8:51 AM   Alek Mcneill is a 74 year old male who presents for:    Chief Complaint   Patient presents with    Blood Draw     Labs drawn via PIV by RN in lab VS taken.     Oncology Clinic Visit     Malignant neoplasm of anterior wall of urinary bladder     Initial Vitals: BP (!) 149/83   Pulse 83   Temp 98.3  F (36.8  C) (Oral)   Resp 18   Wt 109.1 kg (240 lb 8 oz)   SpO2 95%   BMI 34.51 kg/m   Estimated body mass index is 34.51 kg/m  as calculated from the following:    Height as of 3/27/24: 1.778 m (5' 10\").    Weight as of this encounter: 109.1 kg (240 lb 8 oz). Body surface area is 2.32 meters squared.  No Pain (1) Comment: Data Unavailable   No LMP for male patient.  Allergies reviewed: Yes  Medications reviewed: Yes    Medications: Medication refills not needed today.  Pharmacy name entered into [employer]: [facility] #[id number] [address] AT [place] [address]    Frailty Screening:   Is the patient here for a new oncology consult visit in cancer care? 2. No      Clinical concerns: none       Mariam Abraham              "

## 2024-04-18 NOTE — NURSING NOTE
[id number]: Study Visit Note   Subject name: Alek Mcneill     Visit: Week 6    Did the study visit occur within the appropriate window allowed by the protocol? Yes    Since the last study visit, He has been doing well. No new concerns or symptoms since last visit. No symptoms of thyroiditis, TSH and T4 Free WNL this visit.     ECOG 1    Assessed for below symptoms specifically:   Dysuria no  Pollakiuria no  Nocturia no  Urgency no   Incontinence no  Hematuria no  Urinary hesitation no  Bladder pain/spasm no  Urethral pain no   Bladder discomfort no  Feeling of incomplete bladder emptying  no  Lower abdominal pain no  Fever no  Flu-like symptoms no  Fatigue no  Diarrhea no  Nausea no  Rash no  Arthralgia  no    Cetrelimab infusion:  Were premedications given? No    Verbal handover provided to infusion RN; reminded RN to document actual start time of infusion and actual stop time of the infusion. If infusion deviates from protocol directed infusion time, please document rationale in your infusion note. Instructed infusion RN that the end of the infusion is considered the end of the 14 mL post infusion flush. Instructed to perform VS before, once during and after the infusion. CRC-RN is available for questions if needed.     Total Volume= 110 mL + 14 mL post infusion flush     I have personally interviewed Alek Mcneill and reviewed his medical record for adverse events and concomitant medications and these have been recorded on the corresponding logs in Alek AGOSTO Raheelprincess's research file.     Alek Mcneill was given the opportunity to ask any trial related questions.  Please see provider progress note for physical exam and other clinical information. Labs were reviewed - any significant lab values were addressed and reviewed.    Araceli Diaz RN

## 2024-04-18 NOTE — PROGRESS NOTES
Infusion Nursing Note:  Alek Mcneill presents today for study - cetrelimab (IDS# [id number]).    Patient seen by provider today: Yes: Dr. Tobin   present during visit today: Not Applicable.    Note: Pt presents today generally feeling well. He was seen in clinic by Dr. Tobin. Pt wishes to proceed with planned treatment.    Per research staff Araceli Diaz, research RN, OK to proceed with study treatment today.   The following items were completed per protocol as directed by research study staff:     -The drug will be hung as a secondary.   -After the infusion, flush with 14 mL of NS at the same rate as the infusion.   -The end of that flush will be what we consider to be the end of the infusion time, so please mendel that in the MAR or in your note either is fine.  -Vital signs immediately before the infusion, once during the infusion and then at the end of the infusion (+10 min)    IDS #[id number]   Start Time: 1100 Stop Time: 1135    Intravenous Access:  Peripheral IV placed.    Treatment Conditions:  Lab Results   Component Value Date    HGB 16.5 04/18/2024    WBC 6.5 04/18/2024    ANEUTAUTO 3.3 04/18/2024     04/18/2024     Lab Results   Component Value Date     04/18/2024    POTASSIUM 4.1 04/18/2024    MAG 2.3 11/19/2023    CR 1.20 (H) 04/18/2024    MILLI 9.8 04/18/2024    BILITOTAL 0.6 04/18/2024    ALBUMIN 4.2 04/18/2024    ALT 37 04/18/2024    AST 33 04/18/2024     Results reviewed, labs MET treatment parameters, ok to proceed with treatment.    Post Infusion Assessment:  Patient tolerated infusion without incident.  Patient observed for 15 minutes post cetrelimab per protocol.  Blood return noted pre and post infusion.  Site patent and intact, free from redness, edema or discomfort.  No evidence of extravasations.  Access discontinued per protocol.     Discharge Plan:   Patient declined prescription refills.  Discharge instructions reviewed with: Patient.  Patient and/or family verbalized  understanding of discharge instructions and all questions answered.  AVS to patient via [employer].  Patient will return 05/07/24 for next appointment.   Patient discharged in stable condition accompanied by: self.  Departure Mode: Ambulatory.      Emily Meese, RN

## 2024-04-18 NOTE — LETTER
"    4/18/2024         RE: Alek Mcneill  [address] [place] [address]        Dear Colleague,    Thank you for referring your patient, Alek Mcneill, to the [facility]. Please see a copy of my visit note below.      [facility] Medical Oncology Note  Date of visit: April 18, 2024  Outpatient Clinic Note        Assessment:     New diagnosis of Stage 1 (T1NxMx) superficial poorly differentiated bladder cancer. T1 lesions are high risk. Recurrence rates at one, three, and five years can reach up to 50, 70 to 80, and 90 percent, respectively, and 20 to 25 percent progress to more invasive (T2 or greater) disease. Given the high risk here, therapy is indicated.  Alek is on the SUNRISE-3 study, for patients with T1 disease and has been randomized to the TAR-200 (a device inserted in to the bladder that contains gemcitabine)+ Cetrelimab Cetrelimab arm.  S/P one dose of cetrelimab, with lab evidence of thryoiditis.  The NCCN recommends continuing on with treatment if patients are asymptomatic, and rechecking labs in six weeks. Alek has no symptoms, such as tachycardia, weight loss or sweats. Per the study protocol we just move forward with his next dose today.  He also had some equivocal respiratory symptoms, but a CT scan showed no evidence of pneumonitis.      Plan:     Next cetrelimab/TAR-200 today  RTC in 3 weeks for his next cycle.    Seen with Study nurse Araceli Tobin MD, MSc  Associate Professor of Medicine  [employer] [facility] [employer]  [facility]  [address] [place] [id number]  [phone]    __________________________________________________________________    DIAGNOSIS:     Recently diagnosed T1 poorly differentiated invasive bladder cancer.    Respiratory symptoms concerning for pneumonitis, but CT chest 3/27/2024 showed \"No new or suspicious pulmonary opacities.\"       History of Present Illness/Therapy to Date: " "  11/19/2023: Went to ED for subacute onset of gross hematuria, and a passing of clots. Stopped ASA. CT showed multiple indeterminate nodular densities in the bladder suspicious for enhancing urothelial lesions   12/6/2023: Cystoscopy , with finding of multiple bladder tumors appeared papillary and were located on the anterior wall and had some superficial calcifications. \"We resected tissue down to muscle using cut electrocautery. \" Pathology showed high grade papillary carcinoma, that did not invade the muscle.  1/17/2024: Cystoscopy with resection showed CIS, but no invasive cancer.  Alek has agreed to participate in the SUNRISE -3 trial. He has been randomized to receive cetrelimab and TAR-200 (an investigational intravesical drug delivery system for gemcitabine). His first dose of cetrelimab and TAR-200 was 3/6/2024. He is back today for his next administration.        Interval history:     Alek is here without his wife Christine   Has really had no issues with the treatments. He is delighted that now that lidocaine is being used that placement of the TAR-200 is no longer traumatic. In fact, \"it tickles!\"  No hematuria.  Did get short of breath when he changed the bathtub drain the other day. Didn't last long.  He has no new pain except for chronic left shoulder issues.  He denies cough or shortness of breath.  There are no new lumps or bumps.  A nurse from [facility] came out and suggested he get a colonoscopy \"because I have the bladder cancer.\" (This notion was disabused by his oncologist today).  The rest of the multiorgan review of physical symptoms is negative.      Past Medical History:   I have reviewed this patient's past medical history   Past Medical History:   Diagnosis Date    Complication of anesthesia     DM2 (diabetes mellitus, type 2) (H)     Hypercholesteremia     Hyperlipidemia     Hypertension           Past Surgical History:    I have reviewed this patient's past surgical history "       Social History:   Tobacco, ETOH, and rec drugs reviewed and as noted below with the following exceptions:  Drinks a few beers a day. Quit smoking in .  Alek was born in [place], and graduated from [employer]no longer exists) in .  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  He is  to Christine, his third wife.  She worked with his sister-in-law and they were introduced.  They have been  for 25 years.  Jose has 5 children, Golden, Cr, Joon, Radha, and Kayla.  Kayla is the youngest and a PA who lives in [place].  The other children are in the service.  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          Family History:     Grandma with skin cancer (s/p removal of her nose)  Uncle with sarcoma  Dad had bone cancer/lung cancer  Brother Casimiro had bladder cancer  Buddy had bladder cacner ( in car accident)  Brother Lenny had bladder cancer  No family history on file.         Medications:     Current Outpatient Medications   Medication Sig Dispense Refill    Ascorbic Acid (VITAMIN C) 500 MG CHEW Take 1 chew tab by mouth daily      Ascorbic Acid 500 MG CAPS 1 tablet Orally Once a day      aspirin (ASPIRIN LOW DOSE) 81 MG EC tablet Take 81 mg by mouth daily ONCE BEFORE BED      blood glucose (NO BRAND SPECIFIED) lancets standard as directed, to use with his Contour Next test blood sugars daily      CONTOUR NEXT TEST test strip USE TO TEST BLOOD SUGAR DAILY      Ergocalciferol 50 MCG ( UT) CAPS Take 50 mcg by mouth daily      fish oil-omega-3 fatty acids 500 MG capsule Take 2 capsules by mouth daily      hydrochlorothiazide (HYDRODIURIL) 25 MG tablet Take 25 mg by mouth daily      ketoconazole (NIZORAL) 2 % external cream Apply topically as needed      Microlet Lancets MISC daily      simvastatin (ZOCOR) 40 MG tablet Take 40  mg by mouth at bedtime      triamcinolone (KENALOG) 0.1 % external cream Apply as needed      Vitamin D3 (VITAMIN D, CHOLECALCIFEROL,) 25 mcg (1000 units) tablet Take 25 mcg by mouth daily                Physical Exam:   There were no vitals taken for this visit.    ECOG PS: 0  Constitutional: WDWN male in NAD, pleasant and appropriate  HEENT:  NC/AT, no icterus, OP clear, MMM  Skin: No jaundice nor ecchymoses  Lungs: CTAB, no w/r/r, nonlabored breathing  Cardiovascular: RRR, S1, S2, no m/r/g  Abdomen: +BS, soft, nontender, nondistended, no organomegaly nor masses  MSK/Extremities: Warm, well perfused. No edema  LN: no cervical, supraclavicular, axillary, nor inguinal lymphadenopathy  Neurologic: alert, answering questions appropriately, moving all extremities spontaneously. CN 2-12 grossly intact.  Psych: appropriate affect  Data:     Review of blood work 3/27/2024 (baseline in parenthesis)    Free T3     5.2    (3.2)  Free T4     2.15  (1.11)  TSH            0.05  (4.41)    Rest if CMP and CBC un remarkable          Recent Labs   Lab Test 01/19/24  0734 01/19/24  0501 01/18/24  0558 11/19/23  0146 12/23/20  2146   WBC  --  11.0 13.8* 10.0 10.8   NEUTROPHIL  --   --  67 47 55   HGB 14.1 13.8 13.7 15.7 15.5   PLT  --  190 208 242 243     Recent Labs   Lab Test 01/30/24  1202 01/20/24  0737 01/19/24  0501 01/18/24  0558 11/19/23  0146 09/08/23  1102    134* 138 132* 138 137   POTASSIUM 4.1  --  4.1 4.1 4.2 4.7   CHLORIDE 103  --  102 96* 100 99   CO2 24  --  28 27 29 25   ANIONGAP 10  --  8 9 9 13   BUN 14.9  --  15.0 19.3 17.6 17.0   CR 1.12  --  1.16 1.18* 1.13 1.18*   MILLI 9.3  --  8.4* 8.7* 9.7 9.8     Recent Labs   Lab Test 11/19/23  0146   MAG 2.3     Recent Labs   Lab Test 09/08/23  1102 12/13/22  0919 08/18/22  0956   BILITOTAL 0.7 0.7 0.7   ALKPHOS 59 74 88   ALT 53 51* 52*   AST 47* 41 42   ALBUMIN 4.6 4.5 4.6     [facility](PSAtumormarker:7)    No results found for this or any previous visit (from the  past 24 hour(s)).    Other Data     TURBT 12/6/2023  Final Diagnosis   A) URINARY BLADDER, BASE OF TUMOR, TRANSURETHRAL RESECTION BLADDER TUMOR:  -NONINVASIVE LOW-GRADE PAPILLARY UROTHELIAL CARCINOMA  -MULTIPLE FRAGMENTS OF MUSCULARIS PROPRIA  -SEE SYNOPTIC REPORT     B) URINARY BLADDER, TUMOR, TRANSURETHRAL RESECTION BLADDER TUMOR:  -HIGH-GRADE PAPILLARY UROTHELIAL CARCINOMA WITH LAMINA PROPRIA INVASION  -MUSCULARIS PROPRIA PRESENT AND UNINVOLVED  -SEE SYNOPTIC REPORT     CT abd/pelvis 11/19/2023  MPRESSION:   1.  Multiple indeterminate nodular densities in the bladder suspicious for enhancing urothelial lesions. Malignancy not excluded. Given the history, cystoscopy is recommended for further evaluation.     2.  Kidneys and ureters are negative. No hydronephrosis.     3.  Diffuse fatty infiltration of the liver.     4.  No acute bowel findings. Normal appendix.     5.  Fat-containing bilateral inguinal hernias, left greater than right.            Labs, imaging and treatment plan reviewed with patient. All questions answered.      30 minutes spent on the date of the encounter doing chart review, review of outside records, review of test results, interpretation of tests, patient visit, documentation, discussion with other provider(s), and discussion with family

## 2024-04-18 NOTE — TELEPHONE ENCOUNTER
Pt called and notified that at this time we do not have any later openings. Pt expressed that Tuesday will be fine, but he will need later for future appointments.    Alexa Riddle CMA  04/18/24  12:49 PM    --------------------------------------------------------------------------------------------------  ----- Message from Araceli Diaz RN sent at 4/18/2024 10:55 AM CDT -----  Regarding: RE: Dr. Kaiser  Would you be able to move this patient's appointment on 6/25 with Dr. Kaiser to later in the day? The patient requested this!     Thank you,   Araceli Diaz RN   Clinical Research Coordinator Nurse-Solid Tumor   Phone: [phone] Pgr: [phone]  ----- Message -----  From: Alexa Riddle CMA  Sent: 4/18/2024  10:46 AM CDT  To: Araceli Diaz RN  Subject: RE: Dr. Kaiser                                   I am, how can I help you?    Alexa  ----- Message -----  From: Araceli Diaz RN  Sent: 4/18/2024  10:40 AM CDT  To: Alexa Riddle CMA  Subject: Dr. Kaiser                                       Hi!     Are you working and assisting with the RNCC needs for Dr. Kaiser?     Thanks!    Araecli

## 2024-04-18 NOTE — PATIENT INSTRUCTIONS
[facility] Triage and after hours / weekends / holidays:  [phone]    Please call the triage or after hours line if you experience a temperature greater than or equal to 100.4, shaking chills, have uncontrolled nausea, vomiting and/or diarrhea, dizziness, shortness of breath, chest pain, bleeding, unexplained bruising, or if you have any other new/concerning symptoms, questions or concerns.      If you are having any concerning symptoms or wish to speak to a provider before your next infusion visit, please call triage to notify them so we can adequately serve you.     If you need a refill on a narcotic prescription or other medication, please call before your infusion appointment.                April 2024 Sunday Monday Tuesday Wednesday Thursday Friday Saturday        1     2     3     4     5     6       7     8     9     10     11     12     13       14     15     16     17     18    LAB PERIPHERAL   8:15 AM   (15 min.)   [facility] LAB DRAW   [facility]    RETURN CCSL   8:45 AM   (30 min.)   Julio Tobin MD   [facility]    ONC INFUSION 1.5 HR (90 MIN)  11:00 AM   (90 min.)    ONC INFUSION NURSE   [facility] 19     20       21     22     23    CYSTOSCOPY   7:45 AM   (30 min.)   Harman Kaiser MD   [facility] 24     25     26     27       28     29     30                                     May 2024      Danny Monday Tuesday Wednesday Thursday Friday Saturday                  1     2     3     4       5     6     7    RETURN CCSL   7:45 AM   (45 min.)   Clarita Owen PA-C   [facility]    LAB PERIPHERAL   7:45 AM   (15 min.)   [facility] LAB DRAW   [facility]    CYSTOSCOPY   8:30 AM   (15 min.)   Harman Kaiser MD   [facility]    ONC INFUSION 1.5 HR (90  MIN)   9:00 AM   (90 min.)   [facility] ONC INFUSION NURSE   [facility] 8    RESEARCH - SIMPLE  10:00 AM   (30 min.)   CRU EXAM ROOM   [facility] 9     10     11       12     13     14     15     16     17     18       19     20     21     22     23     24    LAB PERIPHERAL  11:45 AM   (15 min.)   [facility] LAB DRAW   [facility]    RETURN CCSL  12:00 PM   (30 min.)   Julio Tobin MD   [facility]    ONC INFUSION 1.5 HR (90 MIN)   3:00 PM   (90 min.)   [facility] ONC INFUSION NURSE   [facility] 25       26     27     28    CYSTOSCOPY   8:30 AM   (15 min.)   Harman Kaiser MD   [facility] [place] 29     30     31                          Lab Results:  Recent Results (from the past 12 hour(s))   Comprehensive metabolic panel    Collection Time: 04/18/24  8:21 AM   Result Value Ref Range    Sodium 137 135 - 145 mmol/L    Potassium 4.1 3.4 - 5.3 mmol/L    Carbon Dioxide (CO2) 24 22 - 29 mmol/L    Anion Gap 13 7 - 15 mmol/L    Urea Nitrogen 16.3 8.0 - 23.0 mg/dL    Creatinine 1.20 (H) 0.67 - 1.17 mg/dL    GFR Estimate 63 >60 mL/min/1.73m2    Calcium 9.8 8.8 - 10.2 mg/dL    Chloride 100 98 - 107 mmol/L    Glucose 140 (H) 70 - 99 mg/dL    Alkaline Phosphatase 77 40 - 150 U/L    AST 33 0 - 45 U/L    ALT 37 0 - 70 U/L    Protein Total 7.1 6.4 - 8.3 g/dL    Albumin 4.2 3.5 - 5.2 g/dL    Bilirubin Total 0.6 <=1.2 mg/dL   Phosphorus    Collection Time: 04/18/24  8:21 AM   Result Value Ref Range    Phosphorus 3.2 2.5 - 4.5 mg/dL   Lactate Dehydrogenase    Collection Time: 04/18/24  8:21 AM   Result Value Ref Range    Lactate Dehydrogenase 197 0 - 250 U/L   Uric acid    Collection Time: 04/18/24  8:21 AM   Result Value Ref Range    Uric Acid 6.4 3.4 - 7.0 mg/dL   CRP inflammation    Collection Time: 04/18/24  8:21 AM   Result Value Ref Range    CRP  Inflammation 3.25 <5.00 mg/L   GGT    Collection Time: 04/18/24  8:21 AM   Result Value Ref Range     (H) 8 - 61 U/L   Lipase    Collection Time: 04/18/24  8:21 AM   Result Value Ref Range    Lipase 46 13 - 60 U/L   Amylase    Collection Time: 04/18/24  8:21 AM   Result Value Ref Range    Amylase 80 28 - 100 U/L   TSH    Collection Time: 04/18/24  8:21 AM   Result Value Ref Range    TSH 0.91 0.30 - 4.20 uIU/mL   T4 free    Collection Time: 04/18/24  8:21 AM   Result Value Ref Range    Free T4 1.24 0.90 - 1.70 ng/dL   Hemoglobin A1c    Collection Time: 04/18/24  8:21 AM   Result Value Ref Range    Hemoglobin A1C 6.8 (H) <5.7 %   UA with Microscopic    Collection Time: 04/18/24  8:21 AM   Result Value Ref Range    Color Urine Straw Colorless, Straw, Light Yellow, Yellow    Appearance Urine Clear Clear    Glucose Urine Negative Negative mg/dL    Bilirubin Urine Negative Negative    Ketones Urine Negative Negative mg/dL    Specific Gravity Urine 1.000 (L) 1.003 - 1.035    Blood Urine Negative Negative    pH Urine 6.0 5.0 - 7.0    Protein Albumin Urine Negative Negative mg/dL    Urobilinogen Urine Normal Normal, 2.0 mg/dL    Nitrite Urine Negative Negative    Leukocyte Esterase Urine Negative Negative    RBC Urine <1 <=2 /HPF    WBC Urine 1 <=5 /HPF    Squamous Epithelials Urine <1 <=1 /HPF   CBC with platelets and differential    Collection Time: 04/18/24  8:21 AM   Result Value Ref Range    WBC Count 6.5 4.0 - 11.0 10e3/uL    RBC Count 5.16 4.40 - 5.90 10e6/uL    Hemoglobin 16.5 13.3 - 17.7 g/dL    Hematocrit 45.7 40.0 - 53.0 %    MCV 89 78 - 100 fL    MCH 32.0 26.5 - 33.0 pg    MCHC 36.1 31.5 - 36.5 g/dL    RDW 12.4 10.0 - 15.0 %    Platelet Count 241 150 - 450 10e3/uL    % Neutrophils 50 %    % Lymphocytes 29 %    % Monocytes 14 %    % Eosinophils 6 %    % Basophils 1 %    % Immature Granulocytes 0 %    NRBCs per 100 WBC 0 <1 /100    Absolute Neutrophils 3.3 1.6 - 8.3 10e3/uL    Absolute Lymphocytes 1.9 0.8 -  5.3 10e3/uL    Absolute Monocytes 0.9 0.0 - 1.3 10e3/uL    Absolute Eosinophils 0.4 0.0 - 0.7 10e3/uL    Absolute Basophils 0.1 0.0 - 0.2 10e3/uL    Absolute Immature Granulocytes 0.0 <=0.4 10e3/uL    Absolute NRBCs 0.0 10e3/uL

## 2024-04-18 NOTE — NURSING NOTE
Chief Complaint   Patient presents with    Blood Draw     Labs drawn via PIV by RN in lab VS taken.      Labs drawn via peripheral IV. Vital signs taken. Checked into next appointment.   Gladys Moore RN

## 2024-04-19 LAB
BACTERIA UR CULT: NORMAL
T3FREE SERPL-MCNC: 3.2 PG/ML (ref 2–4.4)

## 2024-04-22 ENCOUNTER — TRANSFERRED RECORDS (OUTPATIENT)
Dept: HEALTH INFORMATION MANAGEMENT | Facility: CLINIC | Age: 74
End: 2024-04-22
Payer: COMMERCIAL

## 2024-04-22 ENCOUNTER — TELEPHONE (OUTPATIENT)
Dept: ONCOLOGY | Facility: CLINIC | Age: 74
End: 2024-04-22
Payer: COMMERCIAL

## 2024-04-23 ENCOUNTER — ALLIED HEALTH/NURSE VISIT (OUTPATIENT)
Dept: ONCOLOGY | Facility: CLINIC | Age: 74
End: 2024-04-23

## 2024-04-23 ENCOUNTER — OFFICE VISIT (OUTPATIENT)
Dept: UROLOGY | Facility: CLINIC | Age: 74
End: 2024-04-23
Payer: COMMERCIAL

## 2024-04-23 VITALS
BODY MASS INDEX: 33.5 KG/M2 | WEIGHT: 234 LBS | DIASTOLIC BLOOD PRESSURE: 76 MMHG | HEIGHT: 70 IN | HEART RATE: 78 BPM | SYSTOLIC BLOOD PRESSURE: 133 MMHG

## 2024-04-23 DIAGNOSIS — C67.3 MALIGNANT NEOPLASM OF ANTERIOR WALL OF URINARY BLADDER (H): Primary | ICD-10-CM

## 2024-04-23 DIAGNOSIS — C67.9 MALIGNANT NEOPLASM OF URINARY BLADDER, UNSPECIFIED SITE (H): Primary | ICD-10-CM

## 2024-04-23 PROCEDURE — 52000 CYSTOURETHROSCOPY: CPT | Performed by: UROLOGY

## 2024-04-23 RX ORDER — LIDOCAINE HYDROCHLORIDE 20 MG/ML
JELLY TOPICAL ONCE
Status: COMPLETED | OUTPATIENT
Start: 2024-04-23 | End: 2024-04-23

## 2024-04-23 RX ORDER — SULFAMETHOXAZOLE/TRIMETHOPRIM 800-160 MG
1 TABLET ORAL ONCE
Status: COMPLETED | OUTPATIENT
Start: 2024-04-23 | End: 2024-04-23

## 2024-04-23 RX ADMIN — SULFAMETHOXAZOLE AND TRIMETHOPRIM 1 TABLET: 800; 160 TABLET ORAL at 08:48

## 2024-04-23 RX ADMIN — LIDOCAINE HYDROCHLORIDE: 20 JELLY TOPICAL at 08:19

## 2024-04-23 NOTE — PROGRESS NOTES
The following medication was given:     MEDICATION: Bactrim (Trimethoprim/Sulfamethoxazole)  ROUTE: PO  SITE: Medication was given orally   DOSE: 800mg/160mg  LOT #: [id number]  : [employer]  EXPIRATION DATE: 06/2025  NDC#: 4955-4150-68   Was there drug waste? No    Prior to medication administration, verified patient identity using patient's name and date of birth.  Due to medication administration, patient instructed to remain in clinic for 15 minutes  afterwards, and to report any adverse reaction to me immediately.    JONO JOLLY RN  04/23/24

## 2024-04-23 NOTE — PROGRESS NOTES
"  CHIEF COMPLAINT   It was my pleasure to see Alek Mcneill who is a 74 year old male for follow-up of bladder cancer.      HPI  Alek Mcneill is a very pleasant 74 year old male who presents with a history of bladder cancer. 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      He has enrolled in [employer] [facility] and is here today for TAR-200 removal and re-insertion. He continues to tolerate this therapy very well, with no significant symptoms. No recent hematuria or dysuria.      Re-staging TURBT 1/17/2024          SPECIMEN   Procedure   Transurethral resection of bladder (TURBT)   TUMOR   Tumor Site   Anterior wall   Histologic Type   Urothelial carcinoma, in situ   Histologic Grade   High-grade   Tumor Extent   Flat carcinoma in situ   Lymphovascular Invasion   Not identified   Tumor Configuration   Flat   Muscularis Propria (detrusor muscle)   Cannot be determined: Biopsy site changes are identified in the current sample; no residual viable invasive carcinoma is identified.  Please see previous biopsy report for further information, case [id number], [facility]..   ADDITIONAL FINDINGS   Associated Epithelial Lesions   Chronic inflammation, histiocytic proliferation, consistent with biopsy site changes.      TURBT 12/6/2023  Final Diagnosis   A) URINARY BLADDER, BASE OF TUMOR, TRANSURETHRAL RESECTION BLADDER TUMOR:  -NONINVASIVE LOW-GRADE PAPILLARY UROTHELIAL CARCINOMA  -MULTIPLE FRAGMENTS OF MUSCULARIS PROPRIA  -SEE SYNOPTIC REPORT     B) URINARY BLADDER, TUMOR, TRANSURETHRAL RESECTION BLADDER TUMOR:  -HIGH-GRADE PAPILLARY UROTHELIAL CARCINOMA WITH LAMINA PROPRIA INVASION  -MUSCULARIS PROPRIA PRESENT AND UNINVOLVED  -SEE SYNOPTIC REPORT     PHYSICAL EXAM  Patient is a 74 year old  male   Vitals: Blood pressure 133/76, pulse 78, height 1.778 m (5' 10\"), weight 106.1 kg (234 lb).  General Appearance Adult: Body mass index is 33.58 kg/m .  Alert, no acute " distress, oriented  Lungs: no respiratory distress, or pursed lip breathing  Abdomen: soft, nontender, no organomegaly or masses  Back: no CVAT  Neuro: Alert, oriented, speech and mentation normal  Psych: affect and mood normal  : penis, scrotum, testes normal    OFFICE CYSTOSCOPY 4/24/2024     Pre-procedure diagnosis:       Bladder Cancer  Post-procedure diagnosis:       device in good position  Procedure performed:             Cystourethroscopy  Surgeon:                                 Harman Kaiser MD  Anesthesia:                             Local     Description of procedure:   After fully informed, voluntary consent was obtained, the patient was brought into the procedure room, identified and placed in a supine position on the cystoscopy table.  The groin/scrotum were prepped with betadine and draped in a sterile fashion.  A 15F flexible cystoscope was inserted into the urethra, and the bladder and urethra were examined in a systematic manner. The TAR-200 device was identified, grasped, and removed. Removal time was 8:32 AM.  Device was noted to be intact. The urinary placement catheter was advanced 33 cm into the bladder with clear urine return. Lubrication, the device, and additional lubrication were introduced and the device was deployed with the stylet. Insertion time was 8:34 AM Catheter removed. The patient tolerated the procedure well and there were no complications.       Cystoscopic findings:  The urethra was normal without strictures.  The prostate was 3 cm long and demonstrated mild bilobar hypertrophy.  There was no median lobe.  The external sphincter coapted well and the bladder neck was open. The bladder was completely surveyed.  There was mild trabeculation.  There were no neoplasms, stones, or diverticula identifed.  The ureteric orifices were normal in position and number and effluxing clear urine. There are no recurrent tumors or suspicious areas on today's cystoscopy.  The   device was deployed within the bladder.    ASSESSMENT and PLAN  74 year old male with history of HG non-muscle-invasive bladder cancer. He has enrolled in the [employer] 3 clinical trial for BCG-naive NMIBC. Cystoscopy today with no evidence of visible papillary disease.    Time of TAR-200 Removal: 8:32 AM  Time of TAR-200 Insertion: 8:34 AM  There were no irregularities observed during or after removal.  Betzy-procedural antibiotics were given.     -Will plan follow-up  for  removal and reinsertion in 3 weeks.     Harman Kaiser MD  Urology  [facility]

## 2024-04-23 NOTE — PATIENT INSTRUCTIONS
"Please follow up with dr sepulveda in 3 weeks for a repeat cysto + study         AFTER YOUR CYSTOSCOPY        You have just completed a cystoscopy, or \"cysto\", which allowed your physician to learn more about your bladder (or to remove a stent placed after surgery). We suggest that you continue to avoid caffeine, fruit juice, and alcohol for the next 24 hours, however, you are encouraged to return to your normal activities.         A few things that are considered normal after your cystoscopy:     * Small amount of bleeding (or spotting) that clears within the next 24 hours     * Slight burning sensation with urination     * Sensation to of needing to avoid more frequently     * The feeling of \"air\" in your urine     * Mild discomfort that is relieved with Tylenol        Please contact our office promptly if you:     * Develop a fever above 101 degrees     * Are unable to urinate     * Develop bright red blood that does not stop     * Severe pain or swelling         Please contact our office with any concerns or questions @[email or web address].  "

## 2024-04-23 NOTE — NURSING NOTE
"Chief Complaint   Patient presents with    Cystoscopy     Study        Blood pressure 133/76, pulse 78, height 1.778 m (5' 10\"), weight 106.1 kg (234 lb). Body mass index is 33.58 kg/m .    Patient Active Problem List   Diagnosis    Malignant neoplasm of anterior wall of urinary bladder (H)    Benign hypertension    Hyperlipidemia    Impotence of organic origin    Morbid (severe) obesity due to excess calories (H)    Type 2 diabetes mellitus without complication, without long-term current use of insulin (H)    Spinal stenosis of lumbosacral region    Peripheral vascular disease (H24)    Venous stasis dermatitis of left lower extremity    Lumbar radiculopathy    Environmental allergies    Age-related nuclear cataract of right eye       Allergies   Allergen Reactions    Chlorhexidine Dermatitis       Current Outpatient Medications   Medication Sig Dispense Refill    Ascorbic Acid (VITAMIN C) 500 MG CHEW Take 1 chew tab by mouth daily      Ascorbic Acid 500 MG CAPS 1 tablet Orally Once a day      aspirin (ASPIRIN LOW DOSE) 81 MG EC tablet Take 81 mg by mouth daily ONCE BEFORE BED      blood glucose (NO BRAND SPECIFIED) lancets standard as directed, to use with his Contour Next test blood sugars daily      CONTOUR NEXT TEST test strip USE TO TEST BLOOD SUGAR DAILY      Ergocalciferol 50 MCG (2000 UT) CAPS Take 50 mcg by mouth daily      fish oil-omega-3 fatty acids 500 MG capsule Take 2 capsules by mouth daily      hydrochlorothiazide (HYDRODIURIL) 25 MG tablet Take 25 mg by mouth daily      ketoconazole (NIZORAL) 2 % external cream Apply topically as needed      Microlet Lancets MISC daily      simvastatin (ZOCOR) 40 MG tablet Take 40 mg by mouth at bedtime      triamcinolone (KENALOG) 0.1 % external cream Apply as needed      Vitamin D3 (VITAMIN D, CHOLECALCIFEROL,) 25 mcg (1000 units) tablet Take 25 mcg by mouth daily         Social History     Tobacco Use    Smoking status: Former     Current packs/day: 0.00     " Types: Cigarettes     Quit date:      Years since quittin.3   Vaping Use    Vaping status: Never Used   Substance Use Topics    Alcohol use: Yes     Comment: daily    Drug use: Never       Invasive Procedure Safety Checklist:    Procedure: Cystoscopy    Action: Complete sections and checkboxes as appropriate.    Pre-procedure:  1. Patient ID Verified with 2 identifiers (Octavia and  or MRN) : YES    2. Procedure and site verified with patient/designee (when able) : YES    3. Accurate consent documentation in medical record : YES    4. H&P (or appropriate assessment) documented in medical record : N/A  H&P must be up to 30 days prior to procedure an updated within 24 hours of                 Procedure as applicable.     5. Relevant diagnostic and radiology test results appropriately labeled and displayed as applicable : YES    6. Blood products, implants, devices, and/or special equipment available for the procedure as applicable : YES    7. Procedure site(s) marked with provider initials [Exclusions: none] : NO    8. Marking not required. Reason : Yes  Procedure does not require site marking    Time Out:     Time-Out performed immediately prior to starting procedure, including verbal and active participation of all team members addressing: YES    1. Correct patient identity.  2. Confirmed that the correct side and site are marked.  3. An accurate procedure to be done.  4. Agreement on the procedure to be done.  5. Correct patient position.  6. Relevant images and results are properly labeled and appropriately displayed.  7. The need to administer antibiotics or fluids for irrigation purposes during the procedure as applicable.  8. Safety precautions based on patient history or medication use.    During Procedure: Verification of correct person, site, and procedure occurs any time the responsibility for care of the patient is transferred to another member of the care team.    The following medication was  given:     MEDICATION:  Lidocaine without epinephrine 2% jelly  ROUTE: urethral   SITE: urethral   DOSE: 10 mL  LOT #: [id number]  : [employer]  EXPIRATION DATE: 12-25  NDC#: [address]-5   Was there drug waste? No    Prior to med admin, verified patient identity using patient's name and date of birth.  Due to med administration, patient instructed to remain in clinic for 15 minutes  afterwards, and to report any adverse reaction to me immediately.    Drug Amount Wasted:  None.  Vial/Syringe: Syringe      Deedee Dyson  4/23/2024  7:54 AM

## 2024-04-23 NOTE — NURSING NOTE
[id number]: Study Visit Note   Subject name: Alek Mcneill     Visit: Week 6 TAR-200 Insertion     Did the study visit occur within the appropriate window allowed by the protocol? ye    Since the last study visit, He has been doing well. No changes since I talked to the patient yesterday.     Assessed for below symptoms specifically:   See previous note last week.       TAR-200 Insertion/Removal:  Medication number inserted today: [id number]    Verbal handover provided to infusion RN; reminded RN to document actual start time of infusion and actual stop time of the infusion. If infusion deviates from protocol directed infusion time, please document rationale in your infusion note.      I have personally interviewed Alek Mcneill and reviewed his medical record for adverse events and concomitant medications and these have been recorded on the corresponding logs in Alek Mcneill's research file.     Alek Mcneill was given the opportunity to ask any trial related questions.  Please see provider progress note for physical exam and other clinical information. Labs were reviewed - any significant lab values were addressed and reviewed.    Araceli Diaz RN   Clinical Research Coordinator Nurse-Solid Tumor   Phone: [phone] Pgr: [phone]

## 2024-04-23 NOTE — Clinical Note
4/23/2024       RE: Alek AGOSTO Raheelprincess  [address] [place] [address]     Dear Colleague,    Thank you for referring your patient, Alek Mcneill, to the [facility]. Please see a copy of my visit note below.    No notes on file    Again, thank you for allowing me to participate in the care of your patient.      Sincerely,    Harman Kaiser MD

## 2024-05-01 ENCOUNTER — PRE VISIT (OUTPATIENT)
Dept: UROLOGY | Facility: CLINIC | Age: 74
End: 2024-05-01
Payer: COMMERCIAL

## 2024-05-01 NOTE — TELEPHONE ENCOUNTER
Reason for visit: cystoscopy - study pt      Dx/Hx/Sx: bladder cancer     Records/imaging/labs/orders: in epic     At Rooming: consent - ask provider if its okay to administer lido -

## 2024-05-02 DIAGNOSIS — C67.3 MALIGNANT NEOPLASM OF ANTERIOR WALL OF URINARY BLADDER (H): Primary | ICD-10-CM

## 2024-05-06 ENCOUNTER — APPOINTMENT (OUTPATIENT)
Dept: LAB | Facility: HOSPITAL | Age: 74
End: 2024-05-06
Payer: COMMERCIAL

## 2024-05-06 DIAGNOSIS — C67.3 MALIGNANT NEOPLASM OF ANTERIOR WALL OF URINARY BLADDER (H): Primary | ICD-10-CM

## 2024-05-06 LAB
ALBUMIN SERPL BCG-MCNC: 4.1 G/DL (ref 3.5–5.2)
ALBUMIN UR-MCNC: NEGATIVE MG/DL
ALP SERPL-CCNC: 82 U/L (ref 40–150)
ALT SERPL W P-5'-P-CCNC: 33 U/L (ref 0–70)
AMYLASE SERPL-CCNC: 83 U/L (ref 28–100)
ANION GAP SERPL CALCULATED.3IONS-SCNC: 8 MMOL/L (ref 7–15)
APPEARANCE UR: CLEAR
AST SERPL W P-5'-P-CCNC: 33 U/L (ref 0–45)
BASOPHILS # BLD AUTO: 0.1 10E3/UL (ref 0–0.2)
BASOPHILS NFR BLD AUTO: 1 %
BILIRUB SERPL-MCNC: 0.6 MG/DL
BILIRUB UR QL STRIP: NEGATIVE
BUN SERPL-MCNC: 14.6 MG/DL (ref 8–23)
CALCIUM SERPL-MCNC: 10 MG/DL (ref 8.8–10.2)
CHLORIDE SERPL-SCNC: 98 MMOL/L (ref 98–107)
COLOR UR AUTO: COLORLESS
CREAT SERPL-MCNC: 1.3 MG/DL (ref 0.67–1.17)
CRP SERPL-MCNC: 3.5 MG/L
DEPRECATED HCO3 PLAS-SCNC: 31 MMOL/L (ref 22–29)
EGFRCR SERPLBLD CKD-EPI 2021: 57 ML/MIN/1.73M2
EOSINOPHIL # BLD AUTO: 0.2 10E3/UL (ref 0–0.7)
EOSINOPHIL NFR BLD AUTO: 3 %
ERYTHROCYTE [DISTWIDTH] IN BLOOD BY AUTOMATED COUNT: 12.8 % (ref 10–15)
GLUCOSE SERPL-MCNC: 119 MG/DL (ref 70–99)
GLUCOSE UR STRIP-MCNC: NEGATIVE MG/DL
HBA1C MFR BLD: 7 %
HCT VFR BLD AUTO: 46 % (ref 40–53)
HGB BLD-MCNC: 16.3 G/DL (ref 13.3–17.7)
HGB UR QL STRIP: NEGATIVE
IMM GRANULOCYTES # BLD: 0 10E3/UL
IMM GRANULOCYTES NFR BLD: 0 %
KETONES UR STRIP-MCNC: NEGATIVE MG/DL
LDH SERPL L TO P-CCNC: 209 U/L (ref 0–250)
LEUKOCYTE ESTERASE UR QL STRIP: NEGATIVE
LIPASE SERPL-CCNC: 41 U/L (ref 13–60)
LYMPHOCYTES # BLD AUTO: 2.3 10E3/UL (ref 0.8–5.3)
LYMPHOCYTES NFR BLD AUTO: 27 %
MCH RBC QN AUTO: 32.5 PG (ref 26.5–33)
MCHC RBC AUTO-ENTMCNC: 35.4 G/DL (ref 31.5–36.5)
MCV RBC AUTO: 92 FL (ref 78–100)
MONOCYTES # BLD AUTO: 0.8 10E3/UL (ref 0–1.3)
MONOCYTES NFR BLD AUTO: 9 %
NEUTROPHILS # BLD AUTO: 5.4 10E3/UL (ref 1.6–8.3)
NEUTROPHILS NFR BLD AUTO: 60 %
NITRATE UR QL: NEGATIVE
NRBC # BLD AUTO: 0 10E3/UL
NRBC BLD AUTO-RTO: 0 /100
PH UR STRIP: 6.5 [PH] (ref 5–7)
PHOSPHATE SERPL-MCNC: 3.1 MG/DL (ref 2.5–4.5)
PLATELET # BLD AUTO: 223 10E3/UL (ref 150–450)
POTASSIUM SERPL-SCNC: 4.1 MMOL/L (ref 3.4–5.3)
PROT SERPL-MCNC: 7.2 G/DL (ref 6.4–8.3)
RBC # BLD AUTO: 5.01 10E6/UL (ref 4.4–5.9)
RBC URINE: 0 /HPF
SODIUM SERPL-SCNC: 137 MMOL/L (ref 135–145)
SP GR UR STRIP: 1 (ref 1–1.03)
SQUAMOUS EPITHELIAL: <1 /HPF
T4 FREE SERPL-MCNC: 0.58 NG/DL (ref 0.9–1.7)
TSH SERPL DL<=0.005 MIU/L-ACNC: 26.53 UIU/ML (ref 0.3–4.2)
URATE SERPL-MCNC: 6.1 MG/DL (ref 3.4–7)
UROBILINOGEN UR STRIP-MCNC: <2 MG/DL
WBC # BLD AUTO: 8.7 10E3/UL (ref 4–11)
WBC URINE: 1 /HPF

## 2024-05-06 PROCEDURE — 84443 ASSAY THYROID STIM HORMONE: CPT | Performed by: INTERNAL MEDICINE

## 2024-05-06 PROCEDURE — 81001 URINALYSIS AUTO W/SCOPE: CPT | Performed by: INTERNAL MEDICINE

## 2024-05-06 PROCEDURE — 84550 ASSAY OF BLOOD/URIC ACID: CPT | Performed by: INTERNAL MEDICINE

## 2024-05-06 PROCEDURE — 84100 ASSAY OF PHOSPHORUS: CPT | Performed by: INTERNAL MEDICINE

## 2024-05-06 PROCEDURE — 83036 HEMOGLOBIN GLYCOSYLATED A1C: CPT | Performed by: INTERNAL MEDICINE

## 2024-05-06 PROCEDURE — 82977 ASSAY OF GGT: CPT | Performed by: INTERNAL MEDICINE

## 2024-05-06 PROCEDURE — 85025 COMPLETE CBC W/AUTO DIFF WBC: CPT | Performed by: INTERNAL MEDICINE

## 2024-05-06 PROCEDURE — 86140 C-REACTIVE PROTEIN: CPT | Performed by: INTERNAL MEDICINE

## 2024-05-06 PROCEDURE — 82150 ASSAY OF AMYLASE: CPT | Performed by: INTERNAL MEDICINE

## 2024-05-06 PROCEDURE — 80053 COMPREHEN METABOLIC PANEL: CPT | Performed by: INTERNAL MEDICINE

## 2024-05-06 PROCEDURE — 87086 URINE CULTURE/COLONY COUNT: CPT | Performed by: INTERNAL MEDICINE

## 2024-05-06 PROCEDURE — 83615 LACTATE (LD) (LDH) ENZYME: CPT | Performed by: INTERNAL MEDICINE

## 2024-05-06 PROCEDURE — 84439 ASSAY OF FREE THYROXINE: CPT | Performed by: INTERNAL MEDICINE

## 2024-05-06 PROCEDURE — 36415 COLL VENOUS BLD VENIPUNCTURE: CPT | Performed by: INTERNAL MEDICINE

## 2024-05-06 PROCEDURE — 83690 ASSAY OF LIPASE: CPT | Performed by: INTERNAL MEDICINE

## 2024-05-06 PROCEDURE — 84481 FREE ASSAY (FT-3): CPT | Performed by: INTERNAL MEDICINE

## 2024-05-06 NOTE — PROGRESS NOTES
"  [facility] Medical Oncology Note  Date of visit: May 7, 2024    Outpatient Clinic Note        Assessment:     New diagnosis of Stage 1 (T1NxMx) superficial poorly differentiated bladder cancer. T1 lesions are high risk. Recurrence rates at one, three, and five years can reach up to 50, 70 to 80, and 90 percent, respectively, and 20 to 25 percent progress to more invasive (T2 or greater) disease. Given the high risk here, therapy is indicated.  Alek is on the SUNRISE-3 study, for patients with T1 disease and has been randomized to the TAR-200 (a device inserted in to the bladder that contains gemcitabine)+ Cetrelimab arm.  S/P one dose of cetrelimab, with lab evidence of thryoiditis.  He now is hypothyroid, TSH 26.53 and T4 0.58. Given he is >60 years old, will start levothyroxine 50 mcg daily. Recheck in six weeks and will adjust further at that time if needed. He will talk to his primary about this and taking over the levothyroxine management following completion of the trial.   Hemoglobin A1C up trending, continue to monitor. Patient will discuss further with his PCP next week as well.   Creatinine is very mildly above baseline ~1.20 at 1.30 today. Will proceed with an extra IV fluid bolus today. Continue to monitor.       Plan:     Next cetrelimab/TAR-200 today  RTC in 3 weeks for his next cycle.    Seen with Study nurse Araceli Diaz    30 minutes spent on the date of the encounter doing chart review, review of test results, interpretation of tests, patient visit, and documentation     Clarita Owen PA-C      __________________________________________________________________    DIAGNOSIS:     Recently diagnosed T1 poorly differentiated invasive bladder cancer.    Respiratory symptoms concerning for pneumonitis, but CT chest 3/27/2024 showed \"No new or suspicious pulmonary opacities.\"       History of Present Illness/Therapy to Date:   11/19/2023: Went to ED for subacute onset of gross hematuria, and a " "passing of clots. Stopped ASA. CT showed multiple indeterminate nodular densities in the bladder suspicious for enhancing urothelial lesions   12/6/2023: Cystoscopy , with finding of multiple bladder tumors appeared papillary and were located on the anterior wall and had some superficial calcifications. \"We resected tissue down to muscle using cut electrocautery. \" Pathology showed high grade papillary carcinoma, that did not invade the muscle.  1/17/2024: Cystoscopy with resection showed CIS, but no invasive cancer.  Alek has agreed to participate in the SUNRISE -3 trial. He has been randomized to receive cetrelimab and TAR-200 (an investigational intravesical drug delivery system for gemcitabine). His first dose of cetrelimab and TAR-200 was 3/6/2024. He is back today for his next administration.        Interval history:     Alek is here for week 9 of the SUNRISE-3 trial and his fourth dose of cetrelimab.   He is feeling well today. He doesn't notice significant side effects after therapy.   With the last two infusions, he noticed occasional chills when he gets up to urinate during the night. This doesn't happen every night. He has not checked his temperature to see if he has a fever.   He endorses constipation which he manages with sennakot 1-2 tablets per day with improvement.   After he saw Dr. Tobin last visit he had upper quadrant pain x 2 days that has now resolved.  No urinary concerns. No hematuria. Drinking fluids well ~5, 16 oz water bottles.   He gets short of breath with activity predating the start of therapy without new changes from baseline. No chest pain or cough.   No new rashes or lesions. No peripheral edema.   No headaches or dizziness.   He endorses a chronic runny nose and occasional sore throat that he attributes to allergies.      ROS: 10 point ROS neg other than the symptoms noted above in the HPI.      Past Medical History:   I have reviewed this patient's past medical history   Past " Medical History:   Diagnosis Date    Complication of anesthesia     DM2 (diabetes mellitus, type 2) (H)     Hypercholesteremia     Hyperlipidemia     Hypertension           Past Surgical History:    I have reviewed this patient's past surgical history       Social History:   Tobacco, ETOH, and rec drugs reviewed and as noted below with the following exceptions:  Drinks a few beers a day. Quit smoking in .  Alek was born in [place], and graduated from [employer] (no longer exists) in .  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  He is  to Christine, his third wife.  She worked with his sister-in-law and they were introduced.  They have been  for 25 years.  Jose has 5 children, Golden, Cr, Joon, Radha, and Kayla.  Kayla is the youngest and a PA who lives in [place].  The other children are in the service.  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          Family History:     Grandma with skin cancer (s/p removal of her nose)  Uncle with sarcoma  Dad had bone cancer/lung cancer  Brother Casimiro had bladder cancer  Ned had bladder cacner ( in car accident)  Brother Lenny had bladder cancer  No family history on file.         Medications:     Current Outpatient Medications   Medication Sig Dispense Refill    Ascorbic Acid (VITAMIN C) 500 MG CHEW Take 1 chew tab by mouth daily      Ascorbic Acid 500 MG CAPS 1 tablet Orally Once a day      aspirin (ASPIRIN LOW DOSE) 81 MG EC tablet Take 81 mg by mouth daily ONCE BEFORE BED      blood glucose (NO BRAND SPECIFIED) lancets standard as directed, to use with his Contour Next test blood sugars daily      CONTOUR NEXT TEST test strip USE TO TEST BLOOD SUGAR DAILY      Ergocalciferol 50 MCG ( UT) CAPS Take 50 mcg by mouth daily      fish oil-omega-3 fatty acids 500 MG capsule Take 2  capsules by mouth daily      hydrochlorothiazide (HYDRODIURIL) 25 MG tablet Take 25 mg by mouth daily      ketoconazole (NIZORAL) 2 % external cream Apply topically as needed      Microlet Lancets MISC daily      simvastatin (ZOCOR) 40 MG tablet Take 40 mg by mouth at bedtime      triamcinolone (KENALOG) 0.1 % external cream Apply as needed      Vitamin D3 (VITAMIN D, CHOLECALCIFEROL,) 25 mcg (1000 units) tablet Take 25 mcg by mouth daily                Physical Exam:   There were no vitals taken for this visit.    ECOG PS: 0  Constitutional: WDWN male in NAD, pleasant and appropriate  HEENT:  NC/AT, no icterus, OP clear, MMM  Skin: No jaundice nor ecchymoses  Lungs: CTAB, no w/r/r, nonlabored breathing  Cardiovascular: RRR, S1, S2, no m/r/g  Abdomen: +BS, soft, nontender, nondistended, no organomegaly nor masses  MSK/Extremities: Warm, well perfused. No edema  LN: no cervical, supraclavicular, axillary, nor inguinal lymphadenopathy  Neurologic: alert, answering questions appropriately, moving all extremities spontaneously. CN 2-12 grossly intact.  Psych: appropriate affect  Data:   Most Recent 3 CBC's:  Recent Labs   Lab Test 05/06/24  1143 04/18/24  0821 03/27/24  1204   WBC 8.7 6.5 7.6   HGB 16.3 16.5 16.2   MCV 92 89 92    241 222   ANEUTAUTO 5.4 3.3 3.6     Most Recent 3 BMP's:  Recent Labs   Lab Test 05/06/24  1143 04/18/24  0821 03/27/24  1204    137 137   POTASSIUM 4.1 4.1 4.1   CHLORIDE 98 100 100   CO2 31* 24 27   BUN 14.6 16.3 14.4   CR 1.30* 1.20* 1.04   ANIONGAP 8 13 10   MILLI 10.0 9.8 9.9   * 140* 106*   PROTTOTAL 7.2 7.1 7.8   ALBUMIN 4.1 4.2 4.4    Most Recent 3 LFT's:  Recent Labs   Lab Test 05/06/24  1143 04/18/24  0821 03/27/24  1204   AST 33 33 36   ALT 33 37 39   ALKPHOS 82 77 80   BILITOTAL 0.6 0.6 0.6    Most Recent 2 TSH and T4:  Recent Labs   Lab Test 05/06/24  1143 04/18/24  0821   TSH 26.53* 0.91   T4 0.58* 1.24     I reviewed the above labs today.

## 2024-05-07 ENCOUNTER — ONCOLOGY VISIT (OUTPATIENT)
Dept: ONCOLOGY | Facility: CLINIC | Age: 74
End: 2024-05-07
Payer: COMMERCIAL

## 2024-05-07 ENCOUNTER — ALLIED HEALTH/NURSE VISIT (OUTPATIENT)
Dept: ONCOLOGY | Facility: CLINIC | Age: 74
End: 2024-05-07

## 2024-05-07 ENCOUNTER — OFFICE VISIT (OUTPATIENT)
Dept: UROLOGY | Facility: CLINIC | Age: 74
End: 2024-05-07
Payer: COMMERCIAL

## 2024-05-07 ENCOUNTER — INFUSION THERAPY VISIT (OUTPATIENT)
Dept: ONCOLOGY | Facility: CLINIC | Age: 74
End: 2024-05-07
Payer: COMMERCIAL

## 2024-05-07 VITALS
HEART RATE: 78 BPM | RESPIRATION RATE: 20 BRPM | OXYGEN SATURATION: 95 % | WEIGHT: 239.9 LBS | TEMPERATURE: 98.1 F | BODY MASS INDEX: 36.36 KG/M2 | HEIGHT: 68 IN | DIASTOLIC BLOOD PRESSURE: 80 MMHG | SYSTOLIC BLOOD PRESSURE: 150 MMHG

## 2024-05-07 VITALS
RESPIRATION RATE: 18 BRPM | SYSTOLIC BLOOD PRESSURE: 128 MMHG | HEART RATE: 60 BPM | DIASTOLIC BLOOD PRESSURE: 77 MMHG | OXYGEN SATURATION: 94 % | TEMPERATURE: 97.5 F

## 2024-05-07 VITALS
SYSTOLIC BLOOD PRESSURE: 149 MMHG | HEART RATE: 83 BPM | DIASTOLIC BLOOD PRESSURE: 76 MMHG | BODY MASS INDEX: 36.22 KG/M2 | WEIGHT: 239 LBS | HEIGHT: 68 IN

## 2024-05-07 DIAGNOSIS — C67.3 MALIGNANT NEOPLASM OF ANTERIOR WALL OF URINARY BLADDER (H): Primary | ICD-10-CM

## 2024-05-07 DIAGNOSIS — Z00.6 EXAMINATION OF PARTICIPANT IN CLINICAL TRIAL: ICD-10-CM

## 2024-05-07 DIAGNOSIS — E03.2 HYPOTHYROIDISM DUE TO MEDICATION: ICD-10-CM

## 2024-05-07 LAB
BACTERIA UR CULT: NORMAL
GGT SERPL-CCNC: 106 U/L (ref 8–61)
T3FREE SERPL-MCNC: 2.2 PG/ML (ref 2–4.4)

## 2024-05-07 PROCEDURE — 96413 CHEMO IV INFUSION 1 HR: CPT

## 2024-05-07 PROCEDURE — G0463 HOSPITAL OUTPT CLINIC VISIT: HCPCS

## 2024-05-07 PROCEDURE — 52000 CYSTOURETHROSCOPY: CPT | Performed by: UROLOGY

## 2024-05-07 PROCEDURE — 258N000003 HC RX IP 258 OP 636

## 2024-05-07 PROCEDURE — 99214 OFFICE O/P EST MOD 30 MIN: CPT

## 2024-05-07 RX ORDER — METHYLPREDNISOLONE SODIUM SUCCINATE 125 MG/2ML
125 INJECTION, POWDER, LYOPHILIZED, FOR SOLUTION INTRAMUSCULAR; INTRAVENOUS
Status: CANCELLED
Start: 2024-05-07

## 2024-05-07 RX ORDER — EPINEPHRINE 1 MG/ML
0.3 INJECTION, SOLUTION INTRAMUSCULAR; SUBCUTANEOUS EVERY 5 MIN PRN
Status: CANCELLED | OUTPATIENT
Start: 2024-05-07

## 2024-05-07 RX ORDER — ACETAMINOPHEN 325 MG/1
650 TABLET ORAL
Status: CANCELLED
Start: 2024-05-07

## 2024-05-07 RX ORDER — LEVOTHYROXINE SODIUM 50 UG/1
50 TABLET ORAL DAILY
Qty: 30 TABLET | Refills: 1 | Status: SHIPPED | OUTPATIENT
Start: 2024-05-07 | End: 2024-05-24

## 2024-05-07 RX ORDER — MEPERIDINE HYDROCHLORIDE 25 MG/ML
25 INJECTION INTRAMUSCULAR; INTRAVENOUS; SUBCUTANEOUS EVERY 30 MIN PRN
Status: CANCELLED | OUTPATIENT
Start: 2024-05-07

## 2024-05-07 RX ORDER — ALBUTEROL SULFATE 90 UG/1
1-2 AEROSOL, METERED RESPIRATORY (INHALATION)
Status: CANCELLED
Start: 2024-05-07

## 2024-05-07 RX ORDER — SULFAMETHOXAZOLE/TRIMETHOPRIM 800-160 MG
1 TABLET ORAL ONCE
Status: COMPLETED | OUTPATIENT
Start: 2024-05-07 | End: 2024-05-07

## 2024-05-07 RX ORDER — DIPHENHYDRAMINE HYDROCHLORIDE 50 MG/ML
50 INJECTION INTRAMUSCULAR; INTRAVENOUS
Status: CANCELLED
Start: 2024-05-07

## 2024-05-07 RX ORDER — LIDOCAINE HYDROCHLORIDE 20 MG/ML
10 JELLY TOPICAL
Status: CANCELLED | OUTPATIENT
Start: 2024-05-07

## 2024-05-07 RX ORDER — DIPHENHYDRAMINE HCL 25 MG
50 CAPSULE ORAL
Status: CANCELLED | OUTPATIENT
Start: 2024-05-07

## 2024-05-07 RX ORDER — HEPARIN SODIUM,PORCINE 10 UNIT/ML
5-20 VIAL (ML) INTRAVENOUS DAILY PRN
Status: CANCELLED | OUTPATIENT
Start: 2024-05-07

## 2024-05-07 RX ORDER — ACETAMINOPHEN 325 MG/1
650 TABLET ORAL
Status: CANCELLED | OUTPATIENT
Start: 2024-05-07

## 2024-05-07 RX ORDER — LIDOCAINE HYDROCHLORIDE 20 MG/ML
JELLY TOPICAL ONCE
Status: COMPLETED | OUTPATIENT
Start: 2024-05-07 | End: 2024-05-07

## 2024-05-07 RX ORDER — LORAZEPAM 2 MG/ML
0.5 INJECTION INTRAMUSCULAR EVERY 4 HOURS PRN
Status: CANCELLED | OUTPATIENT
Start: 2024-05-07

## 2024-05-07 RX ORDER — ALBUTEROL SULFATE 0.83 MG/ML
2.5 SOLUTION RESPIRATORY (INHALATION)
Status: CANCELLED | OUTPATIENT
Start: 2024-05-07

## 2024-05-07 RX ORDER — HEPARIN SODIUM (PORCINE) LOCK FLUSH IV SOLN 100 UNIT/ML 100 UNIT/ML
5 SOLUTION INTRAVENOUS
Status: CANCELLED | OUTPATIENT
Start: 2024-05-07

## 2024-05-07 RX ADMIN — SODIUM CHLORIDE 500 ML: 9 INJECTION, SOLUTION INTRAVENOUS at 11:11

## 2024-05-07 RX ADMIN — LIDOCAINE HYDROCHLORIDE: 20 JELLY TOPICAL at 10:52

## 2024-05-07 RX ADMIN — SULFAMETHOXAZOLE AND TRIMETHOPRIM 1 TABLET: 800; 160 TABLET ORAL at 10:52

## 2024-05-07 ASSESSMENT — PAIN SCALES - GENERAL: PAINLEVEL: NO PAIN (0)

## 2024-05-07 NOTE — NURSING NOTE
The following medication was given:     MEDICATION: Bactrim (Trimethoprim / Sulfamethoxazole)  ROUTE: PO  SITE: Medication was given orally  DOSE: 800mg/160mg  LOT #: [id number]  : [employer]  EXPIRATION DATE: 2025/11  NDC#: 5643-3331-83   Was there drug waste? No    Prior to medication administration, verified patient identity using patient's name and date of birth.  Due to medication administration, patient instructed to remain in clinic for 15 minutes  afterwards, and to report any adverse reaction to me immediately.

## 2024-05-07 NOTE — PATIENT INSTRUCTIONS
[facility] Triage and after hours / weekends / holidays:  [phone]    Please call the triage or after hours line if you experience a temperature greater than or equal to 100.4, shaking chills, have uncontrolled nausea, vomiting and/or diarrhea, dizziness, shortness of breath, chest pain, bleeding, unexplained bruising, or if you have any other new/concerning symptoms, questions or concerns.      If you are having any concerning symptoms or wish to speak to a provider before your next infusion visit, please call triage to notify them so we can adequately serve you.     If you need a refill on a narcotic prescription or other medication, please call before your infusion appointment.                 May 2024      Danny Monday Tuesday Wednesday Thursday Friday Saturday                  1     2     3     4       5     6    LAB  12:00 PM   (15 min.)   [facility]   [facility] 7    RETURN CCSL   7:45 AM   (45 min.)   Clarita Owen PA-C   [facility]    CYSTOSCOPY   8:30 AM   (15 min.)   Harman Kaiser MD   [facility]rology [facility] [place]    ONC INFUSION 1.5 HR (90 MIN)   9:00 AM   (90 min.)    ONC INFUSION NURSE   [facility] 8    RESEARCH - SIMPLE  10:00 AM   (30 min.)   CRU EXAM ROOM   [facility] Clinical Research Unit 9     10     11       12     13     14     15     16     17     18       19     20     21     22     23     24    LAB PERIPHERAL  11:45 AM   (15 min.)   [facility] DRAW   [facility]    RETURN CCSL  12:00 PM   (30 min.)   Julio Tobin MD   [facility]    ONC INFUSION 1.5 HR (90 MIN)   1:00 PM   (90 min.)    ONC INFUSION NURSE   [facility] 25       26     27     28    CYSTOSCOPY   8:30 AM   (15 min.)   Harman Kaiser MD   [facility]  [place] 29     30     31 June 2024 Sunday Monday Tuesday Wednesday Thursday Friday Saturday                                 1       2     3     4     5     6     7     8       9     10     11     12     13     14     15       16     17     18     19     20     21     22       23     24    LAB PERIPHERAL  10:00 AM   (15 min.)   [facility] DRAW   [facility]    RETURN CCSL  10:15 AM   (30 min.)   Julio Tobin MD   [facility]    ONC INFUSION 1.5 HR (90 MIN)  11:00 AM   (90 min.)   UC ONC INFUSION NURSE   [facility] 25    CYSTOSCOPY   7:45 AM   (30 min.)   Harman Kaiser MD   [facility] [place] 26     27     28     29       30                                                   Lab Results:  No results found for this or any previous visit (from the past 12 hour(s)).

## 2024-05-07 NOTE — NURSING NOTE
"Chief Complaint   Patient presents with    Cystoscopy     + study         Blood pressure (!) 149/76, pulse 83, height 1.727 m (5' 8\"), weight 108.4 kg (239 lb). Body mass index is 36.34 kg/m .    Patient Active Problem List   Diagnosis    Malignant neoplasm of anterior wall of urinary bladder (H)    Benign hypertension    Hyperlipidemia    Impotence of organic origin    Morbid (severe) obesity due to excess calories (H)    Type 2 diabetes mellitus without complication, without long-term current use of insulin (H)    Spinal stenosis of lumbosacral region    Peripheral vascular disease (H24)    Venous stasis dermatitis of left lower extremity    Lumbar radiculopathy    Environmental allergies    Age-related nuclear cataract of right eye       Allergies   Allergen Reactions    Chlorhexidine Dermatitis       Current Outpatient Medications   Medication Sig Dispense Refill    Ascorbic Acid (VITAMIN C) 500 MG CHEW Take 1 chew tab by mouth daily      Ascorbic Acid 500 MG CAPS 1 tablet Orally Once a day      aspirin (ASPIRIN LOW DOSE) 81 MG EC tablet Take 81 mg by mouth daily ONCE BEFORE BED      blood glucose (NO BRAND SPECIFIED) lancets standard as directed, to use with his Contour Next test blood sugars daily      CONTOUR NEXT TEST test strip USE TO TEST BLOOD SUGAR DAILY      Ergocalciferol 50 MCG (2000 UT) CAPS Take 50 mcg by mouth daily      fish oil-omega-3 fatty acids 500 MG capsule Take 2 capsules by mouth daily      hydrochlorothiazide (HYDRODIURIL) 25 MG tablet Take 25 mg by mouth daily      ketoconazole (NIZORAL) 2 % external cream Apply topically as needed      levothyroxine (SYNTHROID/LEVOTHROID) 50 MCG tablet Take 1 tablet (50 mcg) by mouth daily 30 tablet 1    Microlet Lancets MISC daily      simvastatin (ZOCOR) 40 MG tablet Take 40 mg by mouth at bedtime      triamcinolone (KENALOG) 0.1 % external cream Apply as needed      Vitamin D3 (VITAMIN D, CHOLECALCIFEROL,) 25 mcg (1000 units) tablet Take 25 mcg by " mouth daily         Social History     Tobacco Use    Smoking status: Former     Current packs/day: 0.00     Types: Cigarettes     Quit date:      Years since quittin.3   Vaping Use    Vaping status: Never Used   Substance Use Topics    Alcohol use: Yes     Comment: daily    Drug use: Never       Invasive Procedure Safety Checklist:    Procedure: Cystoscopy    Action: Complete sections and checkboxes as appropriate.    Pre-procedure:  1. Patient ID Verified with 2 identifiers (Octavia and  or MRN) : YES    2. Procedure and site verified with patient/designee (when able) : YES    3. Accurate consent documentation in medical record : YES    4. H&P (or appropriate assessment) documented in medical record : N/A  H&P must be up to 30 days prior to procedure an updated within 24 hours of                 Procedure as applicable.     5. Relevant diagnostic and radiology test results appropriately labeled and displayed as applicable : YES    6. Blood products, implants, devices, and/or special equipment available for the procedure as applicable : YES    7. Procedure site(s) marked with provider initials [Exclusions: none] : NO    8. Marking not required. Reason : Yes  Procedure does not require site marking    Time Out:     Time-Out performed immediately prior to starting procedure, including verbal and active participation of all team members addressing: YES    1. Correct patient identity.  2. Confirmed that the correct side and site are marked.  3. An accurate procedure to be done.  4. Agreement on the procedure to be done.  5. Correct patient position.  6. Relevant images and results are properly labeled and appropriately displayed.  7. The need to administer antibiotics or fluids for irrigation purposes during the procedure as applicable.  8. Safety precautions based on patient history or medication use.    During Procedure: Verification of correct person, site, and procedure occurs any time the responsibility  for care of the patient is transferred to another member of the care team.    The following medication was given:     MEDICATION:  Lidocaine without epinephrine 2% jelly  ROUTE: urethral   SITE: urethral   DOSE: 10 mL  LOT #: [id number]  : [employer]  EXPIRATION DATE: 12-25  NDC#: [address]-7   Was there drug waste? No    Prior to med admin, verified patient identity using patient's name and date of birth.  Due to med administration, patient instructed to remain in clinic for 15 minutes  afterwards, and to report any adverse reaction to me immediately.    Drug Amount Wasted:  None.  Vial/Syringe: Syringe      Deedee Dyson  5/7/2024  9:41 AM

## 2024-05-07 NOTE — Clinical Note
"    5/7/2024         RE: Alek Mcneill  [address] [place] [address]        Dear Colleague,    Thank you for referring your patient, Alek Mcneill, to the [facility] CANCER [facility]. Please see a copy of my visit note below.      [facility] Medical Oncology Note  Date of visit: May 7, 2024    Outpatient Clinic Note        Assessment:     New diagnosis of Stage 1 (T1NxMx) superficial poorly differentiated bladder cancer. T1 lesions are high risk. Recurrence rates at one, three, and five years can reach up to 50, 70 to 80, and 90 percent, respectively, and 20 to 25 percent progress to more invasive (T2 or greater) disease. Given the high risk here, therapy is indicated.  Alek is on the SUNRISE-3 study, for patients with T1 disease and has been randomized to the TAR-200 (a device inserted in to the bladder that contains gemcitabine)+ Cetrelimab arm.  S/P one dose of cetrelimab, with lab evidence of thryoiditis.  He now is hypothryoid. He will talk to his primary about this. The NCCN recommends continuing on with treatment if patients are asymptomatic, and rechecking labs in six weeks. Alek has no symptoms, such as tachycardia, weight loss or sweats. Per the study protocol we just move forward with his next dose today.  He also had some equivocal respiratory symptoms, but a CT scan showed no evidence of pneumonitis.      Plan:     Next cetrelimab/TAR-200 today  RTC in 3 weeks for his next cycle.    Seen with Study nurse Araceli Diaz    *** minutes spent on the date of the encounter doing chart review, review of test results, interpretation of tests, patient visit, and documentation     Clarita Owen PA-C      __________________________________________________________________    DIAGNOSIS:     Recently diagnosed T1 poorly differentiated invasive bladder cancer.    Respiratory symptoms concerning for pneumonitis, but CT chest 3/27/2024 showed \"No new or suspicious pulmonary opacities.\" " "      History of Present Illness/Therapy to Date:   11/19/2023: Went to ED for subacute onset of gross hematuria, and a passing of clots. Stopped ASA. CT showed multiple indeterminate nodular densities in the bladder suspicious for enhancing urothelial lesions   12/6/2023: Cystoscopy , with finding of multiple bladder tumors appeared papillary and were located on the anterior wall and had some superficial calcifications. \"We resected tissue down to muscle using cut electrocautery. \" Pathology showed high grade papillary carcinoma, that did not invade the muscle.  1/17/2024: Cystoscopy with resection showed CIS, but no invasive cancer.  Alek has agreed to participate in the SUNRISE -3 trial. He has been randomized to receive cetrelimab and TAR-200 (an investigational intravesical drug delivery system for gemcitabine). His first dose of cetrelimab and TAR-200 was 3/6/2024. He is back today for his next administration.        Interval history:     Alek is here without his wife Christine   Constipation managed with sennakot 1-2 tablets per day.   L upper quadrant pain x 2 days.   Occasional chills when he gets up to urinate at HS     Has really had no issues with the treatments. He is delighted that now that lidocaine is being used that placement of the TAR-200 is no longer traumatic. In fact, \"it tickles!\"  No hematuria.  Did get short of breath when he changed the bathtub drain the other day. Didn't last long.  He has no new pain except for chronic left shoulder issues.  He denies cough or shortness of breath.  There are no new lumps or bumps.  A nurse from [facility] came out and suggested he get a colonoscopy \"because I have the bladder cancer.\" (This notion was disabused by his oncologist today).  The rest of the multiorgan review of physical symptoms is negative.    Extra fluids       Past Medical History:   I have reviewed this patient's past medical history   Past Medical History:   Diagnosis Date    " Complication of anesthesia     DM2 (diabetes mellitus, type 2) (H)     Hypercholesteremia     Hyperlipidemia     Hypertension           Past Surgical History:    I have reviewed this patient's past surgical history       Social History:   Tobacco, ETOH, and rec drugs reviewed and as noted below with the following exceptions:  Drinks a few beers a day. Quit smoking in .  Alek was born in [place], and graduated from [employer] (no longer exists) in .  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  He is  to Christine, his third wife.  She worked with his sister-in-law and they were introduced.  They have been  for 25 years.  Jose has 5 children, Golden, Cr, Joon, Radha, and Kayla.  Kayla is the youngest and a PA who lives in [place].  The other children are in the service.  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          Family History:     Grandma with skin cancer (s/p removal of her nose)  Uncle with sarcoma  Dad had bone cancer/lung cancer  Brother Casimiro had bladder cancer  Ned had bladder cacner ( in car accident)  Brother Lenny had bladder cancer  No family history on file.         Medications:     Current Outpatient Medications   Medication Sig Dispense Refill    Ascorbic Acid (VITAMIN C) 500 MG CHEW Take 1 chew tab by mouth daily      Ascorbic Acid 500 MG CAPS 1 tablet Orally Once a day      aspirin (ASPIRIN LOW DOSE) 81 MG EC tablet Take 81 mg by mouth daily ONCE BEFORE BED      blood glucose (NO BRAND SPECIFIED) lancets standard as directed, to use with his Contour Next test blood sugars daily      CONTOUR NEXT TEST test strip USE TO TEST BLOOD SUGAR DAILY      Ergocalciferol 50 MCG (2000 UT) CAPS Take 50 mcg by mouth daily      fish oil-omega-3 fatty acids 500 MG capsule Take 2 capsules by mouth daily       hydrochlorothiazide (HYDRODIURIL) 25 MG tablet Take 25 mg by mouth daily      ketoconazole (NIZORAL) 2 % external cream Apply topically as needed      Microlet Lancets MISC daily      simvastatin (ZOCOR) 40 MG tablet Take 40 mg by mouth at bedtime      triamcinolone (KENALOG) 0.1 % external cream Apply as needed      Vitamin D3 (VITAMIN D, CHOLECALCIFEROL,) 25 mcg (1000 units) tablet Take 25 mcg by mouth daily                Physical Exam:   There were no vitals taken for this visit.    ECOG PS: 0  Constitutional: WDWN male in NAD, pleasant and appropriate  HEENT:  NC/AT, no icterus, OP clear, MMM  Skin: No jaundice nor ecchymoses  Lungs: CTAB, no w/r/r, nonlabored breathing  Cardiovascular: RRR, S1, S2, no m/r/g  Abdomen: +BS, soft, nontender, nondistended, no organomegaly nor masses  MSK/Extremities: Warm, well perfused. No edema  LN: no cervical, supraclavicular, axillary, nor inguinal lymphadenopathy  Neurologic: alert, answering questions appropriately, moving all extremities spontaneously. CN 2-12 grossly intact.  Psych: appropriate affect  Data:   Most Recent 3 CBC's:  Recent Labs   Lab Test 05/06/24  1143 04/18/24  0821 03/27/24  1204   WBC 8.7 6.5 7.6   HGB 16.3 16.5 16.2   MCV 92 89 92    241 222   ANEUTAUTO 5.4 3.3 3.6     Most Recent 3 BMP's:  Recent Labs   Lab Test 05/06/24  1143 04/18/24  0821 03/27/24  1204    137 137   POTASSIUM 4.1 4.1 4.1   CHLORIDE 98 100 100   CO2 31* 24 27   BUN 14.6 16.3 14.4   CR 1.30* 1.20* 1.04   ANIONGAP 8 13 10   MILLI 10.0 9.8 9.9   * 140* 106*   PROTTOTAL 7.2 7.1 7.8   ALBUMIN 4.1 4.2 4.4    Most Recent 3 LFT's:  Recent Labs   Lab Test 05/06/24  1143 04/18/24  0821 03/27/24  1204   AST 33 33 36   ALT 33 37 39   ALKPHOS 82 77 80   BILITOTAL 0.6 0.6 0.6    Most Recent 2 TSH and T4:  Recent Labs   Lab Test 05/06/24  1143 04/18/24  0821   TSH 26.53* 0.91   T4 0.58* 1.24     I reviewed the above labs today.      [facility] Medical Oncology Note  Date of  "visit: May 7, 2024    Outpatient Clinic Note        Assessment:     New diagnosis of Stage 1 (T1NxMx) superficial poorly differentiated bladder cancer. T1 lesions are high risk. Recurrence rates at one, three, and five years can reach up to 50, 70 to 80, and 90 percent, respectively, and 20 to 25 percent progress to more invasive (T2 or greater) disease. Given the high risk here, therapy is indicated.  Alek is on the SUNRISE-3 study, for patients with T1 disease and has been randomized to the TAR-200 (a device inserted in to the bladder that contains gemcitabine)+ Cetrelimab arm.  S/P one dose of cetrelimab, with lab evidence of thryoiditis.  He now is hypothyroid, TSH 26.53 and T4 0.58. Given he is >60 years old, will start levothyroxine 50 mcg daily. Recheck in six weeks and will adjust further at that time if needed. He will talk to his primary about this and taking over the levothyroxine management following completion of the trial.   Hemoglobin A1C up trending, continue to monitor. Patient will discuss further with his PCP next week as well.   Creatinine is very mildly above baseline ~1.20 at 1.30 today. Will proceed with an extra IV fluid bolus today. Continue to monitor.       Plan:     Next cetrelimab/TAR-200 today  RTC in 3 weeks for his next cycle.    Seen with Study nurse Araceli Diaz    30 minutes spent on the date of the encounter doing chart review, review of test results, interpretation of tests, patient visit, and documentation     Clarita Owen PA-C      __________________________________________________________________    DIAGNOSIS:     Recently diagnosed T1 poorly differentiated invasive bladder cancer.    Respiratory symptoms concerning for pneumonitis, but CT chest 3/27/2024 showed \"No new or suspicious pulmonary opacities.\"       History of Present Illness/Therapy to Date:   11/19/2023: Went to ED for subacute onset of gross hematuria, and a passing of clots. Stopped ASA. CT showed multiple " "indeterminate nodular densities in the bladder suspicious for enhancing urothelial lesions   12/6/2023: Cystoscopy , with finding of multiple bladder tumors appeared papillary and were located on the anterior wall and had some superficial calcifications. \"We resected tissue down to muscle using cut electrocautery. \" Pathology showed high grade papillary carcinoma, that did not invade the muscle.  1/17/2024: Cystoscopy with resection showed CIS, but no invasive cancer.  Alek has agreed to participate in the SUNRISE -3 trial. He has been randomized to receive cetrelimab and TAR-200 (an investigational intravesical drug delivery system for gemcitabine). His first dose of cetrelimab and TAR-200 was 3/6/2024. He is back today for his next administration.        Interval history:     Alek is here for week 9 of the SUNRISE-3 trial and his fourth dose of cetrelimab.   He is feeling well today. He doesn't notice significant side effects after therapy.   With the last two infusions, he noticed occasional chills when he gets up to urinate during the night. This doesn't happen every night. He has not checked his temperature to see if he has a fever.   He endorses constipation which he manages with sennakot 1-2 tablets per day with improvement.   After he saw Dr. Tobin last visit he had upper quadrant pain x 2 days that has now resolved.  No urinary concerns. No hematuria. Drinking fluids well ~5, 16 oz water bottles.   He gets short of breath with activity predating the start of therapy without new changes from baseline. No chest pain or cough.   No new rashes or lesions. No peripheral edema.   No headaches or dizziness.   He endorses a chronic runny nose and occasional sore throat that he attributes to allergies.      ROS: 10 point ROS neg other than the symptoms noted above in the HPI.      Past Medical History:   I have reviewed this patient's past medical history   Past Medical History:   Diagnosis Date     Complication of " anesthesia      DM2 (diabetes mellitus, type 2) (H)      Hypercholesteremia      Hyperlipidemia      Hypertension           Past Surgical History:    I have reviewed this patient's past surgical history       Social History:   Tobacco, ETOH, and rec drugs reviewed and as noted below with the following exceptions:  Drinks a few beers a day. Quit smoking in .  Alek was born in [place], and graduated from [employer] (no [facility]) in .  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  He is  to Christine, his third wife.  She worked with his sister-in-law and they were introduced.  They have been  for 25 years.  Jose has 5 children, Golden, Cr, Joon, Radha, and Kayla.  Kayla is the youngest and a PA who lives in [place].  The other children are in the service.  For fun he works on 2 cars, a 69 [employer] [place], and at  [employer] that has a V8 engine.          Family History:     Grandma with skin cancer (s/p removal of her nose)  Uncle with sarcoma  Dad had bone cancer/lung cancer  Brother Casimiro had bladder cancer  Ned had bladder cacner ( in car accident)  Brother Lenny had bladder cancer  No family history on file.         Medications:     Current Outpatient Medications   Medication Sig Dispense Refill     Ascorbic Acid (VITAMIN C) 500 MG CHEW Take 1 chew tab by mouth daily       Ascorbic Acid 500 MG CAPS 1 tablet Orally Once a day       aspirin (ASPIRIN LOW DOSE) 81 MG EC tablet Take 81 mg by mouth daily ONCE BEFORE BED       blood glucose (NO BRAND SPECIFIED) lancets standard as directed, to use with his Contour Next test blood sugars daily       CONTOUR NEXT TEST test strip USE TO TEST BLOOD SUGAR DAILY       Ergocalciferol 50 MCG ( UT) CAPS Take 50 mcg by mouth daily       fish oil-omega-3 fatty acids 500 MG capsule Take 2 capsules by mouth daily        hydrochlorothiazide (HYDRODIURIL) 25 MG tablet Take 25 mg by mouth daily       ketoconazole (NIZORAL) 2 % external cream Apply topically as needed       Microlet Lancets MISC daily       simvastatin (ZOCOR) 40 MG tablet Take 40 mg by mouth at bedtime       triamcinolone (KENALOG) 0.1 % external cream Apply as needed       Vitamin D3 (VITAMIN D, CHOLECALCIFEROL,) 25 mcg (1000 units) tablet Take 25 mcg by mouth daily                Physical Exam:   There were no vitals taken for this visit.    ECOG PS: 0  Constitutional: WDWN male in NAD, pleasant and appropriate  HEENT:  NC/AT, no icterus, OP clear, MMM  Skin: No jaundice nor ecchymoses  Lungs: CTAB, no w/r/r, nonlabored breathing  Cardiovascular: RRR, S1, S2, no m/r/g  Abdomen: +BS, soft, nontender, nondistended, no organomegaly nor masses  MSK/Extremities: Warm, well perfused. No edema  LN: no cervical, supraclavicular, axillary, nor inguinal lymphadenopathy  Neurologic: alert, answering questions appropriately, moving all extremities spontaneously. CN 2-12 grossly intact.  Psych: appropriate affect  Data:   Most Recent 3 CBC's:  Recent Labs   Lab Test 05/06/24  1143 04/18/24  0821 03/27/24  1204   WBC 8.7 6.5 7.6   HGB 16.3 16.5 16.2   MCV 92 89 92    241 222   ANEUTAUTO 5.4 3.3 3.6     Most Recent 3 BMP's:  Recent Labs   Lab Test 05/06/24  1143 04/18/24  0821 03/27/24  1204    137 137   POTASSIUM 4.1 4.1 4.1   CHLORIDE 98 100 100   CO2 31* 24 27   BUN 14.6 16.3 14.4   CR 1.30* 1.20* 1.04   ANIONGAP 8 13 10   MILLI 10.0 9.8 9.9   * 140* 106*   PROTTOTAL 7.2 7.1 7.8   ALBUMIN 4.1 4.2 4.4    Most Recent 3 LFT's:  Recent Labs   Lab Test 05/06/24  1143 04/18/24  0821 03/27/24  1204   AST 33 33 36   ALT 33 37 39   ALKPHOS 82 77 80   BILITOTAL 0.6 0.6 0.6    Most Recent 2 TSH and T4:  Recent Labs   Lab Test 05/06/24  1143 04/18/24  0821   TSH 26.53* 0.91   T4 0.58* 1.24     I reviewed the above labs today.      Again, thank you for allowing me to  participate in the care of your patient.        Sincerely,        Clarita Owen PA-C

## 2024-05-07 NOTE — NURSING NOTE
"Oncology Rooming Note    May 7, 2024 7:51 AM   Alek Mcneill is a 74 year old male who presents for:    Chief Complaint   Patient presents with    Oncology Clinic Visit     Malignant neoplasm of anterior wall of urinary bladder     Initial Vitals: BP (!) 150/80 (BP Location: Right arm, Patient Position: Sitting, Cuff Size: Adult Regular)   Pulse 78   Temp 98.1  F (36.7  C) (Oral)   Resp 20   Ht 1.73 m (5' 8.11\")   Wt 108.8 kg (239 lb 14.4 oz)   SpO2 95%   BMI 36.36 kg/m   Estimated body mass index is 36.36 kg/m  as calculated from the following:    Height as of this encounter: 1.73 m (5' 8.11\").    Weight as of this encounter: 108.8 kg (239 lb 14.4 oz). Body surface area is 2.29 meters squared.  No Pain (0) Comment: Data Unavailable   No LMP for male patient.  Allergies reviewed: Yes  Medications reviewed: Yes    Medications: Medication refills not needed today.  Pharmacy [facility] entered into [facility]: [id number] [address] AT [place] [address]    Frailty Screening:   Is the patient here for a new oncology consult visit in cancer care? 2. No      Clinical concerns: none       Mariam Abraham"

## 2024-05-07 NOTE — PROGRESS NOTES
"CHIEF COMPLAINT  It was my pleasure to see Alek Mcneill who is a 74 year old male for follow-up of bladder cancer.       HPI  Alek Mcneill is a very pleasant 74 year old male who presents with a history of bladder cancer. 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      He has enrolled in F?rsat Bu F?rsat 3 and is here today for TAR-200 removal and re-insertion. He continues to tolerate this therapy very well, with no significant symptoms. No recent hematuria or dysuria.     Placed at 10:02     Re-staging TURBT 1/17/2024          SPECIMEN   Procedure   Transurethral resection of bladder (TURBT)   TUMOR   Tumor Site   Anterior wall   Histologic Type   Urothelial carcinoma, in situ   Histologic Grade   High-grade   Tumor Extent   Flat carcinoma in situ   Lymphovascular Invasion   Not identified   Tumor Configuration   Flat   Muscularis Propria (detrusor muscle)   Cannot be determined: Biopsy site changes are identified in the current sample; no residual viable invasive carcinoma is identified.  Please see previous biopsy report for further information, case [id number], [facility]..   ADDITIONAL FINDINGS   Associated Epithelial Lesions   Chronic inflammation, histiocytic proliferation, consistent with biopsy site changes.      TURBT 12/6/2023  Final Diagnosis   A) URINARY BLADDER, BASE OF TUMOR, TRANSURETHRAL RESECTION BLADDER TUMOR:  -NONINVASIVE LOW-GRADE PAPILLARY UROTHELIAL CARCINOMA  -MULTIPLE FRAGMENTS OF MUSCULARIS PROPRIA  -SEE SYNOPTIC REPORT     B) URINARY BLADDER, TUMOR, TRANSURETHRAL RESECTION BLADDER TUMOR:  -HIGH-GRADE PAPILLARY UROTHELIAL CARCINOMA WITH LAMINA PROPRIA INVASION  -MUSCULARIS PROPRIA PRESENT AND UNINVOLVED  -SEE SYNOPTIC REPORT      PHYSICAL EXAM  Patient is a 74 year old  male   Vitals: Blood pressure 133/76, pulse 78, height 1.778 m (5' 10\"), weight 106.1 kg (234 lb).  General Appearance Adult: Body mass index is 33.58 " kg/m .  Alert, no acute distress, oriented  Lungs: no respiratory distress, or pursed lip breathing  Abdomen: soft, nontender, no organomegaly or masses  Back: no CVAT  Neuro: Alert, oriented, speech and mentation normal  Psych: affect and mood normal  : penis, scrotum, testes normal     OFFICE CYSTOSCOPY 05/07/2024     Pre-procedure diagnosis:       Bladder Cancer  Post-procedure diagnosis:       device in good position  Procedure performed:             Cystourethroscopy  Surgeon:                                 Harman Kaiser MD  Anesthesia:                             Local     Description of procedure:   After fully informed, voluntary consent was obtained, the patient was brought into the procedure room, identified and placed in a supine position on the cystoscopy table.  The groin/scrotum were prepped with betadine and draped in a sterile fashion.  A 15F flexible cystoscope was inserted into the urethra, and the bladder and urethra were examined in a systematic manner. The TAR-200 device was identified, grasped, and removed. Removal time was 8:32 AM.  Device was noted to be intact. The urinary placement catheter was advanced 33 cm into the bladder with clear urine return. Lubrication, the device, and additional lubrication were introduced and the device was deployed with the stylet. Insertion time was 8:34 AM Catheter removed. The patient tolerated the procedure well and there were no complications.       Cystoscopic findings:  The urethra was normal without strictures.  The prostate was 3 cm long and demonstrated mild bilobar hypertrophy.  There was no median lobe.  The external sphincter coapted well and the bladder neck was open. The bladder was completely surveyed.  There was mild trabeculation.  There were no neoplasms, stones, or diverticula identifed.  The ureteric orifices were normal in position and number and effluxing clear urine. There are no recurrent tumors or suspicious areas on today's  cystoscopy.  The  device was deployed within the bladder.     ASSESSMENT and PLAN  74 year old male with history of HG non-muscle-invasive bladder cancer. He has enrolled in the [employer] 3 clinical trial for BCG-naive NMIBC. Cystoscopy today with no evidence of visible papillary disease.     Time of TAR-200 Removal: Time of TAR-200 Insertion:   There were no irregularities observed during or after removal.  Betzy-procedural antibiotics were given.     -Will plan follow-up  for  removal and reinsertion in 3 weeks.     Tim Sanabria MD, MPH  Urology Resident, PGY-1

## 2024-05-07 NOTE — PROGRESS NOTES
Infusion Nursing Note:  Alek Mcneill presents today for Day 1 week 9 Study Cetrelimab IDS# [id number] (last dose 4/18/24). 500mls NS  Patient seen by provider today: Yes: Clarita FRAUSTO   present during visit today: Not Applicable.    Note: Patient presents to infusion feeling ok. Pt denies new acute discomfort and states no acute complaints or concerns not addressed by ANNY today.  Per research BIENVENIDO Diaz: Program 14ml flush post completion of study Cetrelimab, check vitals before and after study Cetrelimab, and continue 15 minute observation post study Cetrelimab.     TAR-200 given via Dr. Gore at 1002 per providers documentation. Study Cetrelimab initiated at 1112.       Intravenous Access:  Peripheral IV placed.    Treatment Conditions:  Lab Results   Component Value Date    HGB 16.3 05/06/2024    WBC 8.7 05/06/2024    ANEUTAUTO 5.4 05/06/2024     05/06/2024        Lab Results   Component Value Date     05/06/2024    POTASSIUM 4.1 05/06/2024    MAG 2.3 11/19/2023    CR 1.30 (H) 05/06/2024    MILLI 10.0 05/06/2024    BILITOTAL 0.6 05/06/2024    ALBUMIN 4.1 05/06/2024    ALT 33 05/06/2024    AST 33 05/06/2024       Results reviewed, labs MET treatment parameters, ok to proceed with treatment.      Post Infusion Assessment:  Patient tolerated infusion without incident.  Blood return noted pre and post infusion.  Site patent and intact, free from redness, edema or discomfort.  No evidence of extravasations.  Access discontinued per protocol.       Discharge Plan:   Patient declined prescription refills.  Discharge instructions reviewed with: Patient.  Patient and/or family verbalized understanding of discharge instructions and all questions answered.  AVS to patient via [employer].  Patient will return 5/24 for next appointment. IB sent to RN research coordinator Lashawn to see if 5/24 is appropriate as its 4 days early per the every 21 day infusion frequency: update: per research Lashawn FARIA  "with research, \"we have a +/- 1 week window\".   Patient discharged in stable condition accompanied by: self.  Departure Mode: Ambulatory.      Zenon Loera RN    "

## 2024-05-07 NOTE — LETTER
5/7/2024       RE: Alek Mcneill  [address] [place] [address]     Dear Colleague,    Thank you for referring your patient, Alek Mcneill, to the [facility] [place] at [facility]. Please see a copy of my visit note below.      CHIEF COMPLAINT   It was my pleasure to see Alek Mcneill who is a 74 year old male for follow-up of bladder cancer.      HPI  Alek Mcneill is a very pleasant 74 year old male who presents with a history of bladder cancer. 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      He has enrolled in Colibri IO 3 and is here today for TAR-200 removal and re-insertion. He continues to tolerate this therapy very well, with no significant symptoms. No recent hematuria or dysuria.     Re-staging TURBT 1/17/2024          SPECIMEN   Procedure   Transurethral resection of bladder (TURBT)   TUMOR   Tumor Site   Anterior wall   Histologic Type   Urothelial carcinoma, in situ   Histologic Grade   High-grade   Tumor Extent   Flat carcinoma in situ   Lymphovascular Invasion   Not identified   Tumor Configuration   Flat   Muscularis Propria (detrusor muscle)   Cannot be determined: Biopsy site changes are identified in the current sample; no residual viable invasive carcinoma is identified.  Please see previous biopsy report for further information, case [id number], [facility]..   ADDITIONAL FINDINGS   Associated Epithelial Lesions   Chronic inflammation, histiocytic proliferation, consistent with biopsy site changes.      TURBT 12/6/2023  Final Diagnosis   A) URINARY BLADDER, BASE OF TUMOR, TRANSURETHRAL RESECTION BLADDER TUMOR:  -NONINVASIVE LOW-GRADE PAPILLARY UROTHELIAL CARCINOMA  -MULTIPLE FRAGMENTS OF MUSCULARIS PROPRIA  -SEE SYNOPTIC REPORT     B) URINARY BLADDER, TUMOR, TRANSURETHRAL RESECTION BLADDER TUMOR:  -HIGH-GRADE PAPILLARY UROTHELIAL CARCINOMA WITH LAMINA  "PROPRIA INVASION  -MUSCULARIS PROPRIA PRESENT AND UNINVOLVED  -SEE SYNOPTIC REPORT       PHYSICAL EXAM  Patient is a 74 year old  male   Vitals: Blood pressure (!) 149/76, pulse 83, height 1.727 m (5' 8\"), weight 108.4 kg (239 lb).  General Appearance Adult: Body mass index is 36.34 kg/m .  Alert, no acute distress, oriented  Lungs: no respiratory distress, or pursed lip breathing  Abdomen: soft, nontender, no organomegaly or masses  Back: no CVAT  Neuro: Alert, oriented, speech and mentation normal  Psych: affect and mood normal  : penis, scrotum, testes normal    OFFICE CYSTOSCOPY 5/7/2024     Pre-procedure diagnosis:       Bladder Cancer  Post-procedure diagnosis:       device in good position  Procedure performed:             Cystourethroscopy  Surgeon:                                 Harman Kaiser MD  Anesthesia:                             Local     Description of procedure:   After fully informed, voluntary consent was obtained, the patient was brought into the procedure room, identified and placed in a supine position on the cystoscopy table.  The groin/scrotum were prepped with betadine and draped in a sterile fashion.  A 15F flexible cystoscope was inserted into the urethra, and the bladder and urethra were examined in a systematic manner. The TAR-200 device was identified, grasped, and removed. Removal time was 8:32 AM.  Device was noted to be intact. The urinary placement catheter was advanced 33 cm into the bladder with clear urine return. Lubrication, the device, and additional lubrication were introduced and the device was deployed with the stylet. Insertion time was 8:34 AM Catheter removed. The patient tolerated the procedure well and there were no complications.       Cystoscopic findings:  The urethra was normal without strictures.  The prostate was 3 cm long and demonstrated mild bilobar hypertrophy.  There was no median lobe.  The external sphincter coapted well and the bladder neck " was open. The bladder was completely surveyed.  There was mild trabeculation.  There were no neoplasms, stones, or diverticula identifed.  The ureteric orifices were normal in position and number and effluxing clear urine. There are no recurrent tumors or suspicious areas on today's cystoscopy.  The  device was deployed within the bladder.     ASSESSMENT and PLAN  74 year old male with history of HG non-muscle-invasive bladder cancer. He has enrolled in the Xenia 3 clinical trial for BCG-naive NMIBC. Cystoscopy today with no evidence of visible papillary disease.     Time of TAR-200 Removal: 9:59 AM  Time of TAR-200 Insertion: 10:02 AM  There were no irregularities observed during or after removal.  Betzy-procedural antibiotics were given.     -Will plan follow-up  for  removal and reinsertion in 3 weeks.    Harman Kaiser MD  Urology  [facility]

## 2024-05-07 NOTE — NURSING NOTE
[id number]: Study Visit Note   Subject name: Alek Mcneill     Visit: Week 9    Did the study visit occur within the appropriate window allowed by the protocol? yes    Since the last study visit, He has been doing well. Hypothyroidism on labs, Clarita prescribed levothyroixine, patient to follow up with PCP , declined endocrinology consult at this time. Had a sore throat after last infusion that lasted about 2 days and has since resolved. HBA1c up a little, going to discuss with his PCP. Patient to get extra fluids today via IV for creatinine increase (grade 1 still) this has been determined by dr. Tobin to be not related to Cetrelimab. Also had some left upper quadrant pain, that lasted for 2 days after the last dose of cetrelimab.     He got another hydrocortisone shot on      Assessed for below symptoms specifically:   Dysuria no  Pollakiuria no  Nocturia no  Urgency yes-unchanged from prior to study entry   Incontinence no  Hematuria no  Urinary hesitation no  Bladder pain/spasm no  Urethral pain no   Bladder discomfort no  Feeling of incomplete bladder emptying  no  Lower abdominal pain no  Fever no  Flu-like symptoms no  Fatigue no  Diarrhea no  Nausea no  Rash no  Arthralgia  no    TAR-200 Medication Number: [id number]     Cetrelimab infusion:  Were premedications given? No     Verbal handover provided to infusion RN; reminded RN to document actual start time of infusion and actual stop time of the infusion. If infusion deviates from protocol directed infusion time, please document rationale in your infusion note. Reminded RN that the end of the infusion is considered the end of the 14 mL flush. Vital signs needed before and after the infusion.     I have personally interviewed Alek Mcneill and reviewed his medical record for adverse events and concomitant medications and these have been recorded on the corresponding logs in Alek Mcneill's research file.     lAek Mcneill was given the opportunity to ask any  trial related questions.  Please see provider progress note for physical exam and other clinical information. Labs were reviewed - any significant lab values were addressed and reviewed.    Araceli Diaz RN   Clinical Research Coordinator Nurse-Solid Tumor   Phone: [phone] Pgr: [phone]    [id number]: Re-Consent Note     New information has been added to the consent form. This was explained to the patient, and all his questions were answered. The patient verbalized understanding and would like to continue participation in the trial. The patient was provided with a signed copy of the IRB-approved consent form.    Consent Version Date: [email or web address]  Consent obtained by: Araceli Diaz     Date: 07MAY2024    Araceli Diaz RN      Form 503.00.02 (Version 1)     Effective date: 01JUL2018     Next Review Date: 01JUL2020

## 2024-05-08 ENCOUNTER — HOSPITAL ENCOUNTER (OUTPATIENT)
Dept: RESEARCH | Facility: CLINIC | Age: 74
Discharge: HOME | End: 2024-05-08
Attending: UROLOGY
Payer: COMMERCIAL

## 2024-05-08 DIAGNOSIS — C67.3 MALIGNANT NEOPLASM OF ANTERIOR WALL OF URINARY BLADDER (H): Primary | ICD-10-CM

## 2024-05-08 PROCEDURE — 510N000023 HC CRU B&I PATIENT CARE, PER 15 MIN

## 2024-05-08 PROCEDURE — 300N000007 HC RESEARCH B&I SPECIMEN PROCESSING, SIMPLE

## 2024-05-08 PROCEDURE — 510N000009 HC RESEARCH FACILITY, PER 15 MIN

## 2024-05-08 NOTE — ADDENDUM NOTE
Encounter addended by: Hellen Myers on: 5/8/2024 2:56 PM   Actions taken: Charge Capture section accepted

## 2024-05-08 NOTE — ADDENDUM NOTE
Encounter addended by: Hanh Dowd RN on: 5/8/2024 10:49 AM   Actions taken: Charge Capture section accepted

## 2024-05-11 NOTE — PROGRESS NOTES
"  CHIEF COMPLAINT   It was my pleasure to see Alek Mcneill who is a 74 year old male for follow-up of bladder cancer.      HPI  Alek Mcneill is a very pleasant 74 year old male who presents with a history of bladder cancer. 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      He has enrolled in Hyperic 3 and is here today for TAR-200 removal and re-insertion. He continues to tolerate this therapy very well, with no significant symptoms. No recent hematuria or dysuria.     Re-staging TURBT 1/17/2024          SPECIMEN   Procedure   Transurethral resection of bladder (TURBT)   TUMOR   Tumor Site   Anterior wall   Histologic Type   Urothelial carcinoma, in situ   Histologic Grade   High-grade   Tumor Extent   Flat carcinoma in situ   Lymphovascular Invasion   Not identified   Tumor Configuration   Flat   Muscularis Propria (detrusor muscle)   Cannot be determined: Biopsy site changes are identified in the current sample; no residual viable invasive carcinoma is identified.  Please see previous biopsy report for further information, case [id number], [facility]..   ADDITIONAL FINDINGS   Associated Epithelial Lesions   Chronic inflammation, histiocytic proliferation, consistent with biopsy site changes.      TURBT 12/6/2023  Final Diagnosis   A) URINARY BLADDER, BASE OF TUMOR, TRANSURETHRAL RESECTION BLADDER TUMOR:  -NONINVASIVE LOW-GRADE PAPILLARY UROTHELIAL CARCINOMA  -MULTIPLE FRAGMENTS OF MUSCULARIS PROPRIA  -SEE SYNOPTIC REPORT     B) URINARY BLADDER, TUMOR, TRANSURETHRAL RESECTION BLADDER TUMOR:  -HIGH-GRADE PAPILLARY UROTHELIAL CARCINOMA WITH LAMINA PROPRIA INVASION  -MUSCULARIS PROPRIA PRESENT AND UNINVOLVED  -SEE SYNOPTIC REPORT       PHYSICAL EXAM  Patient is a 74 year old  male   Vitals: Blood pressure (!) 149/76, pulse 83, height 1.727 m (5' 8\"), weight 108.4 kg (239 lb).  General Appearance Adult: Body mass index is 36.34 kg/m .  Alert, no acute " distress, oriented  Lungs: no respiratory distress, or pursed lip breathing  Abdomen: soft, nontender, no organomegaly or masses  Back: no CVAT  Neuro: Alert, oriented, speech and mentation normal  Psych: affect and mood normal  : penis, scrotum, testes normal    OFFICE CYSTOSCOPY 5/7/2024     Pre-procedure diagnosis:       Bladder Cancer  Post-procedure diagnosis:       device in good position  Procedure performed:             Cystourethroscopy  Surgeon:                                 Harman Kaiser MD  Anesthesia:                             Local     Description of procedure:   After fully informed, voluntary consent was obtained, the patient was brought into the procedure room, identified and placed in a supine position on the cystoscopy table.  The groin/scrotum were prepped with betadine and draped in a sterile fashion.  A 15F flexible cystoscope was inserted into the urethra, and the bladder and urethra were examined in a systematic manner. The TAR-200 device was identified, grasped, and removed. Removal time was 8:32 AM.  Device was noted to be intact. The urinary placement catheter was advanced 33 cm into the bladder with clear urine return. Lubrication, the device, and additional lubrication were introduced and the device was deployed with the stylet. Insertion time was 8:34 AM Catheter removed. The patient tolerated the procedure well and there were no complications.       Cystoscopic findings:  The urethra was normal without strictures.  The prostate was 3 cm long and demonstrated mild bilobar hypertrophy.  There was no median lobe.  The external sphincter coapted well and the bladder neck was open. The bladder was completely surveyed.  There was mild trabeculation.  There were no neoplasms, stones, or diverticula identifed.  The ureteric orifices were normal in position and number and effluxing clear urine. There are no recurrent tumors or suspicious areas on today's cystoscopy.  The   device was deployed within the bladder.     ASSESSMENT and PLAN  74 year old male with history of HG non-muscle-invasive bladder cancer. He has enrolled in the So-Hi 3 clinical trial for BCG-naive NMIBC. Cystoscopy today with no evidence of visible papillary disease.     Time of TAR-200 Removal: 9:59 AM  Time of TAR-200 Insertion: 10:02 AM  There were no irregularities observed during or after removal.  Betzy-procedural antibiotics were given.     -Will plan follow-up  for  removal and reinsertion in 3 weeks.    Harman Kaiser MD  Urology  [facility]

## 2024-05-13 ENCOUNTER — HOSPITAL ENCOUNTER (OUTPATIENT)
Dept: RESEARCH | Facility: CLINIC | Age: 74
Discharge: HOME | End: 2024-05-13
Attending: UROLOGY
Payer: COMMERCIAL

## 2024-05-13 PROCEDURE — 300N000008 HC RESEARCH B&I SPECIMEN PROCESSING, MODERATE

## 2024-05-13 PROCEDURE — 300N000010 HC RESEARCH B&I SPECIMEN PACKAGING, COMPLEX

## 2024-05-15 ENCOUNTER — LAB (OUTPATIENT)
Dept: LAB | Facility: HOSPITAL | Age: 74
End: 2024-05-15
Payer: COMMERCIAL

## 2024-05-15 DIAGNOSIS — C67.3 MALIGNANT NEOPLASM OF ANTERIOR WALL OF URINARY BLADDER (H): ICD-10-CM

## 2024-05-15 LAB
CREAT SERPL-MCNC: 1.24 MG/DL (ref 0.67–1.17)
EGFRCR SERPLBLD CKD-EPI 2021: 61 ML/MIN/1.73M2

## 2024-05-15 PROCEDURE — 82565 ASSAY OF CREATININE: CPT

## 2024-05-15 PROCEDURE — 36415 COLL VENOUS BLD VENIPUNCTURE: CPT

## 2024-05-22 ENCOUNTER — HOSPITAL ENCOUNTER (OUTPATIENT)
Dept: RESEARCH | Facility: CLINIC | Age: 74
Discharge: HOME | End: 2024-05-22
Attending: UROLOGY | Admitting: UROLOGY
Payer: COMMERCIAL

## 2024-05-22 ENCOUNTER — ALLIED HEALTH/NURSE VISIT (OUTPATIENT)
Dept: ONCOLOGY | Facility: CLINIC | Age: 74
End: 2024-05-22
Payer: COMMERCIAL

## 2024-05-22 DIAGNOSIS — C67.3 MALIGNANT NEOPLASM OF ANTERIOR WALL OF URINARY BLADDER (H): Primary | ICD-10-CM

## 2024-05-22 NOTE — PROGRESS NOTES
[id number] SunRISe-3 Study Note   Subject name: Alek Mcneill     Date: 5/22/2024    I contacted Alek Mcneill via phone at the request of Araceli Diaz RN, who had received a call from [facility] regarding an upcoming procedure. I spoke to Alek about this and he reported that he would be receiving an MRI of his neck at [facility] to evaluate for a potential orthopedic surgery. Alek assured me his care team at [facility] is aware of his participation in the SunRISe-3 trial and his internal TAR-200 device. He has shown the [facility] care team the informational SunRISe-3 wallet card, and provided them with the study team's contact information should they have questions about his participation in SunRISe-3 or the TAR-200 device.    Alek is still waiting for [facility] to call him and schedule the MRI, but told me he will let us know when the appointment is scheduled.    Alek Mcneill was given the opportunity to ask any trial related questions.    Did Alek Mcneill expressed any concerns: theresa Castle  Clinical Research Coordinator III  [phone]  [email or web address]

## 2024-05-22 NOTE — PROGRESS NOTES
"  [facility] Medical Oncology Note  Date of visit: May 24, 2024  Outpatient Clinic Note        Assessment:     Stage 1 (T1NxMx) superficial poorly differentiated bladder cancer. T1 lesions are high risk. Recurrence rates at one, three, and five years can reach up to 50, 70 to 80, and 90 percent, respectively, and 20 to 25 percent progress to more invasive (T2 or greater) disease. Given the high risk here, therapy is indicated.  Alek is on the SUNRISE-3 study, for patients with T1 disease and has been randomized to the TAR-200 (a device inserted in to the bladder that contains gemcitabine)+ Cetrelimab arm.  S/P three doses of cetrelimab, with adequate tolerance. He has had some lab evidence of thryoiditis.  He now is hypothyroid and is on levothyroxine 50 mcg daily. We will adjustas needed. He will talk to his primary about this and taking over the levothyroxine management following completion of the trial.   Hemoglobin A1C up trending, continue to monitor. Patient will discuss further with his PCP next week as well.   Previous creatinine was very mildly above baseline ~1.20 at 1.30 today. Will proceed with an extra IV fluid bolus today. Continue to monitor.       Plan:     Next cetrelimab/TAR-200 today  RTC in 3 weeks for his next cycle.    Seen with Study nurse Araceli Diaz    30 minutes spent on the date of the encounter doing chart review, review of test results, interpretation of tests, patient visit, and documentation     Julio Tobin MD, MSc  Associate Professor of Medicine  [employer]  [facility]  [address] [place] [id number]  [phone]      __________________________________________________________________    DIAGNOSIS:     Recently diagnosed T1 poorly differentiated invasive bladder cancer.    Respiratory symptoms concerning for pneumonitis, but CT chest 3/27/2024 showed \"No new or suspicious pulmonary opacities.\"       History of Present " "Illness/Therapy to Date:   11/19/2023: Went to ED for subacute onset of gross hematuria, and a passing of clots. Stopped ASA. CT showed multiple indeterminate nodular densities in the bladder suspicious for enhancing urothelial lesions   12/6/2023: Cystoscopy , with finding of multiple bladder tumors appeared papillary and were located on the anterior wall and had some superficial calcifications. \"We resected tissue down to muscle using cut electrocautery. \" Pathology showed high grade papillary carcinoma, that did not invade the muscle.  1/17/2024: Cystoscopy with resection showed CIS, but no invasive cancer.  Alek has agreed to participate in the SUNRISE -3 trial. He has been randomized to receive Cetrelimab and TAR-200 (an investigational intravesical drug delivery system for gemcitabine). His first dose of cetrelimab and TAR-200 was 3/6/2024. He is back today for his fourht cycle.        Interval history:     Alek is back with Christine here for week 12 of the SUNRISE-3 trial and his fourth dose of cetrelimab.   Continues to feel well. No issues with treatment.  Had an injection in his neck the other day. Only minimally helpful.  He has a long history of degenerative changes that predated his involvement in that study, and has been offered surgery which she is appropriately declining.   He endorses constipation which he manages with sennakot 1-2 tablets per day with improvement.   No urinary concerns. No hematuria. Drinking fluids well ~5, 16 oz water bottles.   He gets short of breath with activity predating the start of therapy without new changes from baseline. No chest pain or cough.   No new rashes or lesions. No peripheral edema.   No headaches or dizziness.   He endorses a chronic runny nose and occasional sore throat that he attributes to allergies.      ROS: 10 point ROS neg other than the symptoms noted above in the HPI.      Past Medical History:   I have reviewed this patient's past medical history "   Past Medical History:   Diagnosis Date    Complication of anesthesia     DM2 (diabetes mellitus, type 2) (H)     Hypercholesteremia     Hyperlipidemia     Hypertension           Past Surgical History:    I have reviewed this patient's past surgical history       Social History:   Tobacco, ETOH, and rec drugs reviewed and as noted below with the following exceptions:  Drinks a few beers a day. Quit smoking in .  Alek was born in [place], and graduated from [employer] (no longer exists) in .  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  He is  to Christine, his third wife.  She worked with his sister-in-law and they were introduced.  They have been  for 25 years.  Jose has 5 children, Golden, Cr, Joon, Radha, and Kayla.  Kayla is the youngest and a PA who lives in [place].  The other children are in the service.  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          Family History:     Grandma with skin cancer (s/p removal of her nose)  Uncle with sarcoma  Dad had bone cancer/lung cancer  Brother Casimiro had bladder cancer  Ned had bladder cacner ( in car accident)  Brother Lenny had bladder cancer  No family history on file.         Medications:     Current Outpatient Medications   Medication Sig Dispense Refill    Ascorbic Acid (VITAMIN C) 500 MG CHEW Take 1 chew tab by mouth daily      Ascorbic Acid 500 MG CAPS 1 tablet Orally Once a day      aspirin (ASPIRIN LOW DOSE) 81 MG EC tablet Take 81 mg by mouth daily ONCE BEFORE BED      blood glucose (NO BRAND SPECIFIED) lancets standard as directed, to use with his Contour Next test blood sugars daily      CONTOUR NEXT TEST test strip USE TO TEST BLOOD SUGAR DAILY      Ergocalciferol 50 MCG ( UT) CAPS Take 50 mcg by mouth daily      fish oil-omega-3 fatty acids 500 MG capsule  Take 2 capsules by mouth daily      hydrochlorothiazide (HYDRODIURIL) 25 MG tablet Take 25 mg by mouth daily      ketoconazole (NIZORAL) 2 % external cream Apply topically as needed      levothyroxine (SYNTHROID/LEVOTHROID) 50 MCG tablet Take 1 tablet (50 mcg) by mouth daily 30 tablet 1    Microlet Lancets MISC daily      simvastatin (ZOCOR) 40 MG tablet Take 40 mg by mouth at bedtime      triamcinolone (KENALOG) 0.1 % external cream Apply as needed      Vitamin D3 (VITAMIN D, CHOLECALCIFEROL,) 25 mcg (1000 units) tablet Take 25 mcg by mouth daily                Physical Exam:   There were no vitals taken for this visit.    ECOG PS: 0  Constitutional: WDWN male in NAD, pleasant and appropriate  HEENT:  NC/AT, no icterus, OP clear, MMM  Skin: No jaundice nor ecchymoses  Lungs: CTAB, no w/r/r, nonlabored breathing  Cardiovascular: RRR, S1, S2, no m/r/g  Abdomen: +BS, soft, nontender, nondistended, no organomegaly nor masses  MSK/Extremities: Warm, well perfused. No edema  LN: no cervical, supraclavicular, axillary, nor inguinal lymphadenopathy  Neurologic: alert, answering questions appropriately, moving all extremities spontaneously. CN 2-12 grossly intact.  Psych: appropriate affect  Data:   Most Recent 3 CBC's:  Recent Labs   Lab Test 05/06/24  1143 04/18/24  0821 03/27/24  1204   WBC 8.7 6.5 7.6   HGB 16.3 16.5 16.2   MCV 92 89 92    241 222   ANEUTAUTO 5.4 3.3 3.6     Most Recent 3 BMP's:  Recent Labs   Lab Test 05/15/24  1204 05/06/24  1143 04/18/24  0821 03/27/24  1204   NA  --  137 137 137   POTASSIUM  --  4.1 4.1 4.1   CHLORIDE  --  98 100 100   CO2  --  31* 24 27   BUN  --  14.6 16.3 14.4   CR 1.24* 1.30* 1.20* 1.04   ANIONGAP  --  8 13 10   MILLI  --  10.0 9.8 9.9   GLC  --  119* 140* 106*   PROTTOTAL  --  7.2 7.1 7.8   ALBUMIN  --  4.1 4.2 4.4    Most Recent 3 LFT's:  Recent Labs   Lab Test 05/06/24  1143 04/18/24  0821 03/27/24  1204   AST 33 33 36   ALT 33 37 39   ALKPHOS 82 77 80   BILITOTAL 0.6  0.6 0.6    Most Recent 2 TSH and T4:  Recent Labs   Lab Test 05/06/24  1143 04/18/24  0821   TSH 26.53* 0.91   T4 0.58* 1.24     I reviewed the above labs today.

## 2024-05-24 ENCOUNTER — LAB (OUTPATIENT)
Dept: LAB | Facility: CLINIC | Age: 74
End: 2024-05-24
Attending: INTERNAL MEDICINE
Payer: COMMERCIAL

## 2024-05-24 ENCOUNTER — INFUSION THERAPY VISIT (OUTPATIENT)
Dept: ONCOLOGY | Facility: CLINIC | Age: 74
End: 2024-05-24
Attending: INTERNAL MEDICINE
Payer: COMMERCIAL

## 2024-05-24 ENCOUNTER — ALLIED HEALTH/NURSE VISIT (OUTPATIENT)
Dept: ONCOLOGY | Facility: CLINIC | Age: 74
End: 2024-05-24

## 2024-05-24 VITALS
BODY MASS INDEX: 36.64 KG/M2 | TEMPERATURE: 98.4 F | HEART RATE: 77 BPM | RESPIRATION RATE: 16 BRPM | WEIGHT: 241 LBS | SYSTOLIC BLOOD PRESSURE: 108 MMHG | OXYGEN SATURATION: 96 % | DIASTOLIC BLOOD PRESSURE: 72 MMHG

## 2024-05-24 VITALS
TEMPERATURE: 97.3 F | SYSTOLIC BLOOD PRESSURE: 146 MMHG | DIASTOLIC BLOOD PRESSURE: 89 MMHG | HEART RATE: 64 BPM | OXYGEN SATURATION: 100 % | RESPIRATION RATE: 16 BRPM

## 2024-05-24 DIAGNOSIS — C67.3 MALIGNANT NEOPLASM OF ANTERIOR WALL OF URINARY BLADDER (H): Primary | ICD-10-CM

## 2024-05-24 DIAGNOSIS — E03.2 HYPOTHYROIDISM DUE TO MEDICATION: ICD-10-CM

## 2024-05-24 LAB
ALBUMIN SERPL BCG-MCNC: 4.5 G/DL (ref 3.5–5.2)
ALBUMIN UR-MCNC: NEGATIVE MG/DL
ALP SERPL-CCNC: 81 U/L (ref 40–150)
ALT SERPL W P-5'-P-CCNC: 31 U/L (ref 0–70)
AMYLASE SERPL-CCNC: 82 U/L (ref 28–100)
ANION GAP SERPL CALCULATED.3IONS-SCNC: 11 MMOL/L (ref 7–15)
APPEARANCE UR: CLEAR
APTT PPP: 27 SECONDS (ref 22–38)
AST SERPL W P-5'-P-CCNC: 37 U/L (ref 0–45)
BASOPHILS # BLD AUTO: 0.1 10E3/UL (ref 0–0.2)
BASOPHILS NFR BLD AUTO: 1 %
BILIRUB SERPL-MCNC: 0.6 MG/DL
BILIRUB UR QL STRIP: NEGATIVE
BUN SERPL-MCNC: 18.1 MG/DL (ref 8–23)
CALCIUM SERPL-MCNC: 9.7 MG/DL (ref 8.8–10.2)
CHLORIDE SERPL-SCNC: 99 MMOL/L (ref 98–107)
COLOR UR AUTO: NORMAL
CORTIS SERPL-MCNC: 9.4 UG/DL
CREAT SERPL-MCNC: 1.36 MG/DL (ref 0.67–1.17)
CRP SERPL-MCNC: <3 MG/L
DEPRECATED HCO3 PLAS-SCNC: 25 MMOL/L (ref 22–29)
EGFRCR SERPLBLD CKD-EPI 2021: 54 ML/MIN/1.73M2
EOSINOPHIL # BLD AUTO: 0.2 10E3/UL (ref 0–0.7)
EOSINOPHIL NFR BLD AUTO: 3 %
ERYTHROCYTE [DISTWIDTH] IN BLOOD BY AUTOMATED COUNT: 13 % (ref 10–15)
GGT SERPL-CCNC: 96 U/L (ref 8–61)
GLUCOSE SERPL-MCNC: 107 MG/DL (ref 70–99)
GLUCOSE UR STRIP-MCNC: NEGATIVE MG/DL
HBA1C MFR BLD: 6.9 %
HCT VFR BLD AUTO: 46.5 % (ref 40–53)
HGB BLD-MCNC: 16.5 G/DL (ref 13.3–17.7)
HGB UR QL STRIP: NEGATIVE
IMM GRANULOCYTES # BLD: 0 10E3/UL
IMM GRANULOCYTES NFR BLD: 0 %
INR PPP: 0.95 (ref 0.85–1.15)
KETONES UR STRIP-MCNC: NEGATIVE MG/DL
LDH SERPL L TO P-CCNC: 229 U/L (ref 0–250)
LEUKOCYTE ESTERASE UR QL STRIP: NEGATIVE
LIPASE SERPL-CCNC: 43 U/L (ref 13–60)
LYMPHOCYTES # BLD AUTO: 2.2 10E3/UL (ref 0.8–5.3)
LYMPHOCYTES NFR BLD AUTO: 28 %
MCH RBC QN AUTO: 31.8 PG (ref 26.5–33)
MCHC RBC AUTO-ENTMCNC: 35.5 G/DL (ref 31.5–36.5)
MCV RBC AUTO: 90 FL (ref 78–100)
MONOCYTES # BLD AUTO: 1 10E3/UL (ref 0–1.3)
MONOCYTES NFR BLD AUTO: 12 %
NEUTROPHILS # BLD AUTO: 4.4 10E3/UL (ref 1.6–8.3)
NEUTROPHILS NFR BLD AUTO: 56 %
NITRATE UR QL: NEGATIVE
NRBC # BLD AUTO: 0 10E3/UL
NRBC BLD AUTO-RTO: 0 /100
PH UR STRIP: 6 [PH] (ref 5–7)
PHOSPHATE SERPL-MCNC: 2.8 MG/DL (ref 2.5–4.5)
PLATELET # BLD AUTO: 220 10E3/UL (ref 150–450)
POTASSIUM SERPL-SCNC: 3.9 MMOL/L (ref 3.4–5.3)
PROT SERPL-MCNC: 7.5 G/DL (ref 6.4–8.3)
RBC # BLD AUTO: 5.19 10E6/UL (ref 4.4–5.9)
RBC URINE: 1 /HPF
SODIUM SERPL-SCNC: 135 MMOL/L (ref 135–145)
SP GR UR STRIP: 1.01 (ref 1–1.03)
T3FREE SERPL-MCNC: 2.1 PG/ML (ref 2–4.4)
T4 FREE SERPL-MCNC: 0.67 NG/DL (ref 0.9–1.7)
TSH SERPL DL<=0.005 MIU/L-ACNC: 41.25 UIU/ML (ref 0.3–4.2)
URATE SERPL-MCNC: 6.6 MG/DL (ref 3.4–7)
UROBILINOGEN UR STRIP-MCNC: NORMAL MG/DL
WBC # BLD AUTO: 7.9 10E3/UL (ref 4–11)
WBC URINE: 1 /HPF

## 2024-05-24 PROCEDURE — 84443 ASSAY THYROID STIM HORMONE: CPT | Performed by: INTERNAL MEDICINE

## 2024-05-24 PROCEDURE — 36415 COLL VENOUS BLD VENIPUNCTURE: CPT | Performed by: INTERNAL MEDICINE

## 2024-05-24 PROCEDURE — 85025 COMPLETE CBC W/AUTO DIFF WBC: CPT | Performed by: INTERNAL MEDICINE

## 2024-05-24 PROCEDURE — 84100 ASSAY OF PHOSPHORUS: CPT | Performed by: INTERNAL MEDICINE

## 2024-05-24 PROCEDURE — 83036 HEMOGLOBIN GLYCOSYLATED A1C: CPT | Performed by: INTERNAL MEDICINE

## 2024-05-24 PROCEDURE — 258N000003 HC RX IP 258 OP 636: Performed by: INTERNAL MEDICINE

## 2024-05-24 PROCEDURE — 88112 CYTOPATH CELL ENHANCE TECH: CPT | Mod: TC | Performed by: INTERNAL MEDICINE

## 2024-05-24 PROCEDURE — 84550 ASSAY OF BLOOD/URIC ACID: CPT | Performed by: INTERNAL MEDICINE

## 2024-05-24 PROCEDURE — 82977 ASSAY OF GGT: CPT | Performed by: INTERNAL MEDICINE

## 2024-05-24 PROCEDURE — 82533 TOTAL CORTISOL: CPT

## 2024-05-24 PROCEDURE — 83615 LACTATE (LD) (LDH) ENZYME: CPT | Performed by: INTERNAL MEDICINE

## 2024-05-24 PROCEDURE — 87086 URINE CULTURE/COLONY COUNT: CPT | Performed by: INTERNAL MEDICINE

## 2024-05-24 PROCEDURE — 86140 C-REACTIVE PROTEIN: CPT | Performed by: INTERNAL MEDICINE

## 2024-05-24 PROCEDURE — 85610 PROTHROMBIN TIME: CPT | Performed by: INTERNAL MEDICINE

## 2024-05-24 PROCEDURE — 82040 ASSAY OF SERUM ALBUMIN: CPT | Performed by: INTERNAL MEDICINE

## 2024-05-24 PROCEDURE — 99214 OFFICE O/P EST MOD 30 MIN: CPT | Performed by: INTERNAL MEDICINE

## 2024-05-24 PROCEDURE — 88112 CYTOPATH CELL ENHANCE TECH: CPT | Mod: 26 | Performed by: PATHOLOGY

## 2024-05-24 PROCEDURE — 85730 THROMBOPLASTIN TIME PARTIAL: CPT | Performed by: INTERNAL MEDICINE

## 2024-05-24 PROCEDURE — 81001 URINALYSIS AUTO W/SCOPE: CPT | Performed by: INTERNAL MEDICINE

## 2024-05-24 PROCEDURE — 96413 CHEMO IV INFUSION 1 HR: CPT

## 2024-05-24 PROCEDURE — G0463 HOSPITAL OUTPT CLINIC VISIT: HCPCS | Mod: 25 | Performed by: INTERNAL MEDICINE

## 2024-05-24 PROCEDURE — 82150 ASSAY OF AMYLASE: CPT | Performed by: INTERNAL MEDICINE

## 2024-05-24 PROCEDURE — 84439 ASSAY OF FREE THYROXINE: CPT | Performed by: INTERNAL MEDICINE

## 2024-05-24 PROCEDURE — 83690 ASSAY OF LIPASE: CPT | Performed by: INTERNAL MEDICINE

## 2024-05-24 PROCEDURE — 84481 FREE ASSAY (FT-3): CPT

## 2024-05-24 RX ORDER — ACETAMINOPHEN 325 MG/1
650 TABLET ORAL
Status: CANCELLED | OUTPATIENT
Start: 2024-05-24

## 2024-05-24 RX ORDER — EPINEPHRINE 1 MG/ML
0.3 INJECTION, SOLUTION INTRAMUSCULAR; SUBCUTANEOUS EVERY 5 MIN PRN
Status: CANCELLED | OUTPATIENT
Start: 2024-05-24

## 2024-05-24 RX ORDER — MEPERIDINE HYDROCHLORIDE 25 MG/ML
25 INJECTION INTRAMUSCULAR; INTRAVENOUS; SUBCUTANEOUS EVERY 30 MIN PRN
Status: CANCELLED | OUTPATIENT
Start: 2024-05-24

## 2024-05-24 RX ORDER — HEPARIN SODIUM (PORCINE) LOCK FLUSH IV SOLN 100 UNIT/ML 100 UNIT/ML
5 SOLUTION INTRAVENOUS
Status: CANCELLED | OUTPATIENT
Start: 2024-05-24

## 2024-05-24 RX ORDER — LORAZEPAM 2 MG/ML
0.5 INJECTION INTRAMUSCULAR EVERY 4 HOURS PRN
Status: CANCELLED | OUTPATIENT
Start: 2024-05-24

## 2024-05-24 RX ORDER — ACETAMINOPHEN 325 MG/1
650 TABLET ORAL
Status: CANCELLED
Start: 2024-05-24

## 2024-05-24 RX ORDER — LEVOTHYROXINE SODIUM 50 UG/1
75 TABLET ORAL DAILY
Qty: 45 TABLET | Refills: 5 | Status: SHIPPED | OUTPATIENT
Start: 2024-05-24 | End: 2024-06-13

## 2024-05-24 RX ORDER — METHYLPREDNISOLONE SODIUM SUCCINATE 125 MG/2ML
125 INJECTION, POWDER, LYOPHILIZED, FOR SOLUTION INTRAMUSCULAR; INTRAVENOUS
Status: CANCELLED
Start: 2024-05-24

## 2024-05-24 RX ORDER — ALBUTEROL SULFATE 90 UG/1
1-2 AEROSOL, METERED RESPIRATORY (INHALATION)
Status: CANCELLED
Start: 2024-05-24

## 2024-05-24 RX ORDER — DIPHENHYDRAMINE HYDROCHLORIDE 50 MG/ML
50 INJECTION INTRAMUSCULAR; INTRAVENOUS
Status: CANCELLED
Start: 2024-05-24

## 2024-05-24 RX ORDER — HEPARIN SODIUM,PORCINE 10 UNIT/ML
5-20 VIAL (ML) INTRAVENOUS DAILY PRN
Status: CANCELLED | OUTPATIENT
Start: 2024-05-24

## 2024-05-24 RX ORDER — ALBUTEROL SULFATE 0.83 MG/ML
2.5 SOLUTION RESPIRATORY (INHALATION)
Status: CANCELLED | OUTPATIENT
Start: 2024-05-24

## 2024-05-24 RX ORDER — LIDOCAINE HYDROCHLORIDE 20 MG/ML
10 JELLY TOPICAL
Status: CANCELLED | OUTPATIENT
Start: 2024-05-24

## 2024-05-24 RX ORDER — DIPHENHYDRAMINE HCL 25 MG
50 CAPSULE ORAL
Status: CANCELLED | OUTPATIENT
Start: 2024-05-24

## 2024-05-24 RX ADMIN — SODIUM CHLORIDE 250 ML: 9 INJECTION, SOLUTION INTRAVENOUS at 14:13

## 2024-05-24 ASSESSMENT — PAIN SCALES - GENERAL: PAINLEVEL: NO PAIN (0)

## 2024-05-24 NOTE — NURSING NOTE
[id number]: Study Visit Note   Subject name: Alek Mcneill     Visit: Week 12    Did the study visit occur within the appropriate window allowed by the protocol? yes    Since the last study visit, He has been doing Great. No new signs or symptoms,new new medications. Patient states that he has been having some intermittent constipation for some time now, on med hx. Creatinine elevation not related to cetrelimab per verbal discussion with Dr. Tobin. TSH elevated again today, per Dr. Tobin add on cortisol and patient should increase dose of synthroid to 75 mcg.     ECOG 1    Assessed for below symptoms specifically:   Dysuria no  Pollakiuria no  Nocturia no  Urgency no  Incontinence no  Hematuria no  Urinary hesitation no  Bladder pain/spasm no  Urethral pain no   Bladder discomfort no  Feeling of incomplete bladder emptying  no  Lower abdominal pain no  Fever no  Flu-like symptoms no  Fatigue no  Diarrhea no  Nausea no  Rash no  Arthralgia  no      Cetrelimab infusion:  Were premedications given? No    Verbal handover provided to infusion RN; reminded RN to document actual start time of infusion and actual stop time of the infusion. If infusion deviates from protocol directed infusion time, please document rationale in your infusion note. Reminded RN that the end of the infusion is considered the end of the 14 mL flush. Vital signs needed before and after the infusion. Research kit also needed within 2 hours prior to start of the infusion and at the end of the infusion. End of infusion research kit is to be drawn on the opposite arm of the infusion. Reminded RN of 0.2 micron filter requirement.     Total Volume= 110 mL + 14 mL post infusion flush      I have personally interviewed Alek Mcneill and reviewed his medical record for adverse events and concomitant medications and these have been recorded on the corresponding logs in Alek Mcneill's research file.     Alek Mcneill was given the opportunity to ask any  trial related questions.  Please see provider progress note for physical exam and other clinical information. Labs were reviewed - any significant lab values were addressed and reviewed.    Araceli Diaz RN

## 2024-05-24 NOTE — NURSING NOTE
Chief Complaint   Patient presents with    Labs Only     PIV placed, vitals checked, urine labs collected   No urine research kit provided, sample sent anyway    Dot Bond RN on 5/24/2024 at 11:53 AM

## 2024-05-24 NOTE — PATIENT INSTRUCTIONS
Contact Numbers  [facility]: [phone] (for symptom and scheduling needs)    Please call the [facility] Triage line if you experience a temperature greater than or equal to 100.4, shaking chills, have uncontrolled nausea, vomiting and/or diarrhea, dizziness, shortness of breath, chest pain, bleeding, unexplained bruising, or if you have any other new/concerning symptoms, questions or concerns.     If you are having any concerning symptoms or wish to speak to a provider before your next infusion visit, please call your care coordinator or triage to notify them so we can adequately serve you.     If you need a refill on a narcotic prescription or other medication, please call triage before your infusion appointment.           May 2024      Danny Monday Tuesday Wednesday Thursday Friday Saturday                  1     2     3     4       5     6    LAB  12:00 PM   (15 min.)   [facility]   [facility] 7    RETURN CCSL   7:45 AM   (45 min.)   Clarita Owen PA-C   [facility]    CYSTOSCOPY   8:30 AM   (15 min.)   Harman Kaiser MD   [facility]    ONC INFUSION 1.5 HR (90 MIN)   9:00 AM   (90 min.)    ONC INFUSION NURSE   [facility] 8    RESEARCH - SIMPLE  10:00 AM   (30 min.)   CRU EXAM ROOM   [facility] 9     10     11       12     13    RESEARCH - SIMPLE  12:30 PM   (30 min.)   CRU LAB   [facility] 14     15    LAB   1:30 PM   (15 min.)   [facility]   [facility] 16     17     18       19     20     21     22    RESEARCH - SIMPLE  12:00 PM   (60 min.)   CRU LAB   [facility] 23     24    LAB PERIPHERAL  11:45 AM   (15 min.)   [facility] DRAW   [facility]    RETURN CCSL  12:00 PM   (30 min.)   Julio Tobin MD     [facility]    ONC INFUSION 1.5 HR (90 MIN)   1:00 PM   (90 min.)   [facility] ONC INFUSION NURSE   [facility] 25       26     27     28    CYSTOSCOPY   8:30 AM   (15 min.)   Harman Kaiser MD   [facility] 29 30 31 June 2024 Sunday Monday Tuesday Wednesday Thursday Friday Saturday                                 1       2     3     4     5     6     7     8       9     10     11     12     13     14     15       16     17     18     19     20     21     22       23     24    LAB PERIPHERAL  10:00 AM   (15 min.)   [facility] DRAW   [facility]    RETURN CCSL  10:15 AM   (30 min.)   Julio Tobin MD   [facility]    ONC INFUSION 1.5 HR (90 MIN)  11:00 AM   (90 min.)   UC ONC INFUSION NURSE   [facility] 25    CYSTOSCOPY   7:45 AM   (30 min.)   Harman Kaiser MD   [facility] 26 27 28 29       30                                                   Lab Results:  Recent Results (from the past 12 hour(s))   Comprehensive metabolic panel    Collection Time: 05/24/24 11:51 AM   Result Value Ref Range    Sodium 135 135 - 145 mmol/L    Potassium 3.9 3.4 - 5.3 mmol/L    Carbon Dioxide (CO2) 25 22 - 29 mmol/L    Anion Gap 11 7 - 15 mmol/L    Urea Nitrogen 18.1 8.0 - 23.0 mg/dL    Creatinine 1.36 (H) 0.67 - 1.17 mg/dL    GFR Estimate 54 (L) >60 mL/min/1.73m2    Calcium 9.7 8.8 - 10.2 mg/dL    Chloride 99 98 - 107 mmol/L    Glucose 107 (H) 70 - 99 mg/dL    Alkaline Phosphatase 81 40 - 150 U/L    AST 37 0 - 45 U/L    ALT 31 0 - 70 U/L    Protein Total 7.5 6.4 - 8.3 g/dL    Albumin 4.5 3.5 - 5.2 g/dL    Bilirubin Total 0.6 <=1.2 mg/dL   Phosphorus    Collection Time: 05/24/24 11:51 AM   Result Value Ref Range    Phosphorus 2.8 2.5 - 4.5 mg/dL   Lactate  Dehydrogenase    Collection Time: 05/24/24 11:51 AM   Result Value Ref Range    Lactate Dehydrogenase 229 0 - 250 U/L   Uric acid    Collection Time: 05/24/24 11:51 AM   Result Value Ref Range    Uric Acid 6.6 3.4 - 7.0 mg/dL   CRP inflammation    Collection Time: 05/24/24 11:51 AM   Result Value Ref Range    CRP Inflammation <3.00 <5.00 mg/L   GGT    Collection Time: 05/24/24 11:51 AM   Result Value Ref Range    GGT 96 (H) 8 - 61 U/L   Lipase    Collection Time: 05/24/24 11:51 AM   Result Value Ref Range    Lipase 43 13 - 60 U/L   Amylase    Collection Time: 05/24/24 11:51 AM   Result Value Ref Range    Amylase 82 28 - 100 U/L   TSH    Collection Time: 05/24/24 11:51 AM   Result Value Ref Range    TSH 41.25 (H) 0.30 - 4.20 uIU/mL   T4 free    Collection Time: 05/24/24 11:51 AM   Result Value Ref Range    Free T4 0.67 (L) 0.90 - 1.70 ng/dL   UA with Microscopic    Collection Time: 05/24/24 11:51 AM   Result Value Ref Range    Color Urine Straw Colorless, Straw, Light Yellow, Yellow    Appearance Urine Clear Clear    Glucose Urine Negative Negative mg/dL    Bilirubin Urine Negative Negative    Ketones Urine Negative Negative mg/dL    Specific Gravity Urine 1.007 1.003 - 1.035    Blood Urine Negative Negative    pH Urine 6.0 5.0 - 7.0    Protein Albumin Urine Negative Negative mg/dL    Urobilinogen Urine Normal Normal, 2.0 mg/dL    Nitrite Urine Negative Negative    Leukocyte Esterase Urine Negative Negative    RBC Urine 1 <=2 /HPF    WBC Urine 1 <=5 /HPF   INR    Collection Time: 05/24/24 11:51 AM   Result Value Ref Range    INR 0.95 0.85 - 1.15   Partial thromboplastin time    Collection Time: 05/24/24 11:51 AM   Result Value Ref Range    aPTT 27 22 - 38 Seconds   Hemoglobin A1c    Collection Time: 05/24/24 11:51 AM   Result Value Ref Range    Hemoglobin A1C 6.9 (H) <5.7 %   CBC with platelets and differential    Collection Time: 05/24/24 11:51 AM   Result Value Ref Range    WBC Count 7.9 4.0 - 11.0 10e3/uL    RBC  Count 5.19 4.40 - 5.90 10e6/uL    Hemoglobin 16.5 13.3 - 17.7 g/dL    Hematocrit 46.5 40.0 - 53.0 %    MCV 90 78 - 100 fL    MCH 31.8 26.5 - 33.0 pg    MCHC 35.5 31.5 - 36.5 g/dL    RDW 13.0 10.0 - 15.0 %    Platelet Count 220 150 - 450 10e3/uL    % Neutrophils 56 %    % Lymphocytes 28 %    % Monocytes 12 %    % Eosinophils 3 %    % Basophils 1 %    % Immature Granulocytes 0 %    NRBCs per 100 WBC 0 <1 /100    Absolute Neutrophils 4.4 1.6 - 8.3 10e3/uL    Absolute Lymphocytes 2.2 0.8 - 5.3 10e3/uL    Absolute Monocytes 1.0 0.0 - 1.3 10e3/uL    Absolute Eosinophils 0.2 0.0 - 0.7 10e3/uL    Absolute Basophils 0.1 0.0 - 0.2 10e3/uL    Absolute Immature Granulocytes 0.0 <=0.4 10e3/uL    Absolute NRBCs 0.0 10e3/uL

## 2024-05-24 NOTE — PROGRESS NOTES
Infusion Nursing Note:  Alek Mcneill presents today for Week 12 Day 1 Study Cetrelimab (IDS# 6160).    Patient seen by provider today: Yes: Dr. Tobin   present during visit today: Not Applicable.    Note: Patient presents to infusion today doing well. No new questions or concerns following his visit with Dr. Tobin.    Vitals obtained pre and post infusion per study and research labs collected as ordered.     Study Cetrelimab (IDS# 6160) Start Time: 1415  Study Cetrelimab (IDS# 6160) Stop Time: 1449    Intravenous Access:  Peripheral IV placed.    Treatment Conditions:  Lab Results   Component Value Date    HGB 16.5 05/24/2024    WBC 7.9 05/24/2024    ANEUTAUTO 4.4 05/24/2024     05/24/2024        Lab Results   Component Value Date     05/24/2024    POTASSIUM 3.9 05/24/2024    MAG 2.3 11/19/2023    CR 1.36 (H) 05/24/2024    MILLI 9.7 05/24/2024    BILITOTAL 0.6 05/24/2024    ALBUMIN 4.5 05/24/2024    ALT 31 05/24/2024    AST 37 05/24/2024     Results reviewed, labs MET treatment parameters, ok to proceed with treatment.    Post Infusion Assessment:  Patient tolerated infusion without incident.  Patient observed for 15 minutes post Study Cetrelimab per protocol.  Blood return noted pre and post infusion.  Site patent and intact, free from redness, edema or discomfort.  No evidence of extravasations.  Access discontinued per protocol.     Discharge Plan:   Patient declined prescription refills.  Discharge instructions reviewed with: Patient.  Patient and/or family verbalized understanding of discharge instructions and all questions answered.  AVS to patient via [employer].  Patient will return 5/28 for next appointment.   Patient discharged in stable condition accompanied by: wife.  Departure Mode: Ambulatory.      Betty Ham RN

## 2024-05-24 NOTE — NURSING NOTE
"Oncology Rooming Note    May 24, 2024 12:14 PM   Alek Mcneill is a 74 year old male who presents for:    Chief Complaint   Patient presents with    Labs Only     PIV placed, vitals checked, urine labs collected    Oncology Clinic Visit     Malignant neoplasm of anterior wall of urinary bladder     Initial Vitals: /72 (BP Location: Right arm, Patient Position: Sitting, Cuff Size: Adult Regular)   Pulse 77   Temp 98.4  F (36.9  C) (Oral)   Resp 16   Wt 109.3 kg (241 lb)   SpO2 96%   BMI 36.64 kg/m   Estimated body mass index is 36.64 kg/m  as calculated from the following:    Height as of 5/7/24: 1.727 m (5' 8\").    Weight as of this encounter: 109.3 kg (241 lb). Body surface area is 2.29 meters squared.  No Pain (0) Comment: Data Unavailable   No LMP for male patient.  Allergies reviewed: Yes  Medications reviewed: Yes    Medications: Medication refills not needed today.  Pharmacy name entered into [employer]: [id number] [place] [id number] AT [place] [id number]    Frailty Screening:   Is the patient here for a new oncology consult visit in cancer care? 2. No      Clinical concerns: none       Mariam Abraham              "

## 2024-05-24 NOTE — LETTER
5/24/2024         RE: Alek Mcneill  [address] [place] [address]        Dear Colleague,    Thank you for referring your patient, Alek Mcneill, to the [facility] CANCER [facility]. Please see a copy of my visit note below.      [facility] Medical Oncology Note  Date of visit: May 24, 2024  Outpatient Clinic Note        Assessment:     Stage 1 (T1NxMx) superficial poorly differentiated bladder cancer. T1 lesions are high risk. Recurrence rates at one, three, and five years can reach up to 50, 70 to 80, and 90 percent, respectively, and 20 to 25 percent progress to more invasive (T2 or greater) disease. Given the high risk here, therapy is indicated.  Alek is on the SUNRISE-3 study, for patients with T1 disease and has been randomized to the TAR-200 (a device inserted in to the bladder that contains gemcitabine)+ Cetrelimab arm.  S/P three doses of cetrelimab, with adequate tolerance. He has had some lab evidence of thryoiditis.  He now is hypothyroid and is on levothyroxine 50 mcg daily. We will adjustas needed. He will talk to his primary about this and taking over the levothyroxine management following completion of the trial.   Hemoglobin A1C up trending, continue to monitor. Patient will discuss further with his PCP next week as well.   Previous creatinine was very mildly above baseline ~1.20 at 1.30 today. Will proceed with an extra IV fluid bolus today. Continue to monitor.       Plan:     Next cetrelimab/TAR-200 today  RTC in 3 weeks for his next cycle.    Seen with Study nurse Araceli Diaz    30 minutes spent on the date of the encounter doing chart review, review of test results, interpretation of tests, patient visit, and documentation     Julio Tobin MD, MSc  Associate Professor of Medicine  [employer]  [facility]  [address], [place]  "[id number]  [phone]      __________________________________________________________________    DIAGNOSIS:     Recently diagnosed T1 poorly differentiated invasive bladder cancer.    Respiratory symptoms concerning for pneumonitis, but CT chest 3/27/2024 showed \"No new or suspicious pulmonary opacities.\"       History of Present Illness/Therapy to Date:   11/19/2023: Went to ED for subacute onset of gross hematuria, and a passing of clots. Stopped ASA. CT showed multiple indeterminate nodular densities in the bladder suspicious for enhancing urothelial lesions   12/6/2023: Cystoscopy , with finding of multiple bladder tumors appeared papillary and were located on the anterior wall and had some superficial calcifications. \"We resected tissue down to muscle using cut electrocautery. \" Pathology showed high grade papillary carcinoma, that did not invade the muscle.  1/17/2024: Cystoscopy with resection showed CIS, but no invasive cancer.  Alek has agreed to participate in the SUNRISE -3 trial. He has been randomized to receive Cetrelimab and TAR-200 (an investigational intravesical drug delivery system for gemcitabine). His first dose of cetrelimab and TAR-200 was 3/6/2024. He is back today for his fourht cycle.        Interval history:     Alek is back with Christine here for week 12 of the SUNRISE-3 trial and his fourth dose of cetrelimab.   Continues to feel well. No issues with treatment.  Had an injection in his neck the other day. Only minimally helpful.  He has a long history of degenerative changes that predated his involvement in that study, and has been offered surgery which she is appropriately declining.   He endorses constipation which he manages with sennakot 1-2 tablets per day with improvement.   No urinary concerns. No hematuria. Drinking fluids well ~5, 16 oz water bottles.   He gets short of breath with activity predating the start of therapy without new changes from baseline. No chest pain or cough. "   No new rashes or lesions. No peripheral edema.   No headaches or dizziness.   He endorses a chronic runny nose and occasional sore throat that he attributes to allergies.      ROS: 10 point ROS neg other than the symptoms noted above in the HPI.      Past Medical History:   I have reviewed this patient's past medical history   Past Medical History:   Diagnosis Date    Complication of anesthesia     DM2 (diabetes mellitus, type 2) (H)     Hypercholesteremia     Hyperlipidemia     Hypertension           Past Surgical History:    I have reviewed this patient's past surgical history       Social History:   Tobacco, ETOH, and rec drugs reviewed and as noted below with the following exceptions:  Drinks a few beers a day. Quit smoking in .  Alek was born in [place], and graduated from [employer] (no longer exists) in .  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  He is  to Christine, his third wife.  She worked with his sister-in-law and they were introduced.  They have been  for 25 years.  Jose has 5 children, Golden, Cr, Joon, Radha, and Kayla.  Kayla is the youngest and a PA who lives in [place].  The other children are in the service.  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          Family History:     Grandma with skin cancer (s/p removal of her nose)  Uncle with sarcoma  Dad had bone cancer/lung cancer  Brother Casimiro had bladder cancer  Ned had bladder cacner ( in car accident)  Brother Lenny had bladder cancer  No family history on file.         Medications:     Current Outpatient Medications   Medication Sig Dispense Refill    Ascorbic Acid (VITAMIN C) 500 MG CHEW Take 1 chew tab by mouth daily      Ascorbic Acid 500 MG CAPS 1 tablet Orally Once a day      aspirin (ASPIRIN LOW DOSE) 81 MG EC tablet Take 81 mg by mouth  daily ONCE BEFORE BED      blood glucose (NO BRAND SPECIFIED) lancets standard as directed, to use with his Contour Next test blood sugars daily      CONTOUR NEXT TEST test strip USE TO TEST BLOOD SUGAR DAILY      Ergocalciferol 50 MCG (2000 UT) CAPS Take 50 mcg by mouth daily      fish oil-omega-3 fatty acids 500 MG capsule Take 2 capsules by mouth daily      hydrochlorothiazide (HYDRODIURIL) 25 MG tablet Take 25 mg by mouth daily      ketoconazole (NIZORAL) 2 % external cream Apply topically as needed      levothyroxine (SYNTHROID/LEVOTHROID) 50 MCG tablet Take 1 tablet (50 mcg) by mouth daily 30 tablet 1    Microlet Lancets MISC daily      simvastatin (ZOCOR) 40 MG tablet Take 40 mg by mouth at bedtime      triamcinolone (KENALOG) 0.1 % external cream Apply as needed      Vitamin D3 (VITAMIN D, CHOLECALCIFEROL,) 25 mcg (1000 units) tablet Take 25 mcg by mouth daily                Physical Exam:   There were no vitals taken for this visit.    ECOG PS: 0  Constitutional: WDWN male in NAD, pleasant and appropriate  HEENT:  NC/AT, no icterus, OP clear, MMM  Skin: No jaundice nor ecchymoses  Lungs: CTAB, no w/r/r, nonlabored breathing  Cardiovascular: RRR, S1, S2, no m/r/g  Abdomen: +BS, soft, nontender, nondistended, no organomegaly nor masses  MSK/Extremities: Warm, well perfused. No edema  LN: no cervical, supraclavicular, axillary, nor inguinal lymphadenopathy  Neurologic: alert, answering questions appropriately, moving all extremities spontaneously. CN 2-12 grossly intact.  Psych: appropriate affect  Data:   Most Recent 3 CBC's:  Recent Labs   Lab Test 05/06/24  1143 04/18/24  0821 03/27/24  1204   WBC 8.7 6.5 7.6   HGB 16.3 16.5 16.2   MCV 92 89 92    241 222   ANEUTAUTO 5.4 3.3 3.6     Most Recent 3 BMP's:  Recent Labs   Lab Test 05/15/24  1204 05/06/24  1143 04/18/24  0821 03/27/24  1204   NA  --  137 137 137   POTASSIUM  --  4.1 4.1 4.1   CHLORIDE  --  98 100 100   CO2  --  31* 24 27   BUN  --  14.6  16.3 14.4   CR 1.24* 1.30* 1.20* 1.04   ANIONGAP  --  8 13 10   MILLI  --  10.0 9.8 9.9   GLC  --  119* 140* 106*   PROTTOTAL  --  7.2 7.1 7.8   ALBUMIN  --  4.1 4.2 4.4    Most Recent 3 LFT's:  Recent Labs   Lab Test 05/06/24  1143 04/18/24  0821 03/27/24  1204   AST 33 33 36   ALT 33 37 39   ALKPHOS 82 77 80   BILITOTAL 0.6 0.6 0.6    Most Recent 2 TSH and T4:  Recent Labs   Lab Test 05/06/24  1143 04/18/24  0821   TSH 26.53* 0.91   T4 0.58* 1.24     I reviewed the above labs today.

## 2024-05-25 LAB — BACTERIA UR CULT: NORMAL

## 2024-05-28 ENCOUNTER — DOCUMENTATION ONLY (OUTPATIENT)
Dept: ONCOLOGY | Facility: CLINIC | Age: 74
End: 2024-05-28

## 2024-05-28 ENCOUNTER — TELEPHONE (OUTPATIENT)
Dept: ONCOLOGY | Facility: CLINIC | Age: 74
End: 2024-05-28

## 2024-05-28 ENCOUNTER — OFFICE VISIT (OUTPATIENT)
Dept: UROLOGY | Facility: CLINIC | Age: 74
End: 2024-05-28
Payer: COMMERCIAL

## 2024-05-28 VITALS — DIASTOLIC BLOOD PRESSURE: 74 MMHG | HEART RATE: 69 BPM | SYSTOLIC BLOOD PRESSURE: 144 MMHG

## 2024-05-28 DIAGNOSIS — C67.3 MALIGNANT NEOPLASM OF ANTERIOR WALL OF URINARY BLADDER (H): Primary | ICD-10-CM

## 2024-05-28 PROCEDURE — 52000 CYSTOURETHROSCOPY: CPT | Performed by: UROLOGY

## 2024-05-28 RX ORDER — LIDOCAINE HYDROCHLORIDE 20 MG/ML
10 JELLY TOPICAL
Status: DISCONTINUED | OUTPATIENT
Start: 2024-05-28 | End: 2024-05-28 | Stop reason: HOSPADM

## 2024-05-28 RX ORDER — SULFAMETHOXAZOLE/TRIMETHOPRIM 800-160 MG
1 TABLET ORAL ONCE
Status: COMPLETED | OUTPATIENT
Start: 2024-05-28 | End: 2024-05-28

## 2024-05-28 RX ORDER — LIDOCAINE HYDROCHLORIDE 20 MG/ML
JELLY TOPICAL ONCE
Status: CANCELLED | OUTPATIENT
Start: 2024-05-28 | End: 2024-05-28

## 2024-05-28 RX ADMIN — SULFAMETHOXAZOLE AND TRIMETHOPRIM 1 TABLET: 800; 160 TABLET ORAL at 09:05

## 2024-05-28 NOTE — PROGRESS NOTES
CHIEF COMPLAINT   It was my pleasure to see Alek Mcneill who is a 74 year old male for follow-up of bladder cancer.      HPI  Alek Mcneill is a very pleasant 74 year old male who presents with a history of bladder cancer. 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      He has enrolled in [place] 3 and is here today for TAR-200 removal and re-insertion. He continues to tolerate this therapy very well, with no significant symptoms. No recent hematuria or dysuria.      Re-staging TURBT 1/17/2024          SPECIMEN   Procedure   Transurethral resection of bladder (TURBT)   TUMOR   Tumor Site   Anterior wall   Histologic Type   Urothelial carcinoma, in situ   Histologic Grade   High-grade   Tumor Extent   Flat carcinoma in situ   Lymphovascular Invasion   Not identified   Tumor Configuration   Flat   Muscularis Propria (detrusor muscle)   Cannot be determined: Biopsy site changes are identified in the current sample; no residual viable invasive carcinoma is identified.  Please see previous biopsy report for further information, case [id number], [facility]..   ADDITIONAL FINDINGS   Associated Epithelial Lesions   Chronic inflammation, histiocytic proliferation, consistent with biopsy site changes.      TURBT 12/6/2023  Final Diagnosis   A) URINARY BLADDER, BASE OF TUMOR, TRANSURETHRAL RESECTION BLADDER TUMOR:  -NONINVASIVE LOW-GRADE PAPILLARY UROTHELIAL CARCINOMA  -MULTIPLE FRAGMENTS OF MUSCULARIS PROPRIA  -SEE SYNOPTIC REPORT     B) URINARY BLADDER, TUMOR, TRANSURETHRAL RESECTION BLADDER TUMOR:  -HIGH-GRADE PAPILLARY UROTHELIAL CARCINOMA WITH LAMINA PROPRIA INVASION  -MUSCULARIS PROPRIA PRESENT AND UNINVOLVED  -SEE SYNOPTIC REPORT     PHYSICAL EXAM  Patient is a 74 year old  male   Vitals: Blood pressure (!) 144/74, pulse 69.  General Appearance Adult: There is no height or weight on file to calculate BMI.  Alert, no acute distress, oriented  Lungs: no  respiratory distress, or pursed lip breathing  Abdomen: soft, nontender, no organomegaly or masses  Back: no CVAT  Neuro: Alert, oriented, speech and mentation normal  Psych: affect and mood normal  : penis, scrotum, testes normal    OFFICE CYSTOSCOPY 5/28/2024     Pre-procedure diagnosis:       Bladder Cancer  Post-procedure diagnosis:       device in good position  Procedure performed:             Cystourethroscopy  Surgeon:                                 Harman Kaiser MD  Anesthesia:                             Local     Description of procedure:   After fully informed, voluntary consent was obtained, the patient was brought into the procedure room, identified and placed in a supine position on the cystoscopy table.  The groin/scrotum were prepped with betadine and draped in a sterile fashion.  A 15F flexible cystoscope was inserted into the urethra, and the bladder and urethra were examined in a systematic manner. The TAR-200 device was identified, grasped, and removed. Removal time was 8:32 AM.  Device was noted to be intact. The urinary placement catheter was advanced 33 cm into the bladder with clear urine return. Lubrication, the device, and additional lubrication were introduced and the device was deployed with the stylet. Insertion time was 8:34 AM Catheter removed. The patient tolerated the procedure well and there were no complications.       Cystoscopic findings:  The urethra was normal without strictures.  The prostate was 3 cm long and demonstrated mild bilobar hypertrophy.  There was no median lobe.  The external sphincter coapted well and the bladder neck was open. The bladder was completely surveyed.  There was mild trabeculation.  There were no neoplasms, stones, or diverticula identifed.  The ureteric orifices were normal in position and number and effluxing clear urine. There are no recurrent tumors or suspicious areas on today's cystoscopy.  The  device was deployed within the  bladder.     ASSESSMENT and PLAN  74 year old male with history of HG non-muscle-invasive bladder cancer. He has enrolled in the [place] 3 clinical trial for BCG-naive NMIBC. Cystoscopy today with no evidence of visible papillary disease.     Time of TAR-200 Removal: 9:25 AM  Time of TAR-200 Insertion: 9:27 AM  There were no irregularities observed during or after removal.  Betzy-procedural antibiotics were given.     -Will plan follow-up  for  removal and reinsertion in 3 weeks.    Harman Kaiser MD  Urology  [facility]

## 2024-05-28 NOTE — Clinical Note
5/28/2024       RE: Alek AGOSTO Raheelprincess  [address] [place] [address]     Dear Colleague,    Thank you for referring your patient, Alek Mcneill, to the [facility]. Please see a copy of my visit note below.    No notes on file    Again, thank you for allowing me to participate in the care of your patient.      Sincerely,    Harman Kaiser MD

## 2024-05-28 NOTE — TELEPHONE ENCOUNTER
Called patient to check in. Patient reports no changes since last visit with RN on Friday.     Encouraged patient to call with questions.     Araceli Diaz RN   Clinical Research Coordinator Nurse-Solid Tumor   Phone: [phone] Pgr: [phone]

## 2024-05-28 NOTE — NURSING NOTE
Chief Complaint   Patient presents with    Cystoscopy     200 Tar study          Blood pressure (!) 144/74, pulse 69. There is no height or weight on file to calculate BMI.    Patient Active Problem List   Diagnosis    Malignant neoplasm of anterior wall of urinary bladder (H)    Benign hypertension    Hyperlipidemia    Impotence of organic origin    Morbid (severe) obesity due to excess calories (H)    Type 2 diabetes mellitus without complication, without long-term current use of insulin (H)    Spinal stenosis of lumbosacral region    Peripheral vascular disease (H24)    Venous stasis dermatitis of left lower extremity    Lumbar radiculopathy    Environmental allergies    Age-related nuclear cataract of right eye       Allergies   Allergen Reactions    Chlorhexidine Dermatitis       Current Outpatient Medications   Medication Sig Dispense Refill    Ascorbic Acid (VITAMIN C) 500 MG CHEW Take 1 chew tab by mouth daily      Ascorbic Acid 500 MG CAPS 1 tablet Orally Once a day      aspirin (ASPIRIN LOW DOSE) 81 MG EC tablet Take 81 mg by mouth daily ONCE BEFORE BED      blood glucose (NO BRAND SPECIFIED) lancets standard as directed, to use with his Contour Next test blood sugars daily      CONTOUR NEXT TEST test strip USE TO TEST BLOOD SUGAR DAILY      Ergocalciferol 50 MCG (2000 UT) CAPS Take 50 mcg by mouth daily      fish oil-omega-3 fatty acids 500 MG capsule Take 2 capsules by mouth daily      hydrochlorothiazide (HYDRODIURIL) 25 MG tablet Take 25 mg by mouth daily      ketoconazole (NIZORAL) 2 % external cream Apply topically as needed      levothyroxine (SYNTHROID/LEVOTHROID) 50 MCG tablet Take 1.5 tablets (75 mcg) by mouth daily 45 tablet 5    Microlet Lancets MISC daily      simvastatin (ZOCOR) 40 MG tablet Take 40 mg by mouth at bedtime      triamcinolone (KENALOG) 0.1 % external cream Apply as needed      Vitamin D3 (VITAMIN D, CHOLECALCIFEROL,) 25 mcg (1000 units) tablet Take 25 mcg by mouth daily          Social History     Tobacco Use    Smoking status: Former     Current packs/day: 0.00     Types: Cigarettes     Quit date:      Years since quittin.4   Vaping Use    Vaping status: Never Used   Substance Use Topics    Alcohol use: Yes     Comment: daily    Drug use: Never       Invasive Procedure Safety Checklist:    Procedure: Cystoscopy    Action: Complete sections and checkboxes as appropriate.    Pre-procedure:  1. Patient ID Verified with 2 identifiers (Octavia and  or MRN) : YES    2. Procedure and site verified with patient/designee (when able) : YES    3. Accurate consent documentation in medical record : YES    4. H&P (or appropriate assessment) documented in medical record : N/A  H&P must be up to 30 days prior to procedure an updated within 24 hours of                 Procedure as applicable.     5. Relevant diagnostic and radiology test results appropriately labeled and displayed as applicable : YES    6. Blood products, implants, devices, and/or special equipment available for the procedure as applicable : YES    7. Procedure site(s) marked with provider initials [Exclusions: none] : NO    8. Marking not required. Reason : Yes  Procedure does not require site marking    Time Out:     Time-Out performed immediately prior to starting procedure, including verbal and active participation of all team members addressing: YES    1. Correct patient identity.  2. Confirmed that the correct side and site are marked.  3. An accurate procedure to be done.  4. Agreement on the procedure to be done.  5. Correct patient position.  6. Relevant images and results are properly labeled and appropriately displayed.  7. The need to administer antibiotics or fluids for irrigation purposes during the procedure as applicable.  8. Safety precautions based on patient history or medication use.    During Procedure: Verification of correct person, site, and procedure occurs any time the responsibility for care of the  patient is transferred to another member of the care team.    The following medication was given:     MEDICATION:  Lidocaine without epinephrine 2% jelly  ROUTE: urethral   SITE: urethral   DOSE: 10 mL  LOT #: [id number]  : [employer]  EXPIRATION DATE: 1-26  NDC#: [address]-0   Was there drug waste? No    Prior to med admin, verified patient identity using patient's name and date of birth.  Due to med administration, patient instructed to remain in clinic for 15 minutes  afterwards, and to report any adverse reaction to me immediately.    Drug Amount Wasted:  None.  Vial/Syringe: Syringe      The following medication was given:     MEDICATION: Bactrim (Trimethoprim / Sulfamethoxazole)  ROUTE: PO  SITE: Medication was given orally  DOSE: 800mg/160mg  LOT #: [id number]  : [employer]  EXPIRATION DATE: 2025/11  NDC#: 6444-8828-03   Was there drug waste? No    Prior to medication administration, verified patient identity using patient's name and date of birth.  Due to medication administration, patient instructed to remain in clinic for 15 minutes  afterwards, and to report any adverse reaction to me immediately.      Deedee Dyson  5/28/2024  8:45 AM

## 2024-05-31 LAB
PATH REPORT.COMMENTS IMP SPEC: NORMAL
PATH REPORT.FINAL DX SPEC: NORMAL
PATH REPORT.GROSS SPEC: NORMAL

## 2024-06-03 ENCOUNTER — ALLIED HEALTH/NURSE VISIT (OUTPATIENT)
Dept: ONCOLOGY | Facility: CLINIC | Age: 74
End: 2024-06-03
Payer: COMMERCIAL

## 2024-06-03 ENCOUNTER — DOCUMENTATION ONLY (OUTPATIENT)
Dept: ONCOLOGY | Facility: CLINIC | Age: 74
End: 2024-06-03
Payer: COMMERCIAL

## 2024-06-03 DIAGNOSIS — C67.3 MALIGNANT NEOPLASM OF ANTERIOR WALL OF URINARY BLADDER (H): Primary | ICD-10-CM

## 2024-06-03 NOTE — PROGRESS NOTES
[id number] SunRISe-3 Study Note   Subject name: Alek Mcneill     Date: 6/3/2024    I contacted Alek Mcneill via phone per request of study CRC-RN Araceli Diaz who was busy with another patient but received multiple missed calls from Alek. I reached Alek, he wished to inform the study team that he would be getting a repeat MRI of his neck, ordered by his orthopedic clinic, because the first MRI did not produce a sufficient quality image. This time his MRI will be at [facility]. NORM wanted more information about the implanted TAR-200 device and requested to contact the study team. Alek stated that he provided RAY with the study team's contact information.    Alek also had a question regarding the increased Synthroid dosage prescribed by Dr. Tobin. The new dose necessitates cutting his pills in half, and Alek reports difficulty getting the pills to break evenly into two halves. He is wondering how much of a concern it is to have a slightly uneven dose of Synthroid due to uneven pill breakage. I told Alek I was unsure and will check with the study CRC-RN Araceli Diaz. He reports that he is using two different kinds of pill cutters to attempt a clean cut but is still getting an uneven break. I suggested trying to use his fingers to hold one end of the pill while pressing down firmly on the other end with another finger.    Alek Mcneill was given the opportunity to ask any trial related questions.    Did Alek Mcneill expressed any concerns: theresa Castle  Clinical Research Coordinator III  [phone]  [email or web address]

## 2024-06-06 ENCOUNTER — TELEPHONE (OUTPATIENT)
Dept: ONCOLOGY | Facility: CLINIC | Age: 74
End: 2024-06-06
Payer: COMMERCIAL

## 2024-06-06 ENCOUNTER — MYC MEDICAL ADVICE (OUTPATIENT)
Dept: ONCOLOGY | Facility: CLINIC | Age: 74
End: 2024-06-06
Payer: COMMERCIAL

## 2024-06-06 DIAGNOSIS — C67.3 MALIGNANT NEOPLASM OF ANTERIOR WALL OF URINARY BLADDER (H): Primary | ICD-10-CM

## 2024-06-06 NOTE — NURSING NOTE
Send IB message to [facility] in regards to synthroid question patient asked my colleague Marciano Berrios. Will communicate response to patient    No response received on 6/6- Forwarded question to Dr. Tobin.     Araceli Diaz, RN   Clinical Research Coordinator Nurse-Solid Tumor   Phone: [phone] Pgr: [phone]

## 2024-06-06 NOTE — TELEPHONE ENCOUNTER
"[id number]: Study Follow-Up Note   Subject name: Alek Mcneill     Date: 6/6/2024    He was contacted by the clinical research coordinator in regards to checking in for symptoms and scheduling.  Patient states he is doing \"great\". Has been feeling slightly \"run down\" lately unsure if that is from the medication or the heat. Otherwise feeling OK.     Alek Mcneill was given the opportunity to ask any trial related questions.    Alek Mcneill expressed any concerns: no    Araceli Diaz RN    "

## 2024-06-11 ENCOUNTER — LAB (OUTPATIENT)
Dept: LAB | Facility: HOSPITAL | Age: 74
End: 2024-06-11
Payer: COMMERCIAL

## 2024-06-11 DIAGNOSIS — M54.16 LUMBAR RADICULOPATHY: Primary | ICD-10-CM

## 2024-06-11 DIAGNOSIS — C67.3 MALIGNANT NEOPLASM OF ANTERIOR WALL OF URINARY BLADDER (H): ICD-10-CM

## 2024-06-11 LAB
CREAT SERPL-MCNC: 1.22 MG/DL (ref 0.67–1.17)
EGFRCR SERPLBLD CKD-EPI 2021: 62 ML/MIN/1.73M2

## 2024-06-11 PROCEDURE — 82565 ASSAY OF CREATININE: CPT

## 2024-06-11 PROCEDURE — 36415 COLL VENOUS BLD VENIPUNCTURE: CPT

## 2024-06-12 ENCOUNTER — TELEPHONE (OUTPATIENT)
Dept: ONCOLOGY | Facility: CLINIC | Age: 74
End: 2024-06-12
Payer: COMMERCIAL

## 2024-06-12 NOTE — TELEPHONE ENCOUNTER
[id number]: Study Follow-Up Note   Subject name: Alek Mcneill     Date: 6/12/2024    He was contacted by the clinical research coordinator in regards to setting up travel assistance program offered by the [place] 3 clinical trial he is participating in. Explained to the patient that this program covers and assists patients in setting up hotels and transportation to appointments. Patient is not interested in the program at this time and will alert research coordinators if he changes his mind.     Alek Mcneill was given the opportunity to ask any trial related questions.    Alek Mcneill expressed any concerns: no      Araceli Diaz RN

## 2024-06-13 ENCOUNTER — PATIENT OUTREACH (OUTPATIENT)
Dept: ONCOLOGY | Facility: CLINIC | Age: 74
End: 2024-06-13
Payer: COMMERCIAL

## 2024-06-13 DIAGNOSIS — E03.2 HYPOTHYROIDISM DUE TO MEDICATION: ICD-10-CM

## 2024-06-13 RX ORDER — LEVOTHYROXINE SODIUM 75 UG/1
75 TABLET ORAL DAILY
Qty: 30 TABLET | Refills: 5 | Status: SHIPPED | OUTPATIENT
Start: 2024-06-13 | End: 2024-07-29

## 2024-06-13 NOTE — PROGRESS NOTES
[facility]: Cancer Care                                                                                          RN left patient a message stating that Dr. Tobin sent in prescription for 75 mcg of synthroid. RN will send a My Chart message also    Ratna SANFORD, RN, OCN  Care Coordinator  [facility]

## 2024-06-14 ENCOUNTER — PATIENT OUTREACH (OUTPATIENT)
Dept: ONCOLOGY | Facility: CLINIC | Age: 74
End: 2024-06-14
Payer: COMMERCIAL

## 2024-06-14 DIAGNOSIS — C67.3 MALIGNANT NEOPLASM OF ANTERIOR WALL OF URINARY BLADDER (H): Primary | ICD-10-CM

## 2024-06-17 NOTE — PROGRESS NOTES
"  [facility] Medical Oncology Note  Date of visit: Jun 18, 2024  Outpatient Clinic Note        Assessment:     Stage 1 (T1NxMx) superficial poorly differentiated bladder cancer. T1 lesions are high risk. Recurrence rates at one, three, and five years can reach up to 50, 70 to 80, and 90 percent, respectively, and 20 to 25 percent progress to more invasive (T2 or greater) disease. Given the high risk here, therapy is indicated.  Alek is on the SUNRISE-3 study, for patients with T1 disease and has been randomized to the TAR-200 (a device inserted in to the bladder that contains gemcitabine)+ Cetrelimab arm.  S/P three doses of cetrelimab, with adequate tolerance. He has had some lab evidence of thryoiditis.  He now is hypothyroid and is on levothyroxine 75 mcg increased on 6.3. TSH is elevated but improved. Recommend monitoring and adjusting dose prn.  He will talk to his primary about this and taking over the levothyroxine management following completion of the trial.   Hemoglobin A1C up trending, continue to monitor. Stable today 6/18/24.  Previous creatinine has improved and back to baseline.       Plan:     Next cetrelimab today. TAR-200 next week with Dr. Kaiser.  RTC in 3 weeks for his next cycle.    Patient was seen and evaluated with study RNCC, Araceli Diaz.      25 minutes spent on the date of the encounter doing chart review, review of test results, interpretation of tests, patient visit, and documentation     Clarita Owen PA-C    __________________________________________________________________    DIAGNOSIS:     Recently diagnosed T1 poorly differentiated invasive bladder cancer.    Respiratory symptoms concerning for pneumonitis, but CT chest 3/27/2024 showed \"No new or suspicious pulmonary opacities.\"       History of Present Illness/Therapy to Date:   11/19/2023: Went to ED for subacute onset of gross hematuria, and a passing of clots. Stopped ASA. CT showed multiple indeterminate nodular " "densities in the bladder suspicious for enhancing urothelial lesions   12/6/2023: Cystoscopy , with finding of multiple bladder tumors appeared papillary and were located on the anterior wall and had some superficial calcifications. \"We resected tissue down to muscle using cut electrocautery. \" Pathology showed high grade papillary carcinoma, that did not invade the muscle.  1/17/2024: Cystoscopy with resection showed CIS, but no invasive cancer.  Alek has agreed to participate in the SUNRISE -3 trial. He has been randomized to receive Cetrelimab and TAR-200 (an investigational intravesical drug delivery system for gemcitabine). His first dose of cetrelimab and TAR-200 was 3/6/2024. He is back today for his fourht cycle.        Interval history:     Alek is back with Christine here for week 15 of the SUNRISE-3 trial and his sixth dose of cetrelimab.   Patient reports that he is feeling well.  He does not notice any significant side effects with immunotherapy.  Energy is stable today.  Previously had reported getting shortness of breath with activity although this appears to be improved at this time.  No chest pain or cough.  He has not noticed any urinary changes including hematuria or pain.  No rashes or lesions.  He endorses occasional mild headaches that are not new or worsened.  No new neurological symptoms.  No vision changes.  He is taking senna for constipation as needed.  No fevers or chills.  Eating and drinking well.     ROS: 10 point ROS neg other than the symptoms noted above in the HPI.      Past Medical History:   I have reviewed this patient's past medical history   Past Medical History:   Diagnosis Date    Complication of anesthesia     DM2 (diabetes mellitus, type 2) (H)     Hypercholesteremia     Hyperlipidemia     Hypertension           Past Surgical History:    I have reviewed this patient's past surgical history       Social History:   Tobacco, ETOH, and rec drugs reviewed and as noted below with " the following exceptions:  Drinks a few beers a day. Quit smoking in .  Alek was born in [place], and graduated from [employer] in .  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  He is  to Christine, his third wife.  She worked with his sister-in-law and they were introduced.  They have been  for 25 years.  Jose has 5 children, Golden, Cr, Joon, Radha, and Kayla.  Kayla is the youngest and a PA who lives in [place].  The other children are in the service.  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      Family History:     Grandma with skin cancer (s/p removal of her nose)  Uncle with sarcoma  Dad had bone cancer/lung cancer  Brother Casimiro had bladder cancer  Buddy had bladder cacner ( in car accident)  Brother Lenny had bladder cancer  No family history on file.         Medications:     Current Outpatient Medications   Medication Sig Dispense Refill    Ascorbic Acid (VITAMIN C) 500 MG CHEW Take 1 chew tab by mouth daily      Ascorbic Acid 500 MG CAPS 1 tablet Orally Once a day      aspirin (ASPIRIN LOW DOSE) 81 MG EC tablet Take 81 mg by mouth daily ONCE BEFORE BED      blood glucose (NO BRAND SPECIFIED) lancets standard as directed, to use with his Contour Next test blood sugars daily      CONTOUR NEXT TEST test strip USE TO TEST BLOOD SUGAR DAILY      Ergocalciferol 50 MCG (2000 UT) CAPS Take 50 mcg by mouth daily      fish oil-omega-3 fatty acids 500 MG capsule Take 2 capsules by mouth daily      hydrochlorothiazide (HYDRODIURIL) 25 MG tablet Take 25 mg by mouth daily      ketoconazole (NIZORAL) 2 % external cream Apply topically as needed      levothyroxine (SYNTHROID/LEVOTHROID) 75 MCG tablet Take 1 tablet (75 mcg) by mouth daily 30 tablet 5    Microlet Lancets MISC daily      simvastatin (ZOCOR) 40 MG  tablet Take 40 mg by mouth at bedtime      triamcinolone (KENALOG) 0.1 % external cream Apply as needed      Vitamin D3 (VITAMIN D, CHOLECALCIFEROL,) 25 mcg (1000 units) tablet Take 25 mcg by mouth daily                Physical Exam:   Blood pressure (!) 154/76, pulse 82, temperature 98.4  F (36.9  C), temperature source Oral, resp. rate 16, weight 109.5 kg (241 lb 6.4 oz), SpO2 95%.    ECOG PS: 0  Constitutional: WDWN male in NAD, pleasant and appropriate  HEENT:  NC/AT, no icterus, OP clear, MMM  Skin: No jaundice nor ecchymoses  Lungs: CTAB, no w/r/r, nonlabored breathing  Cardiovascular: RRR, S1, S2, no m/r/g  Abdomen: +BS, soft, nontender, nondistended, no organomegaly nor masses  MSK/Extremities: Warm, well perfused. No edema  LN: no cervical, supraclavicular, axillary, nor inguinal lymphadenopathy  Neurologic: alert, answering questions appropriately, moving all extremities spontaneously. CN 2-12 grossly intact.  Psych: appropriate affect  Data:   Most Recent 3 CBC's:  Recent Labs   Lab Test 06/18/24  0802 05/24/24  1151 05/06/24  1143   WBC 8.6 7.9 8.7   HGB 15.9 16.5 16.3   MCV 91 90 92    220 223   ANEUTAUTO 5.2 4.4 5.4     Most Recent 3 BMP's:  Recent Labs   Lab Test 06/18/24  0802 06/11/24  0951 05/24/24  1151 05/15/24  1204 05/06/24  1143   *  --  135  --  137   POTASSIUM 4.2  --  3.9  --  4.1   CHLORIDE 96*  --  99  --  98   CO2 24  --  25  --  31*   BUN 15.5  --  18.1  --  14.6   CR 1.11 1.22* 1.36*   < > 1.30*   ANIONGAP 12  --  11  --  8   MILLI 9.6  --  9.7  --  10.0   *  --  107*  --  119*   PROTTOTAL 7.2  --  7.5  --  7.2   ALBUMIN 4.3  --  4.5  --  4.1    < > = values in this interval not displayed.    Most Recent 3 LFT's:  Recent Labs   Lab Test 06/18/24  0802 05/24/24  1151 05/06/24  1143   AST 35 37 33   ALT 31 31 33   ALKPHOS 72 81 82   BILITOTAL 0.7 0.6 0.6    Most Recent 2 TSH and T4:  Recent Labs   Lab Test 06/18/24  0802 05/24/24  1151   TSH 25.59* 41.25*   T4 1.03  0.67*     I reviewed the above labs today.

## 2024-06-18 ENCOUNTER — ONCOLOGY VISIT (OUTPATIENT)
Dept: ONCOLOGY | Facility: CLINIC | Age: 74
End: 2024-06-18
Attending: INTERNAL MEDICINE
Payer: COMMERCIAL

## 2024-06-18 ENCOUNTER — ALLIED HEALTH/NURSE VISIT (OUTPATIENT)
Dept: ONCOLOGY | Facility: CLINIC | Age: 74
End: 2024-06-18

## 2024-06-18 ENCOUNTER — LAB (OUTPATIENT)
Dept: LAB | Facility: CLINIC | Age: 74
End: 2024-06-18
Attending: INTERNAL MEDICINE
Payer: COMMERCIAL

## 2024-06-18 VITALS
RESPIRATION RATE: 16 BRPM | DIASTOLIC BLOOD PRESSURE: 76 MMHG | SYSTOLIC BLOOD PRESSURE: 154 MMHG | OXYGEN SATURATION: 95 % | HEART RATE: 82 BPM | BODY MASS INDEX: 36.7 KG/M2 | TEMPERATURE: 98.4 F | WEIGHT: 241.4 LBS

## 2024-06-18 VITALS
SYSTOLIC BLOOD PRESSURE: 144 MMHG | RESPIRATION RATE: 18 BRPM | TEMPERATURE: 97.6 F | OXYGEN SATURATION: 97 % | HEART RATE: 69 BPM | DIASTOLIC BLOOD PRESSURE: 82 MMHG

## 2024-06-18 DIAGNOSIS — C67.3 MALIGNANT NEOPLASM OF ANTERIOR WALL OF URINARY BLADDER (H): Primary | ICD-10-CM

## 2024-06-18 DIAGNOSIS — Z00.6 EXAMINATION OF PARTICIPANT IN CLINICAL TRIAL: ICD-10-CM

## 2024-06-18 LAB
ALBUMIN SERPL BCG-MCNC: 4.3 G/DL (ref 3.5–5.2)
ALBUMIN UR-MCNC: NEGATIVE MG/DL
ALP SERPL-CCNC: 72 U/L (ref 40–150)
ALT SERPL W P-5'-P-CCNC: 31 U/L (ref 0–70)
AMYLASE SERPL-CCNC: 79 U/L (ref 28–100)
ANION GAP SERPL CALCULATED.3IONS-SCNC: 12 MMOL/L (ref 7–15)
APPEARANCE UR: CLEAR
AST SERPL W P-5'-P-CCNC: 35 U/L (ref 0–45)
BASOPHILS # BLD AUTO: 0.1 10E3/UL (ref 0–0.2)
BASOPHILS NFR BLD AUTO: 1 %
BILIRUB SERPL-MCNC: 0.7 MG/DL
BILIRUB UR QL STRIP: NEGATIVE
BUN SERPL-MCNC: 15.5 MG/DL (ref 8–23)
CALCIUM SERPL-MCNC: 9.6 MG/DL (ref 8.8–10.2)
CHLORIDE SERPL-SCNC: 96 MMOL/L (ref 98–107)
COLOR UR AUTO: NORMAL
CREAT SERPL-MCNC: 1.11 MG/DL (ref 0.67–1.17)
CRP SERPL-MCNC: <3 MG/L
DEPRECATED HCO3 PLAS-SCNC: 24 MMOL/L (ref 22–29)
EGFRCR SERPLBLD CKD-EPI 2021: 69 ML/MIN/1.73M2
EOSINOPHIL # BLD AUTO: 0.3 10E3/UL (ref 0–0.7)
EOSINOPHIL NFR BLD AUTO: 3 %
ERYTHROCYTE [DISTWIDTH] IN BLOOD BY AUTOMATED COUNT: 13.3 % (ref 10–15)
GGT SERPL-CCNC: 81 U/L (ref 8–61)
GLUCOSE SERPL-MCNC: 143 MG/DL (ref 70–99)
GLUCOSE UR STRIP-MCNC: NEGATIVE MG/DL
HBA1C MFR BLD: 7 %
HCT VFR BLD AUTO: 44.8 % (ref 40–53)
HGB BLD-MCNC: 15.9 G/DL (ref 13.3–17.7)
HGB UR QL STRIP: NEGATIVE
IMM GRANULOCYTES # BLD: 0 10E3/UL
IMM GRANULOCYTES NFR BLD: 0 %
KETONES UR STRIP-MCNC: NEGATIVE MG/DL
LDH SERPL L TO P-CCNC: 232 U/L (ref 0–250)
LEUKOCYTE ESTERASE UR QL STRIP: NEGATIVE
LIPASE SERPL-CCNC: 50 U/L (ref 13–60)
LYMPHOCYTES # BLD AUTO: 2.2 10E3/UL (ref 0.8–5.3)
LYMPHOCYTES NFR BLD AUTO: 26 %
MCH RBC QN AUTO: 32.2 PG (ref 26.5–33)
MCHC RBC AUTO-ENTMCNC: 35.5 G/DL (ref 31.5–36.5)
MCV RBC AUTO: 91 FL (ref 78–100)
MONOCYTES # BLD AUTO: 0.9 10E3/UL (ref 0–1.3)
MONOCYTES NFR BLD AUTO: 10 %
NEUTROPHILS # BLD AUTO: 5.2 10E3/UL (ref 1.6–8.3)
NEUTROPHILS NFR BLD AUTO: 60 %
NITRATE UR QL: NEGATIVE
NRBC # BLD AUTO: 0 10E3/UL
NRBC BLD AUTO-RTO: 0 /100
PH UR STRIP: 6 [PH] (ref 5–7)
PHOSPHATE SERPL-MCNC: 3.6 MG/DL (ref 2.5–4.5)
PLATELET # BLD AUTO: 231 10E3/UL (ref 150–450)
POTASSIUM SERPL-SCNC: 4.2 MMOL/L (ref 3.4–5.3)
PROT SERPL-MCNC: 7.2 G/DL (ref 6.4–8.3)
RBC # BLD AUTO: 4.94 10E6/UL (ref 4.4–5.9)
RBC URINE: <1 /HPF
SODIUM SERPL-SCNC: 132 MMOL/L (ref 135–145)
SP GR UR STRIP: 1.01 (ref 1–1.03)
SQUAMOUS EPITHELIAL: <1 /HPF
T3FREE SERPL-MCNC: 2.3 PG/ML (ref 2–4.4)
T4 FREE SERPL-MCNC: 1.03 NG/DL (ref 0.9–1.7)
TSH SERPL DL<=0.005 MIU/L-ACNC: 25.59 UIU/ML (ref 0.3–4.2)
URATE SERPL-MCNC: 6.3 MG/DL (ref 3.4–7)
UROBILINOGEN UR STRIP-MCNC: NORMAL MG/DL
WBC # BLD AUTO: 8.6 10E3/UL (ref 4–11)
WBC URINE: 1 /HPF

## 2024-06-18 PROCEDURE — 80053 COMPREHEN METABOLIC PANEL: CPT | Performed by: INTERNAL MEDICINE

## 2024-06-18 PROCEDURE — 82977 ASSAY OF GGT: CPT | Performed by: INTERNAL MEDICINE

## 2024-06-18 PROCEDURE — 83036 HEMOGLOBIN GLYCOSYLATED A1C: CPT | Performed by: INTERNAL MEDICINE

## 2024-06-18 PROCEDURE — 96365 THER/PROPH/DIAG IV INF INIT: CPT

## 2024-06-18 PROCEDURE — 96413 CHEMO IV INFUSION 1 HR: CPT

## 2024-06-18 PROCEDURE — 258N000003 HC RX IP 258 OP 636: Mod: JZ

## 2024-06-18 PROCEDURE — 83615 LACTATE (LD) (LDH) ENZYME: CPT | Performed by: INTERNAL MEDICINE

## 2024-06-18 PROCEDURE — 99213 OFFICE O/P EST LOW 20 MIN: CPT

## 2024-06-18 PROCEDURE — 84443 ASSAY THYROID STIM HORMONE: CPT

## 2024-06-18 PROCEDURE — 84481 FREE ASSAY (FT-3): CPT

## 2024-06-18 PROCEDURE — 84550 ASSAY OF BLOOD/URIC ACID: CPT | Performed by: INTERNAL MEDICINE

## 2024-06-18 PROCEDURE — 87086 URINE CULTURE/COLONY COUNT: CPT | Performed by: INTERNAL MEDICINE

## 2024-06-18 PROCEDURE — 84439 ASSAY OF FREE THYROXINE: CPT

## 2024-06-18 PROCEDURE — 84100 ASSAY OF PHOSPHORUS: CPT | Performed by: INTERNAL MEDICINE

## 2024-06-18 PROCEDURE — 86140 C-REACTIVE PROTEIN: CPT | Performed by: INTERNAL MEDICINE

## 2024-06-18 PROCEDURE — 85025 COMPLETE CBC W/AUTO DIFF WBC: CPT | Performed by: INTERNAL MEDICINE

## 2024-06-18 PROCEDURE — 81001 URINALYSIS AUTO W/SCOPE: CPT | Performed by: INTERNAL MEDICINE

## 2024-06-18 PROCEDURE — 36415 COLL VENOUS BLD VENIPUNCTURE: CPT | Performed by: INTERNAL MEDICINE

## 2024-06-18 PROCEDURE — 83690 ASSAY OF LIPASE: CPT | Performed by: INTERNAL MEDICINE

## 2024-06-18 PROCEDURE — G0463 HOSPITAL OUTPT CLINIC VISIT: HCPCS | Mod: 25

## 2024-06-18 PROCEDURE — 82150 ASSAY OF AMYLASE: CPT | Performed by: INTERNAL MEDICINE

## 2024-06-18 RX ORDER — MEPERIDINE HYDROCHLORIDE 25 MG/ML
25 INJECTION INTRAMUSCULAR; INTRAVENOUS; SUBCUTANEOUS EVERY 30 MIN PRN
Status: CANCELLED | OUTPATIENT
Start: 2024-06-18

## 2024-06-18 RX ORDER — ACETAMINOPHEN 325 MG/1
650 TABLET ORAL
Status: CANCELLED | OUTPATIENT
Start: 2024-06-18

## 2024-06-18 RX ORDER — HEPARIN SODIUM (PORCINE) LOCK FLUSH IV SOLN 100 UNIT/ML 100 UNIT/ML
5 SOLUTION INTRAVENOUS
Status: CANCELLED | OUTPATIENT
Start: 2024-06-18

## 2024-06-18 RX ORDER — LIDOCAINE HYDROCHLORIDE 20 MG/ML
10 JELLY TOPICAL
Status: CANCELLED | OUTPATIENT
Start: 2024-06-18

## 2024-06-18 RX ORDER — DIPHENHYDRAMINE HCL 25 MG
50 CAPSULE ORAL
Status: CANCELLED | OUTPATIENT
Start: 2024-06-18

## 2024-06-18 RX ORDER — ACETAMINOPHEN 325 MG/1
650 TABLET ORAL
Status: CANCELLED
Start: 2024-06-18

## 2024-06-18 RX ORDER — ALBUTEROL SULFATE 0.83 MG/ML
2.5 SOLUTION RESPIRATORY (INHALATION)
Status: CANCELLED | OUTPATIENT
Start: 2024-06-18

## 2024-06-18 RX ORDER — LORAZEPAM 2 MG/ML
0.5 INJECTION INTRAMUSCULAR EVERY 4 HOURS PRN
Status: CANCELLED | OUTPATIENT
Start: 2024-06-18

## 2024-06-18 RX ORDER — HEPARIN SODIUM,PORCINE 10 UNIT/ML
5-20 VIAL (ML) INTRAVENOUS DAILY PRN
Status: CANCELLED | OUTPATIENT
Start: 2024-06-18

## 2024-06-18 RX ORDER — DIPHENHYDRAMINE HYDROCHLORIDE 50 MG/ML
50 INJECTION INTRAMUSCULAR; INTRAVENOUS
Status: CANCELLED
Start: 2024-06-18

## 2024-06-18 RX ORDER — METHYLPREDNISOLONE SODIUM SUCCINATE 125 MG/2ML
125 INJECTION, POWDER, LYOPHILIZED, FOR SOLUTION INTRAMUSCULAR; INTRAVENOUS
Status: CANCELLED
Start: 2024-06-18

## 2024-06-18 RX ORDER — EPINEPHRINE 1 MG/ML
0.3 INJECTION, SOLUTION INTRAMUSCULAR; SUBCUTANEOUS EVERY 5 MIN PRN
Status: CANCELLED | OUTPATIENT
Start: 2024-06-18

## 2024-06-18 RX ORDER — ALBUTEROL SULFATE 90 UG/1
1-2 AEROSOL, METERED RESPIRATORY (INHALATION)
Status: CANCELLED
Start: 2024-06-18

## 2024-06-18 RX ADMIN — SODIUM CHLORIDE 250 ML: 9 INJECTION, SOLUTION INTRAVENOUS at 10:55

## 2024-06-18 ASSESSMENT — PAIN SCALES - GENERAL: PAINLEVEL: NO PAIN (0)

## 2024-06-18 NOTE — NURSING NOTE
[id number]: Study Visit Note   Subject name: Alek Mcneill     Visit: Week 15    Did the study visit occur within the appropriate window allowed by the protocol? yes    Since the last study visit, He has been doing ***.     ECOG ***    Assessed for below symptoms specifically:   Dysuria {yes no:365952}  Pollakiuria {yes no:225962}  Nocturia {yes no:934597}  Urgency {yes no:951608}  Incontinence {yes no:931131}  Hematuria {yes no:152843}  Urinary hesitation {yes no:233787}  Bladder pain/spasm {yes no:201767}  Urethral pain {yes no:956683}   Bladder discomfort {yes no:747477}  Feeling of incomplete bladder emptying  {yes no:713078}  Lower abdominal pain {yes no:462541}  Fever {yes no:509617}  Flu-like symptoms {yes no:011718}  Fatigue {yes no:566740}  Diarrhea {yes no:743030}  Nausea {yes no:660744}  Rash {yes no:545641}  Arthralgia  {yes no:284555}      Cetrelimab infusion:  Were premedications given? No    Verbal handover provided to infusion RN; reminded RN to document actual start time of infusion and actual stop time of the infusion. If infusion deviates from protocol directed infusion time, please document rationale in your infusion note.      I have personally interviewed Alek Mcneill and reviewed his medical record for adverse events and concomitant medications and these have been recorded on the corresponding logs in Alek Mcneill's research file.     Alek Mcneill was given the opportunity to ask any trial related questions.  Please see provider progress note for physical exam and other clinical information. Labs were reviewed - any significant lab values were addressed and reviewed.    Araceli Diaz RN

## 2024-06-18 NOTE — NURSING NOTE
[id number]: Study Visit Note   Subject name: Alek Mcneill     Visit: Week 15    Did the study visit occur within the appropriate window allowed by the protocol? yes    Since the last study visit, He has been doing very well. No new signs or symptoms since his last visit. Hyponatremic today at 132, Clarita thinks this is related to alcohol use, Clarita counseled patient on reducing alcohol intake until levels normalize. Creatinine increase has resolved.     Assessed for below symptoms specifically:   Dysuria no  Pollakiuria no  Nocturia no  Urgency no  Incontinence no  Hematuria no  Urinary hesitation no  Bladder pain/spasm no  Urethral pain no   Bladder discomfort no  Feeling of incomplete bladder emptying  no  Lower abdominal pain no  Fever no  Flu-like symptoms no  Fatigue no  Diarrhea no  Nausea no  Rash no  Arthralgia  no      Cetrelimab infusion:  Were premedications given? No    Verbal handover provided to infusion RN; reminded RN to document actual start time of infusion and actual stop time of the infusion. If infusion deviates from protocol directed infusion time, please document rationale in your infusion note. Reminded RN that the end of the infusion is considered the end of the 14 mL flush. Vital signs needed before the infusion. Reminded RN of 0.2 micron filter requirement.      Total Volume= 110 mL + 14 mL post infusion flush        I have personally interviewed Alek Mcneill and reviewed his medical record for adverse events and concomitant medications and these have been recorded on the corresponding logs in Alek AGOSTO Raheelprincess's research file.     Alek Mcneill was given the opportunity to ask any trial related questions.  Please see provider progress note for physical exam and other clinical information. Labs were reviewed - any significant lab values were addressed and reviewed.    Araceli Diaz RN

## 2024-06-18 NOTE — Clinical Note
"6/18/2024      Alek Mcneill  [address] [place] [address]      Dear Colleague,    Thank you for referring your patient, Alek Mcneill, to the [facility]. Please see a copy of my visit note below.      [facility] Medical Oncology Note  Date of visit: Jun 18, 2024  Outpatient Clinic Note        Assessment:     Stage 1 (T1NxMx) superficial poorly differentiated bladder cancer. T1 lesions are high risk. Recurrence rates at one, three, and five years can reach up to 50, 70 to 80, and 90 percent, respectively, and 20 to 25 percent progress to more invasive (T2 or greater) disease. Given the high risk here, therapy is indicated.  Alek is on the SUNRISE-3 study, for patients with T1 disease and has been randomized to the TAR-200 (a device inserted in to the bladder that contains gemcitabine)+ Cetrelimab arm.  S/P three doses of cetrelimab, with adequate tolerance. He has had some lab evidence of thryoiditis.  He now is hypothyroid and is on levothyroxine 50 mcg daily. We will adjustas needed. He will talk to his primary about this and taking over the levothyroxine management following completion of the trial.   Hemoglobin A1C up trending, continue to monitor. Patient will discuss further with his PCP next week as well.   Previous creatinine was very mildly above baseline ~1.20 at 1.30 today. Will proceed with an extra IV fluid bolus today. Continue to monitor.       Plan:     Next cetrelimab/TAR-200 today  RTC in 3 weeks for his next cycle.      __________________________________________________________________    DIAGNOSIS:     Recently diagnosed T1 poorly differentiated invasive bladder cancer.    Respiratory symptoms concerning for pneumonitis, but CT chest 3/27/2024 showed \"No new or suspicious pulmonary opacities.\"       History of Present Illness/Therapy to Date:   11/19/2023: Went to ED for subacute onset of gross hematuria, and a passing of clots. Stopped ASA. CT showed " "multiple indeterminate nodular densities in the bladder suspicious for enhancing urothelial lesions   12/6/2023: Cystoscopy , with finding of multiple bladder tumors appeared papillary and were located on the anterior wall and had some superficial calcifications. \"We resected tissue down to muscle using cut electrocautery. \" Pathology showed high grade papillary carcinoma, that did not invade the muscle.  1/17/2024: Cystoscopy with resection showed CIS, but no invasive cancer.  Alek has agreed to participate in the SUNRISE -3 trial. He has been randomized to receive Cetrelimab and TAR-200 (an investigational intravesical drug delivery system for gemcitabine). His first dose of cetrelimab and TAR-200 was 3/6/2024. He is back today for his fourht cycle.        Interval history:     Alek is back with Christine here for week 12 of the SUNRISE-3 trial and his fourth dose of cetrelimab.   Continues to feel well. No issues with treatment.  Had an injection in his neck the other day. Only minimally helpful.  He has a long history of degenerative changes that predated his involvement in that study, and has been offered surgery which she is appropriately declining.   He endorses constipation which he manages with sennakot 1-2 tablets prn with improvement.   No urinary concerns. No hematuria. Drinking fluids well ~5, 16 oz water bottles.   He gets short of breath with activity predating the start of therapy without new changes from baseline. No chest pain or cough.   No new rashes or lesions. No peripheral edema.   No headaches or dizziness.      ROS: 10 point ROS neg other than the symptoms noted above in the HPI.      Past Medical History:   I have reviewed this patient's past medical history   Past Medical History:   Diagnosis Date    Complication of anesthesia     DM2 (diabetes mellitus, type 2) (H)     Hypercholesteremia     Hyperlipidemia     Hypertension           Past Surgical History:    I have reviewed this patient's " past surgical history       Social History:   Tobacco, ETOH, and rec drugs reviewed and as noted below with the following exceptions:  Drinks a few beers a day. Quit smoking in .  Alek was born in [place], and graduated from [employer] (no longer exists) in .  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  He is  to Christine, his third wife.  She worked with his sister-in-law and they were introduced.  They have been  for 25 years.  Jose has 5 children, Golden, Cr, Joon, Radha, and Kayla.  Kayla is the youngest and a PA who lives in [place].  The other children are in the service.  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          Family History:     Grandma with skin cancer (s/p removal of her nose)  Uncle with sarcoma  Dad had bone cancer/lung cancer  Brother Casimiro had bladder cancer  Buddy had bladder cacner ( in car accident)  Brother Lenny had bladder cancer  No family history on file.         Medications:     Current Outpatient Medications   Medication Sig Dispense Refill    Ascorbic Acid (VITAMIN C) 500 MG CHEW Take 1 chew tab by mouth daily      Ascorbic Acid 500 MG CAPS 1 tablet Orally Once a day      aspirin (ASPIRIN LOW DOSE) 81 MG EC tablet Take 81 mg by mouth daily ONCE BEFORE BED      blood glucose (NO BRAND SPECIFIED) lancets standard as directed, to use with his Contour Next test blood sugars daily      CONTOUR NEXT TEST test strip USE TO TEST BLOOD SUGAR DAILY      Ergocalciferol 50 MCG ( UT) CAPS Take 50 mcg by mouth daily      fish oil-omega-3 fatty acids 500 MG capsule Take 2 capsules by mouth daily      hydrochlorothiazide (HYDRODIURIL) 25 MG tablet Take 25 mg by mouth daily      ketoconazole (NIZORAL) 2 % external cream Apply topically as needed      levothyroxine (SYNTHROID/LEVOTHROID) 75 MCG tablet  Take 1 tablet (75 mcg) by mouth daily 30 tablet 5    Microlet Lancets MISC daily      simvastatin (ZOCOR) 40 MG tablet Take 40 mg by mouth at bedtime      triamcinolone (KENALOG) 0.1 % external cream Apply as needed      Vitamin D3 (VITAMIN D, CHOLECALCIFEROL,) 25 mcg (1000 units) tablet Take 25 mcg by mouth daily                Physical Exam:   Blood pressure (!) 154/76, pulse 82, temperature 98.4  F (36.9  C), temperature source Oral, resp. rate 16, weight 109.5 kg (241 lb 6.4 oz), SpO2 95%.    ECOG PS: 0  Constitutional: WDWN male in NAD, pleasant and appropriate  HEENT:  NC/AT, no icterus, OP clear, MMM  Skin: No jaundice nor ecchymoses  Lungs: CTAB, no w/r/r, nonlabored breathing  Cardiovascular: RRR, S1, S2, no m/r/g  Abdomen: +BS, soft, nontender, nondistended, no organomegaly nor masses  MSK/Extremities: Warm, well perfused. No edema  LN: no cervical, supraclavicular, axillary, nor inguinal lymphadenopathy  Neurologic: alert, answering questions appropriately, moving all extremities spontaneously. CN 2-12 grossly intact.  Psych: appropriate affect  Data:   Most Recent 3 CBC's:  Recent Labs   Lab Test 05/24/24  1151 05/06/24  1143 04/18/24  0821   WBC 7.9 8.7 6.5   HGB 16.5 16.3 16.5   MCV 90 92 89    223 241   ANEUTAUTO 4.4 5.4 3.3     Most Recent 3 BMP's:  Recent Labs   Lab Test 06/11/24  0951 05/24/24  1151 05/15/24  1204 05/06/24  1143 04/18/24  0821   NA  --  135  --  137 137   POTASSIUM  --  3.9  --  4.1 4.1   CHLORIDE  --  99  --  98 100   CO2  --  25  --  31* 24   BUN  --  18.1  --  14.6 16.3   CR 1.22* 1.36* 1.24* 1.30* 1.20*   ANIONGAP  --  11  --  8 13   MILLI  --  9.7  --  10.0 9.8   GLC  --  107*  --  119* 140*   PROTTOTAL  --  7.5  --  7.2 7.1   ALBUMIN  --  4.5  --  4.1 4.2    Most Recent 3 LFT's:  Recent Labs   Lab Test 05/24/24  1151 05/06/24  1143 04/18/24  0821   AST 37 33 33   ALT 31 33 37   ALKPHOS 81 82 77   BILITOTAL 0.6 0.6 0.6    Most Recent 2 TSH and T4:  Recent Labs   Lab Test  "05/24/24  1151 05/06/24  1143   TSH 41.25* 26.53*   T4 0.67* 0.58*     I reviewed the above labs today.      [facility] Medical Oncology Note  Date of visit: Jun 18, 2024  Outpatient Clinic Note        Assessment:     Stage 1 (T1NxMx) superficial poorly differentiated bladder cancer. T1 lesions are high risk. Recurrence rates at one, three, and five years can reach up to 50, 70 to 80, and 90 percent, respectively, and 20 to 25 percent progress to more invasive (T2 or greater) disease. Given the high risk here, therapy is indicated.  Alek is on the SUNRISE-3 study, for patients with T1 disease and has been randomized to the TAR-200 (a device inserted in to the bladder that contains gemcitabine)+ Cetrelimab arm.  S/P three doses of cetrelimab, with adequate tolerance. He has had some lab evidence of thryoiditis.  He now is hypothyroid and is on levothyroxine 75 mcg increased on 6.3. TSH is elevated but improved. Recommend monitoring and adjusting dose prn.  He will talk to his primary about this and taking over the levothyroxine management following completion of the trial.   Hemoglobin A1C up trending, continue to monitor. Stable today 6/18/24.  Previous creatinine has improved and back to baseline.       Plan:     Next cetrelimab today. TAR-200 next week with Dr. Kaiser.  RTC in 3 weeks for his next cycle.    Patient was seen and evaluated with study RNCC, Araceli Diaz.      25 minutes spent on the date of the encounter doing chart review, review of test results, interpretation of tests, patient visit, and documentation     Clarita Owen PA-C    __________________________________________________________________    DIAGNOSIS:     Recently diagnosed T1 poorly differentiated invasive bladder cancer.    Respiratory symptoms concerning for pneumonitis, but CT chest 3/27/2024 showed \"No new or suspicious pulmonary opacities.\"       History of Present Illness/Therapy to Date:   11/19/2023: Went to ED for subacute " "onset of gross hematuria, and a passing of clots. Stopped ASA. CT showed multiple indeterminate nodular densities in the bladder suspicious for enhancing urothelial lesions   12/6/2023: Cystoscopy , with finding of multiple bladder tumors appeared papillary and were located on the anterior wall and had some superficial calcifications. \"We resected tissue down to muscle using cut electrocautery. \" Pathology showed high grade papillary carcinoma, that did not invade the muscle.  1/17/2024: Cystoscopy with resection showed CIS, but no invasive cancer.  Alek has agreed to participate in the SUNRISE -3 trial. He has been randomized to receive Cetrelimab and TAR-200 (an investigational intravesical drug delivery system for gemcitabine). His first dose of cetrelimab and TAR-200 was 3/6/2024. He is back today for his fourht cycle.        Interval history:     Alek is back with Christine here for week 15 of the SUNRISE-3 trial and his sixth dose of cetrelimab.   Patient reports that he is feeling well.  He does not notice any significant side effects with immunotherapy.  Energy is stable today.  Previously had reported getting shortness of breath with activity although this appears to be improved at this time.  No chest pain or cough.  He has not noticed any urinary changes including hematuria or pain.  No rashes or lesions.  He endorses occasional mild headaches that are not new or worsened.  No new neurological symptoms.  No vision changes.  He is taking senna for constipation as needed.  No fevers or chills.  Eating and drinking well.     ROS: 10 point ROS neg other than the symptoms noted above in the HPI.      Past Medical History:   I have reviewed this patient's past medical history   Past Medical History:   Diagnosis Date     Complication of anesthesia      DM2 (diabetes mellitus, type 2) (H)      Hypercholesteremia      Hyperlipidemia      Hypertension           Past Surgical History:    I have reviewed this patient's " past surgical history       Social History:   Tobacco, ETOH, and rec drugs reviewed and as noted below with the following exceptions:  Drinks a few beers a day. Quit smoking in .  Alek was born in [place], and graduated from [employer] [facility]no [facility]) in .  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  He is  to Christine, his third wife.  She worked with his sister-in-law and they were introduced.  They have been  for 25 years.  Jose has 5 children, Golden, Cr, Joon, Radha, and Kayla.  Kayla is the youngest and a PA who lives in [place].  The other children are in the service.  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      Family History:     Grandma with skin cancer (s/p removal of her nose)  Uncle with sarcoma  Dad had bone cancer/lung cancer  Brother Casimiro had bladder cancer  Buddy had bladder cacner ( in car accident)  Brother Lenny had bladder cancer  No family history on file.         Medications:     Current Outpatient Medications   Medication Sig Dispense Refill     Ascorbic Acid (VITAMIN C) 500 MG CHEW Take 1 chew tab by mouth daily       Ascorbic Acid 500 MG CAPS 1 tablet Orally Once a day       aspirin (ASPIRIN LOW DOSE) 81 MG EC tablet Take 81 mg by mouth daily ONCE BEFORE BED       blood glucose (NO BRAND SPECIFIED) lancets standard as directed, to use with his Contour Next test blood sugars daily       CONTOUR NEXT TEST test strip USE TO TEST BLOOD SUGAR DAILY       Ergocalciferol 50 MCG ( UT) CAPS Take 50 mcg by mouth daily       fish oil-omega-3 fatty acids 500 MG capsule Take 2 capsules by mouth daily       hydrochlorothiazide (HYDRODIURIL) 25 MG tablet Take 25 mg by mouth daily       ketoconazole (NIZORAL) 2 % external cream Apply topically as needed       levothyroxine (SYNTHROID/LEVOTHROID) 75 MCG  tablet Take 1 tablet (75 mcg) by mouth daily 30 tablet 5     Microlet Lancets MISC daily       simvastatin (ZOCOR) 40 MG tablet Take 40 mg by mouth at bedtime       triamcinolone (KENALOG) 0.1 % external cream Apply as needed       Vitamin D3 (VITAMIN D, CHOLECALCIFEROL,) 25 mcg (1000 units) tablet Take 25 mcg by mouth daily                Physical Exam:   Blood pressure (!) 154/76, pulse 82, temperature 98.4  F (36.9  C), temperature source Oral, resp. rate 16, weight 109.5 kg (241 lb 6.4 oz), SpO2 95%.    ECOG PS: 0  Constitutional: WDWN male in NAD, pleasant and appropriate  HEENT:  NC/AT, no icterus, OP clear, MMM  Skin: No jaundice nor ecchymoses  Lungs: CTAB, no w/r/r, nonlabored breathing  Cardiovascular: RRR, S1, S2, no m/r/g  Abdomen: +BS, soft, nontender, nondistended, no organomegaly nor masses  MSK/Extremities: Warm, well perfused. No edema  LN: no cervical, supraclavicular, axillary, nor inguinal lymphadenopathy  Neurologic: alert, answering questions appropriately, moving all extremities spontaneously. CN 2-12 grossly intact.  Psych: appropriate affect  Data:   Most Recent 3 CBC's:  Recent Labs   Lab Test 06/18/24  0802 05/24/24  1151 05/06/24  1143   WBC 8.6 7.9 8.7   HGB 15.9 16.5 16.3   MCV 91 90 92    220 223   ANEUTAUTO 5.2 4.4 5.4     Most Recent 3 BMP's:  Recent Labs   Lab Test 06/18/24  0802 06/11/24  0951 05/24/24  1151 05/15/24  1204 05/06/24  1143   *  --  135  --  137   POTASSIUM 4.2  --  3.9  --  4.1   CHLORIDE 96*  --  99  --  98   CO2 24  --  25  --  31*   BUN 15.5  --  18.1  --  14.6   CR 1.11 1.22* 1.36*   < > 1.30*   ANIONGAP 12  --  11  --  8   MILLI 9.6  --  9.7  --  10.0   *  --  107*  --  119*   PROTTOTAL 7.2  --  7.5  --  7.2   ALBUMIN 4.3  --  4.5  --  4.1    < > = values in this interval not displayed.    Most Recent 3 LFT's:  Recent Labs   Lab Test 06/18/24  0802 05/24/24  1151 05/06/24  1143   AST 35 37 33   ALT 31 31 33   ALKPHOS 72 81 82   BILITOTAL 0.7  0.6 0.6    Most Recent 2 TSH and T4:  Recent Labs   Lab Test 06/18/24  0802 05/24/24  1151   TSH 25.59* 41.25*   T4 1.03 0.67*     I reviewed the above labs today.      Again, thank you for allowing me to participate in the care of your patient.        Sincerely,        Clarita Owen PA-C

## 2024-06-18 NOTE — NURSING NOTE
"Oncology Rooming Note    June 18, 2024 8:47 AM   Alek Mcneill is a 74 year old male who presents for:    Chief Complaint   Patient presents with    Blood Draw     Labs drawn via PIV by RN in lab.  VS taken     Initial Vitals: BP (!) 154/76   Pulse 82   Temp 98.4  F (36.9  C) (Oral)   Resp 16   Wt 109.5 kg (241 lb 6.4 oz)   SpO2 95%   BMI 36.70 kg/m   Estimated body mass index is 36.7 kg/m  as calculated from the following:    Height as of 5/7/24: 1.727 m (5' 8\").    Weight as of this encounter: 109.5 kg (241 lb 6.4 oz). Body surface area is 2.29 meters squared.  No Pain (0) Comment: Data Unavailable   No LMP for male patient.  Allergies reviewed: Yes  Medications reviewed: Yes    Medications: Medication refills not needed today.  Pharmacy name entered into [employer]: [facility] [address] [place] [address] AT [place] [id number]    Frailty Screening:   Is the patient here for a new oncology consult visit in cancer care? 2. No      Clinical concerns:  None      Roland May              "

## 2024-06-18 NOTE — NURSING NOTE
Chief Complaint   Patient presents with    Blood Draw     Labs drawn via PIV by RN in lab.  VS taken       Labs drawn from PIV placed by RN. Line flushed with saline. Vitals taken. Pt checked in for appointment(s).    Mikayla Contreras RN

## 2024-06-18 NOTE — PROGRESS NOTES
Infusion Nursing Note:  Alek Mcneill presents today for C15D1 Study Cetrelimab (IDS#[id number]).    Patient seen by provider today: Yes: Sheri FRAUSTO   present during visit today: Not Applicable.    Note: No complaints.    Per research RN Araceli Diaz: Program 14ml flush post completion of study Cetrelimab, check vitals before and after study Cetrelimab, and continue 15 minute observation post study Cetrelimab.   Study Cetrelimab initiated at 1107.        Intravenous Access:  Peripheral IV placed.    Treatment Conditions:  Lab Results   Component Value Date    HGB 15.9 06/18/2024    WBC 8.6 06/18/2024    ANEUTAUTO 5.2 06/18/2024     06/18/2024        Lab Results   Component Value Date     (L) 06/18/2024    POTASSIUM 4.2 06/18/2024    MAG 2.3 11/19/2023    CR 1.11 06/18/2024    MILLI 9.6 06/18/2024    BILITOTAL 0.7 06/18/2024    ALBUMIN 4.3 06/18/2024    ALT 31 06/18/2024    AST 35 06/18/2024       Results reviewed, labs MET treatment parameters, ok to proceed with treatment.      Post Infusion Assessment:  Patient tolerated infusion without incident.  Patient observed for 15 minutes post Study Cetrelimab per protocol.   Blood return noted pre and post infusion.  Site patent and intact, free from redness, edema or discomfort.  No evidence of extravasations.  Access discontinued per protocol.       Discharge Plan:   Patient declined prescription refills.  Discharge instructions reviewed with: Patient.  Patient and/or family verbalized understanding of discharge instructions and all questions answered.  AVS to patient via [employer].  Patient will return 7/9/24 for next appointment.   Patient discharged in stable condition accompanied by: self and daughter.  Departure Mode: Ambulatory.      LAURA MUHAMMAD RN

## 2024-06-19 LAB — BACTERIA UR CULT: NORMAL

## 2024-06-25 ENCOUNTER — ALLIED HEALTH/NURSE VISIT (OUTPATIENT)
Dept: ONCOLOGY | Facility: CLINIC | Age: 74
End: 2024-06-25

## 2024-06-25 ENCOUNTER — OFFICE VISIT (OUTPATIENT)
Dept: UROLOGY | Facility: CLINIC | Age: 74
End: 2024-06-25
Payer: COMMERCIAL

## 2024-06-25 VITALS
WEIGHT: 234 LBS | HEIGHT: 69 IN | DIASTOLIC BLOOD PRESSURE: 76 MMHG | HEART RATE: 73 BPM | SYSTOLIC BLOOD PRESSURE: 131 MMHG | BODY MASS INDEX: 34.66 KG/M2

## 2024-06-25 DIAGNOSIS — C67.3 MALIGNANT NEOPLASM OF ANTERIOR WALL OF URINARY BLADDER (H): Primary | ICD-10-CM

## 2024-06-25 PROCEDURE — 52000 CYSTOURETHROSCOPY: CPT | Performed by: UROLOGY

## 2024-06-25 RX ORDER — LIDOCAINE HYDROCHLORIDE 20 MG/ML
JELLY TOPICAL ONCE
Status: COMPLETED | OUTPATIENT
Start: 2024-06-25 | End: 2024-06-25

## 2024-06-25 RX ORDER — SULFAMETHOXAZOLE/TRIMETHOPRIM 800-160 MG
1 TABLET ORAL ONCE
Status: COMPLETED | OUTPATIENT
Start: 2024-06-25 | End: 2024-06-25

## 2024-06-25 RX ADMIN — LIDOCAINE HYDROCHLORIDE: 20 JELLY TOPICAL at 08:20

## 2024-06-25 RX ADMIN — SULFAMETHOXAZOLE AND TRIMETHOPRIM 1 TABLET: 800; 160 TABLET ORAL at 08:49

## 2024-06-25 ASSESSMENT — PAIN SCALES - GENERAL: PAINLEVEL: NO PAIN (0)

## 2024-06-25 NOTE — Clinical Note
6/25/2024       RE: Alek AGOSTO Raheelprincess  [address] [place] [address]     Dear Colleague,    Thank you for referring your patient, Alek Mcneill, to the [facility]. Please see a copy of my visit note below.    No notes on file    Again, thank you for allowing me to participate in the care of your patient.      Sincerely,    Harman Kaiser MD

## 2024-06-25 NOTE — NURSING NOTE
"Chief Complaint   Patient presents with    Cystoscopy     Tar 200       Blood pressure 131/76, pulse 73, height 1.753 m (5' 9\"), weight 106.1 kg (234 lb). Body mass index is 34.56 kg/m .    Patient Active Problem List   Diagnosis    Malignant neoplasm of anterior wall of urinary bladder (H)    Benign hypertension    Hyperlipidemia    Impotence of organic origin    Morbid (severe) obesity due to excess calories (H)    Type 2 diabetes mellitus without complication, without long-term current use of insulin (H)    Spinal stenosis of lumbosacral region    Peripheral vascular disease (H24)    Venous stasis dermatitis of left lower extremity    Lumbar radiculopathy    Environmental allergies    Age-related nuclear cataract of right eye       Allergies   Allergen Reactions    Chlorhexidine Dermatitis       Current Outpatient Medications   Medication Sig Dispense Refill    Ascorbic Acid (VITAMIN C) 500 MG CHEW Take 1 chew tab by mouth daily      Ascorbic Acid 500 MG CAPS 1 tablet Orally Once a day      aspirin (ASPIRIN LOW DOSE) 81 MG EC tablet Take 81 mg by mouth daily ONCE BEFORE BED      blood glucose (NO BRAND SPECIFIED) lancets standard as directed, to use with his Contour Next test blood sugars daily      CONTOUR NEXT TEST test strip USE TO TEST BLOOD SUGAR DAILY      Ergocalciferol 50 MCG (2000 UT) CAPS Take 50 mcg by mouth daily      fish oil-omega-3 fatty acids 500 MG capsule Take 2 capsules by mouth daily      hydrochlorothiazide (HYDRODIURIL) 25 MG tablet Take 25 mg by mouth daily      ketoconazole (NIZORAL) 2 % external cream Apply topically as needed      levothyroxine (SYNTHROID/LEVOTHROID) 75 MCG tablet Take 1 tablet (75 mcg) by mouth daily 30 tablet 5    Microlet Lancets MISC daily      simvastatin (ZOCOR) 40 MG tablet Take 40 mg by mouth at bedtime      triamcinolone (KENALOG) 0.1 % external cream Apply as needed      Vitamin D3 (VITAMIN D, CHOLECALCIFEROL,) 25 mcg (1000 units) tablet Take 25 mcg by mouth " daily         Social History     Tobacco Use    Smoking status: Former     Current packs/day: 0.00     Types: Cigarettes     Quit date:      Years since quittin.5   Vaping Use    Vaping status: Never Used   Substance Use Topics    Alcohol use: Yes     Comment: daily    Drug use: Never       Invasive Procedure Safety Checklist:    Procedure: Cystoscopy    Action: Complete sections and checkboxes as appropriate.    Pre-procedure:  1. Patient ID Verified with 2 identifiers (Octavia and  or MRN) : YES    2. Procedure and site verified with patient/designee (when able) : YES    3. Accurate consent documentation in medical record : YES    4. H&P (or appropriate assessment) documented in medical record : N/A  H&P must be up to 30 days prior to procedure an updated within 24 hours of                 Procedure as applicable.     5. Relevant diagnostic and radiology test results appropriately labeled and displayed as applicable : YES    6. Blood products, implants, devices, and/or special equipment available for the procedure as applicable : YES    7. Procedure site(s) marked with provider initials [Exclusions: none] : NO    8. Marking not required. Reason : Yes  Procedure does not require site marking    Time Out:     Time-Out performed immediately prior to starting procedure, including verbal and active participation of all team members addressing: YES    1. Correct patient identity.  2. Confirmed that the correct side and site are marked.  3. An accurate procedure to be done.  4. Agreement on the procedure to be done.  5. Correct patient position.  6. Relevant images and results are properly labeled and appropriately displayed.  7. The need to administer antibiotics or fluids for irrigation purposes during the procedure as applicable.  8. Safety precautions based on patient history or medication use.    During Procedure: Verification of correct person, site, and procedure occurs any time the responsibility for  care of the patient is transferred to another member of the care team.    The following medication was given:     MEDICATION:  Lidocaine without epinephrine 2% jelly  ROUTE: urethral   SITE: urethral   DOSE: 10 mL  LOT #: [id number]  : [employer]  EXPIRATION DATE: 10-25  NDC#: [address]-9   Was there drug waste? No    Prior to med admin, verified patient identity using patient's name and date of birth.  Due to med administration, patient instructed to remain in clinic for 15 minutes  afterwards, and to report any adverse reaction to me immediately.    Drug Amount Wasted:  None.  Vial/Syringe: Syringe      Deedee Dyson  6/25/2024  8:04 AM

## 2024-06-25 NOTE — NURSING NOTE
[id number]: Study Visit Note   Subject name: Alek Mcneill     Visit: Week 15 TAR-200 Insertion    Did the study visit occur within the appropriate window allowed by the protocol? yes    Since the last study visit, He has been doing well.    TAR-200 Insertion/Removal:  Time of TAR-200 Insertion:   Medication #: [id number]    I have personally interviewed Alek Mcneill and reviewed his medical record for adverse events and concomitant medications and these have been recorded on the corresponding logs in Alek Mcneill's research file.     Alek Mcneill was given the opportunity to ask any trial related questions.  Please see provider progress note for physical exam and other clinical information. Labs were reviewed - any significant lab values were addressed and reviewed.    Araceli Diaz RN

## 2024-06-25 NOTE — PATIENT INSTRUCTIONS
"Please schedule a follow up cysto + tar 200 insertion           AFTER YOUR CYSTOSCOPY        You have just completed a cystoscopy, or \"cysto\", which allowed your physician to learn more about your bladder (or to remove a stent placed after surgery). We suggest that you continue to avoid caffeine, fruit juice, and alcohol for the next 24 hours, however, you are encouraged to return to your normal activities.         A few things that are considered normal after your cystoscopy:     * Small amount of bleeding (or spotting) that clears within the next 24 hours     * Slight burning sensation with urination     * Sensation to of needing to avoid more frequently     * The feeling of \"air\" in your urine     * Mild discomfort that is relieved with Tylenol        Please contact our office promptly if you:     * Develop a fever above 101 degrees     * Are unable to urinate     * Develop bright red blood that does not stop     * Severe pain or swelling         Please contact our office with any concerns or questions @[email or web address].  "

## 2024-06-25 NOTE — PROGRESS NOTES
"  CHIEF COMPLAINT   It was my pleasure to see Alek Mcneill who is a 74 year old male for follow-up of bladder cancer.      HPI  Alek Mcneill is a very pleasant 74 year old male who presents with a history of bladder cancer. 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      He has enrolled in [employer] 3 and is here today for TAR-200 removal and re-insertion. He continues to tolerate this therapy very well, with no significant symptoms. No recent hematuria or dysuria.      Re-staging TURBT 1/17/2024          SPECIMEN   Procedure   Transurethral resection of bladder (TURBT)   TUMOR   Tumor Site   Anterior wall   Histologic Type   Urothelial carcinoma, in situ   Histologic Grade   High-grade   Tumor Extent   Flat carcinoma in situ   Lymphovascular Invasion   Not identified   Tumor Configuration   Flat   Muscularis Propria (detrusor muscle)   Cannot be determined: Biopsy site changes are identified in the current sample; no residual viable invasive carcinoma is identified.  Please see previous biopsy report for further information, case [id number], [facility]..   ADDITIONAL FINDINGS   Associated Epithelial Lesions   Chronic inflammation, histiocytic proliferation, consistent with biopsy site changes.      TURBT 12/6/2023  Final Diagnosis   A) URINARY BLADDER, BASE OF TUMOR, TRANSURETHRAL RESECTION BLADDER TUMOR:  -NONINVASIVE LOW-GRADE PAPILLARY UROTHELIAL CARCINOMA  -MULTIPLE FRAGMENTS OF MUSCULARIS PROPRIA  -SEE SYNOPTIC REPORT     B) URINARY BLADDER, TUMOR, TRANSURETHRAL RESECTION BLADDER TUMOR:  -HIGH-GRADE PAPILLARY UROTHELIAL CARCINOMA WITH LAMINA PROPRIA INVASION  -MUSCULARIS PROPRIA PRESENT AND UNINVOLVED  -SEE SYNOPTIC REPORT     PHYSICAL EXAM  Patient is a 74 year old  male   Vitals: Blood pressure 131/76, pulse 73, height 1.753 m (5' 9\"), weight 106.1 kg (234 lb).  General Appearance Adult: Body mass index is 34.56 kg/m .  Alert, no acute " distress, oriented  Lungs: no respiratory distress, or pursed lip breathing  Abdomen: soft, nontender, no organomegaly or masses  Back: no CVAT  Neuro: Alert, oriented, speech and mentation normal  Psych: affect and mood normal  : penis, scrotum, testes normal    OFFICE CYSTOSCOPY 6/25/2024     Pre-procedure diagnosis:       Bladder Cancer  Post-procedure diagnosis:       device in good position  Procedure performed:             Cystourethroscopy  Surgeon:                                 Harman Kaiser MD  Anesthesia:                             Local     Description of procedure:   After fully informed, voluntary consent was obtained, the patient was brought into the procedure room, identified and placed in a supine position on the cystoscopy table.  The groin/scrotum were prepped with betadine and draped in a sterile fashion.  A 15F flexible cystoscope was inserted into the urethra, and the bladder and urethra were examined in a systematic manner. The TAR-200 device was identified, grasped, and removed. Removal time was 8:32 AM.  Device was noted to be intact. The urinary placement catheter was advanced 33 cm into the bladder with clear urine return. Lubrication, the device, and additional lubrication were introduced and the device was deployed with the stylet. Insertion time was 8:34 AM Catheter removed. The patient tolerated the procedure well and there were no complications.       Cystoscopic findings:  The urethra was normal without strictures.  The prostate was 3 cm long and demonstrated mild bilobar hypertrophy.  There was no median lobe.  The external sphincter coapted well and the bladder neck was open. The bladder was completely surveyed.  There was mild trabeculation.  There were no neoplasms, stones, or diverticula identifed.  The ureteric orifices were normal in position and number and effluxing clear urine. There are no recurrent tumors or suspicious areas on today's cystoscopy.  The   device was deployed within the bladder.     ASSESSMENT and PLAN  74 year old male with history of HG non-muscle-invasive bladder cancer. He has enrolled in the [place] 3 clinical trial for BCG-naive NMIBC. Cystoscopy today with no evidence of visible papillary disease.     Time of TAR-200 Removal: 8:30 AM  Time of TAR-200 Insertion: 8:31 AM  There were no irregularities observed during or after removal.  Betzy-procedural antibiotics were given.     -Will plan follow-up  for  removal and reinsertion in 3 weeks.    Harman Kaiser MD  Urology  [facility]

## 2024-07-03 ENCOUNTER — PRE VISIT (OUTPATIENT)
Dept: UROLOGY | Facility: CLINIC | Age: 74
End: 2024-07-03
Payer: COMMERCIAL

## 2024-07-03 NOTE — TELEPHONE ENCOUNTER
Reason for visit: cysto + STUDY      Dx/Hx/Sx: bladder cancer     Records/imaging/labs/orders: in epic     At Rooming: have graspers ready - have yellow bag available - bio hazard bin - tall gloves- blue gown- face shields - ask pt if he would like lido

## 2024-07-05 DIAGNOSIS — Z00.6 EXAMINATION OF PARTICIPANT IN CLINICAL TRIAL: ICD-10-CM

## 2024-07-05 DIAGNOSIS — C67.3 MALIGNANT NEOPLASM OF ANTERIOR WALL OF URINARY BLADDER (H): Primary | ICD-10-CM

## 2024-07-08 ENCOUNTER — APPOINTMENT (OUTPATIENT)
Dept: LAB | Facility: HOSPITAL | Age: 74
End: 2024-07-08
Payer: COMMERCIAL

## 2024-07-08 LAB
ALBUMIN SERPL BCG-MCNC: 4.3 G/DL (ref 3.5–5.2)
ALBUMIN UR-MCNC: NEGATIVE MG/DL
ALP SERPL-CCNC: 75 U/L (ref 40–150)
ALT SERPL W P-5'-P-CCNC: 34 U/L (ref 0–70)
AMYLASE SERPL-CCNC: 89 U/L (ref 28–100)
ANION GAP SERPL CALCULATED.3IONS-SCNC: 10 MMOL/L (ref 7–15)
APPEARANCE UR: CLEAR
AST SERPL W P-5'-P-CCNC: 31 U/L (ref 0–45)
BASOPHILS # BLD AUTO: 0.1 10E3/UL (ref 0–0.2)
BASOPHILS NFR BLD AUTO: 1 %
BILIRUB SERPL-MCNC: 0.7 MG/DL
BILIRUB UR QL STRIP: NEGATIVE
BUN SERPL-MCNC: 17 MG/DL (ref 8–23)
CALCIUM SERPL-MCNC: 9.5 MG/DL (ref 8.8–10.2)
CHLORIDE SERPL-SCNC: 97 MMOL/L (ref 98–107)
COLOR UR AUTO: COLORLESS
CREAT SERPL-MCNC: 1.23 MG/DL (ref 0.67–1.17)
CRP SERPL-MCNC: <3 MG/L
DEPRECATED HCO3 PLAS-SCNC: 28 MMOL/L (ref 22–29)
EGFRCR SERPLBLD CKD-EPI 2021: 61 ML/MIN/1.73M2
EOSINOPHIL # BLD AUTO: 0.3 10E3/UL (ref 0–0.7)
EOSINOPHIL NFR BLD AUTO: 3 %
ERYTHROCYTE [DISTWIDTH] IN BLOOD BY AUTOMATED COUNT: 13.4 % (ref 10–15)
GGT SERPL-CCNC: 84 U/L (ref 8–61)
GLUCOSE SERPL-MCNC: 123 MG/DL (ref 70–99)
GLUCOSE UR STRIP-MCNC: NEGATIVE MG/DL
HBA1C MFR BLD: 6.7 %
HCT VFR BLD AUTO: 45.3 % (ref 40–53)
HGB BLD-MCNC: 15.9 G/DL (ref 13.3–17.7)
HGB UR QL STRIP: NEGATIVE
IMM GRANULOCYTES # BLD: 0 10E3/UL
IMM GRANULOCYTES NFR BLD: 0 %
KETONES UR STRIP-MCNC: NEGATIVE MG/DL
LDH SERPL L TO P-CCNC: 203 U/L (ref 0–250)
LEUKOCYTE ESTERASE UR QL STRIP: NEGATIVE
LIPASE SERPL-CCNC: 59 U/L (ref 13–60)
LYMPHOCYTES # BLD AUTO: 2 10E3/UL (ref 0.8–5.3)
LYMPHOCYTES NFR BLD AUTO: 25 %
MCH RBC QN AUTO: 32.5 PG (ref 26.5–33)
MCHC RBC AUTO-ENTMCNC: 35.1 G/DL (ref 31.5–36.5)
MCV RBC AUTO: 93 FL (ref 78–100)
MONOCYTES # BLD AUTO: 0.8 10E3/UL (ref 0–1.3)
MONOCYTES NFR BLD AUTO: 10 %
MUCOUS THREADS #/AREA URNS LPF: PRESENT /LPF
NEUTROPHILS # BLD AUTO: 4.9 10E3/UL (ref 1.6–8.3)
NEUTROPHILS NFR BLD AUTO: 61 %
NITRATE UR QL: NEGATIVE
NRBC # BLD AUTO: 0 10E3/UL
NRBC BLD AUTO-RTO: 0 /100
PH UR STRIP: 5.5 [PH] (ref 5–7)
PHOSPHATE SERPL-MCNC: 2.8 MG/DL (ref 2.5–4.5)
PLATELET # BLD AUTO: 216 10E3/UL (ref 150–450)
POTASSIUM SERPL-SCNC: 4.2 MMOL/L (ref 3.4–5.3)
PROT SERPL-MCNC: 7.4 G/DL (ref 6.4–8.3)
RBC # BLD AUTO: 4.89 10E6/UL (ref 4.4–5.9)
RBC URINE: 0 /HPF
SODIUM SERPL-SCNC: 135 MMOL/L (ref 135–145)
SP GR UR STRIP: 1.01 (ref 1–1.03)
SQUAMOUS EPITHELIAL: <1 /HPF
T3FREE SERPL-MCNC: 3 PG/ML (ref 2–4.4)
T4 FREE SERPL-MCNC: 1.01 NG/DL (ref 0.9–1.7)
TSH SERPL DL<=0.005 MIU/L-ACNC: 19.39 UIU/ML (ref 0.3–4.2)
URATE SERPL-MCNC: 7.2 MG/DL (ref 3.4–7)
UROBILINOGEN UR STRIP-MCNC: <2 MG/DL
WBC # BLD AUTO: 8.1 10E3/UL (ref 4–11)
WBC URINE: <1 /HPF

## 2024-07-08 PROCEDURE — 85025 COMPLETE CBC W/AUTO DIFF WBC: CPT | Performed by: INTERNAL MEDICINE

## 2024-07-08 PROCEDURE — 81001 URINALYSIS AUTO W/SCOPE: CPT | Performed by: INTERNAL MEDICINE

## 2024-07-08 PROCEDURE — 84443 ASSAY THYROID STIM HORMONE: CPT | Performed by: INTERNAL MEDICINE

## 2024-07-08 PROCEDURE — 83690 ASSAY OF LIPASE: CPT | Performed by: INTERNAL MEDICINE

## 2024-07-08 PROCEDURE — 84100 ASSAY OF PHOSPHORUS: CPT | Performed by: INTERNAL MEDICINE

## 2024-07-08 PROCEDURE — 84481 FREE ASSAY (FT-3): CPT | Performed by: INTERNAL MEDICINE

## 2024-07-08 PROCEDURE — 86140 C-REACTIVE PROTEIN: CPT | Performed by: INTERNAL MEDICINE

## 2024-07-08 PROCEDURE — 80053 COMPREHEN METABOLIC PANEL: CPT | Performed by: INTERNAL MEDICINE

## 2024-07-08 PROCEDURE — 82150 ASSAY OF AMYLASE: CPT | Performed by: INTERNAL MEDICINE

## 2024-07-08 PROCEDURE — 83036 HEMOGLOBIN GLYCOSYLATED A1C: CPT | Performed by: INTERNAL MEDICINE

## 2024-07-08 PROCEDURE — 82977 ASSAY OF GGT: CPT | Performed by: INTERNAL MEDICINE

## 2024-07-08 PROCEDURE — 84439 ASSAY OF FREE THYROXINE: CPT | Performed by: INTERNAL MEDICINE

## 2024-07-08 PROCEDURE — 87086 URINE CULTURE/COLONY COUNT: CPT | Performed by: INTERNAL MEDICINE

## 2024-07-08 PROCEDURE — 36415 COLL VENOUS BLD VENIPUNCTURE: CPT | Performed by: INTERNAL MEDICINE

## 2024-07-08 PROCEDURE — 84550 ASSAY OF BLOOD/URIC ACID: CPT | Performed by: INTERNAL MEDICINE

## 2024-07-08 PROCEDURE — 83615 LACTATE (LD) (LDH) ENZYME: CPT | Performed by: INTERNAL MEDICINE

## 2024-07-08 NOTE — PROGRESS NOTES
"  [facility] Medical Oncology Note  Date of visit: Jul 9, 2024  Outpatient Clinic Note        Assessment:     Stage 1 (T1NxMx) superficial poorly differentiated bladder cancer. T1 lesions are high risk. Recurrence rates at one, three, and five years can reach up to 50, 70 to 80, and 90 percent, respectively, and 20 to 25 percent progress to more invasive (T2 or greater) disease. Given the high risk here, therapy is indicated.  Alek is on the SUNRISE-3 study, for patients with T1 disease and has been randomized to the TAR-200 (a device inserted in to the bladder that contains gemcitabine)+ Cetrelimab arm.  S/P three doses of cetrelimab, with adequate tolerance. He has had some lab evidence of thryoiditis.  He now is hypothyroid and is on levothyroxine 75 mcg since 6/18. TSH is elevated but improved. Recommend monitoring and adjusting dose prn.  He will talk to his primary about this and taking over the levothyroxine management following completion of the trial.   Hemoglobin A1C up trending but improved on 7/8/24. Alek asks about his blood sugar control. Fasting was 123 yesterday. I have recommended continue follow up with PCP for his known type II diabetes.   Creatinine is mildly elevated but at baseline today.       Plan:     Next cetrelimab today. TAR-200 with Dr. Kaiser.  RTC in 3 weeks for his next cycle and visit with Dr. Tobin.    Patient was seen and evaluated with study RNCC, Araceli Diaz.      25 minutes spent on the date of the encounter doing chart review, review of test results, interpretation of tests, patient visit, and documentation     Clarita Owen PA-C      __________________________________________________________________    DIAGNOSIS:     Recently diagnosed T1 poorly differentiated invasive bladder cancer.    Respiratory symptoms concerning for pneumonitis, but CT chest 3/27/2024 showed \"No new or suspicious pulmonary opacities.\"       History of Present Illness/Therapy to Date: " "  11/19/2023: Went to ED for subacute onset of gross hematuria, and a passing of clots. Stopped ASA. CT showed multiple indeterminate nodular densities in the bladder suspicious for enhancing urothelial lesions   12/6/2023: Cystoscopy , with finding of multiple bladder tumors appeared papillary and were located on the anterior wall and had some superficial calcifications. \"We resected tissue down to muscle using cut electrocautery. \" Pathology showed high grade papillary carcinoma, that did not invade the muscle.  1/17/2024: Cystoscopy with resection showed CIS, but no invasive cancer.  Alek has agreed to participate in the SUNRISE -3 trial. He has been randomized to receive Cetrelimab and TAR-200 (an investigational intravesical drug delivery system for gemcitabine). His first dose of cetrelimab and TAR-200 was 3/6/2024.         Interval history:     Alek is back with Christine here for week 18 of the SUNRISE-3 trial and his 7th dose of cetrelimab.   Patient reports that he is feeling well today.  He denies any side effects from his cetrelimab.  His energy has been stable.  He is able to do all activities that he would like to get done.  He denies any chest pain, shortness of breath, or cough today.  No recent fevers or chills.  No rash or skin concerns.  No urinary concerns.  His urine has been clear and he has not noticed any recent hematuria or clots.  No pain.  No headaches or vision concerns.  He has occasional constipation but for the most part his bowels are moving well.  No diarrhea.  He is eating and drinking well.       ROS: 10 point ROS neg other than the symptoms noted above in the HPI.      Past Medical History:   I have reviewed this patient's past medical history   Past Medical History:   Diagnosis Date    Complication of anesthesia     DM2 (diabetes mellitus, type 2) (H)     Hypercholesteremia     Hyperlipidemia     Hypertension           Past Surgical History:    I have reviewed this patient's past " surgical history       Social History:   Tobacco, ETOH, and rec drugs reviewed and as noted below with the following exceptions:  Drinks a few beers a day. Quit smoking in .  Alek was born in [place], and graduated from [employer] (no longer exists) in .  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  He is  to Christine, his third wife.  She worked with his sister-in-law and they were introduced.  They have been  for 25 years.  Jose has 5 children, Golden, Cr, Joon, Radha, and Kayla.  Kayla is the youngest and a PA who lives in [place].  The other children are in the service.  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      Family History:     Grandma with skin cancer (s/p removal of her nose)  Uncle with sarcoma  Dad had bone cancer/lung cancer  Brother Casimiro had bladder cancer  Buddy had bladder cacner ( in car accident)  Brother Lenny had bladder cancer  No family history on file.         Medications:     Current Outpatient Medications   Medication Sig Dispense Refill    Ascorbic Acid 500 MG CAPS 1 tablet Orally Once a day      aspirin (ASPIRIN LOW DOSE) 81 MG EC tablet Take 81 mg by mouth daily ONCE BEFORE BED      blood glucose (NO BRAND SPECIFIED) lancets standard as directed, to use with his Contour Next test blood sugars daily      CONTOUR NEXT TEST test strip USE TO TEST BLOOD SUGAR DAILY      Ergocalciferol 50 MCG (2000 UT) CAPS Take 50 mcg by mouth daily      fish oil-omega-3 fatty acids 500 MG capsule Take 2 capsules by mouth daily      hydrochlorothiazide (HYDRODIURIL) 25 MG tablet Take 25 mg by mouth daily      ketoconazole (NIZORAL) 2 % external cream Apply topically as needed      levothyroxine (SYNTHROID/LEVOTHROID) 75 MCG tablet Take 1 tablet (75 mcg) by mouth daily 30 tablet 5    Microlet Lancets MISC daily       simvastatin (ZOCOR) 40 MG tablet Take 40 mg by mouth at bedtime      triamcinolone (KENALOG) 0.1 % external cream Apply as needed      Vitamin D3 (VITAMIN D, CHOLECALCIFEROL,) 25 mcg (1000 units) tablet Take 25 mcg by mouth daily            Physical Exam:   BP (!) 155/83 (BP Location: Right arm, Patient Position: Sitting, Cuff Size: Adult Regular)   Pulse 69   Temp 98  F (36.7  C) (Oral)   Resp 16   Wt 109.8 kg (242 lb)   SpO2 95%   BMI 35.74 kg/m    General: No acute distress  HEENT: Sclera anicteric. Oral mucosa pink and moist.  No mucositis or thrush  Lymph: No lymphadenopathy in neck  Heart: Regular, rate, and rhythm  Lungs: Clear to ascultation bilaterally  Abdomen: Positive bowel sounds. Soft, non-distended, non-tender. No organomegaly or mass.   Extremities: no lower extremity edema  Neuro: Cranial nerves grossly intact  Rash: none      Data:   Most Recent 3 CBC's:  Recent Labs   Lab Test 07/08/24  0853 06/18/24  0802 05/24/24  1151   WBC 8.1 8.6 7.9   HGB 15.9 15.9 16.5   MCV 93 91 90    231 220   ANEUTAUTO 4.9 5.2 4.4     Most Recent 3 BMP's:  Recent Labs   Lab Test 07/08/24  0853 06/18/24  0802 06/11/24  0951 05/24/24  1151    132*  --  135   POTASSIUM 4.2 4.2  --  3.9   CHLORIDE 97* 96*  --  99   CO2 28 24  --  25   BUN 17.0 15.5  --  18.1   CR 1.23* 1.11 1.22* 1.36*   ANIONGAP 10 12  --  11   MILLI 9.5 9.6  --  9.7   * 143*  --  107*   PROTTOTAL 7.4 7.2  --  7.5   ALBUMIN 4.3 4.3  --  4.5    Most Recent 3 LFT's:  Recent Labs   Lab Test 07/08/24  0853 06/18/24  0802 05/24/24  1151   AST 31 35 37   ALT 34 31 31   ALKPHOS 75 72 81   BILITOTAL 0.7 0.7 0.6    Most Recent 2 TSH and T4:  Recent Labs   Lab Test 07/08/24  0853 06/18/24  0802   TSH 19.39* 25.59*   T4 1.01 1.03     I reviewed the above labs today.

## 2024-07-08 NOTE — NURSING NOTE
[id number]: Study Visit Note   Subject name: Alek Mcneill     Visit: Week 18    Did the study visit occur within the appropriate window allowed by the protocol? yes    Since the last study visit, He has been doing really well. No new complaints at all. He took some aleve last week after working on his car, as he was feeling a little sore. Otherwise feeling really great. Patient reports he was fasting for yesterdays 7/8 labs.     Assessed for below symptoms specifically:   Dysuria no  Pollakiuria no  Nocturia no  Urgency no  Incontinence no  Hematuria no  Urinary hesitation no  Bladder pain/spasm no  Urethral pain no   Bladder discomfort no  Feeling of incomplete bladder emptying  no  Lower abdominal pain no  Fever no  Flu-like symptoms no  Fatigue no  Diarrhea no  Nausea no  Rash no  Arthralgia  no      TAR-200 Insertion/Removal:    Medication #: [id number]      Cetrelimab infusion:  Were premedications given? No    Verbal handover provided to infusion RN; reminded RN to document actual start time of infusion and actual stop time of the infusion. If infusion deviates from protocol directed infusion time, please document rationale in your infusion note.    Instructed RN that the drug must be flushed with 14 mL of NS after the administration, the end of the flush is considered the end of the infusion. Instructed RN that VS are required before and ager the infusion.       I have personally interviewed Alek Mcneill and reviewed his medical record for adverse events and concomitant medications and these have been recorded on the corresponding logs in Alek Mcneill's research file.     Alek Mcneill was given the opportunity to ask any trial related questions.  Please see provider progress note for physical exam and other clinical information. Labs were reviewed - any significant lab values were addressed and reviewed.    Araceli Diaz RN

## 2024-07-09 ENCOUNTER — INFUSION THERAPY VISIT (OUTPATIENT)
Dept: ONCOLOGY | Facility: CLINIC | Age: 74
End: 2024-07-09
Attending: INTERNAL MEDICINE
Payer: COMMERCIAL

## 2024-07-09 ENCOUNTER — ALLIED HEALTH/NURSE VISIT (OUTPATIENT)
Dept: ONCOLOGY | Facility: CLINIC | Age: 74
End: 2024-07-09

## 2024-07-09 ENCOUNTER — OFFICE VISIT (OUTPATIENT)
Dept: UROLOGY | Facility: CLINIC | Age: 74
End: 2024-07-09
Payer: COMMERCIAL

## 2024-07-09 VITALS
DIASTOLIC BLOOD PRESSURE: 87 MMHG | OXYGEN SATURATION: 97 % | TEMPERATURE: 97.5 F | RESPIRATION RATE: 18 BRPM | SYSTOLIC BLOOD PRESSURE: 148 MMHG | HEART RATE: 69 BPM

## 2024-07-09 VITALS
HEART RATE: 69 BPM | BODY MASS INDEX: 35.74 KG/M2 | TEMPERATURE: 98 F | OXYGEN SATURATION: 95 % | DIASTOLIC BLOOD PRESSURE: 83 MMHG | SYSTOLIC BLOOD PRESSURE: 155 MMHG | WEIGHT: 242 LBS | RESPIRATION RATE: 16 BRPM

## 2024-07-09 VITALS
DIASTOLIC BLOOD PRESSURE: 80 MMHG | OXYGEN SATURATION: 95 % | HEART RATE: 70 BPM | SYSTOLIC BLOOD PRESSURE: 145 MMHG | BODY MASS INDEX: 34.96 KG/M2 | HEIGHT: 69 IN | WEIGHT: 236 LBS

## 2024-07-09 DIAGNOSIS — C67.3 MALIGNANT NEOPLASM OF ANTERIOR WALL OF URINARY BLADDER (H): Primary | ICD-10-CM

## 2024-07-09 DIAGNOSIS — Z00.6 EXAMINATION OF PARTICIPANT IN CLINICAL TRIAL: ICD-10-CM

## 2024-07-09 DIAGNOSIS — Z00.6 EXAMINATION OF PARTICIPANT IN CLINICAL TRIAL: Primary | ICD-10-CM

## 2024-07-09 LAB — BACTERIA UR CULT: NORMAL

## 2024-07-09 PROCEDURE — G0463 HOSPITAL OUTPT CLINIC VISIT: HCPCS | Mod: 25

## 2024-07-09 PROCEDURE — 258N000003 HC RX IP 258 OP 636

## 2024-07-09 PROCEDURE — 96413 CHEMO IV INFUSION 1 HR: CPT

## 2024-07-09 PROCEDURE — 52310 CYSTOSCOPY AND TREATMENT: CPT | Performed by: UROLOGY

## 2024-07-09 PROCEDURE — 99215 OFFICE O/P EST HI 40 MIN: CPT

## 2024-07-09 RX ORDER — ACETAMINOPHEN 325 MG/1
650 TABLET ORAL
Status: CANCELLED
Start: 2024-07-09

## 2024-07-09 RX ORDER — ALBUTEROL SULFATE 90 UG/1
1-2 AEROSOL, METERED RESPIRATORY (INHALATION)
Status: CANCELLED
Start: 2024-07-09

## 2024-07-09 RX ORDER — DIPHENHYDRAMINE HCL 25 MG
50 CAPSULE ORAL
Status: CANCELLED | OUTPATIENT
Start: 2024-07-09

## 2024-07-09 RX ORDER — LIDOCAINE HYDROCHLORIDE 20 MG/ML
10 JELLY TOPICAL
Status: CANCELLED | OUTPATIENT
Start: 2024-07-09

## 2024-07-09 RX ORDER — ACETAMINOPHEN 325 MG/1
650 TABLET ORAL
Status: CANCELLED | OUTPATIENT
Start: 2024-07-09

## 2024-07-09 RX ORDER — EPINEPHRINE 1 MG/ML
0.3 INJECTION, SOLUTION INTRAMUSCULAR; SUBCUTANEOUS EVERY 5 MIN PRN
Status: CANCELLED | OUTPATIENT
Start: 2024-07-09

## 2024-07-09 RX ORDER — HEPARIN SODIUM (PORCINE) LOCK FLUSH IV SOLN 100 UNIT/ML 100 UNIT/ML
5 SOLUTION INTRAVENOUS
Status: CANCELLED | OUTPATIENT
Start: 2024-07-09

## 2024-07-09 RX ORDER — HEPARIN SODIUM,PORCINE 10 UNIT/ML
5-20 VIAL (ML) INTRAVENOUS DAILY PRN
Status: CANCELLED | OUTPATIENT
Start: 2024-07-09

## 2024-07-09 RX ORDER — ALBUTEROL SULFATE 0.83 MG/ML
2.5 SOLUTION RESPIRATORY (INHALATION)
Status: CANCELLED | OUTPATIENT
Start: 2024-07-09

## 2024-07-09 RX ORDER — LORAZEPAM 2 MG/ML
0.5 INJECTION INTRAMUSCULAR EVERY 4 HOURS PRN
Status: CANCELLED | OUTPATIENT
Start: 2024-07-09

## 2024-07-09 RX ORDER — MEPERIDINE HYDROCHLORIDE 25 MG/ML
25 INJECTION INTRAMUSCULAR; INTRAVENOUS; SUBCUTANEOUS EVERY 30 MIN PRN
Status: CANCELLED | OUTPATIENT
Start: 2024-07-09

## 2024-07-09 RX ORDER — METHYLPREDNISOLONE SODIUM SUCCINATE 125 MG/2ML
125 INJECTION, POWDER, LYOPHILIZED, FOR SOLUTION INTRAMUSCULAR; INTRAVENOUS
Status: CANCELLED
Start: 2024-07-09

## 2024-07-09 RX ORDER — SULFAMETHOXAZOLE/TRIMETHOPRIM 800-160 MG
1 TABLET ORAL ONCE
Status: COMPLETED | OUTPATIENT
Start: 2024-07-09 | End: 2024-07-09

## 2024-07-09 RX ORDER — LIDOCAINE HYDROCHLORIDE 20 MG/ML
10 JELLY TOPICAL
Status: DISCONTINUED | OUTPATIENT
Start: 2024-07-09 | End: 2024-07-09 | Stop reason: HOSPADM

## 2024-07-09 RX ORDER — DIPHENHYDRAMINE HYDROCHLORIDE 50 MG/ML
50 INJECTION INTRAMUSCULAR; INTRAVENOUS
Status: CANCELLED
Start: 2024-07-09

## 2024-07-09 RX ADMIN — SULFAMETHOXAZOLE AND TRIMETHOPRIM 1 TABLET: 800; 160 TABLET ORAL at 09:40

## 2024-07-09 RX ADMIN — LIDOCAINE HYDROCHLORIDE 10 ML: 20 JELLY TOPICAL at 09:30

## 2024-07-09 RX ADMIN — SODIUM CHLORIDE 250 ML: 9 INJECTION, SOLUTION INTRAVENOUS at 10:43

## 2024-07-09 ASSESSMENT — PAIN SCALES - GENERAL
PAINLEVEL: NO PAIN (0)
PAINLEVEL: NO PAIN (0)

## 2024-07-09 NOTE — NURSING NOTE
The following medication was given per Dr. Kaiser based on research protocol:     MEDICATION: Bactrim (Trimethoprim/Sulfamethoxazole)  ROUTE: PO  SITE: Medication was given orally   DOSE: 800mg/160mg  LOT #: [id number]  : [employer]  EXPIRATION DATE: 01/2026  NDC#: 5635-6517-57   Was there drug waste? No    Prior to medication administration, verified patient identity using patient's name and date of birth.  Due to medication administration, patient instructed to remain in clinic for 15 minutes  afterwards, and to report any adverse reaction to me immediately.    Hanh Chandler RN  Patient Care Supervisor- [facility]

## 2024-07-09 NOTE — NURSING NOTE
"Oncology Rooming Note    July 9, 2024 7:48 AM   Alek Mcneill is a 74 year old male who presents for:    Chief Complaint   Patient presents with    Oncology Clinic Visit    Prostate Cancer     Malignant neoplasm of anterior wall of urinary bladder     Initial Vitals: There were no vitals taken for this visit. Estimated body mass index is 34.56 kg/m  as calculated from the following:    Height as of 6/25/24: 1.753 m (5' 9\").    Weight as of 6/25/24: 106.1 kg (234 lb). There is no height or weight on file to calculate BSA.  Data Unavailable Comment: Data Unavailable   No LMP for male patient.  Allergies reviewed: Yes  Medications reviewed: Yes    Medications: Medication refills not needed today.  Pharmacy name entered into [facility]: [facility] [address] [place] [address] AT [place] [id number]    Frailty Screening:   Is the patient here for a new oncology consult visit in cancer care? 2. No      Clinical concerns:  Patient states there are no new concerns to discuss with provider.  Clarita was not notified.        Hannah Jurado CMA              "

## 2024-07-09 NOTE — NURSING NOTE
"Chief Complaint   Patient presents with    Consult     Here for a cystoscopy       Blood pressure (!) 145/80, pulse 70, height 1.74 m (5' 8.5\"), weight 107 kg (236 lb), SpO2 95%. Body mass index is 35.36 kg/m .    Patient Active Problem List   Diagnosis    Malignant neoplasm of anterior wall of urinary bladder (H)    Benign hypertension    Hyperlipidemia    Impotence of organic origin    Morbid (severe) obesity due to excess calories (H)    Type 2 diabetes mellitus without complication, without long-term current use of insulin (H)    Spinal stenosis of lumbosacral region    Peripheral vascular disease (H24)    Venous stasis dermatitis of left lower extremity    Lumbar radiculopathy    Environmental allergies    Age-related nuclear cataract of right eye       Allergies   Allergen Reactions    Chlorhexidine Dermatitis       Current Outpatient Medications   Medication Sig Dispense Refill    Ascorbic Acid 500 MG CAPS 1 tablet Orally Once a day      aspirin (ASPIRIN LOW DOSE) 81 MG EC tablet Take 81 mg by mouth daily ONCE BEFORE BED      blood glucose (NO BRAND SPECIFIED) lancets standard as directed, to use with his Contour Next test blood sugars daily      CONTOUR NEXT TEST test strip USE TO TEST BLOOD SUGAR DAILY      Ergocalciferol 50 MCG (2000 UT) CAPS Take 50 mcg by mouth daily      fish oil-omega-3 fatty acids 500 MG capsule Take 2 capsules by mouth daily      hydrochlorothiazide (HYDRODIURIL) 25 MG tablet Take 25 mg by mouth daily      ketoconazole (NIZORAL) 2 % external cream Apply topically as needed      levothyroxine (SYNTHROID/LEVOTHROID) 75 MCG tablet Take 1 tablet (75 mcg) by mouth daily 30 tablet 5    Microlet Lancets MISC daily      simvastatin (ZOCOR) 40 MG tablet Take 40 mg by mouth at bedtime      triamcinolone (KENALOG) 0.1 % external cream Apply as needed      Vitamin D3 (VITAMIN D, CHOLECALCIFEROL,) 25 mcg (1000 units) tablet Take 25 mcg by mouth daily         Social History     Tobacco Use    " Smoking status: Former     Current packs/day: 0.00     Types: Cigarettes     Quit date:      Years since quittin.5     Passive exposure: Past   Vaping Use    Vaping status: Never Used   Substance Use Topics    Alcohol use: Yes     Comment: daily    Drug use: Never       What to expect after the procedure reviewed with patient: yes    Phan Gaxiola  2024  8:48 AM     Invasive Procedure Safety Checklist:    Procedure: cystoscopy    Action: Complete sections and checkboxes as appropriate.    Pre-procedure:  1. Patient ID Verified with 2 identifiers (Octavia and  or MRN) : YES    2. Procedure and site verified with patient/designee (when able) : YES    3. Accurate consent documentation in medical record : YES    4. H&P (or appropriate assessment) documented in medical record : YES  H&P must be up to 30 days prior to procedure an updated within 24 hours of                 Procedure as applicable.     5. Relevant diagnostic and radiology test results appropriately labeled and displayed as applicable : YES    6. Blood products, implants, devices, and/or special equipment available for the procedure as applicable : YES    7. Procedure site(s) marked with provider initials [Exclusions: None] : NO    8. Marking not required. Reason : Yes  Procedure does not require site marking    Time Out:     Time-Out performed immediately prior to starting procedure, including verbal and active participation of all team members addressing: YES    1. Correct patient identity.  2. Confirmed that the correct side and site are marked.  3. An accurate procedure to be done.  4. Agreement on the procedure to be done.  5. Correct patient position.  6. Relevant images and results are properly labeled and appropriately displayed.  7. The need to administer antibiotics or fluids for irrigation purposes during the procedure as applicable.  8. Safety precautions based on patient history or medication use.    During Procedure: Verification  of correct person, site, and procedure occurs any time the responsibility for care of the patient is transferred to another member of the care team.      The following medication was given:     MEDICATION:  Uro-jet  ROUTE: Intra-urethral   SITE: Urethra  DOSE: 10 mL 2% lidocaine  LOT #: [id number]  : [employer]  EXPIRATION DATE: 2-26  NDC#: [address]-20   Was there drug waste? No    Prior to administration, verified patient identity using patient's name and date of birth.  Due to administration, patient instructed to remain in clinic for 15 minutes  afterwards, and to report any adverse reaction to me immediately.      Drug Amount Wasted:  None.  Vial/Syringe: Single dose vial    Phan Gaxiola  July 9, 2024

## 2024-07-09 NOTE — PROGRESS NOTES
"  CHIEF COMPLAINT   It was my pleasure to see Alek Mcneill who is a 74 year old male for follow-up of bladder cancer.      HPI  Alek Mcneill is a very pleasant 74 year old male who presents with a history of bladder cancer. 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      He has enrolled in [employer] [facility] and is here today for TAR-200 removal and re-insertion. He continues to tolerate this therapy very well, with no significant symptoms. No recent hematuria or dysuria.      Re-staging TURBT 1/17/2024          SPECIMEN   Procedure   Transurethral resection of bladder (TURBT)   TUMOR   Tumor Site   Anterior wall   Histologic Type   Urothelial carcinoma, in situ   Histologic Grade   High-grade   Tumor Extent   Flat carcinoma in situ   Lymphovascular Invasion   Not identified   Tumor Configuration   Flat   Muscularis Propria (detrusor muscle)   Cannot be determined: Biopsy site changes are identified in the current sample; no residual viable invasive carcinoma is identified.  Please see previous biopsy report for further information, case [id number], [facility]..   ADDITIONAL FINDINGS   Associated Epithelial Lesions   Chronic inflammation, histiocytic proliferation, consistent with biopsy site changes.      TURBT 12/6/2023  Final Diagnosis   A) URINARY BLADDER, BASE OF TUMOR, TRANSURETHRAL RESECTION BLADDER TUMOR:  -NONINVASIVE LOW-GRADE PAPILLARY UROTHELIAL CARCINOMA  -MULTIPLE FRAGMENTS OF MUSCULARIS PROPRIA  -SEE SYNOPTIC REPORT     B) URINARY BLADDER, TUMOR, TRANSURETHRAL RESECTION BLADDER TUMOR:  -HIGH-GRADE PAPILLARY UROTHELIAL CARCINOMA WITH LAMINA PROPRIA INVASION  -MUSCULARIS PROPRIA PRESENT AND UNINVOLVED  -SEE SYNOPTIC REPORT     PHYSICAL EXAM  Patient is a 74 year old  male   Vitals: Blood pressure (!) 145/80, pulse 70, height 1.74 m (5' 8.5\"), weight 107 kg (236 lb), SpO2 95%.  General Appearance Adult: Body mass index is 35.36 kg/m .  Alert, no " acute distress, oriented  Lungs: no respiratory distress, or pursed lip breathing  Abdomen: soft, nontender, no organomegaly or masses  Back: no CVAT  Neuro: Alert, oriented, speech and mentation normal  Psych: affect and mood normal  : penis, scrotum, testes normal    OFFICE CYSTOSCOPY 7/9/2024     Pre-procedure diagnosis:       Bladder Cancer  Post-procedure diagnosis:       device in good position  Procedure performed:             Cystourethroscopy  Surgeon:                                 Harman Kaiser MD  Anesthesia:                             Local     Description of procedure:   After fully informed, voluntary consent was obtained, the patient was brought into the procedure room, identified and placed in a supine position on the cystoscopy table.  The groin/scrotum were prepped with betadine and draped in a sterile fashion.  A 15F flexible cystoscope was inserted into the urethra, and the bladder and urethra were examined in a systematic manner. The TAR-200 device was identified, grasped, and removed. Removal time was 8:32 AM.  Device was noted to be intact. The urinary placement catheter was advanced 33 cm into the bladder with clear urine return. Lubrication, the device, and additional lubrication were introduced and the device was deployed with the stylet. Insertion time was 8:34 AM Catheter removed. The patient tolerated the procedure well and there were no complications.       Cystoscopic findings:  The urethra was normal without strictures.  The prostate was 3 cm long and demonstrated mild bilobar hypertrophy.  There was no median lobe.  The external sphincter coapted well and the bladder neck was open. The bladder was completely surveyed.  There was mild trabeculation.  There were no neoplasms, stones, or diverticula identifed.  The ureteric orifices were normal in position and number and effluxing clear urine. There are no recurrent tumors or suspicious areas on today's cystoscopy.  The TAR  200 device was deployed within the bladder.     ASSESSMENT and PLAN  74 year old male with history of HG non-muscle-invasive bladder cancer. He has enrolled in the [employer] 3 clinical trial for BCG-naive NMIBC. Cystoscopy today with no evidence of visible papillary disease.     Time of TAR-200 Removal: 9:37 AM  Time of TAR-200 Insertion: 9:39 AM  There were no irregularities observed during or after removal.  Betzy-procedural antibiotics were given.     -Will plan follow-up  for  removal and reinsertion in 3 weeks.    Harman Kaiser MD  Urology  [facility]

## 2024-07-09 NOTE — PROGRESS NOTES
Infusion Nursing Note:  Alek Mcneill presents today for Week 18 Day 1 study - cetrelimab (IDS# [id number]).    Patient seen by provider today: Yes: JETT Carl   present during visit today: Not Applicable.    Note: Alek presents to infusion today following his clinic appointment. He denies any new concerns.    Per research staff BIENVENIDO Meyers, OK to proceed with study treatment today.   The following items were completed per protocol as directed by research study staff:     vital signs prior to infusion and timed vital signs      IDS #[id number]   Start Time: 1047                      Stop Time: 1122      Intravenous Access:  Peripheral IV placed.    Treatment Conditions:  Lab Results   Component Value Date    HGB 15.9 07/08/2024    WBC 8.1 07/08/2024    ANEUTAUTO 4.9 07/08/2024     07/08/2024        Lab Results   Component Value Date     07/08/2024    POTASSIUM 4.2 07/08/2024    MAG 2.3 11/19/2023    CR 1.23 (H) 07/08/2024    MILLI 9.5 07/08/2024    BILITOTAL 0.7 07/08/2024    ALBUMIN 4.3 07/08/2024    ALT 34 07/08/2024    AST 31 07/08/2024       Results reviewed, labs MET treatment parameters, ok to proceed with treatment.      Post Infusion Assessment:  Patient tolerated infusion without incident.  Patient observed for 15 minutes post infusion per protocol.  Blood return noted pre and post infusion.  Site patent and intact, free from redness, edema or discomfort.  No evidence of extravasations.  Access discontinued per protocol.       Discharge Plan:   Patient declined prescription refills.  Discharge instructions reviewed with: Patient.  Patient and/or family verbalized understanding of discharge instructions and all questions answered.  AVS to patient via [employer].  Patient will return 7/29 for next appointment.   Patient discharged in stable condition accompanied by: self.  Departure Mode: Ambulatory.      Mary Sanchez RN

## 2024-07-09 NOTE — Clinical Note
7/9/2024       RE: Alek Mcneill  [address] [place] [address]     Dear Colleague,    Thank you for referring your patient, Alek Mcneill, to the [facility] [place] at [facility]. Please see a copy of my visit note below.      CHIEF COMPLAINT   It was my pleasure to see Alek Mcneill who is a 74 year old male for follow-up of ***.      HPI   Alek Mcneill is a very pleasant 74 year old male who presents with a history of {Diagnoses:947618}.  ***    PHYSICAL EXAM  Patient is a 74 year old  male   Vitals: There were no vitals taken for this visit.  General Appearance Adult: There is no height or weight on file to calculate BMI.  Alert, no acute distress, oriented  Lungs: no respiratory distress, or pursed lip breathing  Abdomen: soft, nontender, no organomegaly or masses; ***  Back: *** CVAT  Neuro: Alert, oriented, speech and mentation normal  Psych: affect and mood normal  : {NOMI EXAM MALE GENITAL:851037}    Outside and Past Medical records:  Assessment requiring an independent historian(s) - {:889116}  Review of prior external note(s) from - {note reviewed source:528269}  Review of the result(s) of each unique test - ***    ASSESSMENT and PLAN  ***      *** minutes spent on the date of the encounter doing chart review, history and exam, documentation and further activities as noted above.    Harman Kaiser MD  Urology  [facility]        Again, thank you for allowing me to participate in the care of your patient.      Sincerely,    Harman Kaiser MD

## 2024-07-09 NOTE — Clinical Note
"7/9/2024      Alek Mcneill  [address] [place] [address]      Dear Colleague,    Thank you for referring your patient, Alek Mcneill, to the [facility]. Please see a copy of my visit note below.      [facility] Medical Oncology Note  Date of visit: Jul 9, 2024  Outpatient Clinic Note        Assessment:     Stage 1 (T1NxMx) superficial poorly differentiated bladder cancer. T1 lesions are high risk. Recurrence rates at one, three, and five years can reach up to 50, 70 to 80, and 90 percent, respectively, and 20 to 25 percent progress to more invasive (T2 or greater) disease. Given the high risk here, therapy is indicated.  Alek is on the SUNRISE-3 study, for patients with T1 disease and has been randomized to the TAR-200 (a device inserted in to the bladder that contains gemcitabine)+ Cetrelimab arm.  S/P three doses of cetrelimab, with adequate tolerance. He has had some lab evidence of thryoiditis.  He now is hypothyroid and is on levothyroxine 75 mcg increased on 6.3. TSH is elevated but improved. Recommend monitoring and adjusting dose prn.  He will talk to his primary about this and taking over the levothyroxine management following completion of the trial.   Hemoglobin A1C up trending, continue to monitor. Stable today 6/18/24.  Previous creatinine has improved and back to baseline.       Plan:     Next cetrelimab today. TAR-200 next week with Dr. Kaiser.  RTC in 3 weeks for his next cycle.    Patient was seen and evaluated with study RNCC, Araceli Diaz.          __________________________________________________________________    DIAGNOSIS:     Recently diagnosed T1 poorly differentiated invasive bladder cancer.    Respiratory symptoms concerning for pneumonitis, but CT chest 3/27/2024 showed \"No new or suspicious pulmonary opacities.\"       History of Present Illness/Therapy to Date:   11/19/2023: Went to ED for subacute onset of gross hematuria, and a passing of " "clots. Stopped ASA. CT showed multiple indeterminate nodular densities in the bladder suspicious for enhancing urothelial lesions   12/6/2023: Cystoscopy , with finding of multiple bladder tumors appeared papillary and were located on the anterior wall and had some superficial calcifications. \"We resected tissue down to muscle using cut electrocautery. \" Pathology showed high grade papillary carcinoma, that did not invade the muscle.  1/17/2024: Cystoscopy with resection showed CIS, but no invasive cancer.  Alek has agreed to participate in the SUNRISE -3 trial. He has been randomized to receive Cetrelimab and TAR-200 (an investigational intravesical drug delivery system for gemcitabine). His first dose of cetrelimab and TAR-200 was 3/6/2024. He is back today for his fourht cycle.        Interval history:     Alek is back with Christine here for week 15 of the SUNRISE-3 trial and his sixth dose of cetrelimab.   Patient reports that he is feeling well.  He does not notice any significant side effects with immunotherapy.  Energy is stable today.  Previously had reported getting shortness of breath with activity although this appears to be improved at this time.  No chest pain or cough.  He has not noticed any urinary changes including hematuria or pain.  No rashes or lesions.  He endorses occasional mild headaches that are not new or worsened.  No new neurological symptoms.  No vision changes.  He is taking senna for constipation as needed.  No fevers or chills.  Eating and drinking well.     ROS: 10 point ROS neg other than the symptoms noted above in the HPI.      Past Medical History:   I have reviewed this patient's past medical history   Past Medical History:   Diagnosis Date    Complication of anesthesia     DM2 (diabetes mellitus, type 2) (H)     Hypercholesteremia     Hyperlipidemia     Hypertension           Past Surgical History:    I have reviewed this patient's past surgical history       Social History: "   Tobacco, ETOH, and rec drugs reviewed and as noted below with the following exceptions:  Drinks a few beers a day. Quit smoking in .  Alek was born in [place], and graduated from [employer] (no longer exists) in .  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  He is  to Christine, his third wife.  She worked with his sister-in-law and they were introduced.  They have been  for 25 years.  Jose has 5 children, Golden, Cr, Joon, Radha, and Kayla.  Kayla is the youngest and a PA who lives in [place].  The other children are in the service.  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      Family History:     Grandma with skin cancer (s/p removal of her nose)  Uncle with sarcoma  Dad had bone cancer/lung cancer  Brother Casimiro had bladder cancer  Buddy had bladder cacner ( in car accident)  Brother Lenny had bladder cancer  No family history on file.         Medications:     Current Outpatient Medications   Medication Sig Dispense Refill    Ascorbic Acid 500 MG CAPS 1 tablet Orally Once a day      aspirin (ASPIRIN LOW DOSE) 81 MG EC tablet Take 81 mg by mouth daily ONCE BEFORE BED      blood glucose (NO BRAND SPECIFIED) lancets standard as directed, to use with his Contour Next test blood sugars daily      CONTOUR NEXT TEST test strip USE TO TEST BLOOD SUGAR DAILY      Ergocalciferol 50 MCG ( UT) CAPS Take 50 mcg by mouth daily      fish oil-omega-3 fatty acids 500 MG capsule Take 2 capsules by mouth daily      hydrochlorothiazide (HYDRODIURIL) 25 MG tablet Take 25 mg by mouth daily      ketoconazole (NIZORAL) 2 % external cream Apply topically as needed      levothyroxine (SYNTHROID/LEVOTHROID) 75 MCG tablet Take 1 tablet (75 mcg) by mouth daily 30 tablet 5    Microlet Lancets MISC daily      simvastatin (ZOCOR) 40 MG tablet Take  40 mg by mouth at bedtime      triamcinolone (KENALOG) 0.1 % external cream Apply as needed      Vitamin D3 (VITAMIN D, CHOLECALCIFEROL,) 25 mcg (1000 units) tablet Take 25 mcg by mouth daily                Physical Exam:   There were no vitals taken for this visit.  General: No acute distress  HEENT: Sclera anicteric. Oral mucosa pink and moist.  No mucositis or thrush  Lymph: No lymphadenopathy in neck  Heart: Regular, rate, and rhythm  Lungs: Clear to ascultation bilaterally  Abdomen: Positive bowel sounds. Soft, non-distended, non-tender. No organomegaly or mass.   Extremities: no lower extremity edema  Neuro: Cranial nerves grossly intact  Rash: none      Data:   Most Recent 3 CBC's:  Recent Labs   Lab Test 07/08/24  0853 06/18/24  0802 05/24/24  1151   WBC 8.1 8.6 7.9   HGB 15.9 15.9 16.5   MCV 93 91 90    231 220   ANEUTAUTO 4.9 5.2 4.4     Most Recent 3 BMP's:  Recent Labs   Lab Test 07/08/24  0853 06/18/24  0802 06/11/24  0951 05/24/24  1151    132*  --  135   POTASSIUM 4.2 4.2  --  3.9   CHLORIDE 97* 96*  --  99   CO2 28 24  --  25   BUN 17.0 15.5  --  18.1   CR 1.23* 1.11 1.22* 1.36*   ANIONGAP 10 12  --  11   MILLI 9.5 9.6  --  9.7   * 143*  --  107*   PROTTOTAL 7.4 7.2  --  7.5   ALBUMIN 4.3 4.3  --  4.5    Most Recent 3 LFT's:  Recent Labs   Lab Test 07/08/24  0853 06/18/24  0802 05/24/24  1151   AST 31 35 37   ALT 34 31 31   ALKPHOS 75 72 81   BILITOTAL 0.7 0.7 0.6    Most Recent 2 TSH and T4:  Recent Labs   Lab Test 07/08/24  0853 06/18/24  0802   TSH 19.39* 25.59*   T4 1.01 1.03     I reviewed the above labs today.      [facility] Medical Oncology Note  Date of visit: Jul 9, 2024  Outpatient Clinic Note        Assessment:     Stage 1 (T1NxMx) superficial poorly differentiated bladder cancer. T1 lesions are high risk. Recurrence rates at one, three, and five years can reach up to 50, 70 to 80, and 90 percent, respectively, and 20 to 25 percent progress to more invasive (T2 or  "greater) disease. Given the high risk here, therapy is indicated.  Alek is on the SUNRISE-3 study, for patients with T1 disease and has been randomized to the TAR-200 (a device inserted in to the bladder that contains gemcitabine)+ Cetrelimab arm.  S/P three doses of cetrelimab, with adequate tolerance. He has had some lab evidence of thryoiditis.  He now is hypothyroid and is on levothyroxine 75 mcg since 6/18. TSH is elevated but improved. Recommend monitoring and adjusting dose prn.  He will talk to his primary about this and taking over the levothyroxine management following completion of the trial.   Hemoglobin A1C up trending but improved on 7/8/24. Alek asks about his blood sugar control. Fasting was 123 yesterday. I have recommended continue follow up with PCP for his known type II diabetes.   Creatinine is elevated but at baseline today.       Plan:     Next cetrelimab today. TAR-200 with Dr. Kaiser.  RTC in 3 weeks for his next cycle and visit with Dr. Tobin.    Patient was seen and evaluated with study RNCC, Araceli Diaz.      *** minutes spent on the date of the encounter doing chart review, review of test results, interpretation of tests, patient visit, and documentation     Clarita Owen PA-C      __________________________________________________________________    DIAGNOSIS:     Recently diagnosed T1 poorly differentiated invasive bladder cancer.    Respiratory symptoms concerning for pneumonitis, but CT chest 3/27/2024 showed \"No new or suspicious pulmonary opacities.\"       History of Present Illness/Therapy to Date:   11/19/2023: Went to ED for subacute onset of gross hematuria, and a passing of clots. Stopped ASA. CT showed multiple indeterminate nodular densities in the bladder suspicious for enhancing urothelial lesions   12/6/2023: Cystoscopy , with finding of multiple bladder tumors appeared papillary and were located on the anterior wall and had some superficial calcifications. \"We " "resected tissue down to muscle using cut electrocautery. \" Pathology showed high grade papillary carcinoma, that did not invade the muscle.  1/17/2024: Cystoscopy with resection showed CIS, but no invasive cancer.  Alek has agreed to participate in the SUNRISE -3 trial. He has been randomized to receive Cetrelimab and TAR-200 (an investigational intravesical drug delivery system for gemcitabine). His first dose of cetrelimab and TAR-200 was 3/6/2024.         Interval history:     Alek is back with Christine here for week 18 of the SUNRISE-3 trial and his 7th dose of cetrelimab.   Patient reports that he is feeling well today.  He denies any side effects from his cetrelimab.  His energy has been stable.  He is able to do all activities that he would like to get done.  He denies any chest pain, shortness of breath, or cough today.  No recent fevers or chills.  No rash or skin concerns.  No urinary concerns.  His urine has been clear and he has not noticed any recent hematuria or clots.  No pain.  No headaches or vision concerns.  He has occasional constipation but for the most part his bowels are moving well.  No diarrhea.  He is eating and drinking well.       ROS: 10 point ROS neg other than the symptoms noted above in the HPI.      Past Medical History:   I have reviewed this patient's past medical history   Past Medical History:   Diagnosis Date     Complication of anesthesia      DM2 (diabetes mellitus, type 2) (H)      Hypercholesteremia      Hyperlipidemia      Hypertension           Past Surgical History:    I have reviewed this patient's past surgical history       Social History:   Tobacco, ETOH, and rec drugs reviewed and as noted below with the following exceptions:  Drinks a few beers a day. Quit smoking in 1992.  Alek was born in [place], and graduated from [employer] (no longer exists) in 1967.  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  He is  to Christine, his third wife.  She worked with his sister-in-law and they were introduced.  They have been  for 25 years.  Jose has 5 children, Golden, Cr, Joon, Radha, and Kayla.  Kayla is the youngest and a PA who lives in [place].  The other children are in the service.  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      Family History:     Grandma with skin cancer (s/p removal of her nose)  Uncle with sarcoma  Dad had bone cancer/lung cancer  Brother Casimiro had bladder cancer  Buddy had bladder cacner ( in car accident)  Brother Lenny had bladder cancer  No family history on file.         Medications:     Current Outpatient Medications   Medication Sig Dispense Refill     Ascorbic Acid 500 MG CAPS 1 tablet Orally Once a day       aspirin (ASPIRIN LOW DOSE) 81 MG EC tablet Take 81 mg by mouth daily ONCE BEFORE BED       blood glucose (NO BRAND SPECIFIED) lancets standard as directed, to use with his Contour Next test blood sugars daily       CONTOUR NEXT TEST test strip USE TO TEST BLOOD SUGAR DAILY       Ergocalciferol 50 MCG (2000 UT) CAPS Take 50 mcg by mouth daily       fish oil-omega-3 fatty acids 500 MG capsule Take 2 capsules by mouth daily       hydrochlorothiazide (HYDRODIURIL) 25 MG tablet Take 25 mg by mouth daily       ketoconazole (NIZORAL) 2 % external cream Apply topically as needed       levothyroxine (SYNTHROID/LEVOTHROID) 75 MCG tablet Take 1 tablet (75 mcg) by mouth daily 30 tablet 5     Microlet Lancets MISC daily       simvastatin (ZOCOR) 40 MG tablet Take 40 mg by mouth at bedtime       triamcinolone (KENALOG) 0.1 % external cream Apply as needed       Vitamin D3 (VITAMIN D, CHOLECALCIFEROL,) 25 mcg (1000 units) tablet Take 25 mcg by mouth daily            Physical Exam:   BP (!) 155/83 (BP Location: Right arm, Patient Position: Sitting, Cuff Size: Adult Regular)   Pulse  69   Temp 98  F (36.7  C) (Oral)   Resp 16   Wt 109.8 kg (242 lb)   SpO2 95%   BMI 35.74 kg/m    General: No acute distress  HEENT: Sclera anicteric. Oral mucosa pink and moist.  No mucositis or thrush  Lymph: No lymphadenopathy in neck  Heart: Regular, rate, and rhythm  Lungs: Clear to ascultation bilaterally  Abdomen: Positive bowel sounds. Soft, non-distended, non-tender. No organomegaly or mass.   Extremities: no lower extremity edema  Neuro: Cranial nerves grossly intact  Rash: none      Data:   Most Recent 3 CBC's:  Recent Labs   Lab Test 07/08/24  0853 06/18/24  0802 05/24/24  1151   WBC 8.1 8.6 7.9   HGB 15.9 15.9 16.5   MCV 93 91 90    231 220   ANEUTAUTO 4.9 5.2 4.4     Most Recent 3 BMP's:  Recent Labs   Lab Test 07/08/24  0853 06/18/24  0802 06/11/24  0951 05/24/24  1151    132*  --  135   POTASSIUM 4.2 4.2  --  3.9   CHLORIDE 97* 96*  --  99   CO2 28 24  --  25   BUN 17.0 15.5  --  18.1   CR 1.23* 1.11 1.22* 1.36*   ANIONGAP 10 12  --  11   MILLI 9.5 9.6  --  9.7   * 143*  --  107*   PROTTOTAL 7.4 7.2  --  7.5   ALBUMIN 4.3 4.3  --  4.5    Most Recent 3 LFT's:  Recent Labs   Lab Test 07/08/24  0853 06/18/24  0802 05/24/24  1151   AST 31 35 37   ALT 34 31 31   ALKPHOS 75 72 81   BILITOTAL 0.7 0.7 0.6    Most Recent 2 TSH and T4:  Recent Labs   Lab Test 07/08/24  0853 06/18/24  0802   TSH 19.39* 25.59*   T4 1.01 1.03     I reviewed the above labs today.      Again, thank you for allowing me to participate in the care of your patient.        Sincerely,        Clarita Owen PA-C

## 2024-07-10 ENCOUNTER — ALLIED HEALTH/NURSE VISIT (OUTPATIENT)
Dept: ONCOLOGY | Facility: CLINIC | Age: 74
End: 2024-07-10
Payer: COMMERCIAL

## 2024-07-10 ENCOUNTER — HOSPITAL ENCOUNTER (OUTPATIENT)
Dept: RESEARCH | Facility: CLINIC | Age: 74
Discharge: HOME | End: 2024-07-10
Attending: UROLOGY
Payer: COMMERCIAL

## 2024-07-10 DIAGNOSIS — Z00.6 EXAMINATION OF PARTICIPANT IN CLINICAL TRIAL: Primary | ICD-10-CM

## 2024-07-10 DIAGNOSIS — C67.3 MALIGNANT NEOPLASM OF ANTERIOR WALL OF URINARY BLADDER (H): Primary | ICD-10-CM

## 2024-07-10 PROCEDURE — 300N000010 HC RESEARCH B&I SPECIMEN PACKAGING, COMPLEX

## 2024-07-10 PROCEDURE — 300N000007 HC RESEARCH B&I SPECIMEN PROCESSING, SIMPLE

## 2024-07-10 PROCEDURE — 510N000009 HC RESEARCH FACILITY, PER 15 MIN

## 2024-07-10 PROCEDURE — 510N000023 HC CRU B&I PATIENT CARE, PER 15 MIN

## 2024-07-10 NOTE — NURSING NOTE
[id number]: Study Visit Note   Subject name: Alek Mcneill     Visit: Week 18 Day 2    Did the study visit occur within the appropriate window allowed by the protocol? yes    Since the last study visit, He has been doing well. He had some burning/painful urination after getting the TAR-200 inserted yesterday but this has completely resolved today.     I have personally interviewed Alek Mcneill and reviewed his medical record for adverse events and concomitant medications and these have been recorded on the corresponding logs in Alek Mcneill's research file.     Special considerations for today's visit were reviewed with staff administering the study intervention including: Instructed patient on 24 hour urine collection and dispensed supplies to patient. Patient verbalizes understanding of collection and will notify RN when he is ready to return.     Alek Mcneill was given the opportunity to ask any trial related questions.  Please see provider progress note for physical exam and other clinical information. Labs were reviewed - any significant lab values were addressed and reviewed.    Araceli Diaz RN

## 2024-07-10 NOTE — ADDENDUM NOTE
Encounter addended by: Hellen Myers on: 7/10/2024 2:52 PM   Actions taken: Charge Capture section accepted

## 2024-07-15 ENCOUNTER — HOSPITAL ENCOUNTER (OUTPATIENT)
Dept: RESEARCH | Facility: CLINIC | Age: 74
Discharge: HOME | End: 2024-07-15
Attending: UROLOGY
Payer: COMMERCIAL

## 2024-07-15 PROCEDURE — 300N000007 HC RESEARCH B&I SPECIMEN PROCESSING, SIMPLE

## 2024-07-17 ENCOUNTER — PRE VISIT (OUTPATIENT)
Dept: UROLOGY | Facility: CLINIC | Age: 74
End: 2024-07-17
Payer: COMMERCIAL

## 2024-07-25 ENCOUNTER — APPOINTMENT (OUTPATIENT)
Dept: LAB | Facility: HOSPITAL | Age: 74
End: 2024-07-25
Payer: COMMERCIAL

## 2024-07-25 LAB
ALBUMIN SERPL BCG-MCNC: 4.3 G/DL (ref 3.5–5.2)
ALBUMIN UR-MCNC: NEGATIVE MG/DL
ALP SERPL-CCNC: 75 U/L (ref 40–150)
ALT SERPL W P-5'-P-CCNC: 35 U/L (ref 0–70)
AMYLASE SERPL-CCNC: 81 U/L (ref 28–100)
ANION GAP SERPL CALCULATED.3IONS-SCNC: 10 MMOL/L (ref 7–15)
APPEARANCE UR: CLEAR
AST SERPL W P-5'-P-CCNC: 32 U/L (ref 0–45)
BASOPHILS # BLD AUTO: 0.1 10E3/UL (ref 0–0.2)
BASOPHILS NFR BLD AUTO: 1 %
BILIRUB SERPL-MCNC: 0.7 MG/DL
BILIRUB UR QL STRIP: NEGATIVE
BUN SERPL-MCNC: 14.7 MG/DL (ref 8–23)
CALCIUM SERPL-MCNC: 9.3 MG/DL (ref 8.8–10.4)
CHLORIDE SERPL-SCNC: 96 MMOL/L (ref 98–107)
COLOR UR AUTO: COLORLESS
CREAT SERPL-MCNC: 1.24 MG/DL (ref 0.67–1.17)
CRP SERPL-MCNC: <3 MG/L
EGFRCR SERPLBLD CKD-EPI 2021: 61 ML/MIN/1.73M2
EOSINOPHIL # BLD AUTO: 0.3 10E3/UL (ref 0–0.7)
EOSINOPHIL NFR BLD AUTO: 3 %
ERYTHROCYTE [DISTWIDTH] IN BLOOD BY AUTOMATED COUNT: 13 % (ref 10–15)
GGT SERPL-CCNC: 101 U/L (ref 8–61)
GLUCOSE SERPL-MCNC: 126 MG/DL (ref 70–99)
GLUCOSE UR STRIP-MCNC: NEGATIVE MG/DL
HBA1C MFR BLD: 6.5 %
HCO3 SERPL-SCNC: 29 MMOL/L (ref 22–29)
HCT VFR BLD AUTO: 44.3 % (ref 40–53)
HGB BLD-MCNC: 15.5 G/DL (ref 13.3–17.7)
HGB UR QL STRIP: NEGATIVE
IMM GRANULOCYTES # BLD: 0 10E3/UL
IMM GRANULOCYTES NFR BLD: 0 %
KETONES UR STRIP-MCNC: NEGATIVE MG/DL
LDH SERPL L TO P-CCNC: 199 U/L (ref 0–250)
LEUKOCYTE ESTERASE UR QL STRIP: NEGATIVE
LIPASE SERPL-CCNC: 48 U/L (ref 13–60)
LYMPHOCYTES # BLD AUTO: 2.2 10E3/UL (ref 0.8–5.3)
LYMPHOCYTES NFR BLD AUTO: 27 %
MCH RBC QN AUTO: 32.7 PG (ref 26.5–33)
MCHC RBC AUTO-ENTMCNC: 35 G/DL (ref 31.5–36.5)
MCV RBC AUTO: 94 FL (ref 78–100)
MONOCYTES # BLD AUTO: 0.9 10E3/UL (ref 0–1.3)
MONOCYTES NFR BLD AUTO: 11 %
NEUTROPHILS # BLD AUTO: 4.7 10E3/UL (ref 1.6–8.3)
NEUTROPHILS NFR BLD AUTO: 58 %
NITRATE UR QL: NEGATIVE
NRBC # BLD AUTO: 0 10E3/UL
NRBC BLD AUTO-RTO: 0 /100
PH UR STRIP: 7 [PH] (ref 5–7)
PHOSPHATE SERPL-MCNC: 3.1 MG/DL (ref 2.5–4.5)
PLATELET # BLD AUTO: 220 10E3/UL (ref 150–450)
POTASSIUM SERPL-SCNC: 4.1 MMOL/L (ref 3.4–5.3)
PROT SERPL-MCNC: 7.3 G/DL (ref 6.4–8.3)
RBC # BLD AUTO: 4.74 10E6/UL (ref 4.4–5.9)
RBC URINE: <1 /HPF
SODIUM SERPL-SCNC: 135 MMOL/L (ref 135–145)
SP GR UR STRIP: 1.01 (ref 1–1.03)
SQUAMOUS EPITHELIAL: <1 /HPF
T4 FREE SERPL-MCNC: 1 NG/DL (ref 0.9–1.7)
TSH SERPL DL<=0.005 MIU/L-ACNC: 25.68 UIU/ML (ref 0.3–4.2)
URATE SERPL-MCNC: 7.5 MG/DL (ref 3.4–7)
UROBILINOGEN UR STRIP-MCNC: <2 MG/DL
WBC # BLD AUTO: 8.2 10E3/UL (ref 4–11)
WBC URINE: <1 /HPF

## 2024-07-25 PROCEDURE — 82150 ASSAY OF AMYLASE: CPT | Performed by: INTERNAL MEDICINE

## 2024-07-25 PROCEDURE — 82977 ASSAY OF GGT: CPT | Performed by: INTERNAL MEDICINE

## 2024-07-25 PROCEDURE — 85025 COMPLETE CBC W/AUTO DIFF WBC: CPT | Performed by: INTERNAL MEDICINE

## 2024-07-25 PROCEDURE — 82040 ASSAY OF SERUM ALBUMIN: CPT | Performed by: INTERNAL MEDICINE

## 2024-07-25 PROCEDURE — 83690 ASSAY OF LIPASE: CPT | Performed by: INTERNAL MEDICINE

## 2024-07-25 PROCEDURE — 88112 CYTOPATH CELL ENHANCE TECH: CPT | Mod: TC | Performed by: INTERNAL MEDICINE

## 2024-07-25 PROCEDURE — 84443 ASSAY THYROID STIM HORMONE: CPT | Performed by: INTERNAL MEDICINE

## 2024-07-25 PROCEDURE — 36415 COLL VENOUS BLD VENIPUNCTURE: CPT | Performed by: INTERNAL MEDICINE

## 2024-07-25 PROCEDURE — 83615 LACTATE (LD) (LDH) ENZYME: CPT | Performed by: INTERNAL MEDICINE

## 2024-07-25 PROCEDURE — 84550 ASSAY OF BLOOD/URIC ACID: CPT | Performed by: INTERNAL MEDICINE

## 2024-07-25 PROCEDURE — 84100 ASSAY OF PHOSPHORUS: CPT | Performed by: INTERNAL MEDICINE

## 2024-07-25 PROCEDURE — 83036 HEMOGLOBIN GLYCOSYLATED A1C: CPT | Performed by: INTERNAL MEDICINE

## 2024-07-25 PROCEDURE — 86140 C-REACTIVE PROTEIN: CPT | Performed by: INTERNAL MEDICINE

## 2024-07-25 PROCEDURE — 81001 URINALYSIS AUTO W/SCOPE: CPT | Performed by: INTERNAL MEDICINE

## 2024-07-25 PROCEDURE — 84439 ASSAY OF FREE THYROXINE: CPT | Performed by: INTERNAL MEDICINE

## 2024-07-25 PROCEDURE — 87086 URINE CULTURE/COLONY COUNT: CPT | Performed by: INTERNAL MEDICINE

## 2024-07-26 LAB — BACTERIA UR CULT: NORMAL

## 2024-07-28 LAB
PATH REPORT.COMMENTS IMP SPEC: NORMAL
PATH REPORT.FINAL DX SPEC: NORMAL
PATH REPORT.GROSS SPEC: NORMAL
PATH REPORT.MICROSCOPIC SPEC OTHER STN: NORMAL

## 2024-07-28 PROCEDURE — 88112 CYTOPATH CELL ENHANCE TECH: CPT | Mod: 26 | Performed by: PATHOLOGY

## 2024-07-29 ENCOUNTER — LAB (OUTPATIENT)
Dept: LAB | Facility: CLINIC | Age: 74
End: 2024-07-29
Attending: INTERNAL MEDICINE
Payer: COMMERCIAL

## 2024-07-29 ENCOUNTER — INFUSION THERAPY VISIT (OUTPATIENT)
Dept: ONCOLOGY | Facility: CLINIC | Age: 74
End: 2024-07-29
Attending: INTERNAL MEDICINE
Payer: COMMERCIAL

## 2024-07-29 ENCOUNTER — ALLIED HEALTH/NURSE VISIT (OUTPATIENT)
Dept: ONCOLOGY | Facility: CLINIC | Age: 74
End: 2024-07-29
Payer: COMMERCIAL

## 2024-07-29 VITALS
SYSTOLIC BLOOD PRESSURE: 138 MMHG | DIASTOLIC BLOOD PRESSURE: 80 MMHG | TEMPERATURE: 97.6 F | RESPIRATION RATE: 20 BRPM | HEART RATE: 68 BPM | OXYGEN SATURATION: 94 %

## 2024-07-29 VITALS
DIASTOLIC BLOOD PRESSURE: 84 MMHG | SYSTOLIC BLOOD PRESSURE: 144 MMHG | BODY MASS INDEX: 36.34 KG/M2 | OXYGEN SATURATION: 96 % | RESPIRATION RATE: 16 BRPM | TEMPERATURE: 96.5 F | HEART RATE: 70 BPM | WEIGHT: 242.5 LBS

## 2024-07-29 DIAGNOSIS — E03.2 HYPOTHYROIDISM DUE TO MEDICATION: ICD-10-CM

## 2024-07-29 DIAGNOSIS — C67.3 MALIGNANT NEOPLASM OF ANTERIOR WALL OF URINARY BLADDER (H): Primary | ICD-10-CM

## 2024-07-29 DIAGNOSIS — Z00.6 EXAMINATION OF PARTICIPANT IN CLINICAL TRIAL: ICD-10-CM

## 2024-07-29 DIAGNOSIS — Z00.6 EXAMINATION OF PARTICIPANT IN CLINICAL TRIAL: Primary | ICD-10-CM

## 2024-07-29 LAB
PATH REPORT.COMMENTS IMP SPEC: NORMAL
PATH REPORT.FINAL DX SPEC: NORMAL
PATH REPORT.GROSS SPEC: NORMAL
T3FREE SERPL-MCNC: 2.3 PG/ML (ref 2–4.4)

## 2024-07-29 PROCEDURE — 99214 OFFICE O/P EST MOD 30 MIN: CPT | Performed by: INTERNAL MEDICINE

## 2024-07-29 PROCEDURE — 258N000003 HC RX IP 258 OP 636: Performed by: INTERNAL MEDICINE

## 2024-07-29 PROCEDURE — 84481 FREE ASSAY (FT-3): CPT | Performed by: INTERNAL MEDICINE

## 2024-07-29 PROCEDURE — 88112 CYTOPATH CELL ENHANCE TECH: CPT | Mod: 26 | Performed by: PATHOLOGY

## 2024-07-29 PROCEDURE — G0463 HOSPITAL OUTPT CLINIC VISIT: HCPCS | Performed by: INTERNAL MEDICINE

## 2024-07-29 PROCEDURE — 96413 CHEMO IV INFUSION 1 HR: CPT

## 2024-07-29 PROCEDURE — 88112 CYTOPATH CELL ENHANCE TECH: CPT | Mod: TC | Performed by: INTERNAL MEDICINE

## 2024-07-29 PROCEDURE — 36415 COLL VENOUS BLD VENIPUNCTURE: CPT | Performed by: INTERNAL MEDICINE

## 2024-07-29 RX ORDER — LORAZEPAM 2 MG/ML
0.5 INJECTION INTRAMUSCULAR EVERY 4 HOURS PRN
Status: CANCELLED | OUTPATIENT
Start: 2024-07-30

## 2024-07-29 RX ORDER — MEPERIDINE HYDROCHLORIDE 25 MG/ML
25 INJECTION INTRAMUSCULAR; INTRAVENOUS; SUBCUTANEOUS EVERY 30 MIN PRN
Status: CANCELLED | OUTPATIENT
Start: 2024-07-30

## 2024-07-29 RX ORDER — DIPHENHYDRAMINE HCL 25 MG
50 CAPSULE ORAL
Status: CANCELLED | OUTPATIENT
Start: 2024-07-30

## 2024-07-29 RX ORDER — METHYLPREDNISOLONE SODIUM SUCCINATE 125 MG/2ML
125 INJECTION, POWDER, LYOPHILIZED, FOR SOLUTION INTRAMUSCULAR; INTRAVENOUS
Status: CANCELLED
Start: 2024-07-30

## 2024-07-29 RX ORDER — ACETAMINOPHEN 325 MG/1
650 TABLET ORAL
Status: CANCELLED
Start: 2024-07-30

## 2024-07-29 RX ORDER — ACETAMINOPHEN 325 MG/1
650 TABLET ORAL
Status: CANCELLED | OUTPATIENT
Start: 2024-07-30

## 2024-07-29 RX ORDER — HEPARIN SODIUM (PORCINE) LOCK FLUSH IV SOLN 100 UNIT/ML 100 UNIT/ML
5 SOLUTION INTRAVENOUS
Status: CANCELLED | OUTPATIENT
Start: 2024-07-30

## 2024-07-29 RX ORDER — LEVOTHYROXINE SODIUM 100 UG/1
100 TABLET ORAL DAILY
Qty: 30 TABLET | Refills: 3 | Status: SHIPPED | OUTPATIENT
Start: 2024-07-29

## 2024-07-29 RX ORDER — LIDOCAINE HYDROCHLORIDE 20 MG/ML
10 JELLY TOPICAL
Status: CANCELLED | OUTPATIENT
Start: 2024-07-30

## 2024-07-29 RX ORDER — ALBUTEROL SULFATE 0.83 MG/ML
2.5 SOLUTION RESPIRATORY (INHALATION)
Status: CANCELLED | OUTPATIENT
Start: 2024-07-30

## 2024-07-29 RX ORDER — HEPARIN SODIUM,PORCINE 10 UNIT/ML
5-20 VIAL (ML) INTRAVENOUS DAILY PRN
Status: CANCELLED | OUTPATIENT
Start: 2024-07-30

## 2024-07-29 RX ORDER — DIPHENHYDRAMINE HYDROCHLORIDE 50 MG/ML
50 INJECTION INTRAMUSCULAR; INTRAVENOUS
Status: CANCELLED
Start: 2024-07-30

## 2024-07-29 RX ORDER — EPINEPHRINE 1 MG/ML
0.3 INJECTION, SOLUTION INTRAMUSCULAR; SUBCUTANEOUS EVERY 5 MIN PRN
Status: CANCELLED | OUTPATIENT
Start: 2024-07-30

## 2024-07-29 RX ORDER — ALBUTEROL SULFATE 90 UG/1
1-2 AEROSOL, METERED RESPIRATORY (INHALATION)
Status: CANCELLED
Start: 2024-07-30

## 2024-07-29 RX ADMIN — SODIUM CHLORIDE 250 ML: 9 INJECTION, SOLUTION INTRAVENOUS at 12:05

## 2024-07-29 NOTE — PROGRESS NOTES
Infusion Nursing Note:  Alek Pickeringprincess presents today for week 21, Day 1 study-cetrelimab (IDS # 6160).    Patient seen by provider today: Yes: Dr. Tobin   present during visit today: Not Applicable.    Note: Per Research BIENVENIDO Graham to proceed with treatment today. VS obtained prior to infusion and after infusion.    Study-cetrelimab start time 1210 and end time 1245    Intravenous Access:  Peripheral IV placed.    Treatment Conditions:  Lab Results   Component Value Date    HGB 15.5 07/25/2024    WBC 8.2 07/25/2024    ANEUTAUTO 4.7 07/25/2024     07/25/2024        Lab Results   Component Value Date     07/25/2024    POTASSIUM 4.1 07/25/2024    MAG 2.3 11/19/2023    CR 1.24 (H) 07/25/2024    MILLI 9.3 07/25/2024    BILITOTAL 0.7 07/25/2024    ALBUMIN 4.3 07/25/2024    ALT 35 07/25/2024    AST 32 07/25/2024     Results reviewed, labs MET treatment parameters, ok to proceed with treatment.    Post Infusion Assessment:  Patient tolerated infusion without incident.  Patient observed for 15 minutes post study-cetrelimab (IDS # 6160) per protocol.  Blood return noted pre and post infusion.  Site patent and intact, free from redness, edema or discomfort.  No evidence of extravasations.  Access discontinued per protocol.     Discharge Plan:   Patient declined prescription refills.  AVS to patient via [employer].  Patient will return 8/26/24 for next appointment.   Patient discharged in stable condition accompanied by: wife.  Departure Mode: Ambulatory.      Katherine Bear RN

## 2024-07-29 NOTE — NURSING NOTE
"Oncology Rooming Note    July 29, 2024 10:41 AM   Alek Mcneill is a 74 year old male who presents for:    Chief Complaint   Patient presents with    Blood Draw     Labs drawn via PIV placed by RN in lab    Oncology Clinic Visit     Examination of participant in clinical trial     Initial Vitals: BP (!) 144/84 (BP Location: Right arm, Patient Position: Sitting, Cuff Size: Adult Regular)   Pulse 70   Temp (!) 96.5  F (35.8  C) (Tympanic)   Resp 16   Wt 110 kg (242 lb 8 oz)   SpO2 96%   BMI 36.34 kg/m   Estimated body mass index is 36.34 kg/m  as calculated from the following:    Height as of 7/9/24: 1.74 m (5' 8.5\").    Weight as of this encounter: 110 kg (242 lb 8 oz). Body surface area is 2.31 meters squared.  No Pain (0) Comment: Data Unavailable   No LMP for male patient.  Allergies reviewed: Yes  Medications reviewed: Yes    Medications: Medication refills not needed today.  Pharmacy name entered into [employer]: [id number] [address] AT [place] [address]    Frailty Screening:   Is the patient here for a new oncology consult visit in cancer care? 2. No      Clinical concerns: none.       Sarthak De Leon"

## 2024-07-29 NOTE — PROGRESS NOTES
[facility] Medical Oncology Note  Date of visit: Jul 29, 2024  Outpatient Clinic Note        Assessment:     Stage 1 (T1NxMx) superficial poorly differentiated bladder cancer. T1 lesions are high risk. Recurrence rates at one, three, and five years can reach up to 50, 70 to 80, and 90 percent, respectively, and 20 to 25 percent progress to more invasive (T2 or greater) disease. Given the high risk here, therapy is indicated.  Alek is on the SUNRISE-3 study, for patients with T1 disease and has been randomized to the TAR-200 (a device inserted in to the bladder that contains gemcitabine)+ Cetrelimab arm.  S/P six doses of cetrelimab, with adequate tolerance. He has had some lab evidence of thryoiditis.  He remains hypothyroid despite levothyroxine 75 mcg since 6/18. TSH is rising. We'll got to 100 micg dosing as of today. Discussed with Alek. He will talk to his primary about this and taking over the levothyroxine management following completion of the trial.   Hemoglobin A1C up trending but improved on 7/8/24. Alek asks about his blood sugar control. Fasting was 123 yesterday. I have recommended continue follow up with PCP for his known type II diabetes.   Creatinine is mildly elevated but at baseline today.       Plan:     Next cetrelimab today. TAR-200 with Dr. Kaiser.  Increase levothyroxine to 100 micg daily.  RTC in 3 weeks for his next cycle.    Patient was seen and evaluated with study RNCC, Araceli Diaz.      30 minutes spent on the date of the encounter doing chart review, review of test results, interpretation of tests, patient visit, and documentation     Julio Tobin MD, MSc  Associate Professor of Medicine  [facility] [facility] [employer] [facility] [employer]  [facility]  [address] [place] [id number]  [phone]      __________________________________________________________________    DIAGNOSIS:     Recently diagnosed T1 poorly differentiated invasive bladder cancer.   "  Respiratory symptoms concerning for pneumonitis, but CT chest 3/27/2024 showed \"No new or suspicious pulmonary opacities.\"       History of Present Illness/Therapy to Date:   11/19/2023: Went to ED for subacute onset of gross hematuria, and a passing of clots. Stopped ASA. CT showed multiple indeterminate nodular densities in the bladder suspicious for enhancing urothelial lesions   12/6/2023: Cystoscopy , with finding of multiple bladder tumors appeared papillary and were located on the anterior wall and had some superficial calcifications. \"We resected tissue down to muscle using cut electrocautery. \" Pathology showed high grade papillary carcinoma, that did not invade the muscle.  1/17/2024: Cystoscopy with resection showed CIS, but no invasive cancer.  Alek has agreed to participate in the SUNRISE -3 trial. He has been randomized to receive Cetrelimab and TAR-200 (an investigational intravesical drug delivery system for gemcitabine). His first dose of cetrelimab and TAR-200 was 3/6/2024.         Interval history:     Alek is back with Christine here for week 18 of the SUNRISE-3 trial and his 7th dose of cetrelimab.   Last cystoscopy 3 weeks showed no lesions!  Went over his labs with hansel Garza, a PA who works for the [employer]  Patient reports that he is feeling well today.  He denies any side effects from his cetrelimab.  They try to walk 3 miles a day. Haven't lately with how how it has been.  Has a little fatigue.  Still with chronic leck pain.  He is able to do all activities that he would like to get done.  He denies any chest pain, shortness of breath, or cough today.  No recent fevers or chills.  No rash or skin concerns.  No urinary concerns.  His urine has been clear and he has not noticed any recent hematuria or clots.  No pain.  No headaches or vision concerns.  He has occasional constipation but for the most part his bowels are moving well.  No diarrhea.  He is " eating and drinking well.       ROS: 10 point ROS neg other than the symptoms noted above in the HPI.      Past Medical History:   I have reviewed this patient's past medical history   Past Medical History:   Diagnosis Date    Complication of anesthesia     DM2 (diabetes mellitus, type 2) (H)     Hypercholesteremia     Hyperlipidemia     Hypertension           Past Surgical History:    I have reviewed this patient's past surgical history       Social History:   Tobacco, ETOH, and rec drugs reviewed and as noted below with the following exceptions:  Drinks a few beers a day. Quit smoking in .  Alek was born in [place], and graduated from [employer] (no longer exists) in .  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  He is  to Christine, his third wife.  She worked with his sister-in-law and they were introduced.  They have been  for 25 years.  Jose has 5 children, Golden, Cr, Joon, Radha, and Kayla.  Kayla is the youngest and a PA who lives in [place].  The other children are in the service.  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      Family History:     Grandma with skin cancer (s/p removal of her nose)  Uncle with sarcoma  Dad had bone cancer/lung cancer  Brother Casimiro had bladder cancer  Ned had bladder cacner ( in car accident)  Brother Lenny had bladder cancer  No family history on file.         Medications:     Current Outpatient Medications   Medication Sig Dispense Refill    Ascorbic Acid 500 MG CAPS 1 tablet Orally Once a day      aspirin (ASPIRIN LOW DOSE) 81 MG EC tablet Take 81 mg by mouth daily ONCE BEFORE BED      blood glucose (NO BRAND SPECIFIED) lancets standard as directed, to use with his Contour Next test blood sugars daily      CONTOUR NEXT TEST test strip USE TO TEST BLOOD SUGAR DAILY       Ergocalciferol 50 MCG (2000 UT) CAPS Take 50 mcg by mouth daily      fish oil-omega-3 fatty acids 500 MG capsule Take 2 capsules by mouth daily      hydrochlorothiazide (HYDRODIURIL) 25 MG tablet Take 25 mg by mouth daily      ketoconazole (NIZORAL) 2 % external cream Apply topically as needed      levothyroxine (SYNTHROID/LEVOTHROID) 75 MCG tablet Take 1 tablet (75 mcg) by mouth daily 30 tablet 5    Microlet Lancets MISC daily      simvastatin (ZOCOR) 40 MG tablet Take 40 mg by mouth at bedtime      triamcinolone (KENALOG) 0.1 % external cream Apply as needed      Vitamin D3 (VITAMIN D, CHOLECALCIFEROL,) 25 mcg (1000 units) tablet Take 25 mcg by mouth daily            Physical Exam:   BP (!) 144/84 (BP Location: Right arm, Patient Position: Sitting, Cuff Size: Adult Regular)   Pulse 70   Temp (!) 96.5  F (35.8  C) (Tympanic)   Resp 16   Wt 110 kg (242 lb 8 oz)   SpO2 96%   BMI 36.34 kg/m    General: No acute distress  HEENT: Sclera anicteric. Oral mucosa pink and moist.  No mucositis or thrush  Lymph: No lymphadenopathy in neck  Heart: Regular, rate, and rhythm  Lungs: Clear to ascultation bilaterally  Abdomen: Positive bowel sounds. Soft, non-distended, non-tender. No organomegaly or mass.   Extremities: no lower extremity edema  Neuro: Cranial nerves grossly intact  Rash: none      Data:   Most Recent 3 CBC's:  Recent Labs   Lab Test 07/25/24  0937 07/08/24  0853 06/18/24  0802   WBC 8.2 8.1 8.6   HGB 15.5 15.9 15.9   MCV 94 93 91    216 231   ANEUTAUTO 4.7 4.9 5.2     Most Recent 3 BMP's:  Recent Labs   Lab Test 07/25/24  0937 07/08/24  0853 06/18/24  0802    135 132*   POTASSIUM 4.1 4.2 4.2   CHLORIDE 96* 97* 96*   CO2 29 28 24   BUN 14.7 17.0 15.5   CR 1.24* 1.23* 1.11   ANIONGAP 10 10 12   MILLI 9.3 9.5 9.6   * 123* 143*   PROTTOTAL 7.3 7.4 7.2   ALBUMIN 4.3 4.3 4.3    Most Recent 3 LFT's:  Recent Labs   Lab Test 07/25/24  0937 07/08/24  0853 06/18/24  0802   AST 32 31 35   ALT 35 34  31   ALKPHOS 75 75 72   BILITOTAL 0.7 0.7 0.7    Most Recent 2 TSH and T4:  Recent Labs   Lab Test 07/25/24  0937 07/08/24  0853   TSH 25.68* 19.39*   T4 1.00 1.01     I reviewed the above labs today.

## 2024-07-29 NOTE — NURSING NOTE
[id number]: Study Visit Note   Subject name: Alek Mcneill     Visit: Week 21    Did the study visit occur within the appropriate window allowed by the protocol? yes    Since the last study visit, He has been doing very well. He continues to have intermittent chills at night that are not bothersome just has to put on an extra blanket or two then resolves. Rhinorrhea also continues intermittently. Patient denies any fatigue, constipation or cold intolerance. Creatinine continues to be elevated at a grade 1. Encouraged patient to drink plenty of water (1500 mL). Dr. Tobin increased patient's synthroid dose today to 100 mcg, patient is aware and will  the script today. Patient educated on next few weeks scheduled. Urine cytology is negative. The above counseling was written down for the patient to refer back to at the request of his daughter. Patient also having mild edema in bilateral lower extremities per Dr. Tobin's assessment.     Assessed for below symptoms specifically:   Dysuria no  Pollakiuria no  Nocturia no  Urgency no  Incontinence no  Hematuria no  Urinary hesitation no  Bladder pain/spasm no  Urethral pain no   Bladder discomfort no  Feeling of incomplete bladder emptying  no  Lower abdominal pain no  Fever no  Flu-like symptoms no  Fatigue no  Diarrhea no  Nausea no  Rash no  Arthralgia  no        Cetrelimab infusion:  Were premedications given? No     Verbal handover provided to infusion RN; reminded RN to document actual start time of infusion and actual stop time of the infusion. If infusion deviates from protocol directed infusion time, please document rationale in your infusion note.    Instructed RN that the drug must be flushed with 14 mL of NS after the administration, the end of the flush is considered the end of the infusion. Instructed RN that VS are required before and after the infusion.     I have personally interviewed Alek Mcneill and reviewed his medical record for adverse events  and concomitant medications and these have been recorded on the corresponding logs in Alek Mcneill's research file.     Alek Mcneill was given the opportunity to ask any trial related questions.  Please see provider progress note for physical exam and other clinical information. Labs were reviewed - any significant lab values were addressed and reviewed.    Araceli Diaz RN

## 2024-07-29 NOTE — NURSING NOTE
Chief Complaint   Patient presents with    Blood Draw     Labs drawn via PIV placed by RN in lab     Labs drawn from PIV placed by RN. Line flushed with saline. Urine sample collected for research kit. Vitals taken. Pt checked in for appointment(s).     Dhara Phelps RN

## 2024-07-29 NOTE — LETTER
7/29/2024      Alek Mcneill  [address] [place] [address]      Dear Colleague,    Thank you for referring your patient, Alek Mcneill, to the [facility]. Please see a copy of my visit note below.      [facility] Medical Oncology Note  Date of visit: Jul 29, 2024  Outpatient Clinic Note        Assessment:     Stage 1 (T1NxMx) superficial poorly differentiated bladder cancer. T1 lesions are high risk. Recurrence rates at one, three, and five years can reach up to 50, 70 to 80, and 90 percent, respectively, and 20 to 25 percent progress to more invasive (T2 or greater) disease. Given the high risk here, therapy is indicated.  Alek is on the SUNRISE-3 study, for patients with T1 disease and has been randomized to the TAR-200 (a device inserted in to the bladder that contains gemcitabine)+ Cetrelimab arm.  S/P six doses of cetrelimab, with adequate tolerance. He has had some lab evidence of thryoiditis.  He remains hypothyroid despite levothyroxine 75 mcg since 6/18. TSH is rising. We'll got to 100 micg dosing as of today. Discussed with Alek. He will talk to his primary about this and taking over the levothyroxine management following completion of the trial.   Hemoglobin A1C up trending but improved on 7/8/24. Alek asks about his blood sugar control. Fasting was 123 yesterday. I have recommended continue follow up with PCP for his known type II diabetes.   Creatinine is mildly elevated but at baseline today.       Plan:     Next cetrelimab today. TAR-200 with Dr. Kaiser.  Increase levothyroxine to 100 micg daily.  RTC in 3 weeks for his next cycle.    Patient was seen and evaluated with study RNCC, Araceli Diaz.      30 minutes spent on the date of the encounter doing chart review, review of test results, interpretation of tests, patient visit, and documentation     Julio Tobin MD, MSc  Associate Professor of Medicine  [employer]  [facility]  "Cancer Center  [address] [place] [id number]  [phone]      __________________________________________________________________    DIAGNOSIS:     Recently diagnosed T1 poorly differentiated invasive bladder cancer.    Respiratory symptoms concerning for pneumonitis, but CT chest 3/27/2024 showed \"No new or suspicious pulmonary opacities.\"       History of Present Illness/Therapy to Date:   11/19/2023: Went to ED for subacute onset of gross hematuria, and a passing of clots. Stopped ASA. CT showed multiple indeterminate nodular densities in the bladder suspicious for enhancing urothelial lesions   12/6/2023: Cystoscopy , with finding of multiple bladder tumors appeared papillary and were located on the anterior wall and had some superficial calcifications. \"We resected tissue down to muscle using cut electrocautery. \" Pathology showed high grade papillary carcinoma, that did not invade the muscle.  1/17/2024: Cystoscopy with resection showed CIS, but no invasive cancer.  Alek has agreed to participate in the SUNRISE -3 trial. He has been randomized to receive Cetrelimab and TAR-200 (an investigational intravesical drug delivery system for gemcitabine). His first dose of cetrelimab and TAR-200 was 3/6/2024.         Interval history:     Alek is back with Christine here for week 18 of the SUNRISE-3 trial and his 7th dose of cetrelimab.   Last cystoscopy 3 weeks showed no lesions!  Went over his labs with hansel Garza, a PA who works for the [employer] [place] [employer]  Patient reports that he is feeling well today.  He denies any side effects from his cetrelimab.  They try to walk 3 miles a day. Haven't lately with how how it has been.  Has a little fatigue.  Still with chronic leck pain.  He is able to do all activities that he would like to get done.  He denies any chest pain, shortness of breath, or cough today.  No recent fevers or chills.  No rash or skin concerns.  No urinary " concerns.  His urine has been clear and he has not noticed any recent hematuria or clots.  No pain.  No headaches or vision concerns.  He has occasional constipation but for the most part his bowels are moving well.  No diarrhea.  He is eating and drinking well.       ROS: 10 point ROS neg other than the symptoms noted above in the HPI.      Past Medical History:   I have reviewed this patient's past medical history   Past Medical History:   Diagnosis Date     Complication of anesthesia      DM2 (diabetes mellitus, type 2) (H)      Hypercholesteremia      Hyperlipidemia      Hypertension           Past Surgical History:    I have reviewed this patient's past surgical history       Social History:   Tobacco, ETOH, and rec drugs reviewed and as noted below with the following exceptions:  Drinks a few beers a day. Quit smoking in .  Alek was born in [place], and graduated from [employer]no [facility] [employer] in .  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  He is  to Christine, his third wife.  She worked with his sister-in-law and they were introduced.  They have been  for 25 years.  Jose has 5 children, Golden, Cr, Joon, Radha, and Kayla.  Kayla is the youngest and a PA who lives in [place].  The other children are in the service.  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      Family History:     Grandma with skin cancer (s/p removal of her nose)  Uncle with sarcoma  Dad had bone cancer/lung cancer  Brother Casimiro had bladder cancer  Ned had bladder cacner ( in car accident)  Brother Lenny had bladder cancer  No family history on file.         Medications:     Current Outpatient Medications   Medication Sig Dispense Refill     Ascorbic Acid 500 MG CAPS 1 tablet Orally Once a day       aspirin (ASPIRIN LOW DOSE) 81 MG EC tablet  Take 81 mg by mouth daily ONCE BEFORE BED       blood glucose (NO BRAND SPECIFIED) lancets standard as directed, to use with his Contour Next test blood sugars daily       CONTOUR NEXT TEST test strip USE TO TEST BLOOD SUGAR DAILY       Ergocalciferol 50 MCG (2000 UT) CAPS Take 50 mcg by mouth daily       fish oil-omega-3 fatty acids 500 MG capsule Take 2 capsules by mouth daily       hydrochlorothiazide (HYDRODIURIL) 25 MG tablet Take 25 mg by mouth daily       ketoconazole (NIZORAL) 2 % external cream Apply topically as needed       levothyroxine (SYNTHROID/LEVOTHROID) 75 MCG tablet Take 1 tablet (75 mcg) by mouth daily 30 tablet 5     Microlet Lancets MISC daily       simvastatin (ZOCOR) 40 MG tablet Take 40 mg by mouth at bedtime       triamcinolone (KENALOG) 0.1 % external cream Apply as needed       Vitamin D3 (VITAMIN D, CHOLECALCIFEROL,) 25 mcg (1000 units) tablet Take 25 mcg by mouth daily            Physical Exam:   BP (!) 144/84 (BP Location: Right arm, Patient Position: Sitting, Cuff Size: Adult Regular)   Pulse 70   Temp (!) 96.5  F (35.8  C) (Tympanic)   Resp 16   Wt 110 kg (242 lb 8 oz)   SpO2 96%   BMI 36.34 kg/m    General: No acute distress  HEENT: Sclera anicteric. Oral mucosa pink and moist.  No mucositis or thrush  Lymph: No lymphadenopathy in neck  Heart: Regular, rate, and rhythm  Lungs: Clear to ascultation bilaterally  Abdomen: Positive bowel sounds. Soft, non-distended, non-tender. No organomegaly or mass.   Extremities: no lower extremity edema  Neuro: Cranial nerves grossly intact  Rash: none      Data:   Most Recent 3 CBC's:  Recent Labs   Lab Test 07/25/24  0937 07/08/24  0853 06/18/24  0802   WBC 8.2 8.1 8.6   HGB 15.5 15.9 15.9   MCV 94 93 91    216 231   ANEUTAUTO 4.7 4.9 5.2     Most Recent 3 BMP's:  Recent Labs   Lab Test 07/25/24  0937 07/08/24  0853 06/18/24  0802    135 132*   POTASSIUM 4.1 4.2 4.2   CHLORIDE 96* 97* 96*   CO2 29 28 24   BUN 14.7 17.0 15.5    CR 1.24* 1.23* 1.11   ANIONGAP 10 10 12   MILLI 9.3 9.5 9.6   * 123* 143*   PROTTOTAL 7.3 7.4 7.2   ALBUMIN 4.3 4.3 4.3    Most Recent 3 LFT's:  Recent Labs   Lab Test 07/25/24  0937 07/08/24  0853 06/18/24  0802   AST 32 31 35   ALT 35 34 31   ALKPHOS 75 75 72   BILITOTAL 0.7 0.7 0.7    Most Recent 2 TSH and T4:  Recent Labs   Lab Test 07/25/24  0937 07/08/24  0853   TSH 25.68* 19.39*   T4 1.00 1.01     I reviewed the above labs today.      Again, thank you for allowing me to participate in the care of your patient.        Sincerely,        Julio Tobin MD

## 2024-07-30 ENCOUNTER — OFFICE VISIT (OUTPATIENT)
Dept: UROLOGY | Facility: CLINIC | Age: 74
End: 2024-07-30
Payer: COMMERCIAL

## 2024-07-30 VITALS
OXYGEN SATURATION: 94 % | BODY MASS INDEX: 35.1 KG/M2 | HEART RATE: 78 BPM | DIASTOLIC BLOOD PRESSURE: 98 MMHG | WEIGHT: 237 LBS | HEIGHT: 69 IN | SYSTOLIC BLOOD PRESSURE: 149 MMHG

## 2024-07-30 DIAGNOSIS — C67.3 MALIGNANT NEOPLASM OF ANTERIOR WALL OF URINARY BLADDER (H): Primary | ICD-10-CM

## 2024-07-30 PROCEDURE — 52000 CYSTOURETHROSCOPY: CPT | Performed by: UROLOGY

## 2024-07-30 RX ORDER — METHYLPREDNISOLONE SODIUM SUCCINATE 125 MG/2ML
125 INJECTION, POWDER, LYOPHILIZED, FOR SOLUTION INTRAMUSCULAR; INTRAVENOUS
Status: CANCELLED
Start: 2024-07-30

## 2024-07-30 RX ORDER — ALBUTEROL SULFATE 0.83 MG/ML
2.5 SOLUTION RESPIRATORY (INHALATION)
Status: CANCELLED | OUTPATIENT
Start: 2024-07-30

## 2024-07-30 RX ORDER — DIPHENHYDRAMINE HYDROCHLORIDE 50 MG/ML
50 INJECTION INTRAMUSCULAR; INTRAVENOUS
Status: CANCELLED
Start: 2024-07-30

## 2024-07-30 RX ORDER — DIPHENHYDRAMINE HCL 25 MG
50 CAPSULE ORAL
Status: CANCELLED | OUTPATIENT
Start: 2024-07-30

## 2024-07-30 RX ORDER — LIDOCAINE HYDROCHLORIDE 20 MG/ML
JELLY TOPICAL ONCE
Status: COMPLETED | OUTPATIENT
Start: 2024-07-30 | End: 2024-07-30

## 2024-07-30 RX ORDER — HEPARIN SODIUM,PORCINE 10 UNIT/ML
5-20 VIAL (ML) INTRAVENOUS DAILY PRN
Status: CANCELLED | OUTPATIENT
Start: 2024-07-30

## 2024-07-30 RX ORDER — MEPERIDINE HYDROCHLORIDE 25 MG/ML
25 INJECTION INTRAMUSCULAR; INTRAVENOUS; SUBCUTANEOUS EVERY 30 MIN PRN
Status: CANCELLED | OUTPATIENT
Start: 2024-07-30

## 2024-07-30 RX ORDER — ACETAMINOPHEN 325 MG/1
650 TABLET ORAL
Status: CANCELLED | OUTPATIENT
Start: 2024-07-30

## 2024-07-30 RX ORDER — LORAZEPAM 2 MG/ML
0.5 INJECTION INTRAMUSCULAR EVERY 4 HOURS PRN
Status: CANCELLED | OUTPATIENT
Start: 2024-07-30

## 2024-07-30 RX ORDER — LIDOCAINE HYDROCHLORIDE 20 MG/ML
10 JELLY TOPICAL
Status: CANCELLED | OUTPATIENT
Start: 2024-07-30

## 2024-07-30 RX ORDER — ALBUTEROL SULFATE 90 UG/1
1-2 AEROSOL, METERED RESPIRATORY (INHALATION)
Status: CANCELLED
Start: 2024-07-30

## 2024-07-30 RX ORDER — SULFAMETHOXAZOLE/TRIMETHOPRIM 800-160 MG
1 TABLET ORAL ONCE
Status: COMPLETED | OUTPATIENT
Start: 2024-07-30 | End: 2024-07-30

## 2024-07-30 RX ORDER — EPINEPHRINE 1 MG/ML
0.3 INJECTION, SOLUTION, CONCENTRATE INTRAVENOUS EVERY 5 MIN PRN
Status: CANCELLED | OUTPATIENT
Start: 2024-07-30

## 2024-07-30 RX ORDER — HEPARIN SODIUM (PORCINE) LOCK FLUSH IV SOLN 100 UNIT/ML 100 UNIT/ML
5 SOLUTION INTRAVENOUS
Status: CANCELLED | OUTPATIENT
Start: 2024-07-30

## 2024-07-30 RX ORDER — ACETAMINOPHEN 325 MG/1
650 TABLET ORAL
Status: CANCELLED
Start: 2024-07-30

## 2024-07-30 RX ADMIN — SULFAMETHOXAZOLE AND TRIMETHOPRIM 1 TABLET: 800; 160 TABLET ORAL at 11:13

## 2024-07-30 RX ADMIN — LIDOCAINE HYDROCHLORIDE: 20 JELLY TOPICAL at 11:13

## 2024-07-30 ASSESSMENT — PAIN SCALES - GENERAL: PAINLEVEL: NO PAIN (0)

## 2024-07-30 NOTE — NURSING NOTE
"Chief Complaint   Patient presents with    Cystoscopy     Tar 200 study         Blood pressure (!) 149/98, pulse 78, height 1.753 m (5' 9\"), weight 107.5 kg (237 lb), SpO2 94%. Body mass index is 35 kg/m .    Patient Active Problem List   Diagnosis    Malignant neoplasm of anterior wall of urinary bladder (H)    Benign hypertension    Hyperlipidemia    Impotence of organic origin    Morbid (severe) obesity due to excess calories (H)    Type 2 diabetes mellitus without complication, without long-term current use of insulin (H)    Spinal stenosis of lumbosacral region    Peripheral vascular disease (H24)    Venous stasis dermatitis of left lower extremity    Lumbar radiculopathy    Environmental allergies    Age-related nuclear cataract of right eye       Allergies   Allergen Reactions    Chlorhexidine Dermatitis       Current Outpatient Medications   Medication Sig Dispense Refill    Ascorbic Acid 500 MG CAPS 1 tablet Orally Once a day      aspirin (ASPIRIN LOW DOSE) 81 MG EC tablet Take 81 mg by mouth daily ONCE BEFORE BED      blood glucose (NO BRAND SPECIFIED) lancets standard as directed, to use with his Contour Next test blood sugars daily      CONTOUR NEXT TEST test strip USE TO TEST BLOOD SUGAR DAILY      Ergocalciferol 50 MCG (2000 UT) CAPS Take 50 mcg by mouth daily      fish oil-omega-3 fatty acids 500 MG capsule Take 2 capsules by mouth daily      hydrochlorothiazide (HYDRODIURIL) 25 MG tablet Take 25 mg by mouth daily      ketoconazole (NIZORAL) 2 % external cream Apply topically as needed      levothyroxine (SYNTHROID/LEVOTHROID) 100 MCG tablet Take 1 tablet (100 mcg) by mouth daily 30 tablet 3    Microlet Lancets MISC daily      simvastatin (ZOCOR) 40 MG tablet Take 40 mg by mouth at bedtime      triamcinolone (KENALOG) 0.1 % external cream Apply as needed      Vitamin D3 (VITAMIN D, CHOLECALCIFEROL,) 25 mcg (1000 units) tablet Take 25 mcg by mouth daily         Social History     Tobacco Use    Smoking " status: Former     Current packs/day: 0.00     Types: Cigarettes     Quit date:      Years since quittin.5     Passive exposure: Past   Vaping Use    Vaping status: Never Used   Substance Use Topics    Alcohol use: Yes     Comment: daily    Drug use: Never       Invasive Procedure Safety Checklist:    Procedure: Cystoscopy    Action: Complete sections and checkboxes as appropriate.    Pre-procedure:  1. Patient ID Verified with 2 identifiers (Octavia and  or MRN) : YES    2. Procedure and site verified with patient/designee (when able) : YES    3. Accurate consent documentation in medical record : YES    4. H&P (or appropriate assessment) documented in medical record : N/A  H&P must be up to 30 days prior to procedure an updated within 24 hours of                 Procedure as applicable.     5. Relevant diagnostic and radiology test results appropriately labeled and displayed as applicable : YES    6. Blood products, implants, devices, and/or special equipment available for the procedure as applicable : YES    7. Procedure site(s) marked with provider initials [Exclusions: none] : NO    8. Marking not required. Reason : Yes  Procedure does not require site marking    Time Out:     Time-Out performed immediately prior to starting procedure, including verbal and active participation of all team members addressing: YES    1. Correct patient identity.  2. Confirmed that the correct side and site are marked.  3. An accurate procedure to be done.  4. Agreement on the procedure to be done.  5. Correct patient position.  6. Relevant images and results are properly labeled and appropriately displayed.  7. The need to administer antibiotics or fluids for irrigation purposes during the procedure as applicable.  8. Safety precautions based on patient history or medication use.    During Procedure: Verification of correct person, site, and procedure occurs any time the responsibility for care of the patient is  transferred to another member of the care team.    The following medication was given:     MEDICATION:  Lidocaine without epinephrine 2% jelly  ROUTE: urethral   SITE: urethral   DOSE: 10 mL  LOT #: [id number]  : [employer]  EXPIRATION DATE: 2-26 NDC#: [address]-0   Was there drug waste? No    Prior to med admin, verified patient identity using patient's name and date of birth.  Due to med administration, patient instructed to remain in clinic for 15 minutes  afterwards, and to report any adverse reaction to me immediately.    Drug Amount Wasted:  None.  Vial/Syringe: Syringe    The following medication was given:     MEDICATION: Bactrim (Trimethoprim / Sulfamethoxazole)  ROUTE: PO  SITE: Medication was given orally  DOSE: 800mg/160mg  LOT #: [id number]  : [employer]  EXPIRATION DATE: 2026/01  NDC#: 4187-1283-50   Was there drug waste? No    Prior to medication administration, verified patient identity using patient's name and date of birth.  Due to medication administration, patient instructed to remain in clinic for 15 minutes  afterwards, and to report any adverse reaction to me immediately.      Deedee Dyson  7/30/2024  10:57 AM

## 2024-07-30 NOTE — PROGRESS NOTES
"  CHIEF COMPLAINT   It was my pleasure to see Alek Mcneill who is a 74 year old male for follow-up of bladder cancer.      HPI  Alek Mcneill is a very pleasant 74 year old male who presents with a history of bladder cancer. 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      He has enrolled in [email or web address] 3 and is here today for TAR-200 removal and re-insertion. He continues to tolerate this therapy very well, with no significant symptoms. No recent hematuria or dysuria.      Re-staging TURBT 1/17/2024          SPECIMEN   Procedure   Transurethral resection of bladder (TURBT)   TUMOR   Tumor Site   Anterior wall   Histologic Type   Urothelial carcinoma, in situ   Histologic Grade   High-grade   Tumor Extent   Flat carcinoma in situ   Lymphovascular Invasion   Not identified   Tumor Configuration   Flat   Muscularis Propria (detrusor muscle)   Cannot be determined: Biopsy site changes are identified in the current sample; no residual viable invasive carcinoma is identified.  Please see previous biopsy report for further information, case [id number], [facility]..   ADDITIONAL FINDINGS   Associated Epithelial Lesions   Chronic inflammation, histiocytic proliferation, consistent with biopsy site changes.      TURBT 12/6/2023  Final Diagnosis   A) URINARY BLADDER, BASE OF TUMOR, TRANSURETHRAL RESECTION BLADDER TUMOR:  -NONINVASIVE LOW-GRADE PAPILLARY UROTHELIAL CARCINOMA  -MULTIPLE FRAGMENTS OF MUSCULARIS PROPRIA  -SEE SYNOPTIC REPORT     B) URINARY BLADDER, TUMOR, TRANSURETHRAL RESECTION BLADDER TUMOR:  -HIGH-GRADE PAPILLARY UROTHELIAL CARCINOMA WITH LAMINA PROPRIA INVASION  -MUSCULARIS PROPRIA PRESENT AND UNINVOLVED  -SEE SYNOPTIC REPORT     PHYSICAL EXAM  Patient is a 74 year old  male   Vitals: Blood pressure (!) 149/98, pulse 78, height 1.753 m (5' 9\"), weight 107.5 kg (237 lb), SpO2 94%.  General Appearance Adult: Body mass index is 35 kg/m .  Alert, no " acute distress, oriented  Lungs: no respiratory distress, or pursed lip breathing  Abdomen: soft, nontender, no organomegaly or masses  Back: no CVAT  Neuro: Alert, oriented, speech and mentation normal  Psych: affect and mood normal  : penis, scrotum, testes normal    OFFICE CYSTOSCOPY 7/30/2024     Pre-procedure diagnosis:       Bladder Cancer  Post-procedure diagnosis:       device in good position; No evidence of recurrence  Procedure performed:             Cystourethroscopy  Surgeon:                                 Harman Kaiser MD  Anesthesia:                             Local     Description of procedure:   After fully informed, voluntary consent was obtained, the patient was brought into the procedure room, identified and placed in a supine position on the cystoscopy table.  The groin/scrotum were prepped with betadine and draped in a sterile fashion.  A 15F flexible cystoscope was inserted into the urethra, and the bladder and urethra were examined in a systematic manner. The TAR-200 device was identified, grasped, and removed. Removal time was 8:32 AM.  Device was noted to be intact. The urinary placement catheter was advanced 33 cm into the bladder with clear urine return. Lubrication, the device, and additional lubrication were introduced and the device was deployed with the stylet. Insertion time was 8:34 AM Catheter removed. The patient tolerated the procedure well and there were no complications.       Cystoscopic findings:  The urethra was normal without strictures.  The prostate was 3 cm long and demonstrated mild bilobar hypertrophy.  There was no median lobe.  The external sphincter coapted well and the bladder neck was open. The bladder was completely surveyed.  There was mild trabeculation.  There were no neoplasms, stones, or diverticula identifed.  The ureteric orifices were normal in position and number and effluxing clear urine. There are no recurrent tumors or suspicious areas on  today's cystoscopy.  The  device was deployed within the bladder.     ASSESSMENT and PLAN  74 year old male with history of HG non-muscle-invasive bladder cancer. He has enrolled in the [place] [place] 3 clinical trial for BCG-naive NMIBC. Cystoscopy today with no evidence of visible papillary disease.     Time of TAR-200 Removal: 11:25 AM  Time of TAR-200 Insertion: 11:27 AM  There were no irregularities observed during or after removal.  Betzy-procedural antibiotics were given.     -Will plan follow-up  for  removal in 3 weeks.    Harman Kaiser MD  Urology  [facility]

## 2024-07-30 NOTE — LETTER
7/30/2024       RE: Alek Mcneill  [address] [place] [address]     Dear Colleague,    Thank you for referring your patient, Alek Mcneill, to the [facility] [place] at [facility]. Please see a copy of my visit note below.      CHIEF COMPLAINT   It was my pleasure to see Alek Mcneill who is a 74 year old male for follow-up of bladder cancer.      HPI  Alek Mcneill is a very pleasant 74 year old male who presents with a history of bladder cancer. 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      He has enrolled in [employer] 3 and is here today for TAR-200 removal and re-insertion. He continues to tolerate this therapy very well, with no significant symptoms. No recent hematuria or dysuria.      Re-staging TURBT 1/17/2024          SPECIMEN   Procedure   Transurethral resection of bladder (TURBT)   TUMOR   Tumor Site   Anterior wall   Histologic Type   Urothelial carcinoma, in situ   Histologic Grade   High-grade   Tumor Extent   Flat carcinoma in situ   Lymphovascular Invasion   Not identified   Tumor Configuration   Flat   Muscularis Propria (detrusor muscle)   Cannot be determined: Biopsy site changes are identified in the current sample; no residual viable invasive carcinoma is identified.  Please see previous biopsy report for further information, case [id number], [facility]..   ADDITIONAL FINDINGS   Associated Epithelial Lesions   Chronic inflammation, histiocytic proliferation, consistent with biopsy site changes.      TURBT 12/6/2023  Final Diagnosis   A) URINARY BLADDER, BASE OF TUMOR, TRANSURETHRAL RESECTION BLADDER TUMOR:  -NONINVASIVE LOW-GRADE PAPILLARY UROTHELIAL CARCINOMA  -MULTIPLE FRAGMENTS OF MUSCULARIS PROPRIA  -SEE SYNOPTIC REPORT     B) URINARY BLADDER, TUMOR, TRANSURETHRAL RESECTION BLADDER TUMOR:  -HIGH-GRADE PAPILLARY UROTHELIAL CARCINOMA WITH LAMINA  "PROPRIA INVASION  -MUSCULARIS PROPRIA PRESENT AND UNINVOLVED  -SEE SYNOPTIC REPORT     PHYSICAL EXAM  Patient is a 74 year old  male   Vitals: Blood pressure (!) 149/98, pulse 78, height 1.753 m (5' 9\"), weight 107.5 kg (237 lb), SpO2 94%.  General Appearance Adult: Body mass index is 35 kg/m .  Alert, no acute distress, oriented  Lungs: no respiratory distress, or pursed lip breathing  Abdomen: soft, nontender, no organomegaly or masses  Back: no CVAT  Neuro: Alert, oriented, speech and mentation normal  Psych: affect and mood normal  : penis, scrotum, testes normal    OFFICE CYSTOSCOPY 7/30/2024     Pre-procedure diagnosis:       Bladder Cancer  Post-procedure diagnosis:       device in good position; No evidence of recurrence  Procedure performed:             Cystourethroscopy  Surgeon:                                 Harman Kaiser MD  Anesthesia:                             Local     Description of procedure:   After fully informed, voluntary consent was obtained, the patient was brought into the procedure room, identified and placed in a supine position on the cystoscopy table.  The groin/scrotum were prepped with betadine and draped in a sterile fashion.  A 15F flexible cystoscope was inserted into the urethra, and the bladder and urethra were examined in a systematic manner. The TAR-200 device was identified, grasped, and removed. Removal time was 8:32 AM.  Device was noted to be intact. The urinary placement catheter was advanced 33 cm into the bladder with clear urine return. Lubrication, the device, and additional lubrication were introduced and the device was deployed with the stylet. Insertion time was 8:34 AM Catheter removed. The patient tolerated the procedure well and there were no complications.       Cystoscopic findings:  The urethra was normal without strictures.  The prostate was 3 cm long and demonstrated mild bilobar hypertrophy.  There was no median lobe.  The external sphincter " coapted well and the bladder neck was open. The bladder was completely surveyed.  There was mild trabeculation.  There were no neoplasms, stones, or diverticula identifed.  The ureteric orifices were normal in position and number and effluxing clear urine. There are no recurrent tumors or suspicious areas on today's cystoscopy.  The  device was deployed within the bladder.     ASSESSMENT and PLAN  74 year old male with history of HG non-muscle-invasive bladder cancer. He has enrolled in the [employer] 3 clinical trial for BCG-naive NMIBC. Cystoscopy today with no evidence of visible papillary disease.     Time of TAR-200 Removal: 11:25 AM  Time of TAR-200 Insertion: 11:27 AM  There were no irregularities observed during or after removal.  Betzy-procedural antibiotics were given.     -Will plan follow-up  for  removal in 3 weeks.    Harman Kaiser MD  Urology  [facility]    Again, thank you for allowing me to participate in the care of your patient.      Sincerely,    Harman Kaiser MD

## 2024-08-13 ENCOUNTER — PRE VISIT (OUTPATIENT)
Dept: UROLOGY | Facility: CLINIC | Age: 74
End: 2024-08-13
Payer: COMMERCIAL

## 2024-08-13 NOTE — TELEPHONE ENCOUNTER
Reason for visit: cysto + STUDY          Dx/Hx/Sx: bladder cancer      Records/imaging/labs/orders: in epic      At Rooming: have graspers ready - have yellow bag available in red bin - bio hazard bin for graspers- tall/ long gloves- blue gown- face shields - ask pt if he would like lido(mostly yes)

## 2024-08-16 ENCOUNTER — LAB (OUTPATIENT)
Dept: LAB | Facility: CLINIC | Age: 74
End: 2024-08-16
Attending: INTERNAL MEDICINE
Payer: COMMERCIAL

## 2024-08-16 ENCOUNTER — ANCILLARY PROCEDURE (OUTPATIENT)
Dept: CT IMAGING | Facility: CLINIC | Age: 74
End: 2024-08-16
Attending: UROLOGY
Payer: COMMERCIAL

## 2024-08-16 DIAGNOSIS — C67.3 MALIGNANT NEOPLASM OF ANTERIOR WALL OF URINARY BLADDER (H): Primary | ICD-10-CM

## 2024-08-16 DIAGNOSIS — C67.3 MALIGNANT NEOPLASM OF ANTERIOR WALL OF URINARY BLADDER (H): ICD-10-CM

## 2024-08-16 DIAGNOSIS — Z00.6 EXAMINATION OF PARTICIPANT IN CLINICAL TRIAL: ICD-10-CM

## 2024-08-16 LAB
ALBUMIN SERPL BCG-MCNC: 4.4 G/DL (ref 3.5–5.2)
ALBUMIN UR-MCNC: NEGATIVE MG/DL
ALP SERPL-CCNC: 68 U/L (ref 40–150)
ALT SERPL W P-5'-P-CCNC: 33 U/L (ref 0–70)
AMYLASE SERPL-CCNC: 78 U/L (ref 28–100)
ANION GAP SERPL CALCULATED.3IONS-SCNC: 10 MMOL/L (ref 7–15)
APPEARANCE UR: CLEAR
APTT PPP: 26 SECONDS (ref 22–38)
AST SERPL W P-5'-P-CCNC: 38 U/L (ref 0–45)
BASOPHILS # BLD AUTO: 0.1 10E3/UL (ref 0–0.2)
BASOPHILS NFR BLD AUTO: 1 %
BILIRUB SERPL-MCNC: 0.7 MG/DL
BILIRUB UR QL STRIP: NEGATIVE
BUN SERPL-MCNC: 15.7 MG/DL (ref 8–23)
CALCIUM SERPL-MCNC: 9.7 MG/DL (ref 8.8–10.4)
CHLORIDE SERPL-SCNC: 97 MMOL/L (ref 98–107)
COLOR UR AUTO: NORMAL
CREAT SERPL-MCNC: 1.3 MG/DL (ref 0.67–1.17)
CRP SERPL-MCNC: <3 MG/L
EGFRCR SERPLBLD CKD-EPI 2021: 57 ML/MIN/1.73M2
EOSINOPHIL # BLD AUTO: 0.3 10E3/UL (ref 0–0.7)
EOSINOPHIL NFR BLD AUTO: 3 %
ERYTHROCYTE [DISTWIDTH] IN BLOOD BY AUTOMATED COUNT: 12.9 % (ref 10–15)
GGT SERPL-CCNC: 95 U/L (ref 8–61)
GLUCOSE SERPL-MCNC: 121 MG/DL (ref 70–99)
GLUCOSE UR STRIP-MCNC: NEGATIVE MG/DL
HBA1C MFR BLD: 6.5 %
HCO3 SERPL-SCNC: 27 MMOL/L (ref 22–29)
HCT VFR BLD AUTO: 44.3 % (ref 40–53)
HGB BLD-MCNC: 15.9 G/DL (ref 13.3–17.7)
HGB UR QL STRIP: NEGATIVE
IMM GRANULOCYTES # BLD: 0 10E3/UL
IMM GRANULOCYTES NFR BLD: 0 %
INR PPP: 0.92 (ref 0.85–1.15)
KETONES UR STRIP-MCNC: NEGATIVE MG/DL
LDH SERPL L TO P-CCNC: 222 U/L (ref 0–250)
LEUKOCYTE ESTERASE UR QL STRIP: NEGATIVE
LIPASE SERPL-CCNC: 55 U/L (ref 13–60)
LYMPHOCYTES # BLD AUTO: 2 10E3/UL (ref 0.8–5.3)
LYMPHOCYTES NFR BLD AUTO: 24 %
MCH RBC QN AUTO: 33 PG (ref 26.5–33)
MCHC RBC AUTO-ENTMCNC: 35.9 G/DL (ref 31.5–36.5)
MCV RBC AUTO: 92 FL (ref 78–100)
MONOCYTES # BLD AUTO: 0.9 10E3/UL (ref 0–1.3)
MONOCYTES NFR BLD AUTO: 10 %
NEUTROPHILS # BLD AUTO: 5.2 10E3/UL (ref 1.6–8.3)
NEUTROPHILS NFR BLD AUTO: 62 %
NITRATE UR QL: NEGATIVE
NRBC # BLD AUTO: 0 10E3/UL
NRBC BLD AUTO-RTO: 0 /100
PH UR STRIP: 6.5 [PH] (ref 5–7)
PHOSPHATE SERPL-MCNC: 3.1 MG/DL (ref 2.5–4.5)
PLATELET # BLD AUTO: 214 10E3/UL (ref 150–450)
POTASSIUM SERPL-SCNC: 4.2 MMOL/L (ref 3.4–5.3)
PROT SERPL-MCNC: 7.4 G/DL (ref 6.4–8.3)
RBC # BLD AUTO: 4.82 10E6/UL (ref 4.4–5.9)
RBC URINE: 1 /HPF
SODIUM SERPL-SCNC: 134 MMOL/L (ref 135–145)
SP GR UR STRIP: 1 (ref 1–1.03)
T4 FREE SERPL-MCNC: 1.34 NG/DL (ref 0.9–1.7)
TSH SERPL DL<=0.005 MIU/L-ACNC: 12.02 UIU/ML (ref 0.3–4.2)
URATE SERPL-MCNC: 7.3 MG/DL (ref 3.4–7)
UROBILINOGEN UR STRIP-MCNC: NORMAL MG/DL
WBC # BLD AUTO: 8.4 10E3/UL (ref 4–11)
WBC URINE: 1 /HPF

## 2024-08-16 PROCEDURE — 83690 ASSAY OF LIPASE: CPT | Performed by: INTERNAL MEDICINE

## 2024-08-16 PROCEDURE — 85730 THROMBOPLASTIN TIME PARTIAL: CPT | Performed by: INTERNAL MEDICINE

## 2024-08-16 PROCEDURE — 85025 COMPLETE CBC W/AUTO DIFF WBC: CPT | Performed by: INTERNAL MEDICINE

## 2024-08-16 PROCEDURE — 81001 URINALYSIS AUTO W/SCOPE: CPT | Performed by: INTERNAL MEDICINE

## 2024-08-16 PROCEDURE — 36415 COLL VENOUS BLD VENIPUNCTURE: CPT | Performed by: INTERNAL MEDICINE

## 2024-08-16 PROCEDURE — 83615 LACTATE (LD) (LDH) ENZYME: CPT | Performed by: INTERNAL MEDICINE

## 2024-08-16 PROCEDURE — 84439 ASSAY OF FREE THYROXINE: CPT | Performed by: INTERNAL MEDICINE

## 2024-08-16 PROCEDURE — 84481 FREE ASSAY (FT-3): CPT

## 2024-08-16 PROCEDURE — 82150 ASSAY OF AMYLASE: CPT | Performed by: INTERNAL MEDICINE

## 2024-08-16 PROCEDURE — 82040 ASSAY OF SERUM ALBUMIN: CPT | Performed by: INTERNAL MEDICINE

## 2024-08-16 PROCEDURE — 74178 CT ABD&PLV WO CNTR FLWD CNTR: CPT | Performed by: RADIOLOGY

## 2024-08-16 PROCEDURE — 84443 ASSAY THYROID STIM HORMONE: CPT | Performed by: INTERNAL MEDICINE

## 2024-08-16 PROCEDURE — 85610 PROTHROMBIN TIME: CPT | Performed by: INTERNAL MEDICINE

## 2024-08-16 PROCEDURE — 82977 ASSAY OF GGT: CPT | Performed by: INTERNAL MEDICINE

## 2024-08-16 PROCEDURE — 84100 ASSAY OF PHOSPHORUS: CPT | Performed by: INTERNAL MEDICINE

## 2024-08-16 PROCEDURE — 87086 URINE CULTURE/COLONY COUNT: CPT | Performed by: INTERNAL MEDICINE

## 2024-08-16 PROCEDURE — 84550 ASSAY OF BLOOD/URIC ACID: CPT | Performed by: INTERNAL MEDICINE

## 2024-08-16 PROCEDURE — 83036 HEMOGLOBIN GLYCOSYLATED A1C: CPT | Performed by: INTERNAL MEDICINE

## 2024-08-16 PROCEDURE — 86140 C-REACTIVE PROTEIN: CPT | Performed by: INTERNAL MEDICINE

## 2024-08-16 RX ORDER — IOPAMIDOL 755 MG/ML
117 INJECTION, SOLUTION INTRAVASCULAR ONCE
Status: COMPLETED | OUTPATIENT
Start: 2024-08-16 | End: 2024-08-16

## 2024-08-16 RX ADMIN — IOPAMIDOL 117 ML: 755 INJECTION, SOLUTION INTRAVASCULAR at 09:29

## 2024-08-16 NOTE — NURSING NOTE
Chief Complaint   Patient presents with    Blood Draw     Labs drawn via PIV placed by RN.     Labs drawn from PIV placed by RN. Line flushed with saline.     Bre Sarah RN

## 2024-08-16 NOTE — DISCHARGE INSTRUCTIONS

## 2024-08-17 LAB — BACTERIA UR CULT: NORMAL

## 2024-08-20 ENCOUNTER — OFFICE VISIT (OUTPATIENT)
Dept: UROLOGY | Facility: CLINIC | Age: 74
End: 2024-08-20
Payer: COMMERCIAL

## 2024-08-20 ENCOUNTER — ALLIED HEALTH/NURSE VISIT (OUTPATIENT)
Dept: ONCOLOGY | Facility: CLINIC | Age: 74
End: 2024-08-20

## 2024-08-20 VITALS
HEART RATE: 82 BPM | HEIGHT: 69 IN | WEIGHT: 137 LBS | DIASTOLIC BLOOD PRESSURE: 82 MMHG | BODY MASS INDEX: 20.29 KG/M2 | SYSTOLIC BLOOD PRESSURE: 156 MMHG

## 2024-08-20 DIAGNOSIS — Z00.6 EXAMINATION OF PARTICIPANT IN CLINICAL TRIAL: Primary | ICD-10-CM

## 2024-08-20 DIAGNOSIS — C67.3 MALIGNANT NEOPLASM OF ANTERIOR WALL OF URINARY BLADDER (H): Primary | ICD-10-CM

## 2024-08-20 LAB — T3FREE SERPL-MCNC: 2.7 PG/ML (ref 2–4.4)

## 2024-08-20 PROCEDURE — 88112 CYTOPATH CELL ENHANCE TECH: CPT | Mod: 26 | Performed by: PATHOLOGY

## 2024-08-20 PROCEDURE — 88112 CYTOPATH CELL ENHANCE TECH: CPT | Mod: TC | Performed by: UROLOGY

## 2024-08-20 PROCEDURE — 88305 TISSUE EXAM BY PATHOLOGIST: CPT | Mod: 26 | Performed by: PATHOLOGY

## 2024-08-20 PROCEDURE — 88305 TISSUE EXAM BY PATHOLOGIST: CPT | Mod: TC | Performed by: UROLOGY

## 2024-08-20 PROCEDURE — 52000 CYSTOURETHROSCOPY: CPT | Performed by: UROLOGY

## 2024-08-20 RX ORDER — CIPROFLOXACIN 500 MG/1
500 TABLET, FILM COATED ORAL ONCE
Status: COMPLETED | OUTPATIENT
Start: 2024-08-20 | End: 2024-08-20

## 2024-08-20 RX ORDER — LIDOCAINE HYDROCHLORIDE 20 MG/ML
JELLY TOPICAL ONCE
Status: COMPLETED | OUTPATIENT
Start: 2024-08-20 | End: 2024-08-20

## 2024-08-20 RX ADMIN — CIPROFLOXACIN 500 MG: 500 TABLET, FILM COATED ORAL at 11:27

## 2024-08-20 RX ADMIN — LIDOCAINE HYDROCHLORIDE: 20 JELLY TOPICAL at 11:27

## 2024-08-20 ASSESSMENT — PAIN SCALES - GENERAL: PAINLEVEL: NO PAIN (0)

## 2024-08-20 NOTE — NURSING NOTE
"Chief Complaint   Patient presents with    Cystoscopy     Tar 200 study  -chemo pretzel removal + biopsy of bladder        Blood pressure (!) 156/82, pulse 82, height 1.74 m (5' 8.5\"), weight 62.1 kg (137 lb). Body mass index is 20.53 kg/m .    Patient Active Problem List   Diagnosis    Malignant neoplasm of anterior wall of urinary bladder (H)    Benign hypertension    Hyperlipidemia    Impotence of organic origin    Morbid (severe) obesity due to excess calories (H)    Type 2 diabetes mellitus without complication, without long-term current use of insulin (H)    Spinal stenosis of lumbosacral region    Peripheral vascular disease (H24)    Venous stasis dermatitis of left lower extremity    Lumbar radiculopathy    Environmental allergies    Age-related nuclear cataract of right eye       Allergies   Allergen Reactions    Chlorhexidine Dermatitis       Current Outpatient Medications   Medication Sig Dispense Refill    Ascorbic Acid 500 MG CAPS 1 tablet Orally Once a day      aspirin (ASPIRIN LOW DOSE) 81 MG EC tablet Take 81 mg by mouth daily ONCE BEFORE BED      blood glucose (NO BRAND SPECIFIED) lancets standard as directed, to use with his Contour Next test blood sugars daily      CONTOUR NEXT TEST test strip USE TO TEST BLOOD SUGAR DAILY      Ergocalciferol 50 MCG (2000 UT) CAPS Take 50 mcg by mouth daily      fish oil-omega-3 fatty acids 500 MG capsule Take 2 capsules by mouth daily      hydrochlorothiazide (HYDRODIURIL) 25 MG tablet Take 25 mg by mouth daily      ketoconazole (NIZORAL) 2 % external cream Apply topically as needed      levothyroxine (SYNTHROID/LEVOTHROID) 100 MCG tablet Take 1 tablet (100 mcg) by mouth daily 30 tablet 3    Microlet Lancets MISC daily      simvastatin (ZOCOR) 40 MG tablet Take 40 mg by mouth at bedtime      triamcinolone (KENALOG) 0.1 % external cream Apply as needed      Vitamin D3 (VITAMIN D, CHOLECALCIFEROL,) 25 mcg (1000 units) tablet Take 25 mcg by mouth daily   "       Social History     Tobacco Use    Smoking status: Former     Current packs/day: 0.00     Types: Cigarettes     Quit date:      Years since quittin.6     Passive exposure: Past   Vaping Use    Vaping status: Never Used   Substance Use Topics    Alcohol use: Yes     Comment: daily    Drug use: Never       Invasive Procedure Safety Checklist:    Procedure: Cystoscopy    Action: Complete sections and checkboxes as appropriate.    Pre-procedure:  1. Patient ID Verified with 2 identifiers (Octavia and  or MRN) : YES    2. Procedure and site verified with patient/designee (when able) : YES    3. Accurate consent documentation in medical record : YES    4. H&P (or appropriate assessment) documented in medical record : N/A  H&P must be up to 30 days prior to procedure an updated within 24 hours of                 Procedure as applicable.     5. Relevant diagnostic and radiology test results appropriately labeled and displayed as applicable : YES    6. Blood products, implants, devices, and/or special equipment available for the procedure as applicable : YES    7. Procedure site(s) marked with provider initials [Exclusions: none] : NO    8. Marking not required. Reason : Yes  Procedure does not require site marking    Time Out:     Time-Out performed immediately prior to starting procedure, including verbal and active participation of all team members addressing: YES    1. Correct patient identity.  2. Confirmed that the correct side and site are marked.  3. An accurate procedure to be done.  4. Agreement on the procedure to be done.  5. Correct patient position.  6. Relevant images and results are properly labeled and appropriately displayed.  7. The need to administer antibiotics or fluids for irrigation purposes during the procedure as applicable.  8. Safety precautions based on patient history or medication use.    During Procedure: Verification of correct person, site, and procedure occurs any time the  responsibility for care of the patient is transferred to another member of the care team.    The following medication was given:     MEDICATION:  Lidocaine without epinephrine 2% jelly  ROUTE: urethral   SITE: urethral   DOSE: 10 mL  LOT #: [id number]  : [employer]  EXPIRATION DATE: 3-26  NDC#: [address]-2   Was there drug waste? No    Prior to med admin, verified patient identity using patient's name and date of birth.  Due to med administration, patient instructed to remain in clinic for 15 minutes  afterwards, and to report any adverse reaction to me immediately.    Drug Amount Wasted:  None.  Vial/Syringe: Syringe    The following medication was given:     MEDICATION: Ciprofloxacin  ROUTE: PO  SITE: Medication was given orally  DOSE: 500mg  LOT #: [id number]  : [employer]  EXPIRATION DATE: 11/2024  NDC#: 4117-5588-82   Was there drug waste? No    Prior to medication administration, verified patient identity using patient's name and date of birth.  Due to medication administration, patient instructed to remain in clinic for 15 minutes  afterwards, and to report any adverse reaction to me immediately.        Deedee Dyson  8/20/2024  11:12 AM

## 2024-08-20 NOTE — NURSING NOTE
[id number]: Study Visit Note   Subject name: Alek Mcneill     Visit: Week 24    Did the study visit occur within the appropriate window allowed by the protocol? yes    Since the last study visit, He has been doing really well.    I have personally interviewed Alek Mcneill and reviewed his medical record for adverse events and concomitant medications and these have been recorded on the corresponding logs in Alek Mcneill's research file.     Alek Mcneill was given the opportunity to ask any trial related questions.  Please see provider progress note for physical exam and other clinical information. Labs were reviewed - any significant lab values were addressed and reviewed.    Araceli Diaz RN      2023IS044: Re-Consent Note     New information has been added to the consent form. This was explained to the patient, and all his questions were answered. The patient verbalized understanding and would like to continue participation in the trial. The patient was provided with a signed copy of the IRB-approved consent form.    Consent Version Date: Local Version: [id number], IRB approved: [email or web address]   Consent obtained by: Araceli Diaz     Date: 20AUG2024     Patient also given a copy of the tokenized data handout (IRB approved) dated 11MAY2023.     Araceli Diaz RN      Form 503.00.02 (Version 1)     Effective date: 01JUL2018     Next Review Date: 01JUL2020

## 2024-08-20 NOTE — PROGRESS NOTES
"  CHIEF COMPLAINT   It was my pleasure to see Alek Mcneill who is a 74 year old male for follow-up of bladder cancer.      HPI  Alek Mcneill is a very pleasant 74 year old male who presents with a history of bladder cancer. 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      He has enrolled in [employer] 3 and is here today for TAR-200 removal and re-insertion. He continues to tolerate this therapy very well, with no significant symptoms. No recent hematuria or dysuria.      Re-staging TURBT 1/17/2024          SPECIMEN   Procedure   Transurethral resection of bladder (TURBT)   TUMOR   Tumor Site   Anterior wall   Histologic Type   Urothelial carcinoma, in situ   Histologic Grade   High-grade   Tumor Extent   Flat carcinoma in situ   Lymphovascular Invasion   Not identified   Tumor Configuration   Flat   Muscularis Propria (detrusor muscle)   Cannot be determined: Biopsy site changes are identified in the current sample; no residual viable invasive carcinoma is identified.  Please see previous biopsy report for further information, case [id number], [facility]..   ADDITIONAL FINDINGS   Associated Epithelial Lesions   Chronic inflammation, histiocytic proliferation, consistent with biopsy site changes.      TURBT 12/6/2023  Final Diagnosis   A) URINARY BLADDER, BASE OF TUMOR, TRANSURETHRAL RESECTION BLADDER TUMOR:  -NONINVASIVE LOW-GRADE PAPILLARY UROTHELIAL CARCINOMA  -MULTIPLE FRAGMENTS OF MUSCULARIS PROPRIA  -SEE SYNOPTIC REPORT     B) URINARY BLADDER, TUMOR, TRANSURETHRAL RESECTION BLADDER TUMOR:  -HIGH-GRADE PAPILLARY UROTHELIAL CARCINOMA WITH LAMINA PROPRIA INVASION  -MUSCULARIS PROPRIA PRESENT AND UNINVOLVED  -SEE SYNOPTIC REPORT     PHYSICAL EXAM  Patient is a 74 year old  male   Vitals: Blood pressure (!) 156/82, pulse 82, height 1.74 m (5' 8.5\"), weight 62.1 kg (137 lb).  General Appearance Adult: Body mass index is 20.53 kg/m .  Alert, no acute " distress, oriented  Lungs: no respiratory distress, or pursed lip breathing  Abdomen: soft, nontender, no organomegaly or masses  Back: no CVAT  Neuro: Alert, oriented, speech and mentation normal  Psych: affect and mood normal  : penis, scrotum, testes normal    OFFICE CYSTOSCOPY 8/20/2024     Pre-procedure diagnosis:       Bladder Cancer  Post-procedure diagnosis:      No recurrence  Procedure performed:             Cystourethroscopy with removal of TAR-200 and bladder biopsy with fulguration  Surgeon:                                 Harman Kaiser MD  Anesthesia:                             Local     Description of procedure:   After fully informed, voluntary consent was obtained, the patient was brought into the procedure room, identified and placed in a supine position on the cystoscopy table.  The groin/scrotum were prepped with betadine and draped in a sterile fashion.  A 15F flexible cystoscope was inserted into the urethra, and the bladder and urethra were examined in a systematic manner. The TAR-200 device was identified, grasped, and removed.  Device was noted to be intact. The scope was re-inserted and bladder was biopsied. No suspicious lesions noted. Bugbee used for fulguration. The patient tolerated the procedure well and there were no complications.       Cystoscopic findings:  The urethra was normal without strictures.  The prostate was 3 cm long and demonstrated mild bilobar hypertrophy.  There was no median lobe.  The external sphincter coapted well and the bladder neck was open. The bladder was completely surveyed.  There was mild trabeculation.  There were no neoplasms, stones, or diverticula identifed.  The ureteric orifices were normal in position and number and effluxing clear urine. There are no  suspicious findings.     ASSESSMENT and PLAN  74 year old male with history of HG non-muscle-invasive bladder cancer. He has enrolled in the [place] 3 clinical trial for BCG-naive NMIBC.  Cystoscopy today with no evidence of visible papillary disease. Bladder biopsy taken today.     Time of TAR-200 Removal: 11:40 AM  There were no irregularities observed during or after removal.  Betzy-procedural antibiotics were given.  Will call with pathology results  Office cysto in 3 months    Harman Kaiser MD  Urology  [facility]

## 2024-08-20 NOTE — Clinical Note
8/20/2024       RE: Alek AGOSTO Raheelprincess  [address] [place] [address]     Dear Colleague,    Thank you for referring your patient, Alek Mcneill, to the [facility]. Please see a copy of my visit note below.    No notes on file    Again, thank you for allowing me to participate in the care of your patient.      Sincerely,    Harman Kaiser MD

## 2024-08-21 ENCOUNTER — TELEPHONE (OUTPATIENT)
Dept: UROLOGY | Facility: CLINIC | Age: 74
End: 2024-08-21
Payer: COMMERCIAL

## 2024-08-21 LAB
PATH REPORT.COMMENTS IMP SPEC: NORMAL
PATH REPORT.COMMENTS IMP SPEC: NORMAL
PATH REPORT.FINAL DX SPEC: NORMAL
PATH REPORT.GROSS SPEC: NORMAL
PATH REPORT.MICROSCOPIC SPEC OTHER STN: NORMAL
PATH REPORT.RELEVANT HX SPEC: NORMAL

## 2024-08-21 NOTE — TELEPHONE ENCOUNTER
Patient confirmed scheduled appointment:  Date: Nov 12th  Time: 8:30am  Visit type: Cystoscopy   Provider: Dr. Kaiser   [place] [facility] [place]    Date: [place] 3rd  Time: 11:30am  Visit type: Cystoscopy   Provider: Dr. Kaiser   [place] [facility] [place]  Additional notes: Per message received - please set up this patient's next appointment for the insertion of the TAR-200 device. This will be a cystoscopy visit.     Please schedule for:   11/12   12/3

## 2024-08-22 LAB
PATH REPORT.COMMENTS IMP SPEC: ABNORMAL
PATH REPORT.COMMENTS IMP SPEC: ABNORMAL
PATH REPORT.COMMENTS IMP SPEC: YES
PATH REPORT.FINAL DX SPEC: ABNORMAL
PATH REPORT.GROSS SPEC: ABNORMAL
PATH REPORT.MICROSCOPIC SPEC OTHER STN: ABNORMAL
PATH REPORT.RELEVANT HX SPEC: ABNORMAL
PHOTO IMAGE: ABNORMAL

## 2024-08-23 NOTE — PROGRESS NOTES
[facility] Medical Oncology Note  Date of visit: Aug 26, 2024  Outpatient Clinic Note        Assessment:     Stage 1 (T1NxMx) superficial poorly differentiated bladder cancer. T1 lesions are high risk. Recurrence rates at one, three, and five years can reach up to 50, 70 to 80, and 90 percent, respectively, and 20 to 25 percent progress to more invasive (T2 or greater) disease. Given the high risk here, therapy is indicated.  Alek is on the [employer]-3 study, for patients with T1 disease and has been randomized to the TAR-200 (a device inserted in to the bladder that contains gemcitabine)+ Cetrelimab arm.  S/P 8 doses of cetrelimab, with adequate tolerance and current cystoscopy showing no lesions.  Autoimmune thyroiditis exacerbated by the study drug.  Currently his TSH and T3 are acceptable and he is on 100 mcg of levothyroxine.  No need to change that.  Hemoglobin A1C up trending.  Alek asks about his blood sugar control. Fasting was 123 yesterday. I have recommended continue follow up with PCP for his known type II diabetes. This pre-dated his initiation on the study.  Creatinine remains mildly elevated but at baseline today.   Elevated uric acid. If it rises above 8, I would recommend getting started on allopurinol to avoid uric acid stones.      Plan:     Next cetrelimab today. TAR-200 with Dr. Kaiser.  Continue levothyroxine to 100 micg daily.  RTC in 3 weeks for his next cycle.    Patient was seen and evaluated with study RNCC, Araceli Diaz (who is very famous).      30 minutes spent on the date of the encounter doing chart review, review of test results, interpretation of tests, patient visit, and documentation     Julio Tobin MD, MSc  Associate Professor of Medicine  [employer]  [facility]  [address] [place] [address]  [phone]      __________________________________________________________________    DIAGNOSIS:     Recently diagnosed T1  "poorly differentiated invasive bladder cancer.    Respiratory symptoms concerning for pneumonitis, but CT chest 3/27/2024 showed \"No new or suspicious pulmonary opacities.\"       History of Present Illness/Therapy to Date:   11/19/2023: Went to ED for subacute onset of gross hematuria, and a passing of clots. Stopped ASA. CT showed multiple indeterminate nodular densities in the bladder suspicious for enhancing urothelial lesions   12/6/2023: Cystoscopy , with finding of multiple bladder tumors appeared papillary and were located on the anterior wall and had some superficial calcifications. \"We resected tissue down to muscle using cut electrocautery. \" Pathology showed high grade papillary carcinoma, that did not invade the muscle.  1/17/2024: Cystoscopy with resection showed CIS, but no invasive cancer.  Alek has agreed to participate in the [employer]RISE -3 trial. He has been randomized to receive Cetrelimab and TAR-200 (an investigational intravesical drug delivery system for gemcitabine). His first dose of cetrelimab and TAR-200 was 3/6/2024.         Interval history:     Alek is back with Christine here for week 18 of the [employer]RISE-3 trial and his 7th dose of cetrelimab.   Office cystoscopy 8/20/2024.  There was no bladder cancer seen at all within the bladder.  There were no suspicious findings.  Feels great.  Worried about some of his labs.  Has gained a little weight, after he lost some as his own treatment for his known elevated blood sugars. Again, this predated h is enrollment on the trial.  He denies any side effects from his cetrelimab.  They try to walk 3 miles a day. Haven't lately with how hot it has been.  His BP is elevated in the AM. Okay with taking his hydrochlorothiazide at night to see if that helps.  Has a little fatigue.  Still with chronic leck pain.  He is able to do all activities that he would like to get done.  He denies any chest pain, shortness of breath, or cough today.  No recent fevers or " chills.  No rash or skin concerns.  No urinary concerns.  His urine has been clear and he has not noticed any recent hematuria or clots.  No pain.  No headaches or vision concerns.  He has occasional constipation but for the most part his bowels are moving well.  No diarrhea.  He is eating and drinking well.       ROS: 10 point ROS neg other than the symptoms noted above in the HPI.      Past Medical History:   I have reviewed this patient's past medical history   Past Medical History:   Diagnosis Date    Complication of anesthesia     DM2 (diabetes mellitus, type 2) (H)     Hypercholesteremia     Hyperlipidemia     Hypertension           Past Surgical History:    I have reviewed this patient's past surgical history       Social History:   Tobacco, ETOH, and rec drugs reviewed and as noted below with the following exceptions:  Drinks a few beers a day. Quit smoking in .  Alek was born in [place], and graduated from [employer] (no longer exists) in .  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  He is  to Christine, his third wife.  She worked with his sister-in-law and they were introduced.  They have been  for 25 years.  Jose has 5 children, Golden, Cr, Joon, Radha, and Kayla.  Kayla is the youngest and a PA who lives in [place].  The other children are in the service.  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      Family History:     Grandma with skin cancer (s/p removal of her nose)  Uncle with sarcoma  Dad had bone cancer/lung cancer  Brother Casimiro had bladder cancer  Ned had bladder cacner ( in car accident)  Brother Lenny had bladder cancer  No family history on file.         Medications:     Current Outpatient Medications   Medication Sig Dispense Refill    Ascorbic Acid 500 MG CAPS 1 tablet Orally Once a day       aspirin (ASPIRIN LOW DOSE) 81 MG EC tablet Take 81 mg by mouth daily ONCE BEFORE BED      blood glucose (NO BRAND SPECIFIED) lancets standard as directed, to use with his Contour Next test blood sugars daily      CONTOUR NEXT TEST test strip USE TO TEST BLOOD SUGAR DAILY      Ergocalciferol 50 MCG (2000 UT) CAPS Take 50 mcg by mouth daily      fish oil-omega-3 fatty acids 500 MG capsule Take 2 capsules by mouth daily      hydrochlorothiazide (HYDRODIURIL) 25 MG tablet Take 25 mg by mouth daily      ketoconazole (NIZORAL) 2 % external cream Apply topically as needed      levothyroxine (SYNTHROID/LEVOTHROID) 100 MCG tablet Take 1 tablet (100 mcg) by mouth daily 30 tablet 3    Microlet Lancets MISC daily      simvastatin (ZOCOR) 40 MG tablet Take 40 mg by mouth at bedtime      triamcinolone (KENALOG) 0.1 % external cream Apply as needed      Vitamin D3 (VITAMIN D, CHOLECALCIFEROL,) 25 mcg (1000 units) tablet Take 25 mcg by mouth daily            Physical Exam:   There were no vitals taken for this visit.  General: No acute distress  HEENT: Sclera anicteric. Oral mucosa pink and moist.  No mucositis or thrush  Lymph: No lymphadenopathy in neck  Heart: Regular, rate, and rhythm  Lungs: Clear to ascultation bilaterally  Abdomen: Positive bowel sounds. Soft, non-distended, non-tender. No organomegaly or mass.   Extremities: no lower extremity edema  Neuro: Cranial nerves grossly intact  Rash: none      Data:     T3   2.7 (8/16/2024)  TSH 12  (down from 25)      Most Recent 3 CBC's:  Recent Labs   Lab Test 08/16/24  0906 07/25/24  0937 07/08/24  0853   WBC 8.4 8.2 8.1   HGB 15.9 15.5 15.9   MCV 92 94 93    220 216   ANEUTAUTO 5.2 4.7 4.9     Most Recent 3 BMP's:  Recent Labs   Lab Test 08/16/24  0906 07/25/24  0937 07/08/24  0853   * 135 135   POTASSIUM 4.2 4.1 4.2   CHLORIDE 97* 96* 97*   CO2 27 29 28   BUN 15.7 14.7 17.0   CR 1.30* 1.24* 1.23*   ANIONGAP 10 10 10   MILLI 9.7 9.3 9.5   * 126* 123*    PROTTOTAL 7.4 7.3 7.4   ALBUMIN 4.4 4.3 4.3    Most Recent 3 LFT's:  Recent Labs   Lab Test 08/16/24  0906 07/25/24  0937 07/08/24  0853   AST 38 32 31   ALT 33 35 34   ALKPHOS 68 75 75   BILITOTAL 0.7 0.7 0.7    Most Recent 2 TSH and T4:  Recent Labs   Lab Test 08/16/24  0906 07/25/24  0937   TSH 12.02* 25.68*   T4 1.34 1.00     I reviewed the above labs today.    The longitudinal plan of care for the diagnosis(es)/condition(s) as documented were addressed during this visit. Due to the added complexity in care, I will continue to support Alek in the subsequent management and with ongoing continuity of care.

## 2024-08-26 ENCOUNTER — ONCOLOGY VISIT (OUTPATIENT)
Dept: ONCOLOGY | Facility: CLINIC | Age: 74
End: 2024-08-26
Attending: INTERNAL MEDICINE
Payer: COMMERCIAL

## 2024-08-26 ENCOUNTER — APPOINTMENT (OUTPATIENT)
Dept: LAB | Facility: CLINIC | Age: 74
End: 2024-08-26
Attending: INTERNAL MEDICINE
Payer: COMMERCIAL

## 2024-08-26 ENCOUNTER — ALLIED HEALTH/NURSE VISIT (OUTPATIENT)
Dept: ONCOLOGY | Facility: CLINIC | Age: 74
End: 2024-08-26

## 2024-08-26 VITALS
RESPIRATION RATE: 18 BRPM | BODY MASS INDEX: 36.59 KG/M2 | SYSTOLIC BLOOD PRESSURE: 153 MMHG | DIASTOLIC BLOOD PRESSURE: 89 MMHG | TEMPERATURE: 97.6 F | HEART RATE: 80 BPM | WEIGHT: 244.2 LBS | OXYGEN SATURATION: 97 %

## 2024-08-26 VITALS
DIASTOLIC BLOOD PRESSURE: 81 MMHG | SYSTOLIC BLOOD PRESSURE: 124 MMHG | RESPIRATION RATE: 18 BRPM | TEMPERATURE: 97.7 F | OXYGEN SATURATION: 95 % | HEART RATE: 74 BPM

## 2024-08-26 DIAGNOSIS — E03.2 HYPOTHYROIDISM DUE TO MEDICATION: ICD-10-CM

## 2024-08-26 DIAGNOSIS — C67.3 MALIGNANT NEOPLASM OF ANTERIOR WALL OF URINARY BLADDER (H): Primary | ICD-10-CM

## 2024-08-26 DIAGNOSIS — Z00.6 EXAMINATION OF PARTICIPANT IN CLINICAL TRIAL: ICD-10-CM

## 2024-08-26 PROCEDURE — 258N000003 HC RX IP 258 OP 636: Performed by: INTERNAL MEDICINE

## 2024-08-26 PROCEDURE — 99214 OFFICE O/P EST MOD 30 MIN: CPT | Performed by: INTERNAL MEDICINE

## 2024-08-26 PROCEDURE — 36415 COLL VENOUS BLD VENIPUNCTURE: CPT | Performed by: INTERNAL MEDICINE

## 2024-08-26 PROCEDURE — 96413 CHEMO IV INFUSION 1 HR: CPT

## 2024-08-26 PROCEDURE — G0463 HOSPITAL OUTPT CLINIC VISIT: HCPCS | Mod: 25 | Performed by: INTERNAL MEDICINE

## 2024-08-26 PROCEDURE — 300N000004 RESEARCH KIT COLLECTION: Performed by: INTERNAL MEDICINE

## 2024-08-26 PROCEDURE — 96365 THER/PROPH/DIAG IV INF INIT: CPT

## 2024-08-26 RX ORDER — ALBUTEROL SULFATE 90 UG/1
1-2 AEROSOL, METERED RESPIRATORY (INHALATION)
Status: CANCELLED
Start: 2024-08-26

## 2024-08-26 RX ORDER — ACETAMINOPHEN 325 MG/1
650 TABLET ORAL
Status: CANCELLED | OUTPATIENT
Start: 2024-08-26

## 2024-08-26 RX ORDER — EPINEPHRINE 1 MG/ML
0.3 INJECTION, SOLUTION INTRAMUSCULAR; SUBCUTANEOUS EVERY 5 MIN PRN
Status: CANCELLED | OUTPATIENT
Start: 2024-08-26

## 2024-08-26 RX ORDER — HEPARIN SODIUM,PORCINE 10 UNIT/ML
5-20 VIAL (ML) INTRAVENOUS DAILY PRN
Status: CANCELLED | OUTPATIENT
Start: 2024-08-26

## 2024-08-26 RX ORDER — HEPARIN SODIUM (PORCINE) LOCK FLUSH IV SOLN 100 UNIT/ML 100 UNIT/ML
5 SOLUTION INTRAVENOUS
Status: CANCELLED | OUTPATIENT
Start: 2024-08-26

## 2024-08-26 RX ORDER — MEPERIDINE HYDROCHLORIDE 25 MG/ML
25 INJECTION INTRAMUSCULAR; INTRAVENOUS; SUBCUTANEOUS EVERY 30 MIN PRN
Status: CANCELLED | OUTPATIENT
Start: 2024-08-26

## 2024-08-26 RX ORDER — METHYLPREDNISOLONE SODIUM SUCCINATE 125 MG/2ML
125 INJECTION, POWDER, LYOPHILIZED, FOR SOLUTION INTRAMUSCULAR; INTRAVENOUS
Status: CANCELLED
Start: 2024-08-26

## 2024-08-26 RX ORDER — DIPHENHYDRAMINE HYDROCHLORIDE 50 MG/ML
50 INJECTION INTRAMUSCULAR; INTRAVENOUS
Status: CANCELLED
Start: 2024-08-26

## 2024-08-26 RX ORDER — LORAZEPAM 2 MG/ML
0.5 INJECTION INTRAMUSCULAR EVERY 4 HOURS PRN
Status: CANCELLED | OUTPATIENT
Start: 2024-08-26

## 2024-08-26 RX ORDER — ACETAMINOPHEN 325 MG/1
650 TABLET ORAL
Status: CANCELLED
Start: 2024-08-26

## 2024-08-26 RX ORDER — DIPHENHYDRAMINE HCL 25 MG
50 CAPSULE ORAL
Status: CANCELLED | OUTPATIENT
Start: 2024-08-26

## 2024-08-26 RX ORDER — ALBUTEROL SULFATE 0.83 MG/ML
2.5 SOLUTION RESPIRATORY (INHALATION)
Status: CANCELLED | OUTPATIENT
Start: 2024-08-26

## 2024-08-26 RX ADMIN — SODIUM CHLORIDE 250 ML: 9 INJECTION, SOLUTION INTRAVENOUS at 11:48

## 2024-08-26 ASSESSMENT — PAIN SCALES - GENERAL: PAINLEVEL: NO PAIN (0)

## 2024-08-26 NOTE — LETTER
8/26/2024      Alek Mcneill  [address] [place] [address]      Dear Colleague,    Thank you for referring your patient, Alek Mcneill, to the [facility]. Please see a copy of my visit note below.      [facility] Medical Oncology Note  Date of visit: Aug 26, 2024  Outpatient Clinic Note        Assessment:     Stage 1 (T1NxMx) superficial poorly differentiated bladder cancer. T1 lesions are high risk. Recurrence rates at one, three, and five years can reach up to 50, 70 to 80, and 90 percent, respectively, and 20 to 25 percent progress to more invasive (T2 or greater) disease. Given the high risk here, therapy is indicated.  Alek is on the [employer]-3 study, for patients with T1 disease and has been randomized to the TAR-200 (a device inserted in to the bladder that contains gemcitabine)+ Cetrelimab arm.  S/P 8 doses of cetrelimab, with adequate tolerance and current cystoscopy showing no lesions.  Autoimmune thyroiditis exacerbated by the study drug.  Currently his TSH and T3 are acceptable and he is on 100 mcg of levothyroxine.  No need to change that.  Hemoglobin A1C up trending.  Alek asks about his blood sugar control. Fasting was 123 yesterday. I have recommended continue follow up with PCP for his known type II diabetes. This pre-dated his initiation on the study.  Creatinine remains mildly elevated but at baseline today.   Elevated uric acid. If it rises above 8, I would recommend getting started on allopurinol to avoid uric acid stones.      Plan:     Next cetrelimab today. TAR-200 with Dr. Kaiser.  Continue levothyroxine to 100 micg daily.  RTC in 3 weeks for his next cycle.    Patient was seen and evaluated with study RNCC, Araceli Diaz (who is very famous).      30 minutes spent on the date of the encounter doing chart review, review of test results, interpretation of tests, patient visit, and documentation     Julio Tobin MD, MSc   of  "Medicine  [facility] [employer]  [facility]  [address] [place] [address]  [phone]      __________________________________________________________________    DIAGNOSIS:     Recently diagnosed T1 poorly differentiated invasive bladder cancer.    Respiratory symptoms concerning for pneumonitis, but CT chest 3/27/2024 showed \"No new or suspicious pulmonary opacities.\"       History of Present Illness/Therapy to Date:   11/19/2023: Went to ED for subacute onset of gross hematuria, and a passing of clots. Stopped ASA. CT showed multiple indeterminate nodular densities in the bladder suspicious for enhancing urothelial lesions   12/6/2023: Cystoscopy , with finding of multiple bladder tumors appeared papillary and were located on the anterior wall and had some superficial calcifications. \"We resected tissue down to muscle using cut electrocautery. \" Pathology showed high grade papillary carcinoma, that did not invade the muscle.  1/17/2024: Cystoscopy with resection showed CIS, but no invasive cancer.  Alek has agreed to participate in the [employer] -3 trial. He has been randomized to receive Cetrelimab and TAR-200 (an investigational intravesical drug delivery system for gemcitabine). His first dose of cetrelimab and TAR-200 was 3/6/2024.         Interval history:     Alek is back with Christine here for week 18 of the [employer]-3 trial and his 7th dose of cetrelimab.   Office cystoscopy 8/20/2024.  There was no bladder cancer seen at all within the bladder.  There were no suspicious findings.  Feels great.  Worried about some of his labs.  Has gained a little weight, after he lost some as his own treatment for his known elevated blood sugars. Again, this predated h is enrollment on the trial.  He denies any side effects from his cetrelimab.  They try to walk 3 miles a day. Haven't lately with how hot it has been.  His BP is elevated in the AM. Okay with taking his " hydrochlorothiazide at night to see if that helps.  Has a little fatigue.  Still with chronic leck pain.  He is able to do all activities that he would like to get done.  He denies any chest pain, shortness of breath, or cough today.  No recent fevers or chills.  No rash or skin concerns.  No urinary concerns.  His urine has been clear and he has not noticed any recent hematuria or clots.  No pain.  No headaches or vision concerns.  He has occasional constipation but for the most part his bowels are moving well.  No diarrhea.  He is eating and drinking well.       ROS: 10 point ROS neg other than the symptoms noted above in the HPI.      Past Medical History:   I have reviewed this patient's past medical history   Past Medical History:   Diagnosis Date     Complication of anesthesia      DM2 (diabetes mellitus, type 2) (H)      Hypercholesteremia      Hyperlipidemia      Hypertension           Past Surgical History:    I have reviewed this patient's past surgical history       Social History:   Tobacco, ETOH, and rec drugs reviewed and as noted below with the following exceptions:  Drinks a few beers a day. Quit smoking in 1992.  Alek was born in [place], and graduated from [employer] (no longer exists) in 1967.  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  He is  to Christine, his third wife.  She worked with his sister-in-law and they were introduced.  They have been  for 25 years.  Jose has 5 children, Golden, Cr, Joon, Radha, and Kayla.  Kayla is the youngest and a PA who lives in [place].  The other children are in the service.  For fun he works on 2 cars, a 69 [employer] Convertible, and at [employer] that has a V8 engine.      Family History:     Grandma with skin cancer (s/p removal of her nose)  Uncle with sarcoma  Dad had bone cancer/lung cancer  Brother Casimiro had bladder  cancer  Buddy had bladder cacner ( in car accident)  Brother Lenny had bladder cancer  No family history on file.         Medications:     Current Outpatient Medications   Medication Sig Dispense Refill     Ascorbic Acid 500 MG CAPS 1 tablet Orally Once a day       aspirin (ASPIRIN LOW DOSE) 81 MG EC tablet Take 81 mg by mouth daily ONCE BEFORE BED       blood glucose (NO BRAND SPECIFIED) lancets standard as directed, to use with his Contour Next test blood sugars daily       CONTOUR NEXT TEST test strip USE TO TEST BLOOD SUGAR DAILY       Ergocalciferol 50 MCG (2000 UT) CAPS Take 50 mcg by mouth daily       fish oil-omega-3 fatty acids 500 MG capsule Take 2 capsules by mouth daily       hydrochlorothiazide (HYDRODIURIL) 25 MG tablet Take 25 mg by mouth daily       ketoconazole (NIZORAL) 2 % external cream Apply topically as needed       levothyroxine (SYNTHROID/LEVOTHROID) 100 MCG tablet Take 1 tablet (100 mcg) by mouth daily 30 tablet 3     Microlet Lancets MISC daily       simvastatin (ZOCOR) 40 MG tablet Take 40 mg by mouth at bedtime       triamcinolone (KENALOG) 0.1 % external cream Apply as needed       Vitamin D3 (VITAMIN D, CHOLECALCIFEROL,) 25 mcg (1000 units) tablet Take 25 mcg by mouth daily            Physical Exam:   There were no vitals taken for this visit.  General: No acute distress  HEENT: Sclera anicteric. Oral mucosa pink and moist.  No mucositis or thrush  Lymph: No lymphadenopathy in neck  Heart: Regular, rate, and rhythm  Lungs: Clear to ascultation bilaterally  Abdomen: Positive bowel sounds. Soft, non-distended, non-tender. No organomegaly or mass.   Extremities: no lower extremity edema  Neuro: Cranial nerves grossly intact  Rash: none      Data:     T3   2.7 (2024)  TSH 12  (down from 25)      Most Recent 3 CBC's:  Recent Labs   Lab Test 24  0906 24  0937 24  0853   WBC 8.4 8.2 8.1   HGB 15.9 15.5 15.9   MCV 92 94 93    220 216   ANEUTAUTO 5.2 4.7 4.9      Most Recent 3 BMP's:  Recent Labs   Lab Test 08/16/24  0906 07/25/24  0937 07/08/24  0853   * 135 135   POTASSIUM 4.2 4.1 4.2   CHLORIDE 97* 96* 97*   CO2 27 29 28   BUN 15.7 14.7 17.0   CR 1.30* 1.24* 1.23*   ANIONGAP 10 10 10   MILLI 9.7 9.3 9.5   * 126* 123*   PROTTOTAL 7.4 7.3 7.4   ALBUMIN 4.4 4.3 4.3    Most Recent 3 LFT's:  Recent Labs   Lab Test 08/16/24  0906 07/25/24  0937 07/08/24  0853   AST 38 32 31   ALT 33 35 34   ALKPHOS 68 75 75   BILITOTAL 0.7 0.7 0.7    Most Recent 2 TSH and T4:  Recent Labs   Lab Test 08/16/24  0906 07/25/24  0937   TSH 12.02* 25.68*   T4 1.34 1.00     I reviewed the above labs today.      Again, thank you for allowing me to participate in the care of your patient.        Sincerely,        Julio Tobin MD

## 2024-08-26 NOTE — PATIENT INSTRUCTIONS
August 2024 Sunday Monday Tuesday Wednesday Thursday Friday Saturday                       1     2     3       4     5     6     7     8     9     10       11     12     13     14     15     16    LAB PERIPHERAL   9:15 AM   (15 min.)   [facility]   [facility]    CT UROGRAM WWO   9:45 AM   (20 min.)   [id number]   [facility] [place] 17       18     19     20    CYSTOSCOPY  10:30 AM   (30 min.)   Harman Kaiser MD   [facility] [place] 21     22     23     24       25     26    LAB PERIPHERAL   9:45 AM   (15 min.)   [facility]   [facility]    RETURN CCSL   9:45 AM   (30 min.)   Julio Tobin MD   [facility]    ONC INFUSION 1.5 HR (90 MIN)  11:00 AM   (90 min.)   [facility] INFUSION NURSE   [facility] 27     28     29 30 31 September 2024 Sunday Monday Tuesday Wednesday Thursday Friday Saturday   1     2     3     4     5     6     7       8     9     10     11     12     13    LAB PERIPHERAL   8:30 AM   (15 min.)   [facility]   [facility]    RETURN CCSL   8:45 AM   (45 min.)   Clarita Owen PA-C   [facility]    ONC INFUSION 1.5 HR (90 MIN)  10:30 AM   (90 min.)   [facility] ONC INFUSION NURSE   [facility] 14       15     16     17     18     19     20     21       22     23     24     25     26     27     28       29     30                                              Lab Results:  No results found for this or any previous visit (from the past 12 hour(s)). EDUCATION POST BIOLOGICAL/CHEMOTHERAPY INFUSION  Call the triage nurse at your clinic or seek medical attention if you have chills and/or temperature greater than or equal to 100.5, uncontrolled nausea/vomiting, diarrhea,  constipation, dizziness, shortness of breath, chest pain, heart palpitations, weakness or any other new or concerning symptoms, questions or concerns.  You can not have any live virus vaccines prior to or during treatment or up to 6 months post infusion.  If you have an upcoming surgery, medical procedure or dental procedure during treatment, this should be discussed with your ordering physician and your surgeon/dentist.  If you are having any concerning symptom, if you are unsure if you should get your next infusion or wish to speak to a provider before your next infusion, please call your care coordinator or triage nurse at your clinic to notify them so we can adequately serve you.   [employer] Triage and after hours / weekends / holidays:  [phone]    Please call the [employer] Triage line if you experience a temperature greater than or equal to 100.4, shaking chills, have uncontrolled nausea, vomiting and/or diarrhea, dizziness, shortness of breath, chest pain, bleeding, unexplained bruising, or if you have any other new/concerning symptoms, questions or concerns.     If you wish to speak to a provider before your next infusion visit, please call your care coordinator to notify them so we can adequately serve you.    If you need a refill on a narcotic prescription or other medication, please call before your infusion appointment.

## 2024-08-26 NOTE — PROGRESS NOTES
Infusion Nursing Note:  Alek Mcneill presents today for C24D1 Cetrelimab.    Patient seen by provider today: Yes: Dr. Tobin   present during visit today: Not Applicable.    Note: Pt arrived after visit with Dr. Tobin offering no new concerns and ready to proceed with treatment today.      Intravenous Access:  Peripheral IV placed.  Research labs drawn pre and post infusion.    Treatment Conditions:  Lab Results   Component Value Date    HGB 15.9 08/16/2024    WBC 8.4 08/16/2024    ANEUTAUTO 5.2 08/16/2024     08/16/2024        Lab Results   Component Value Date     (L) 08/16/2024    POTASSIUM 4.2 08/16/2024    MAG 2.3 11/19/2023    CR 1.30 (H) 08/16/2024    MILLI 9.7 08/16/2024    BILITOTAL 0.7 08/16/2024    ALBUMIN 4.4 08/16/2024    ALT 33 08/16/2024    AST 38 08/16/2024       Results reviewed, labs MET treatment parameters, ok to proceed with treatment.      Post Infusion Assessment:  Patient tolerated infusion without incident.  Patient observed for 15 minutes post cetrelimab per protocol.  Blood return noted pre and post infusion.  Site patent and intact, free from redness, edema or discomfort.  No evidence of extravasations.  Access discontinued per protocol.       Discharge Plan:   Patient declined prescription refills.  Discharge instructions reviewed with: Patient and Family.  Patient and/or family verbalized understanding of discharge instructions and all questions answered.  AVS to patient via [employer].  Patient will return 09/13 for next appointment.   Patient discharged in stable condition accompanied by: wife.  Departure Mode: Ambulatory.      Carina García, RN

## 2024-08-26 NOTE — NURSING NOTE
[id number]: Study Visit Note   Subject name: Alek Mcneill     Visit: Week 24    Did the study visit occur within the appropriate window allowed by the protocol? yes    Since the last study visit, He has been doing very well.  No new medications. Patient states that he had one bought of hematuria immediately after his bladder biopsy last week. He also had some burning with urination on the day of the bladder biopsy.     Assessed for below symptoms specifically:   Dysuria no  Pollakiuria no  Nocturia no  Urgency no  Incontinence no  Hematuria no  Urinary hesitation no  Bladder pain/spasm no  Urethral pain no   Bladder discomfort no  Feeling of incomplete bladder emptying  no  Lower abdominal pain no  Fever no  Flu-like symptoms no  Fatigue no  Diarrhea no  Nausea no  Rash no  Arthralgia  no      Cetrelimab infusion:  Were premedications given? No     Verbal handover provided to infusion RN; reminded RN to document actual start time of infusion and actual stop time of the infusion. If infusion deviates from protocol directed infusion time, please document rationale in your infusion note.      I have personally interviewed Alek Mcneill and reviewed his medical record for adverse events and concomitant medications and these have been recorded on the corresponding logs in Alek Mcneill's research file.     Alek Mcneill was given the opportunity to ask any trial related questions.  Please see provider progress note for physical exam and other clinical information. Labs were reviewed - any significant lab values were addressed and reviewed.    Araceli Diaz RN

## 2024-08-26 NOTE — NURSING NOTE
Chief Complaint   Patient presents with    Blood Draw     PIV start by RN. Pt tolerated well. Vitals taken. Patient checked into next appointment.     No labs collected - clinic staff notified.      Corrine Pizarro RN

## 2024-08-26 NOTE — NURSING NOTE
"Oncology Rooming Note    August 26, 2024 9:51 AM   Alek Mcneill is a 74 year old male who presents for:    Chief Complaint   Patient presents with    Blood Draw     PIV start by RN. Pt tolerated well. Vitals taken. Patient checked into next appointment.      Oncology Clinic Visit     Malignant Neoplasm of Anterior Wall of Urinary Bladder     Initial Vitals: BP (!) 153/89   Pulse 80   Temp 97.6  F (36.4  C) (Oral)   Resp 18   Wt 110.8 kg (244 lb 3.2 oz)   SpO2 97%   BMI 36.59 kg/m   Estimated body mass index is 36.59 kg/m  as calculated from the following:    Height as of 8/20/24: 1.74 m (5' 8.5\").    Weight as of this encounter: 110.8 kg (244 lb 3.2 oz). Body surface area is 2.31 meters squared.  No Pain (0) Comment: Data Unavailable   No LMP for male patient.  Allergies reviewed: Yes  Medications reviewed: Yes    Medications: Medication refills not needed today.  Pharmacy [facility] entered into [id number] [address] AT [place] [address]    Frailty Screening:   Is the patient here for a new oncology consult visit in cancer care? 2. No      Clinical concerns: None       Maria De Jesus Shepherd LPN  8/26/2024              "

## 2024-09-09 ENCOUNTER — LAB REQUISITION (OUTPATIENT)
Dept: LAB | Facility: CLINIC | Age: 74
End: 2024-09-09
Payer: COMMERCIAL

## 2024-09-09 DIAGNOSIS — E11.9 TYPE 2 DIABETES MELLITUS WITHOUT COMPLICATIONS (H): ICD-10-CM

## 2024-09-09 LAB
ALBUMIN SERPL BCG-MCNC: 4.2 G/DL (ref 3.5–5.2)
ALP SERPL-CCNC: 65 U/L (ref 40–150)
ALT SERPL W P-5'-P-CCNC: 39 U/L (ref 0–70)
ANION GAP SERPL CALCULATED.3IONS-SCNC: 11 MMOL/L (ref 7–15)
AST SERPL W P-5'-P-CCNC: 39 U/L (ref 0–45)
BILIRUB SERPL-MCNC: 0.4 MG/DL
BUN SERPL-MCNC: 16.3 MG/DL (ref 8–23)
CALCIUM SERPL-MCNC: 9.4 MG/DL (ref 8.8–10.4)
CHLORIDE SERPL-SCNC: 98 MMOL/L (ref 98–107)
CHOLEST SERPL-MCNC: 144 MG/DL
CREAT SERPL-MCNC: 1.25 MG/DL (ref 0.67–1.17)
CREAT UR-MCNC: 65.3 MG/DL
EGFRCR SERPLBLD CKD-EPI 2021: 60 ML/MIN/1.73M2
FASTING STATUS PATIENT QL REPORTED: ABNORMAL
FASTING STATUS PATIENT QL REPORTED: NORMAL
GLUCOSE SERPL-MCNC: 122 MG/DL (ref 70–99)
HCO3 SERPL-SCNC: 27 MMOL/L (ref 22–29)
HDLC SERPL-MCNC: 60 MG/DL
LDLC SERPL CALC-MCNC: 60 MG/DL
MICROALBUMIN UR-MCNC: <12 MG/L
MICROALBUMIN/CREAT UR: NORMAL MG/G{CREAT}
NONHDLC SERPL-MCNC: 84 MG/DL
POTASSIUM SERPL-SCNC: 4 MMOL/L (ref 3.4–5.3)
PROT SERPL-MCNC: 7 G/DL (ref 6.4–8.3)
SODIUM SERPL-SCNC: 136 MMOL/L (ref 135–145)
TRIGL SERPL-MCNC: 118 MG/DL

## 2024-09-09 PROCEDURE — 80061 LIPID PANEL: CPT | Mod: ORL | Performed by: STUDENT IN AN ORGANIZED HEALTH CARE EDUCATION/TRAINING PROGRAM

## 2024-09-09 PROCEDURE — 80053 COMPREHEN METABOLIC PANEL: CPT | Mod: ORL | Performed by: STUDENT IN AN ORGANIZED HEALTH CARE EDUCATION/TRAINING PROGRAM

## 2024-09-09 PROCEDURE — 82043 UR ALBUMIN QUANTITATIVE: CPT | Mod: ORL | Performed by: STUDENT IN AN ORGANIZED HEALTH CARE EDUCATION/TRAINING PROGRAM

## 2024-09-10 NOTE — PROGRESS NOTES
PRIMARY CARE PHYSICIAN: Margarita BRUNSON MD  UROLOGY: Harman Kaiser MD    HISTORY OF PRESENT ILLNESS: Patient is a 74-year-old male with stage I urothelial carcinoma bladder (cT1, cN0, cM0). Patient presents 11/19/2023, with gross hematuria and blood clots. CT abdomen, pelvis 11/19/2023, revealed several nodular foci of increased density along the inferior bladder measuring up to 1.2 cm there was no hydronephrosis.  There was no lymphadenopathy or metastases. Cystoscopy and TURBT 12/06/2023, revealed multiple 1-2 cm papillary bladder tumors on the anterior wall, some with superficial calcifications. Pathology evaluation revealed a low-grade papillary urothelial carcinoma with invasion of the lamina propria.  Muscularis propria was present and uninvolved.  Repeat cystoscopy and TURBT 01/17/2024, revealed a 3 cm tumor area previously resected on the anterior bladder wall near the dome.  TURBT of the base of the bladder tumor revealed microscopic foci of residual flat urothelial carcinoma in situ without residual invasive carcinoma.  [facility] and is randomized to receive cetrelimab 360 mg IV every 21 days, plus intravesical TAR-200 every 21 days x6, then every 3 months beginning 05/24/2024.  Patient received 9 cycles of cetrelimab thus far.     Restaging CT urogram 08/16/2024, was negative for urinary bladder wall thickening or tumor.  There was no lymphadenopathy. Cystoscopy and bladder biopsy 08/20/2024, was negative for recurrent tumor, and anterior wall bladder biopsy revealed urothelium with reactive changes and was negative for malignancy.  Patient feels well and voices no complaints.  He denies fever, anorexia, headache, dry skin, rash, pruritus, dry eyes, dry mouth, cough, dyspnea, chest pain, abdominal pain, nausea, vomiting, diarrhea, constipation, muscle or joint pain, bone pain, numbness, tingling.    PAST HISTORY:  "  -Hypertension.  -Diabetes.  -Hyperlipidemia.  -Stage I urothelial carcinoma bladder (cT1, cN0, cM0).  -Fatty liver.  -Colonoscopy 2019, was negative for polyps.  -Cervical C7-T1 radiculopathy.  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  -Tonsillectomy, 1958.    MEDICATIONS:   Current Outpatient Medications   Medication Sig Dispense Refill    Ascorbic Acid 500 MG CAPS 1 tablet Orally Once a day      aspirin (ASPIRIN LOW DOSE) 81 MG EC tablet Take 81 mg by mouth daily ONCE BEFORE BED      blood glucose (NO BRAND SPECIFIED) lancets standard as directed, to use with his Contour Next test blood sugars daily      CONTOUR NEXT TEST test strip USE TO TEST BLOOD SUGAR DAILY      Ergocalciferol 50 MCG (2000 UT) CAPS Take 50 mcg by mouth daily      fish oil-omega-3 fatty acids 500 MG capsule Take 2 capsules by mouth daily      hydrochlorothiazide (HYDRODIURIL) 25 MG tablet Take 25 mg by mouth daily      ketoconazole (NIZORAL) 2 % external cream Apply topically as needed      levothyroxine (SYNTHROID/LEVOTHROID) 100 MCG tablet Take 1 tablet (100 mcg) by mouth daily 30 tablet 3    Microlet Lancets MISC daily      simvastatin (ZOCOR) 40 MG tablet Take 40 mg by mouth at bedtime      triamcinolone (KENALOG) 0.1 % external cream Apply as needed      Vitamin D3 (VITAMIN D, CHOLECALCIFEROL,) 25 mcg (1000 units) tablet Take 25 mcg by mouth daily         REVIEW OF SYSTEMS: Review of systems reviewed with the patient and otherwise negative except for those detailed above.    PHYSICAL EXAM: BP (!) 159/79 (BP Location: Right arm, Patient Position: Sitting, Cuff Size: Adult Large)   Pulse 78   Temp 97.8  F (36.6  C) (Oral)   Resp 16   Ht 1.74 m (5' 8.5\")   Wt 109.7 kg (241 lb 14.4 oz)   SpO2 100%   BMI 36.24 kg/m  .  Body surface area is 2.3 meters squared. ECOG performance status: 0.  Skin: No erythema or rash.  HEENT: Sclera nonicteric. Oropharynx without lesions or ulceration, mucosa pink " and moist.  Nodes: No cervical, supraclavicular, axillary, or inguinal adenopathy.  Lungs: No dullness to percussion.  No rales, wheezes, rhonchi.  Heart: Regular rate and rhythm.  Abdomen: Bowel sounds present.  Soft, nontender, no hepatosplenomegaly or mass.  Extremities: Trace ankle and pedal edema.     LABS REVIEWED:   Component      Latest Ref Rng 8/16/2024  9:06 AM 9/16/2024  7:09 AM   WBC      4.0 - 11.0 10e3/uL  8.3    RBC Count      4.40 - 5.90 10e6/uL  4.68    Hemoglobin      13.3 - 17.7 g/dL  15.6    Hematocrit      40.0 - 53.0 %  44.0    MCV      78 - 100 fL  94    MCH      26.5 - 33.0 pg  33.3 (H)    MCHC      31.5 - 36.5 g/dL  35.5    RDW      10.0 - 15.0 %  12.4    Platelet Count      150 - 450 10e3/uL  220    % Neutrophils      %  58    % Lymphocytes      %  25    % Monocytes      %  11    % Eosinophils      %  5    % Basophils      %  1    % Immature Granulocytes      %  0    NRBCs per 100 WBC      <1 /100  0    Absolute Neutrophils      1.6 - 8.3 10e3/uL  4.8    Absolute Lymphocytes      0.8 - 5.3 10e3/uL  2.1    Absolute Monocytes      0.0 - 1.3 10e3/uL  0.9    Absolute Eosinophils      0.0 - 0.7 10e3/uL  0.4    Absolute Basophils      0.0 - 0.2 10e3/uL  0.1    Absolute Immature Granulocytes      <=0.4 10e3/uL  0.0    Absolute NRBCs      10e3/uL  0.0    Sodium      135 - 145 mmol/L  136    Potassium      3.4 - 5.3 mmol/L  4.0    Carbon Dioxide (CO2)      22 - 29 mmol/L  24    Anion Gap      7 - 15 mmol/L  12    Urea Nitrogen      8.0 - 23.0 mg/dL  13.6    Creatinine      0.67 - 1.17 mg/dL  1.15    GFR Estimate      >60 mL/min/1.73m2  66    Calcium      8.8 - 10.4 mg/dL  9.4    Chloride      98 - 107 mmol/L  100    Glucose      70 - 99 mg/dL  134 (H)    Alkaline Phosphatase      40 - 150 U/L  71    AST      0 - 45 U/L  38    ALT      0 - 70 U/L  31    Protein Total      6.4 - 8.3 g/dL  7.1    Albumin      3.5 - 5.2 g/dL  4.2    Bilirubin Total      <=1.2 mg/dL  0.7    Color Urine      Colorless,  Straw, Light Yellow, Yellow   Straw    Appearance Urine      Clear   Clear    Glucose Urine      Negative mg/dL  Negative    Bilirubin Urine      Negative   Negative    Ketones Urine      Negative mg/dL  Negative    Specific Gravity Urine      1.003 - 1.035   1.005    Blood Urine      Negative   Negative    pH Urine      5.0 - 7.0   6.0    Protein Albumin Urine      Negative mg/dL  Negative    Urobilinogen mg/dL      Normal, 2.0 mg/dL  Normal    Nitrite Urine      Negative   Negative    Leukocyte Esterase Urine      Negative   Negative    RBC Urine      <=2 /HPF  <1    WBC Urine      <=5 /HPF  <1    Squamous Epithelial /HPF Urine      <=1 /HPF  <1    TSH      0.30 - 4.20 uIU/mL 12.02 (H)  10.61 (H)    T4 Free      0.90 - 1.70 ng/dL 1.34  1.13    Phosphorus      2.5 - 4.5 mg/dL  2.9    Lactate Dehydrogenase      0 - 250 U/L  247    Uric Acid      3.4 - 7.0 mg/dL  6.0    CRP Inflammation      <5.00 mg/L  <3.00    GGT      8 - 61 U/L  91 (H)    Lipase      13 - 60 U/L  39    Amylase      28 - 100 U/L  64        Hemoglobin A1c pending.    IMAGING REVIEWED:   Recent Results (from the past 744 hour(s))   CT Urogram wo & w Contrast    Narrative    EXAM: CT UROGRAM WO and W CONTRAST  LOCATION: [facility]  DATE: 8/16/2024    INDICATION:  Malignant neoplasm of anterior wall of urinary bladder (H)  COMPARISON: Multiple priors with the most recent dated 2/13/2024  TECHNIQUE: CT scan of the abdomen and pelvis using urogram technique with pre contrast, post contrast, and delayed images. Multiplanar reformats were obtained. Dose reduction techniques were used.   CONTRAST: Isovue 370 117cc    FINDINGS:   LOWER CHEST: Patent central airways. Minimal scattered subpleural scarring. Severe coronary artery calcifications.    HEPATOBILIARY: Hepatic steatosis. No focal hepatic mass, biliary dilatation, or calcified gallstones.    PANCREAS: No focal pancreatic mass, peripancreatic inflammation,  or pancreatic ductal dilatation.    SPLEEN: Normal size.    ADRENAL GLANDS: No nodules.    RIGHT KIDNEY/URETER: No radiodense calculus. No suspicious renal mass or hydronephrosis. Appropriate excretion of the contrast into the collecting system and the ureter, which is opacified appropriately without a definite focal filling defect or   stricture. The suboptimal opacification of the distal ureter is likely due to underlying peristalsis.    LEFT KIDNEY/URETER: Slightly inferiorly ptotic/ectopic left kidney. No radiodense calculus. No suspicious renal mass or hydronephrosis. Appropriate excretion of the contrast into the collecting system and the ureter without a focal filling defect or   suture. The suboptimal opacification of the distal ureter is likely due to underlying peristalsis.    BLADDER: No abnormal focal urinary bladder wall thickening is identified. A small bore catheter is present along the posterior urinary bladder    BOWEL: Unremarkable appearance of the visualized portions of the distal esophagus and stomach. No dilated loops of small bowel are present to suggest an obstruction. No evidence for appendicitis. Unremarkable appearance of the colon.    LYMPH NODES: No abdominal or pelvic lymphadenopathy is identified by size criteria.    VASCULATURE: Normal caliber abdominal aorta. A few scattered atherosclerotic plaques are present. Patent portal, splenic, and mesenteric veins. Patent bilateral renal veins.    PELVIC ORGANS: No large pelvic mass.    MUSCULOSKELETAL: Small right and moderate left fat filled inguinal hernias. Degenerative changes of the spine. Grade 2 anterolisthesis of L4 on L5. Minimal retrolisthesis of L5 on S1.      Impression    IMPRESSION:  1.  No abnormal focal urinary bladder wall thickening.  2.   Small bore catheter along the posterior urinary bladder.  3.  Ptotic/ectatic left kidney.  4.  Hepatic steatosis.  5.  Severe coronary artery calcifications.  6.  No new lymphadenopathy.        IMPRESSION/PLAN: Stage I urothelial carcinoma bladder.  Bladder cancer is a low-grade papillary urothelial carcinoma with invasion of the lamina propria without involvement of the muscularis propria.  Patient underwent maximum TURBT (12/06/2023, 01/17/2024).  Patient is enrolled in the [facility] -3 clinical trial and is randomized to receive cetrelimab 360 mg IV plus intravesical TAR-200 every 21 days (beginning 05/24/2024).  There is no evidence of disease recurrence or metastases noted on CT urogram (08/16/2024) and cystoscopy and bladder biopsy (08/20/2024).  There are no adverse effects.  TSH is mildly elevated, but patient remains clinically euthyroid and the current dose of levothyroxine will be continued.  Cetrelimab/TAR-200 will be continued without modification.  Patient will return to ambulatory infusion center 09/17/2024, for cetrelimab cycle 10.  I reviewed the risks and side effects of cetrelimab with the patient, which include fatigue, weakness, anorexia, weight loss, rash, pruritus, dry eyes, dry mouth, headache, cough, dyspnea, abdominal pain, nausea, vomiting, diarrhea, hypothyroidism, adrenal insufficiency, other endocrine disorders, cardiomyopathy, colitis, nephritis, pancreatitis, myositis, arthritis, neuropathy, cerebritis.  Patient understood the indication and risks and agreed to continue therapy.  Patient will return to clinic in 3 weeks with laboratory testing in accordance with the clinical protocol. The current and past history, clinical evaluation, reviewing diagnostic tests and viewing images with the patient, and assessment and planning occurred over 30 minutes.       Jalil Carbajal MD    cc: MD Harman Stratton MD

## 2024-09-16 ENCOUNTER — LAB (OUTPATIENT)
Dept: LAB | Facility: CLINIC | Age: 74
End: 2024-09-16
Attending: INTERNAL MEDICINE
Payer: COMMERCIAL

## 2024-09-16 ENCOUNTER — ONCOLOGY VISIT (OUTPATIENT)
Dept: ONCOLOGY | Facility: CLINIC | Age: 74
End: 2024-09-16
Attending: INTERNAL MEDICINE
Payer: COMMERCIAL

## 2024-09-16 ENCOUNTER — ALLIED HEALTH/NURSE VISIT (OUTPATIENT)
Dept: ONCOLOGY | Facility: CLINIC | Age: 74
End: 2024-09-16

## 2024-09-16 VITALS
OXYGEN SATURATION: 100 % | WEIGHT: 241.9 LBS | SYSTOLIC BLOOD PRESSURE: 159 MMHG | DIASTOLIC BLOOD PRESSURE: 79 MMHG | HEART RATE: 78 BPM | BODY MASS INDEX: 35.83 KG/M2 | TEMPERATURE: 97.8 F | HEIGHT: 69 IN | RESPIRATION RATE: 16 BRPM

## 2024-09-16 DIAGNOSIS — C67.3 MALIGNANT NEOPLASM OF ANTERIOR WALL OF URINARY BLADDER (H): Primary | ICD-10-CM

## 2024-09-16 LAB
ALBUMIN SERPL BCG-MCNC: 4.2 G/DL (ref 3.5–5.2)
ALBUMIN UR-MCNC: NEGATIVE MG/DL
ALP SERPL-CCNC: 71 U/L (ref 40–150)
ALT SERPL W P-5'-P-CCNC: 31 U/L (ref 0–70)
AMYLASE SERPL-CCNC: 64 U/L (ref 28–100)
ANION GAP SERPL CALCULATED.3IONS-SCNC: 12 MMOL/L (ref 7–15)
APPEARANCE UR: CLEAR
AST SERPL W P-5'-P-CCNC: 38 U/L (ref 0–45)
BASOPHILS # BLD AUTO: 0.1 10E3/UL (ref 0–0.2)
BASOPHILS NFR BLD AUTO: 1 %
BILIRUB SERPL-MCNC: 0.7 MG/DL
BILIRUB UR QL STRIP: NEGATIVE
BUN SERPL-MCNC: 13.6 MG/DL (ref 8–23)
CALCIUM SERPL-MCNC: 9.4 MG/DL (ref 8.8–10.4)
CHLORIDE SERPL-SCNC: 100 MMOL/L (ref 98–107)
COLOR UR AUTO: NORMAL
CREAT SERPL-MCNC: 1.15 MG/DL (ref 0.67–1.17)
CRP SERPL-MCNC: <3 MG/L
EGFRCR SERPLBLD CKD-EPI 2021: 66 ML/MIN/1.73M2
EOSINOPHIL # BLD AUTO: 0.4 10E3/UL (ref 0–0.7)
EOSINOPHIL NFR BLD AUTO: 5 %
ERYTHROCYTE [DISTWIDTH] IN BLOOD BY AUTOMATED COUNT: 12.4 % (ref 10–15)
GGT SERPL-CCNC: 91 U/L (ref 8–61)
GLUCOSE SERPL-MCNC: 134 MG/DL (ref 70–99)
GLUCOSE UR STRIP-MCNC: NEGATIVE MG/DL
HBA1C MFR BLD: 6.6 %
HCO3 SERPL-SCNC: 24 MMOL/L (ref 22–29)
HCT VFR BLD AUTO: 44 % (ref 40–53)
HGB BLD-MCNC: 15.6 G/DL (ref 13.3–17.7)
HGB UR QL STRIP: NEGATIVE
IMM GRANULOCYTES # BLD: 0 10E3/UL
IMM GRANULOCYTES NFR BLD: 0 %
KETONES UR STRIP-MCNC: NEGATIVE MG/DL
LDH SERPL L TO P-CCNC: 247 U/L (ref 0–250)
LEUKOCYTE ESTERASE UR QL STRIP: NEGATIVE
LIPASE SERPL-CCNC: 39 U/L (ref 13–60)
LYMPHOCYTES # BLD AUTO: 2.1 10E3/UL (ref 0.8–5.3)
LYMPHOCYTES NFR BLD AUTO: 25 %
MCH RBC QN AUTO: 33.3 PG (ref 26.5–33)
MCHC RBC AUTO-ENTMCNC: 35.5 G/DL (ref 31.5–36.5)
MCV RBC AUTO: 94 FL (ref 78–100)
MONOCYTES # BLD AUTO: 0.9 10E3/UL (ref 0–1.3)
MONOCYTES NFR BLD AUTO: 11 %
NEUTROPHILS # BLD AUTO: 4.8 10E3/UL (ref 1.6–8.3)
NEUTROPHILS NFR BLD AUTO: 58 %
NITRATE UR QL: NEGATIVE
NRBC # BLD AUTO: 0 10E3/UL
NRBC BLD AUTO-RTO: 0 /100
PH UR STRIP: 6 [PH] (ref 5–7)
PHOSPHATE SERPL-MCNC: 2.9 MG/DL (ref 2.5–4.5)
PLATELET # BLD AUTO: 220 10E3/UL (ref 150–450)
POTASSIUM SERPL-SCNC: 4 MMOL/L (ref 3.4–5.3)
PROT SERPL-MCNC: 7.1 G/DL (ref 6.4–8.3)
RBC # BLD AUTO: 4.68 10E6/UL (ref 4.4–5.9)
RBC URINE: <1 /HPF
SODIUM SERPL-SCNC: 136 MMOL/L (ref 135–145)
SP GR UR STRIP: 1 (ref 1–1.03)
SQUAMOUS EPITHELIAL: <1 /HPF
T4 FREE SERPL-MCNC: 1.13 NG/DL (ref 0.9–1.7)
TSH SERPL DL<=0.005 MIU/L-ACNC: 10.61 UIU/ML (ref 0.3–4.2)
URATE SERPL-MCNC: 6 MG/DL (ref 3.4–7)
UROBILINOGEN UR STRIP-MCNC: NORMAL MG/DL
WBC # BLD AUTO: 8.3 10E3/UL (ref 4–11)
WBC URINE: <1 /HPF

## 2024-09-16 PROCEDURE — 83036 HEMOGLOBIN GLYCOSYLATED A1C: CPT | Performed by: INTERNAL MEDICINE

## 2024-09-16 PROCEDURE — 82977 ASSAY OF GGT: CPT | Performed by: INTERNAL MEDICINE

## 2024-09-16 PROCEDURE — G0463 HOSPITAL OUTPT CLINIC VISIT: HCPCS | Performed by: INTERNAL MEDICINE

## 2024-09-16 PROCEDURE — 36415 COLL VENOUS BLD VENIPUNCTURE: CPT | Performed by: INTERNAL MEDICINE

## 2024-09-16 PROCEDURE — 80053 COMPREHEN METABOLIC PANEL: CPT | Performed by: INTERNAL MEDICINE

## 2024-09-16 PROCEDURE — 83615 LACTATE (LD) (LDH) ENZYME: CPT | Performed by: INTERNAL MEDICINE

## 2024-09-16 PROCEDURE — 83690 ASSAY OF LIPASE: CPT | Performed by: INTERNAL MEDICINE

## 2024-09-16 PROCEDURE — 87086 URINE CULTURE/COLONY COUNT: CPT | Performed by: INTERNAL MEDICINE

## 2024-09-16 PROCEDURE — 84443 ASSAY THYROID STIM HORMONE: CPT | Performed by: INTERNAL MEDICINE

## 2024-09-16 PROCEDURE — 84439 ASSAY OF FREE THYROXINE: CPT | Performed by: INTERNAL MEDICINE

## 2024-09-16 PROCEDURE — 82150 ASSAY OF AMYLASE: CPT | Performed by: INTERNAL MEDICINE

## 2024-09-16 PROCEDURE — 84100 ASSAY OF PHOSPHORUS: CPT | Performed by: INTERNAL MEDICINE

## 2024-09-16 PROCEDURE — 81001 URINALYSIS AUTO W/SCOPE: CPT | Performed by: INTERNAL MEDICINE

## 2024-09-16 PROCEDURE — 86140 C-REACTIVE PROTEIN: CPT | Performed by: INTERNAL MEDICINE

## 2024-09-16 PROCEDURE — 84550 ASSAY OF BLOOD/URIC ACID: CPT | Performed by: INTERNAL MEDICINE

## 2024-09-16 PROCEDURE — 85025 COMPLETE CBC W/AUTO DIFF WBC: CPT | Performed by: INTERNAL MEDICINE

## 2024-09-16 RX ORDER — METHYLPREDNISOLONE SODIUM SUCCINATE 125 MG/2ML
125 INJECTION, POWDER, LYOPHILIZED, FOR SOLUTION INTRAMUSCULAR; INTRAVENOUS
Status: CANCELLED
Start: 2024-09-22

## 2024-09-16 RX ORDER — ALBUTEROL SULFATE 0.83 MG/ML
2.5 SOLUTION RESPIRATORY (INHALATION)
Status: CANCELLED | OUTPATIENT
Start: 2024-09-22

## 2024-09-16 RX ORDER — ALBUTEROL SULFATE 90 UG/1
1-2 AEROSOL, METERED RESPIRATORY (INHALATION)
Status: CANCELLED
Start: 2024-09-22

## 2024-09-16 RX ORDER — MEPERIDINE HYDROCHLORIDE 25 MG/ML
25 INJECTION INTRAMUSCULAR; INTRAVENOUS; SUBCUTANEOUS EVERY 30 MIN PRN
Status: CANCELLED | OUTPATIENT
Start: 2024-09-22

## 2024-09-16 RX ORDER — DIPHENHYDRAMINE HCL 25 MG
50 CAPSULE ORAL
Status: CANCELLED | OUTPATIENT
Start: 2024-09-22

## 2024-09-16 RX ORDER — EPINEPHRINE 1 MG/ML
0.3 INJECTION, SOLUTION INTRAMUSCULAR; SUBCUTANEOUS EVERY 5 MIN PRN
Status: CANCELLED | OUTPATIENT
Start: 2024-09-22

## 2024-09-16 RX ORDER — ACETAMINOPHEN 325 MG/1
650 TABLET ORAL
Status: CANCELLED
Start: 2024-09-22

## 2024-09-16 RX ORDER — DIPHENHYDRAMINE HYDROCHLORIDE 50 MG/ML
50 INJECTION INTRAMUSCULAR; INTRAVENOUS
Status: CANCELLED
Start: 2024-09-22

## 2024-09-16 RX ORDER — HEPARIN SODIUM,PORCINE 10 UNIT/ML
5-20 VIAL (ML) INTRAVENOUS DAILY PRN
Status: CANCELLED | OUTPATIENT
Start: 2024-09-22

## 2024-09-16 RX ORDER — HEPARIN SODIUM (PORCINE) LOCK FLUSH IV SOLN 100 UNIT/ML 100 UNIT/ML
5 SOLUTION INTRAVENOUS
Status: CANCELLED | OUTPATIENT
Start: 2024-09-22

## 2024-09-16 RX ORDER — LORAZEPAM 2 MG/ML
0.5 INJECTION INTRAMUSCULAR EVERY 4 HOURS PRN
Status: CANCELLED | OUTPATIENT
Start: 2024-09-22

## 2024-09-16 RX ORDER — ACETAMINOPHEN 325 MG/1
650 TABLET ORAL
Status: CANCELLED | OUTPATIENT
Start: 2024-09-22

## 2024-09-16 ASSESSMENT — PAIN SCALES - GENERAL: PAINLEVEL: NO PAIN (0)

## 2024-09-16 NOTE — NURSING NOTE
Chief Complaint   Patient presents with    Blood Draw     Labs drawn via  by RN.      Labs drawn with  by RN. Urine collected. Vitals taken. Patient checked into next appointment.    Adriana Roger RN

## 2024-09-16 NOTE — NURSING NOTE
"Oncology Rooming Note    September 16, 2024 7:41 AM   Alek Mcneill is a 74 year old male who presents for:    Chief Complaint   Patient presents with    Blood Draw     Labs drawn via  by RN.     Oncology Clinic Visit     Prostrate cancer     Initial Vitals: BP (!) 159/79 (BP Location: Right arm, Patient Position: Sitting, Cuff Size: Adult Large)   Pulse 78   Temp 97.8  F (36.6  C) (Oral)   Resp 18   Wt 109.7 kg (241 lb 14.4 oz)   SpO2 100%   BMI 36.25 kg/m   Estimated body mass index is 36.25 kg/m  as calculated from the following:    Height as of 8/20/24: 1.74 m (5' 8.5\").    Weight as of this encounter: 109.7 kg (241 lb 14.4 oz). Body surface area is 2.3 meters squared.  No Pain (0) Comment: Data Unavailable   No LMP for male patient.  Allergies reviewed: Yes  Medications reviewed: Yes    Medications: Medication refills not needed today.  Pharmacy name entered into [employer]: [id number] [address] AT [place] [address]    Frailty Screening:   Is the patient here for a new oncology consult visit in cancer care? 2. No      Clinical concerns: none      Dianna Castaneda, EMT  9/16/2024                  "

## 2024-09-16 NOTE — LETTER
9/16/2024      Alek AGOSTO Raheelprincess  [address] [place] [address]      Dear Colleague,    Thank you for referring your patient, Alek Mcenill, to the [facility]. Please see a copy of my visit note below.    PRIMARY CARE PHYSICIAN: Margarita BRUNSON MD  UROLOGY: Harman Kaiser MD    HISTORY OF PRESENT ILLNESS: Patient is a 74-year-old male with stage I urothelial carcinoma bladder (cT1, cN0, cM0). Patient presents 11/19/2023, with gross hematuria and blood clots. CT abdomen, pelvis 11/19/2023, revealed several nodular foci of increased density along the inferior bladder measuring up to 1.2 cm there was no hydronephrosis.  There was no lymphadenopathy or metastases. Cystoscopy and TURBT 12/06/2023, revealed multiple 1-2 cm papillary bladder tumors on the anterior wall, some with superficial calcifications. Pathology evaluation revealed a low-grade papillary urothelial carcinoma with invasion of the lamina propria.  Muscularis propria was present and uninvolved.  Repeat cystoscopy and TURBT 01/17/2024, revealed a 3 cm tumor area previously resected on the anterior bladder wall near the dome.  TURBT of the base of the bladder tumor revealed microscopic foci of residual flat urothelial carcinoma in situ without residual invasive carcinoma.  Patient is enrolled in the [facility] -3 clinical trial and is randomized to receive cetrelimab 360 mg IV every 21 days, plus intravesical TAR-200 every 21 days x6, then every 3 months beginning 05/24/2024.  Patient received 9 cycles of cetrelimab thus far.     Restaging CT urogram 08/16/2024, was negative for urinary bladder wall thickening or tumor.  There was no lymphadenopathy. Cystoscopy and bladder biopsy 08/20/2024, was negative for recurrent tumor, and anterior wall bladder biopsy revealed urothelium with reactive changes and was negative for malignancy.  Patient feels well and voices no complaints.  He denies fever, anorexia, headache,  "dry skin, rash, pruritus, dry eyes, dry mouth, cough, dyspnea, chest pain, abdominal pain, nausea, vomiting, diarrhea, constipation, muscle or joint pain, bone pain, numbness, tingling.    PAST HISTORY:   -Hypertension.  -Diabetes.  -Hyperlipidemia.  -Stage I urothelial carcinoma bladder (cT1, cN0, cM0).  -Fatty liver.  -Colonoscopy 2019, was negative for polyps.  -Cervical C7-T1 radiculopathy.  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  -Tonsillectomy, 1958.    MEDICATIONS:   Current Outpatient Medications   Medication Sig Dispense Refill     Ascorbic Acid 500 MG CAPS 1 tablet Orally Once a day       aspirin (ASPIRIN LOW DOSE) 81 MG EC tablet Take 81 mg by mouth daily ONCE BEFORE BED       blood glucose (NO BRAND SPECIFIED) lancets standard as directed, to use with his Contour Next test blood sugars daily       CONTOUR NEXT TEST test strip USE TO TEST BLOOD SUGAR DAILY       Ergocalciferol 50 MCG (2000 UT) CAPS Take 50 mcg by mouth daily       fish oil-omega-3 fatty acids 500 MG capsule Take 2 capsules by mouth daily       hydrochlorothiazide (HYDRODIURIL) 25 MG tablet Take 25 mg by mouth daily       ketoconazole (NIZORAL) 2 % external cream Apply topically as needed       levothyroxine (SYNTHROID/LEVOTHROID) 100 MCG tablet Take 1 tablet (100 mcg) by mouth daily 30 tablet 3     Microlet Lancets MISC daily       simvastatin (ZOCOR) 40 MG tablet Take 40 mg by mouth at bedtime       triamcinolone (KENALOG) 0.1 % external cream Apply as needed       Vitamin D3 (VITAMIN D, CHOLECALCIFEROL,) 25 mcg (1000 units) tablet Take 25 mcg by mouth daily         REVIEW OF SYSTEMS: Review of systems reviewed with the patient and otherwise negative except for those detailed above.    PHYSICAL EXAM: BP (!) 159/79 (BP Location: Right arm, Patient Position: Sitting, Cuff Size: Adult Large)   Pulse 78   Temp 97.8  F (36.6  C) (Oral)   Resp 16   Ht 1.74 m (5' 8.5\")   Wt 109.7 kg (241 lb 14.4 " oz)   SpO2 100%   BMI 36.24 kg/m  .  Body surface area is 2.3 meters squared. ECOG performance status: 0.  Skin: No erythema or rash.  HEENT: Sclera nonicteric. Oropharynx without lesions or ulceration, mucosa pink and moist.  Nodes: No cervical, supraclavicular, axillary, or inguinal adenopathy.  Lungs: No dullness to percussion.  No rales, wheezes, rhonchi.  Heart: Regular rate and rhythm.  Abdomen: Bowel sounds present.  Soft, nontender, no hepatosplenomegaly or mass.  Extremities: Trace ankle and pedal edema.     LABS REVIEWED:   Component      Latest Ref Rng 8/16/2024  9:06 AM 9/16/2024  7:09 AM   WBC      4.0 - 11.0 10e3/uL  8.3    RBC Count      4.40 - 5.90 10e6/uL  4.68    Hemoglobin      13.3 - 17.7 g/dL  15.6    Hematocrit      40.0 - 53.0 %  44.0    MCV      78 - 100 fL  94    MCH      26.5 - 33.0 pg  33.3 (H)    MCHC      31.5 - 36.5 g/dL  35.5    RDW      10.0 - 15.0 %  12.4    Platelet Count      150 - 450 10e3/uL  220    % Neutrophils      %  58    % Lymphocytes      %  25    % Monocytes      %  11    % Eosinophils      %  5    % Basophils      %  1    % Immature Granulocytes      %  0    NRBCs per 100 WBC      <1 /100  0    Absolute Neutrophils      1.6 - 8.3 10e3/uL  4.8    Absolute Lymphocytes      0.8 - 5.3 10e3/uL  2.1    Absolute Monocytes      0.0 - 1.3 10e3/uL  0.9    Absolute Eosinophils      0.0 - 0.7 10e3/uL  0.4    Absolute Basophils      0.0 - 0.2 10e3/uL  0.1    Absolute Immature Granulocytes      <=0.4 10e3/uL  0.0    Absolute NRBCs      10e3/uL  0.0    Sodium      135 - 145 mmol/L  136    Potassium      3.4 - 5.3 mmol/L  4.0    Carbon Dioxide (CO2)      22 - 29 mmol/L  24    Anion Gap      7 - 15 mmol/L  12    Urea Nitrogen      8.0 - 23.0 mg/dL  13.6    Creatinine      0.67 - 1.17 mg/dL  1.15    GFR Estimate      >60 mL/min/1.73m2  66    Calcium      8.8 - 10.4 mg/dL  9.4    Chloride      98 - 107 mmol/L  100    Glucose      70 - 99 mg/dL  134 (H)    Alkaline Phosphatase      40 -  150 U/L  71    AST      0 - 45 U/L  38    ALT      0 - 70 U/L  31    Protein Total      6.4 - 8.3 g/dL  7.1    Albumin      3.5 - 5.2 g/dL  4.2    Bilirubin Total      <=1.2 mg/dL  0.7    Color Urine      Colorless, Straw, Light Yellow, Yellow   Straw    Appearance Urine      Clear   Clear    Glucose Urine      Negative mg/dL  Negative    Bilirubin Urine      Negative   Negative    Ketones Urine      Negative mg/dL  Negative    Specific Gravity Urine      1.003 - 1.035   1.005    Blood Urine      Negative   Negative    pH Urine      5.0 - 7.0   6.0    Protein Albumin Urine      Negative mg/dL  Negative    Urobilinogen mg/dL      Normal, 2.0 mg/dL  Normal    Nitrite Urine      Negative   Negative    Leukocyte Esterase Urine      Negative   Negative    RBC Urine      <=2 /HPF  <1    WBC Urine      <=5 /HPF  <1    Squamous Epithelial /HPF Urine      <=1 /HPF  <1    TSH      0.30 - 4.20 uIU/mL 12.02 (H)  10.61 (H)    T4 Free      0.90 - 1.70 ng/dL 1.34  1.13    Phosphorus      2.5 - 4.5 mg/dL  2.9    Lactate Dehydrogenase      0 - 250 U/L  247    Uric Acid      3.4 - 7.0 mg/dL  6.0    CRP Inflammation      <5.00 mg/L  <3.00    GGT      8 - 61 U/L  91 (H)    Lipase      13 - 60 U/L  39    Amylase      28 - 100 U/L  64        Hemoglobin A1c pending.    IMAGING REVIEWED:   Recent Results (from the past 744 hour(s))   CT Urogram wo & w Contrast    Narrative    EXAM: CT UROGRAM WO and W CONTRAST  LOCATION: [facility]  DATE: 8/16/2024    INDICATION:  Malignant neoplasm of anterior wall of urinary bladder (H)  COMPARISON: Multiple priors with the most recent dated 2/13/2024  TECHNIQUE: CT scan of the abdomen and pelvis using urogram technique with pre contrast, post contrast, and delayed images. Multiplanar reformats were obtained. Dose reduction techniques were used.   CONTRAST: Isovue 370 [id number]    FINDINGS:   LOWER CHEST: Patent central airways. Minimal scattered subpleural  scarring. Severe coronary artery calcifications.    HEPATOBILIARY: Hepatic steatosis. No focal hepatic mass, biliary dilatation, or calcified gallstones.    PANCREAS: No focal pancreatic mass, peripancreatic inflammation, or pancreatic ductal dilatation.    SPLEEN: Normal size.    ADRENAL GLANDS: No nodules.    RIGHT KIDNEY/URETER: No radiodense calculus. No suspicious renal mass or hydronephrosis. Appropriate excretion of the contrast into the collecting system and the ureter, which is opacified appropriately without a definite focal filling defect or   stricture. The suboptimal opacification of the distal ureter is likely due to underlying peristalsis.    LEFT KIDNEY/URETER: Slightly inferiorly ptotic/ectopic left kidney. No radiodense calculus. No suspicious renal mass or hydronephrosis. Appropriate excretion of the contrast into the collecting system and the ureter without a focal filling defect or   suture. The suboptimal opacification of the distal ureter is likely due to underlying peristalsis.    BLADDER: No abnormal focal urinary bladder wall thickening is identified. A small bore catheter is present along the posterior urinary bladder    BOWEL: Unremarkable appearance of the visualized portions of the distal esophagus and stomach. No dilated loops of small bowel are present to suggest an obstruction. No evidence for appendicitis. Unremarkable appearance of the colon.    LYMPH NODES: No abdominal or pelvic lymphadenopathy is identified by size criteria.    VASCULATURE: Normal caliber abdominal aorta. A few scattered atherosclerotic plaques are present. Patent portal, splenic, and mesenteric veins. Patent bilateral renal veins.    PELVIC ORGANS: No large pelvic mass.    MUSCULOSKELETAL: Small right and moderate left fat filled inguinal hernias. Degenerative changes of the spine. Grade 2 anterolisthesis of L4 on L5. Minimal retrolisthesis of L5 on S1.      Impression    IMPRESSION:  1.  No abnormal focal  urinary bladder wall thickening.  2.   Small bore catheter along the posterior urinary bladder.  3.  Ptotic/ectatic left kidney.  4.  Hepatic steatosis.  5.  Severe coronary artery calcifications.  6.  No new lymphadenopathy.       IMPRESSION/PLAN: Stage I urothelial carcinoma bladder.  Bladder cancer is a low-grade papillary urothelial carcinoma with invasion of the lamina propria without involvement of the muscularis propria.  Patient underwent maximum TURBT (12/06/2023, 01/17/2024).  [facility] and is randomized to receive cetrelimab 360 mg IV plus intravesical TAR-200 every 21 days (beginning 05/24/2024).  There is no evidence of disease recurrence or metastases noted on CT urogram (08/16/2024) and cystoscopy and bladder biopsy (08/20/2024).  There are no adverse effects.  TSH is mildly elevated, but patient remains clinically euthyroid and the current dose of levothyroxine will be continued.  Cetrelimab/TAR-200 will be continued without modification.  Patient will return to ambulatory infusion center 09/17/2024, for cetrelimab cycle 10.  I reviewed the risks and side effects of cetrelimab with the patient, which include fatigue, weakness, anorexia, weight loss, rash, pruritus, dry eyes, dry mouth, headache, cough, dyspnea, abdominal pain, nausea, vomiting, diarrhea, hypothyroidism, adrenal insufficiency, other endocrine disorders, cardiomyopathy, colitis, nephritis, pancreatitis, myositis, arthritis, neuropathy, cerebritis.  Patient understood the indication and risks and agreed to continue therapy.  Patient will return to clinic in 3 weeks with laboratory testing in accordance with the clinical protocol. The current and past history, clinical evaluation, reviewing diagnostic tests and viewing images with the patient, and assessment and planning occurred over 30 minutes.       Jalil Carbajal MD    cc: MD Harman Stratton MD      Again, thank you  for allowing me to participate in the care of your patient.        Sincerely,        Jalil Carbajal MD

## 2024-09-17 ENCOUNTER — TELEPHONE (OUTPATIENT)
Dept: ONCOLOGY | Facility: CLINIC | Age: 74
End: 2024-09-17
Payer: COMMERCIAL

## 2024-09-17 ENCOUNTER — INFUSION THERAPY VISIT (OUTPATIENT)
Dept: ONCOLOGY | Facility: CLINIC | Age: 74
End: 2024-09-17
Attending: INTERNAL MEDICINE
Payer: COMMERCIAL

## 2024-09-17 VITALS
SYSTOLIC BLOOD PRESSURE: 157 MMHG | DIASTOLIC BLOOD PRESSURE: 89 MMHG | HEART RATE: 68 BPM | RESPIRATION RATE: 16 BRPM | OXYGEN SATURATION: 97 % | TEMPERATURE: 97.4 F

## 2024-09-17 DIAGNOSIS — C67.3 MALIGNANT NEOPLASM OF ANTERIOR WALL OF URINARY BLADDER (H): Primary | ICD-10-CM

## 2024-09-17 LAB — BACTERIA UR CULT: NORMAL

## 2024-09-17 PROCEDURE — 258N000003 HC RX IP 258 OP 636: Performed by: INTERNAL MEDICINE

## 2024-09-17 PROCEDURE — 96413 CHEMO IV INFUSION 1 HR: CPT

## 2024-09-17 RX ADMIN — SODIUM CHLORIDE 250 ML: 9 INJECTION, SOLUTION INTRAVENOUS at 14:07

## 2024-09-17 NOTE — NURSING NOTE
[id number]: Study Visit Note   Subject name: Alek Mcneill     Visit: Week 27     Did the study visit occur within the appropriate window allowed by the protocol? yes    Cetrelimab infusion:  Were premedications given? no    Verbal handover provided to infusion RN; reminded RN to document actual start time of infusion and actual stop time of the infusion. If infusion deviates from protocol directed infusion time, please document rationale in your infusion note. Instructed RN to use 0.2 micron filter and flush drug with 14 mL of NS. The end of this flush is considered the end of the infusion.     I have personally interviewed Alek Mcneill and reviewed his medical record for adverse events and concomitant medications and these have been recorded on the corresponding logs in Alek Mcneill's research file.     Alek Mcneill was given the opportunity to ask any trial related questions.  Please see provider progress note for physical exam and other clinical information. Labs were reviewed - any significant lab values were addressed and reviewed.    Araceli Diaz RN

## 2024-09-17 NOTE — TELEPHONE ENCOUNTER
Called patient and patient verified he has had no changes since yesterday, has been feeling and has been doing quite well. Ready for next dose of Cetrelimab.     Reviewed labs from yesterday and OK per protocol re-treatment criteria for Cetrelimab.     Araceli Diaz RN   Clinical Research Coordinator Nurse-Solid Tumor   Phone: [phone]

## 2024-09-17 NOTE — PROGRESS NOTES
Infusion Nursing Note:  Alek Mcneill presents today for Week 27 Day 1 Study Cetrelimab (IDS# 6160).    Patient seen by provider today: No   present during visit today: Not Applicable.    Note: Patient presents to infusion today doing well. No new changes or concerns since visit with Dr. Carbajal yesterday 9/16.    Vital signs obtained pre and post infusion.     Study Cetrelimab (IDS# 6160) Start Time: 1408  Study Cetrelimab (IDS# 6160) Stop Time: 1444    Intravenous Access:  Peripheral IV placed.    Treatment Conditions:  Lab Results   Component Value Date    HGB 15.6 09/16/2024    WBC 8.3 09/16/2024    ANEUTAUTO 4.8 09/16/2024     09/16/2024        Lab Results   Component Value Date     09/16/2024    POTASSIUM 4.0 09/16/2024    MAG 2.3 11/19/2023    CR 1.15 09/16/2024    MILLI 9.4 09/16/2024    BILITOTAL 0.7 09/16/2024    ALBUMIN 4.2 09/16/2024    ALT 31 09/16/2024    AST 38 09/16/2024     Results reviewed, labs MET treatment parameters, ok to proceed with treatment.    Post Infusion Assessment:  Patient tolerated infusion without incident.  Patient observed for 15 minutes post Study Cetrelimab per protocol.  Blood return noted pre and post infusion.  Site patent and intact, free from redness, edema or discomfort.  No evidence of extravasations.  Access discontinued per protocol.     Discharge Plan:   Patient declined prescription refills.  Discharge instructions reviewed with: Patient.  Patient and/or family verbalized understanding of discharge instructions and all questions answered.  AVS to patient via [employer].  Patient will return 10/1 for next appointment.   Patient discharged in stable condition accompanied by: wife.  Departure Mode: Ambulatory.      Betty Ham RN

## 2024-09-17 NOTE — PATIENT INSTRUCTIONS
Contact Numbers  [facility]: [phone] (for symptom and scheduling needs)    Please call the [facility] Triage line if you experience a temperature greater than or equal to 100.4, shaking chills, have uncontrolled nausea, vomiting and/or diarrhea, dizziness, shortness of breath, chest pain, bleeding, unexplained bruising, or if you have any other new/concerning symptoms, questions or concerns.     If you are having any concerning symptoms or wish to speak to a provider before your next infusion visit, please call your care coordinator or triage to notify them so we can adequately serve you.     If you need a refill on a narcotic prescription or other medication, please call triage before your infusion appointment.           September 2024 Sunday Monday Tuesday Wednesday Thursday Friday Saturday   1     2     3     4     5     6     7       8     9     10     11     12     13     14       15     16    LAB PERIPHERAL   7:15 AM   (15 min.)   [facility] LAB DRAW   [facility]    RETURN CCSL   7:45 AM   (30 min.)   Jalil Carbajal MD   [facility] 17    ONC INFUSION 1.5 HR (90 MIN)   1:30 PM   (90 min.)    ONC INFUSION NURSE   [facility] 18     19     20     21       22     23     24     25     26     27     28       29     30                                          October 2024 Sunday Monday Tuesday Wednesday Thursday Friday Saturday             1    LAB PERIPHERAL  10:45 AM   (15 min.)   [facility] LAB DRAW   [facility]    RETURN CCSL  11:00 AM   (45 min.)   Clarita Owen PA-C   [facility]    ONC INFUSION 1.5 HR (90 MIN)  12:30 PM   (90 min.)    ONC INFUSION NURSE   [facility] 2     3     4     5       6     7     8     9     10     11     12       13     14     15     16     17     18     19       20     21    LAB  PERIPHERAL  11:00 AM   (15 min.)   [facility] LAB DRAW   [facility]    RETURN CCSL  11:15 AM   (45 min.)   Clarita Owen PA-C   [facility]    ONC INFUSION 1.5 HR (90 MIN)  12:30 PM   (90 min.)    ONC INFUSION NURSE   [facility] 22     23     24     25     26       27     28     29     30     31                               Lab Results:  No results found for this or any previous visit (from the past 12 hour(s)).

## 2024-09-25 DIAGNOSIS — C67.3 MALIGNANT NEOPLASM OF ANTERIOR WALL OF URINARY BLADDER (H): Primary | ICD-10-CM

## 2024-09-30 ENCOUNTER — APPOINTMENT (OUTPATIENT)
Dept: LAB | Facility: HOSPITAL | Age: 74
End: 2024-09-30
Payer: COMMERCIAL

## 2024-09-30 LAB
ALBUMIN SERPL BCG-MCNC: 4.2 G/DL (ref 3.5–5.2)
ALBUMIN UR-MCNC: NEGATIVE MG/DL
ALP SERPL-CCNC: 74 U/L (ref 40–150)
ALT SERPL W P-5'-P-CCNC: 36 U/L (ref 0–70)
AMYLASE SERPL-CCNC: 88 U/L (ref 28–100)
ANION GAP SERPL CALCULATED.3IONS-SCNC: 7 MMOL/L (ref 7–15)
APPEARANCE UR: CLEAR
AST SERPL W P-5'-P-CCNC: 33 U/L (ref 0–45)
BASOPHILS # BLD AUTO: 0.1 10E3/UL (ref 0–0.2)
BASOPHILS NFR BLD AUTO: 1 %
BILIRUB SERPL-MCNC: 0.7 MG/DL
BILIRUB UR QL STRIP: NEGATIVE
BUN SERPL-MCNC: 17.4 MG/DL (ref 8–23)
CALCIUM SERPL-MCNC: 9.2 MG/DL (ref 8.8–10.4)
CHLORIDE SERPL-SCNC: 98 MMOL/L (ref 98–107)
COLOR UR AUTO: COLORLESS
CREAT SERPL-MCNC: 1.32 MG/DL (ref 0.67–1.17)
CRP SERPL-MCNC: <3 MG/L
EGFRCR SERPLBLD CKD-EPI 2021: 56 ML/MIN/1.73M2
EOSINOPHIL # BLD AUTO: 0.3 10E3/UL (ref 0–0.7)
EOSINOPHIL NFR BLD AUTO: 4 %
ERYTHROCYTE [DISTWIDTH] IN BLOOD BY AUTOMATED COUNT: 12.5 % (ref 10–15)
EST. AVERAGE GLUCOSE BLD GHB EST-MCNC: 134 MG/DL
GGT SERPL-CCNC: 106 U/L (ref 8–61)
GLUCOSE SERPL-MCNC: 123 MG/DL (ref 70–99)
GLUCOSE UR STRIP-MCNC: NEGATIVE MG/DL
HBA1C MFR BLD: 6.3 %
HCO3 SERPL-SCNC: 30 MMOL/L (ref 22–29)
HCT VFR BLD AUTO: 45.9 % (ref 40–53)
HGB BLD-MCNC: 16.1 G/DL (ref 13.3–17.7)
HGB UR QL STRIP: NEGATIVE
IMM GRANULOCYTES # BLD: 0 10E3/UL
IMM GRANULOCYTES NFR BLD: 0 %
KETONES UR STRIP-MCNC: NEGATIVE MG/DL
LDH SERPL L TO P-CCNC: 202 U/L (ref 0–250)
LEUKOCYTE ESTERASE UR QL STRIP: NEGATIVE
LIPASE SERPL-CCNC: 59 U/L (ref 13–60)
LYMPHOCYTES # BLD AUTO: 2.5 10E3/UL (ref 0.8–5.3)
LYMPHOCYTES NFR BLD AUTO: 31 %
MCH RBC QN AUTO: 33 PG (ref 26.5–33)
MCHC RBC AUTO-ENTMCNC: 35.1 G/DL (ref 31.5–36.5)
MCV RBC AUTO: 94 FL (ref 78–100)
MONOCYTES # BLD AUTO: 1 10E3/UL (ref 0–1.3)
MONOCYTES NFR BLD AUTO: 12 %
NEUTROPHILS # BLD AUTO: 4.3 10E3/UL (ref 1.6–8.3)
NEUTROPHILS NFR BLD AUTO: 52 %
NITRATE UR QL: NEGATIVE
NRBC # BLD AUTO: 0 10E3/UL
NRBC BLD AUTO-RTO: 0 /100
PH UR STRIP: 6.5 [PH] (ref 5–7)
PHOSPHATE SERPL-MCNC: 3.1 MG/DL (ref 2.5–4.5)
PLATELET # BLD AUTO: 224 10E3/UL (ref 150–450)
POTASSIUM SERPL-SCNC: 4.2 MMOL/L (ref 3.4–5.3)
PROT SERPL-MCNC: 7.2 G/DL (ref 6.4–8.3)
RBC # BLD AUTO: 4.88 10E6/UL (ref 4.4–5.9)
RBC URINE: 0 /HPF
SODIUM SERPL-SCNC: 135 MMOL/L (ref 135–145)
SP GR UR STRIP: 1.01 (ref 1–1.03)
T4 FREE SERPL-MCNC: 1.17 NG/DL (ref 0.9–1.7)
TSH SERPL DL<=0.005 MIU/L-ACNC: 8.57 UIU/ML (ref 0.3–4.2)
URATE SERPL-MCNC: 6.3 MG/DL (ref 3.4–7)
UROBILINOGEN UR STRIP-MCNC: <2 MG/DL
WBC # BLD AUTO: 8.2 10E3/UL (ref 4–11)
WBC URINE: <1 /HPF

## 2024-09-30 PROCEDURE — 83615 LACTATE (LD) (LDH) ENZYME: CPT | Performed by: INTERNAL MEDICINE

## 2024-09-30 PROCEDURE — 84550 ASSAY OF BLOOD/URIC ACID: CPT | Performed by: INTERNAL MEDICINE

## 2024-09-30 PROCEDURE — 84443 ASSAY THYROID STIM HORMONE: CPT | Performed by: INTERNAL MEDICINE

## 2024-09-30 PROCEDURE — 82150 ASSAY OF AMYLASE: CPT | Performed by: INTERNAL MEDICINE

## 2024-09-30 PROCEDURE — 83036 HEMOGLOBIN GLYCOSYLATED A1C: CPT | Performed by: INTERNAL MEDICINE

## 2024-09-30 PROCEDURE — 80053 COMPREHEN METABOLIC PANEL: CPT | Performed by: INTERNAL MEDICINE

## 2024-09-30 PROCEDURE — 36415 COLL VENOUS BLD VENIPUNCTURE: CPT | Performed by: INTERNAL MEDICINE

## 2024-09-30 PROCEDURE — 84439 ASSAY OF FREE THYROXINE: CPT | Performed by: INTERNAL MEDICINE

## 2024-09-30 PROCEDURE — 82977 ASSAY OF GGT: CPT | Performed by: INTERNAL MEDICINE

## 2024-09-30 PROCEDURE — 81001 URINALYSIS AUTO W/SCOPE: CPT | Performed by: INTERNAL MEDICINE

## 2024-09-30 PROCEDURE — 85025 COMPLETE CBC W/AUTO DIFF WBC: CPT | Performed by: INTERNAL MEDICINE

## 2024-09-30 PROCEDURE — 84100 ASSAY OF PHOSPHORUS: CPT | Performed by: INTERNAL MEDICINE

## 2024-09-30 PROCEDURE — 86140 C-REACTIVE PROTEIN: CPT | Performed by: INTERNAL MEDICINE

## 2024-09-30 PROCEDURE — 87086 URINE CULTURE/COLONY COUNT: CPT | Performed by: INTERNAL MEDICINE

## 2024-09-30 PROCEDURE — 83690 ASSAY OF LIPASE: CPT | Performed by: INTERNAL MEDICINE

## 2024-09-30 NOTE — PROGRESS NOTES
Oct 1, 2024    PRIMARY CARE PHYSICIAN: Margarita BRUNSON MD  UROLOGY: Harman Kaiser MD    HISTORY OF PRESENT ILLNESS: Patient is a 74-year-old male with stage I urothelial carcinoma bladder (cT1, cN0, cM0). Patient presents 11/19/2023, with gross hematuria and blood clots. CT abdomen, pelvis 11/19/2023, revealed several nodular foci of increased density along the inferior bladder measuring up to 1.2 cm there was no hydronephrosis.  There was no lymphadenopathy or metastases. Cystoscopy and TURBT 12/06/2023, revealed multiple 1-2 cm papillary bladder tumors on the anterior wall, some with superficial calcifications. Pathology evaluation revealed a low-grade papillary urothelial carcinoma with invasion of the lamina propria.  Muscularis propria was present and uninvolved.  Repeat cystoscopy and TURBT 01/17/2024, revealed a 3 cm tumor area previously resected on the anterior bladder wall near the dome.  TURBT of the base of the bladder tumor revealed microscopic foci of residual flat urothelial carcinoma in situ without residual invasive carcinoma.  Patient is enrolled in the SUNRISE -3 clinical trial and is randomized to receive cetrelimab 360 mg IV every 21 days, plus intravesical TAR-200 every 21 days x6, then every 3 months beginning 05/24/2024.  Patient received 9 cycles of cetrelimab thus far.     Restaging CT urogram 08/16/2024, was negative for urinary bladder wall thickening or tumor.  There was no lymphadenopathy. Cystoscopy and bladder biopsy 08/20/2024, was negative for recurrent tumor, and anterior wall bladder biopsy revealed urothelium with reactive changes and was negative for malignancy.      INTERVAL HISTORY:  Alek returns to clinic today accompanied by his wife.  He reports that he is doing well.  He has no complaints.  He has no new pains in his body.  No arthralgias.  No chest pain, shortness of breath, or cough.  No fevers or chills.  No urinary changes or concerns.  No diarrhea.  He  tends to be more on the constipated side and uses senna as needed.  No current rashes but does report that approximately week ago he had groin and itching without a rash.  He believes that this was related to the heat.  He remains on his levothyroxine replacement without challenges.  Eating and drinking well.  No other concerns or complaints today.    PAST HISTORY:   -Hypertension.  -Diabetes.  -Hyperlipidemia.  -Stage I urothelial carcinoma bladder (cT1, cN0, cM0).  -Fatty liver.  -Colonoscopy 2019, was negative for polyps.  -Cervical C7-T1 radiculopathy.  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  -Tonsillectomy, 1958.    MEDICATIONS:   Current Outpatient Medications   Medication Sig Dispense Refill    Ascorbic Acid 500 MG CAPS 1 tablet Orally Once a day      aspirin (ASPIRIN LOW DOSE) 81 MG EC tablet Take 81 mg by mouth daily ONCE BEFORE BED      blood glucose (NO BRAND SPECIFIED) lancets standard as directed, to use with his Contour Next test blood sugars daily      CONTOUR NEXT TEST test strip USE TO TEST BLOOD SUGAR DAILY      Ergocalciferol 50 MCG (2000 UT) CAPS Take 50 mcg by mouth daily      fish oil-omega-3 fatty acids 500 MG capsule Take 2 capsules by mouth daily      hydrochlorothiazide (HYDRODIURIL) 25 MG tablet Take 25 mg by mouth daily      ketoconazole (NIZORAL) 2 % external cream Apply topically as needed      levothyroxine (SYNTHROID/LEVOTHROID) 100 MCG tablet Take 1 tablet (100 mcg) by mouth daily 30 tablet 3    Microlet Lancets MISC daily      simvastatin (ZOCOR) 40 MG tablet Take 40 mg by mouth at bedtime      triamcinolone (KENALOG) 0.1 % external cream Apply as needed      Vitamin D3 (VITAMIN D, CHOLECALCIFEROL,) 25 mcg (1000 units) tablet Take 25 mcg by mouth daily         PHYSICAL EXAM:   BP (!) 150/89 (BP Location: Right arm, Patient Position: Right side, Cuff Size: Adult Large)   Pulse 72   Temp 97.5  F (36.4  C) (Oral)   Resp 18   Wt 110.5 kg  (243 lb 8 oz)   SpO2 97%   BMI 36.48 kg/m    General: No acute distress  HEENT: Sclera anicteric. Oral mucosa pink and moist.  No mucositis or thrush  Lymph: No lymphadenopathy in neck  Heart: Regular, rate, and rhythm  Lungs: Clear to ascultation bilaterally  Abdomen: Positive bowel sounds. Soft, non-distended, non-tender. No organomegaly or mass.   Extremities: no lower extremity edema  Neuro: Cranial nerves grossly intact  Rash: none  Vascular access: port     LABS REVIEWED:    Latest Reference Range & Units 09/30/24 09:50   Sodium 135 - 145 mmol/L 135   Potassium 3.4 - 5.3 mmol/L 4.2   Chloride 98 - 107 mmol/L 98   Carbon Dioxide (CO2) 22 - 29 mmol/L 30 (H)   Urea Nitrogen 8.0 - 23.0 mg/dL 17.4   Creatinine 0.67 - 1.17 mg/dL 1.32 (H)   GFR Estimate >60 mL/min/1.73m2 56 (L)   Calcium 8.8 - 10.4 mg/dL 9.2   Anion Gap 7 - 15 mmol/L 7   Phosphorus 2.5 - 4.5 mg/dL 3.1   Albumin 3.5 - 5.2 g/dL 4.2   Protein Total 6.4 - 8.3 g/dL 7.2   Alkaline Phosphatase 40 - 150 U/L 74   ALT 0 - 70 U/L 36   AST 0 - 45 U/L 33   Amylase 28 - 100 U/L 88   Bilirubin Total <=1.2 mg/dL 0.7   CRP Inflammation <5.00 mg/L <3.00   GGT 8 - 61 U/L 106 (H)   Glucose 70 - 99 mg/dL 123 (H)   Estimated Average Glucose <117 mg/dL 134 (H)   Hemoglobin A1C <5.7 % 6.3 (H)   Lactate Dehydrogenase 0 - 250 U/L 202   Lipase 13 - 60 U/L 59   T4 Free 0.90 - 1.70 ng/dL 1.17   TSH 0.30 - 4.20 uIU/mL 8.57 (H)   Uric Acid 3.4 - 7.0 mg/dL 6.3   WBC 4.0 - 11.0 10e3/uL 8.2   Hemoglobin 13.3 - 17.7 g/dL 16.1   Hematocrit 40.0 - 53.0 % 45.9   Platelet Count 150 - 450 10e3/uL 224   RBC Count 4.40 - 5.90 10e6/uL 4.88   MCV 78 - 100 fL 94   MCH 26.5 - 33.0 pg 33.0   MCHC 31.5 - 36.5 g/dL 35.1   RDW 10.0 - 15.0 % 12.5   % Neutrophils % 52   % Lymphocytes % 31   % Monocytes % 12   % Eosinophils % 4   % Basophils % 1   Absolute Basophils 0.0 - 0.2 10e3/uL 0.1   Absolute Eosinophils 0.0 - 0.7 10e3/uL 0.3   Absolute Immature Granulocytes <=0.4 10e3/uL 0.0   Absolute  Lymphocytes 0.8 - 5.3 10e3/uL 2.5   Absolute Monocytes 0.0 - 1.3 10e3/uL 1.0   % Immature Granulocytes % 0   Absolute Neutrophils 1.6 - 8.3 10e3/uL 4.3   Absolute NRBCs 10e3/uL 0.0   NRBCs per 100 WBC <1 /100 0   Color Urine Colorless, Straw, Light Yellow, Yellow  Colorless   Appearance Urine Clear  Clear   Glucose Urine Negative mg/dL Negative   Bilirubin Urine Negative  Negative   Ketones Urine Negative mg/dL Negative   Specific Gravity Urine 1.001 - 1.030  1.011   pH Urine 5.0 - 7.0  6.5   Protein Albumin Urine Negative mg/dL Negative   Urobilinogen mg/dL <2.0 mg/dL <2.0   Nitrite Urine Negative  Negative   Blood Urine Negative  Negative   Leukocyte Esterase Urine Negative  Negative   WBC Urine <=5 /HPF <1   RBC Urine <=2 /HPF 0   URINE CULTURE  Rpt     I reviewed the above labs today.      IMAGING REVIEWED:   Recent Results (from the past 744 hour(s))   CT Urogram wo & w Contrast    Narrative    EXAM: CT UROGRAM WO and W CONTRAST  LOCATION: [facility]  DATE: 8/16/2024    INDICATION:  Malignant neoplasm of anterior wall of urinary bladder (H)  COMPARISON: Multiple priors with the most recent dated 2/13/2024  TECHNIQUE: CT scan of the abdomen and pelvis using urogram technique with pre contrast, post contrast, and delayed images. Multiplanar reformats were obtained. Dose reduction techniques were used.   CONTRAST: Isovue 370 117cc    FINDINGS:   LOWER CHEST: Patent central airways. Minimal scattered subpleural scarring. Severe coronary artery calcifications.    HEPATOBILIARY: Hepatic steatosis. No focal hepatic mass, biliary dilatation, or calcified gallstones.    PANCREAS: No focal pancreatic mass, peripancreatic inflammation, or pancreatic ductal dilatation.    SPLEEN: Normal size.    ADRENAL GLANDS: No nodules.    RIGHT KIDNEY/URETER: No radiodense calculus. No suspicious renal mass or hydronephrosis. Appropriate excretion of the contrast into the collecting system  and the ureter, which is opacified appropriately without a definite focal filling defect or   stricture. The suboptimal opacification of the distal ureter is likely due to underlying peristalsis.    LEFT KIDNEY/URETER: Slightly inferiorly ptotic/ectopic left kidney. No radiodense calculus. No suspicious renal mass or hydronephrosis. Appropriate excretion of the contrast into the collecting system and the ureter without a focal filling defect or   suture. The suboptimal opacification of the distal ureter is likely due to underlying peristalsis.    BLADDER: No abnormal focal urinary bladder wall thickening is identified. A small bore catheter is present along the posterior urinary bladder    BOWEL: Unremarkable appearance of the visualized portions of the distal esophagus and stomach. No dilated loops of small bowel are present to suggest an obstruction. No evidence for appendicitis. Unremarkable appearance of the colon.    LYMPH NODES: No abdominal or pelvic lymphadenopathy is identified by size criteria.    VASCULATURE: Normal caliber abdominal aorta. A few scattered atherosclerotic plaques are present. Patent portal, splenic, and mesenteric veins. Patent bilateral renal veins.    PELVIC ORGANS: No large pelvic mass.    MUSCULOSKELETAL: Small right and moderate left fat filled inguinal hernias. Degenerative changes of the spine. Grade 2 anterolisthesis of L4 on L5. Minimal retrolisthesis of L5 on S1.      Impression    IMPRESSION:  1.  No abnormal focal urinary bladder wall thickening.  2.   Small bore catheter along the posterior urinary bladder.  3.  Ptotic/ectatic left kidney.  4.  Hepatic steatosis.  5.  Severe coronary artery calcifications.  6.  No new lymphadenopathy.       IMPRESSION/PLAN: Stage I urothelial carcinoma bladder.  Bladder cancer is a low-grade papillary urothelial carcinoma with invasion of the lamina propria without involvement of the muscularis propria.  Patient underwent maximum TURBT  (12/06/2023, 01/17/2024).  Patient is enrolled in the SUNRISE -3 clinical trial and is randomized to receive cetrelimab 360 mg IV plus intravesical TAR-200 every 21 days (beginning 05/24/2024).  There is no evidence of disease recurrence or metastases noted on CT urogram (08/16/2024) and cystoscopy and bladder biopsy (08/20/2024).    - He continues to tolerate cetrelimab well. His dose will be administered 1 week early today 10/01 per trial protocol this is within parameters and discussed with Dr. Tobin who is in agreement. He has virtually no side effects with the exception on hypothyroidism which he is now on replacement for. TSH is mildly elevated but improved from prior. T4 is WNL. Therefore, we will continue on current dose and monitor.   - creatinine is mildly elevated but within patients baseline.     RTC in 3 weeks per trial protocol.     Patient was seen and evaluated with study RNCC, Araceli Diaz.      20 minutes spent on the date of the encounter doing chart review, review of test results, interpretation of tests, patient visit, and documentation     Clarita Owen PA-C

## 2024-10-01 ENCOUNTER — ALLIED HEALTH/NURSE VISIT (OUTPATIENT)
Dept: ONCOLOGY | Facility: CLINIC | Age: 74
End: 2024-10-01

## 2024-10-01 ENCOUNTER — ONCOLOGY VISIT (OUTPATIENT)
Dept: ONCOLOGY | Facility: CLINIC | Age: 74
End: 2024-10-01
Payer: COMMERCIAL

## 2024-10-01 ENCOUNTER — INFUSION THERAPY VISIT (OUTPATIENT)
Dept: ONCOLOGY | Facility: CLINIC | Age: 74
End: 2024-10-01
Payer: COMMERCIAL

## 2024-10-01 VITALS
DIASTOLIC BLOOD PRESSURE: 84 MMHG | HEART RATE: 67 BPM | OXYGEN SATURATION: 97 % | SYSTOLIC BLOOD PRESSURE: 151 MMHG | RESPIRATION RATE: 18 BRPM | TEMPERATURE: 97.5 F

## 2024-10-01 VITALS
TEMPERATURE: 97.5 F | DIASTOLIC BLOOD PRESSURE: 89 MMHG | OXYGEN SATURATION: 97 % | SYSTOLIC BLOOD PRESSURE: 150 MMHG | HEART RATE: 72 BPM | BODY MASS INDEX: 36.48 KG/M2 | WEIGHT: 243.5 LBS | RESPIRATION RATE: 18 BRPM

## 2024-10-01 DIAGNOSIS — C67.3 MALIGNANT NEOPLASM OF ANTERIOR WALL OF URINARY BLADDER (H): Primary | ICD-10-CM

## 2024-10-01 LAB — BACTERIA UR CULT: NORMAL

## 2024-10-01 PROCEDURE — G0463 HOSPITAL OUTPT CLINIC VISIT: HCPCS

## 2024-10-01 PROCEDURE — 258N000003 HC RX IP 258 OP 636

## 2024-10-01 PROCEDURE — 96413 CHEMO IV INFUSION 1 HR: CPT

## 2024-10-01 RX ORDER — ACETAMINOPHEN 325 MG/1
650 TABLET ORAL
Status: CANCELLED | OUTPATIENT
Start: 2024-10-07

## 2024-10-01 RX ORDER — EPINEPHRINE 1 MG/ML
0.3 INJECTION, SOLUTION INTRAMUSCULAR; SUBCUTANEOUS EVERY 5 MIN PRN
Status: CANCELLED | OUTPATIENT
Start: 2024-10-07

## 2024-10-01 RX ORDER — METHYLPREDNISOLONE SODIUM SUCCINATE 125 MG/2ML
125 INJECTION, POWDER, LYOPHILIZED, FOR SOLUTION INTRAMUSCULAR; INTRAVENOUS
Status: CANCELLED
Start: 2024-10-07

## 2024-10-01 RX ORDER — DIPHENHYDRAMINE HYDROCHLORIDE 50 MG/ML
50 INJECTION INTRAMUSCULAR; INTRAVENOUS
Status: CANCELLED
Start: 2024-10-07

## 2024-10-01 RX ORDER — HEPARIN SODIUM,PORCINE 10 UNIT/ML
5-20 VIAL (ML) INTRAVENOUS DAILY PRN
Status: CANCELLED | OUTPATIENT
Start: 2024-10-07

## 2024-10-01 RX ORDER — LORAZEPAM 2 MG/ML
0.5 INJECTION INTRAMUSCULAR EVERY 4 HOURS PRN
Status: CANCELLED | OUTPATIENT
Start: 2024-10-07

## 2024-10-01 RX ORDER — ACETAMINOPHEN 325 MG/1
650 TABLET ORAL
Status: CANCELLED
Start: 2024-10-07

## 2024-10-01 RX ORDER — ALBUTEROL SULFATE 0.83 MG/ML
2.5 SOLUTION RESPIRATORY (INHALATION)
Status: CANCELLED | OUTPATIENT
Start: 2024-10-07

## 2024-10-01 RX ORDER — MEPERIDINE HYDROCHLORIDE 25 MG/ML
25 INJECTION INTRAMUSCULAR; INTRAVENOUS; SUBCUTANEOUS EVERY 30 MIN PRN
Status: CANCELLED | OUTPATIENT
Start: 2024-10-07

## 2024-10-01 RX ORDER — DIPHENHYDRAMINE HCL 25 MG
50 CAPSULE ORAL
Status: CANCELLED | OUTPATIENT
Start: 2024-10-07

## 2024-10-01 RX ORDER — HEPARIN SODIUM (PORCINE) LOCK FLUSH IV SOLN 100 UNIT/ML 100 UNIT/ML
5 SOLUTION INTRAVENOUS
Status: CANCELLED | OUTPATIENT
Start: 2024-10-07

## 2024-10-01 RX ORDER — ALBUTEROL SULFATE 90 UG/1
1-2 AEROSOL, METERED RESPIRATORY (INHALATION)
Status: CANCELLED
Start: 2024-10-07

## 2024-10-01 RX ADMIN — SODIUM CHLORIDE 250 ML: 9 INJECTION, SOLUTION INTRAVENOUS at 12:47

## 2024-10-01 ASSESSMENT — PAIN SCALES - GENERAL: PAINLEVEL: NO PAIN (0)

## 2024-10-01 NOTE — PATIENT INSTRUCTIONS
[facility] Triage and after hours / weekends / holidays:  [phone]    Please call the triage or after hours line if you experience a temperature greater than or equal to 100.4, shaking chills, have uncontrolled nausea, vomiting and/or diarrhea, dizziness, shortness of breath, chest pain, bleeding, unexplained bruising, or if you have any other new/concerning symptoms, questions or concerns.      If you are having any concerning symptoms or wish to speak to a provider before your next infusion visit, please call triage to notify them so we can adequately serve you.     If you need a refill on a narcotic prescription or other medication, please call before your infusion appointment.                October 2024 Sunday Monday Tuesday Wednesday Thursday Friday Saturday             1    RETURN CCSL  11:00 AM   (45 min.)   Clarita Owen PA-C   [facility]    ONC INFUSION 1.5 HR (90 MIN)  12:30 PM   (90 min.)    ONC INFUSION NURSE   [facility] 2     3     4     5       6     7     8     9     10     11     12       13     14     15     16     17     18     19       20     21    LAB PERIPHERAL  11:00 AM   (15 min.)   [facility] DRAW   [facility]    RETURN CCSL  11:15 AM   (45 min.)   Clarita Owen PA-C   [facility]    ONC INFUSION 1.5 HR (90 MIN)  12:30 PM   (90 min.)    ONC INFUSION NURSE   [facility] 22     23     24     25     26       27     28     29     30     31                           November 2024 Sunday Monday Tuesday Wednesday Thursday Friday Saturday                            1     2       3     4     5     6     7     8     9       10     11     12    CYSTOSCOPY   8:15 AM   (30 min.)   Harman Kaiser MD   [facility]    LAB PERIPHERAL   9:30 AM   (15 min.)   [facility] DRAW   [facility]  [facility]    RETURN CCSL   9:45 AM   (45 min.)   Clarita Owen PA-C   [facility]    ONC INFUSION 1.5 HR (90 MIN)  11:00 AM   (90 min.)    ONC INFUSION NURSE   [facility] 13     14     15     16       17     18     19     20     21     22     23       24     25     26     27     28     29     30                  No results found for this or any previous visit (from the past 24 hour(s)).

## 2024-10-01 NOTE — PROGRESS NOTES
Infusion Nursing Note:  Alek Mcneill presents today for ***.    Patient seen by provider today: {YES (EXPLAIN)/NO:334883}   present during visit today: {UMHLANGUAGE:298405}    Note: {Not Applicable or free text:695479:s}.    Vital signs obtained pre and post infusion.      Study Cetrelimab (IDS# 6160) Start Time: 1408  Study Cetrelimab (IDS# 6160) Stop Time: 1444      Intravenous Access:  {UMHIVACCESS:167471}    Treatment Conditions:  {UMHTXCONDITIONS:805484}      Post Infusion Assessment:  {UMHPOSTINFUSION:462185}       Discharge Plan:   {UMHDISCHARGE:070267}      Mckayla Smith RN

## 2024-10-01 NOTE — NURSING NOTE
[id number]: Study Visit Note   Subject name: Alek Mcneill     Visit: Week 30    Did the study visit occur within the appropriate window allowed by the protocol? Yes. Confirmed with Dr. Tobin via [employer] message that doing Cetrelimab about 2 weeks after last dose was OK, Dr. Tobin Okayd this, it is also OK per protocol.     Per [email or web address] message with Dr. Tobin: Creatine increase grade 1 is not related to cetrelimab and is not immune related.     Since the last study visit, He has been doing well. Really no new complaints, edema in BLE has resolved, none noted today on Clarita's exam. Has been working hard to clean up the tree that fell on his deck.     Assessed for below symptoms specifically:   Dysuria no  Pollakiuria no  Nocturia no  Urgency no  Incontinence no  Hematuria no  Urinary hesitation no  Bladder pain/spasm no  Urethral pain no   Bladder discomfort no  Feeling of incomplete bladder emptying  no  Lower abdominal pain no  Fever no  Flu-like symptoms no  Fatigue no  Diarrhea no  Nausea no  Rash no  Arthralgia  no      Cetrelimab infusion:  Were premedications given? No     Verbal handover provided to infusion RN; reminded RN to document actual start time of infusion and actual stop time of the infusion. If infusion deviates from protocol directed infusion time, please document rationale in your infusion note. Reminded RN to do 14 mL NS flush after infusion, this is considered the end of infusion. VS to be done before and after infusion and patient to stay for 15 min post infusion observation. Use 0.2 micron filter for infusion.       I have personally interviewed Alek SURENDRA Pickeringprincess and reviewed his medical record for adverse events and concomitant medications and these have been recorded on the corresponding logs in Alek Mcneill's research file.     Alek Mcneill was given the opportunity to ask any trial related questions.  Please see provider progress note for physical exam and other clinical information. Labs  were reviewed - any significant lab values were addressed and reviewed.    Araceli Diaz RN

## 2024-10-01 NOTE — NURSING NOTE
"Oncology Rooming Note    October 1, 2024 11:00 AM   Alek Mcneill is a 74 year old male who presents for:    Chief Complaint   Patient presents with    Oncology Clinic Visit     RTN for Prostate Cancer     Initial Vitals: BP (!) 150/89 (BP Location: Right arm, Patient Position: Right side, Cuff Size: Adult Large)   Pulse 72   Temp 97.5  F (36.4  C) (Oral)   Resp 18   Wt 110.5 kg (243 lb 8 oz)   SpO2 97%   BMI 36.48 kg/m   Estimated body mass index is 36.48 kg/m  as calculated from the following:    Height as of 9/16/24: 1.74 m (5' 8.5\").    Weight as of this encounter: 110.5 kg (243 lb 8 oz). Body surface area is 2.31 meters squared.  No Pain (0) Comment: Data Unavailable   No LMP for male patient.  Allergies reviewed: Yes  Medications reviewed: Yes    Medications: Medication refills not needed today.  Pharmacy name entered into [employer]: [facility] #[id number] [address] AT [place] [address]    Frailty Screening:   Is the patient here for a new oncology consult visit in cancer care? 2. No      Clinical concerns: none        Lyudmila Mohan MA             "

## 2024-10-01 NOTE — Clinical Note
10/1/2024      Alek AGOSTO Raheelprincess  [address] [place] [address]      Dear Colleague,    Thank you for referring your patient, Alek Mcneill, to the [facility]. Please see a copy of my visit note below.    PRIMARY CARE PHYSICIAN: Margarita BRUNSON MD  UROLOGY: Harman Kaiser MD    HISTORY OF PRESENT ILLNESS: Patient is a 74-year-old male with stage I urothelial carcinoma bladder (cT1, cN0, cM0). Patient presents 11/19/2023, with gross hematuria and blood clots. CT abdomen, pelvis 11/19/2023, revealed several nodular foci of increased density along the inferior bladder measuring up to 1.2 cm there was no hydronephrosis.  There was no lymphadenopathy or metastases. Cystoscopy and TURBT 12/06/2023, revealed multiple 1-2 cm papillary bladder tumors on the anterior wall, some with superficial calcifications. Pathology evaluation revealed a low-grade papillary urothelial carcinoma with invasion of the lamina propria.  Muscularis propria was present and uninvolved.  Repeat cystoscopy and TURBT 01/17/2024, revealed a 3 cm tumor area previously resected on the anterior bladder wall near the dome.  TURBT of the base of the bladder tumor revealed microscopic foci of residual flat urothelial carcinoma in situ without residual invasive carcinoma.  Patient is enrolled in the [facility] -[facility] clinical trial and is randomized to receive cetrelimab 360 mg IV every 21 days, plus intravesical TAR-200 every 21 days x6, then every 3 months beginning 05/24/2024.  Patient received 9 cycles of cetrelimab thus far.     Restaging CT urogram 08/16/2024, was negative for urinary bladder wall thickening or tumor.  There was no lymphadenopathy. Cystoscopy and bladder biopsy 08/20/2024, was negative for recurrent tumor, and anterior wall bladder biopsy revealed urothelium with reactive changes and was negative for malignancy.            Patient feels well and voices no complaints.  He denies fever, anorexia,  headache, dry skin, rash, pruritus, dry eyes, dry mouth, cough, dyspnea, chest pain, abdominal pain, nausea, vomiting, diarrhea, constipation, muscle or joint pain, bone pain, numbness, tingling.    INTERVAL HISTORY:      PAST HISTORY:   -Hypertension.  -Diabetes.  -Hyperlipidemia.  -Stage I urothelial carcinoma bladder (cT1, cN0, cM0).  -Fatty liver.  -Colonoscopy 2019, was negative for polyps.  -Cervical C7-T1 radiculopathy.  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  -Tonsillectomy, 1958.    MEDICATIONS:   Current Outpatient Medications   Medication Sig Dispense Refill    Ascorbic Acid 500 MG CAPS 1 tablet Orally Once a day      aspirin (ASPIRIN LOW DOSE) 81 MG EC tablet Take 81 mg by mouth daily ONCE BEFORE BED      blood glucose (NO BRAND SPECIFIED) lancets standard as directed, to use with his Contour Next test blood sugars daily      CONTOUR NEXT TEST test strip USE TO TEST BLOOD SUGAR DAILY      Ergocalciferol 50 MCG (2000 UT) CAPS Take 50 mcg by mouth daily      fish oil-omega-3 fatty acids 500 MG capsule Take 2 capsules by mouth daily      hydrochlorothiazide (HYDRODIURIL) 25 MG tablet Take 25 mg by mouth daily      ketoconazole (NIZORAL) 2 % external cream Apply topically as needed      levothyroxine (SYNTHROID/LEVOTHROID) 100 MCG tablet Take 1 tablet (100 mcg) by mouth daily 30 tablet 3    Microlet Lancets MISC daily      simvastatin (ZOCOR) 40 MG tablet Take 40 mg by mouth at bedtime      triamcinolone (KENALOG) 0.1 % external cream Apply as needed      Vitamin D3 (VITAMIN D, CHOLECALCIFEROL,) 25 mcg (1000 units) tablet Take 25 mcg by mouth daily         REVIEW OF SYSTEMS: Review of systems reviewed with the patient and otherwise negative except for those detailed above.    PHYSICAL EXAM: There were no vitals taken for this visit..  There is no height or weight on file to calculate BSA. ECOG performance status: 0.  Skin: No erythema or rash.  HEENT: Sclera  nonicteric. Oropharynx without lesions or ulceration, mucosa pink and moist.  Nodes: No cervical, supraclavicular, axillary, or inguinal adenopathy.  Lungs: No dullness to percussion.  No rales, wheezes, rhonchi.  Heart: Regular rate and rhythm.  Abdomen: Bowel sounds present.  Soft, nontender, no hepatosplenomegaly or mass.  Extremities: Trace ankle and pedal edema.     LABS REVIEWED:   Component      Latest Ref Rng 8/16/2024  9:06 AM 9/16/2024  7:09 AM   WBC      4.0 - 11.0 10e3/uL  8.3    RBC Count      4.40 - 5.90 10e6/uL  4.68    Hemoglobin      13.3 - 17.7 g/dL  15.6    Hematocrit      40.0 - 53.0 %  44.0    MCV      78 - 100 fL  94    MCH      26.5 - 33.0 pg  33.3 (H)    MCHC      31.5 - 36.5 g/dL  35.5    RDW      10.0 - 15.0 %  12.4    Platelet Count      150 - 450 10e3/uL  220    % Neutrophils      %  58    % Lymphocytes      %  25    % Monocytes      %  11    % Eosinophils      %  5    % Basophils      %  1    % Immature Granulocytes      %  0    NRBCs per 100 WBC      <1 /100  0    Absolute Neutrophils      1.6 - 8.3 10e3/uL  4.8    Absolute Lymphocytes      0.8 - 5.3 10e3/uL  2.1    Absolute Monocytes      0.0 - 1.3 10e3/uL  0.9    Absolute Eosinophils      0.0 - 0.7 10e3/uL  0.4    Absolute Basophils      0.0 - 0.2 10e3/uL  0.1    Absolute Immature Granulocytes      <=0.4 10e3/uL  0.0    Absolute NRBCs      10e3/uL  0.0    Sodium      135 - 145 mmol/L  136    Potassium      3.4 - 5.3 mmol/L  4.0    Carbon Dioxide (CO2)      22 - 29 mmol/L  24    Anion Gap      7 - 15 mmol/L  12    Urea Nitrogen      8.0 - 23.0 mg/dL  13.6    Creatinine      0.67 - 1.17 mg/dL  1.15    GFR Estimate      >60 mL/min/1.73m2  66    Calcium      8.8 - 10.4 mg/dL  9.4    Chloride      98 - 107 mmol/L  100    Glucose      70 - 99 mg/dL  134 (H)    Alkaline Phosphatase      40 - 150 U/L  71    AST      0 - 45 U/L  38    ALT      0 - 70 U/L  31    Protein Total      6.4 - 8.3 g/dL  7.1    Albumin      3.5 - 5.2 g/dL  4.2     Bilirubin Total      <=1.2 mg/dL  0.7    Color Urine      Colorless, Straw, Light Yellow, Yellow   Straw    Appearance Urine      Clear   Clear    Glucose Urine      Negative mg/dL  Negative    Bilirubin Urine      Negative   Negative    Ketones Urine      Negative mg/dL  Negative    Specific Gravity Urine      1.003 - 1.035   1.005    Blood Urine      Negative   Negative    pH Urine      5.0 - 7.0   6.0    Protein Albumin Urine      Negative mg/dL  Negative    Urobilinogen mg/dL      Normal, 2.0 mg/dL  Normal    Nitrite Urine      Negative   Negative    Leukocyte Esterase Urine      Negative   Negative    RBC Urine      <=2 /HPF  <1    WBC Urine      <=5 /HPF  <1    Squamous Epithelial /HPF Urine      <=1 /HPF  <1    TSH      0.30 - 4.20 uIU/mL 12.02 (H)  10.61 (H)    T4 Free      0.90 - 1.70 ng/dL 1.34  1.13    Phosphorus      2.5 - 4.5 mg/dL  2.9    Lactate Dehydrogenase      0 - 250 U/L  247    Uric Acid      3.4 - 7.0 mg/dL  6.0    CRP Inflammation      <5.00 mg/L  <3.00    GGT      8 - 61 U/L  91 (H)    Lipase      13 - 60 U/L  39    Amylase      28 - 100 U/L  64        Hemoglobin A1c pending.    IMAGING REVIEWED:   Recent Results (from the past 744 hour(s))   CT Urogram wo & w Contrast    Narrative    EXAM: CT UROGRAM WO and W CONTRAST  LOCATION: [facility]  DATE: 8/16/2024    INDICATION:  Malignant neoplasm of anterior wall of urinary bladder (H)  COMPARISON: Multiple priors with the most recent dated 2/13/2024  TECHNIQUE: CT scan of the abdomen and pelvis using urogram technique with pre contrast, post contrast, and delayed images. Multiplanar reformats were obtained. Dose reduction techniques were used.   CONTRAST: Isovue [id number]    FINDINGS:   LOWER CHEST: Patent central airways. Minimal scattered subpleural scarring. Severe coronary artery calcifications.    HEPATOBILIARY: Hepatic steatosis. No focal hepatic mass, biliary dilatation, or calcified  gallstones.    PANCREAS: No focal pancreatic mass, peripancreatic inflammation, or pancreatic ductal dilatation.    SPLEEN: Normal size.    ADRENAL GLANDS: No nodules.    RIGHT KIDNEY/URETER: No radiodense calculus. No suspicious renal mass or hydronephrosis. Appropriate excretion of the contrast into the collecting system and the ureter, which is opacified appropriately without a definite focal filling defect or   stricture. The suboptimal opacification of the distal ureter is likely due to underlying peristalsis.    LEFT KIDNEY/URETER: Slightly inferiorly ptotic/ectopic left kidney. No radiodense calculus. No suspicious renal mass or hydronephrosis. Appropriate excretion of the contrast into the collecting system and the ureter without a focal filling defect or   suture. The suboptimal opacification of the distal ureter is likely due to underlying peristalsis.    BLADDER: No abnormal focal urinary bladder wall thickening is identified. A small bore catheter is present along the posterior urinary bladder    BOWEL: Unremarkable appearance of the visualized portions of the distal esophagus and stomach. No dilated loops of small bowel are present to suggest an obstruction. No evidence for appendicitis. Unremarkable appearance of the colon.    LYMPH NODES: No abdominal or pelvic lymphadenopathy is identified by size criteria.    VASCULATURE: Normal caliber abdominal aorta. A few scattered atherosclerotic plaques are present. Patent portal, splenic, and mesenteric veins. Patent bilateral renal veins.    PELVIC ORGANS: No large pelvic mass.    MUSCULOSKELETAL: Small right and moderate left fat filled inguinal hernias. Degenerative changes of the spine. Grade 2 anterolisthesis of L4 on L5. Minimal retrolisthesis of L5 on S1.      Impression    IMPRESSION:  1.  No abnormal focal urinary bladder wall thickening.  2.   Small bore catheter along the posterior urinary bladder.  3.  Ptotic/ectatic left kidney.  4.  Hepatic  steatosis.  5.  Severe coronary artery calcifications.  6.  No new lymphadenopathy.       IMPRESSION/PLAN: Stage I urothelial carcinoma bladder.  Bladder cancer is a low-grade papillary urothelial carcinoma with invasion of the lamina propria without involvement of the muscularis propria.  Patient underwent maximum TURBT (12/06/2023, 01/17/2024).  Patient is enrolled in the [facility] -3 clinical trial and is randomized to receive cetrelimab 360 mg IV plus intravesical TAR-200 every 21 days (beginning 05/24/2024).  There is no evidence of disease recurrence or metastases noted on CT urogram (08/16/2024) and cystoscopy and bladder biopsy (08/20/2024).  There are no adverse effects.  TSH is mildly elevated, but patient remains clinically euthyroid and the current dose of levothyroxine will be continued.  Cetrelimab/TAR-200 will be continued without modification.  Patient will return to ambulatory infusion center 09/17/2024, for cetrelimab cycle 10.  I reviewed the risks and side effects of cetrelimab with the patient, which include fatigue, weakness, anorexia, weight loss, rash, pruritus, dry eyes, dry mouth, headache, cough, dyspnea, abdominal pain, nausea, vomiting, diarrhea, hypothyroidism, adrenal insufficiency, other endocrine disorders, cardiomyopathy, colitis, nephritis, pancreatitis, myositis, arthritis, neuropathy, cerebritis.  Patient understood the indication and risks and agreed to continue therapy.  Patient will return to clinic in 3 weeks with laboratory testing in accordance with the clinical protocol. The current and past history, clinical evaluation, reviewing diagnostic tests and viewing images with the patient, and assessment and planning occurred over 30 minutes.       Jalil Carbajal MD    cc: MD Harman Stratton MD    Oct 1, 2024    PRIMARY CARE PHYSICIAN: Margarita BRUNSON MD  UROLOGY: Harman Kaiser MD    HISTORY OF PRESENT ILLNESS: Patient is a 74-year-old male  with stage I urothelial carcinoma bladder (cT1, cN0, cM0). Patient presents 11/19/2023, with gross hematuria and blood clots. CT abdomen, pelvis 11/19/2023, revealed several nodular foci of increased density along the inferior bladder measuring up to 1.2 cm there was no hydronephrosis.  There was no lymphadenopathy or metastases. Cystoscopy and TURBT 12/06/2023, revealed multiple 1-2 cm papillary bladder tumors on the anterior wall, some with superficial calcifications. Pathology evaluation revealed a low-grade papillary urothelial carcinoma with invasion of the lamina propria.  Muscularis propria was present and uninvolved.  Repeat cystoscopy and TURBT 01/17/2024, revealed a 3 cm tumor area previously resected on the anterior bladder wall near the dome.  TURBT of the base of the bladder tumor revealed microscopic foci of residual flat urothelial carcinoma in situ without residual invasive carcinoma.  [facility] and is randomized to receive cetrelimab 360 mg IV every 21 days, plus intravesical TAR-200 every 21 days x6, then every 3 months beginning 05/24/2024.  Patient received 9 cycles of cetrelimab thus far.     Restaging CT urogram 08/16/2024, was negative for urinary bladder wall thickening or tumor.  There was no lymphadenopathy. Cystoscopy and bladder biopsy 08/20/2024, was negative for recurrent tumor, and anterior wall bladder biopsy revealed urothelium with reactive changes and was negative for malignancy.      INTERVAL HISTORY:  Alek returns to clinic today accompanied by his wife.  He reports that he is doing well.  He has no complaints.  He has no new pains in his body.  No arthralgias.  No chest pain, shortness of breath, or cough.  No fevers or chills.  No urinary changes or concerns.  No diarrhea.  He tends to be more on the constipated side and uses senna as needed.  No current rashes but does report that approximately week ago he had groin and itching  without a rash.  He believes that this was related to the heat.  He remains on his levothyroxine replacement without challenges.  Eating and drinking well.  No other concerns or complaints today.    PAST HISTORY:   -Hypertension.  -Diabetes.  -Hyperlipidemia.  -Stage I urothelial carcinoma bladder (cT1, cN0, cM0).  -Fatty liver.  -Colonoscopy 2019, was negative for polyps.  -Cervical C7-T1 radiculopathy.  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  -Tonsillectomy, 1958.    MEDICATIONS:   Current Outpatient Medications   Medication Sig Dispense Refill     Ascorbic Acid 500 MG CAPS 1 tablet Orally Once a day       aspirin (ASPIRIN LOW DOSE) 81 MG EC tablet Take 81 mg by mouth daily ONCE BEFORE BED       blood glucose (NO BRAND SPECIFIED) lancets standard as directed, to use with his Contour Next test blood sugars daily       CONTOUR NEXT TEST test strip USE TO TEST BLOOD SUGAR DAILY       Ergocalciferol 50 MCG (2000 UT) CAPS Take 50 mcg by mouth daily       fish oil-omega-3 fatty acids 500 MG capsule Take 2 capsules by mouth daily       hydrochlorothiazide (HYDRODIURIL) 25 MG tablet Take 25 mg by mouth daily       ketoconazole (NIZORAL) 2 % external cream Apply topically as needed       levothyroxine (SYNTHROID/LEVOTHROID) 100 MCG tablet Take 1 tablet (100 mcg) by mouth daily 30 tablet 3     Microlet Lancets MISC daily       simvastatin (ZOCOR) 40 MG tablet Take 40 mg by mouth at bedtime       triamcinolone (KENALOG) 0.1 % external cream Apply as needed       Vitamin D3 (VITAMIN D, CHOLECALCIFEROL,) 25 mcg (1000 units) tablet Take 25 mcg by mouth daily         PHYSICAL EXAM:   BP (!) 150/89 (BP Location: Right arm, Patient Position: Right side, Cuff Size: Adult Large)   Pulse 72   Temp 97.5  F (36.4  C) (Oral)   Resp 18   Wt 110.5 kg (243 lb 8 oz)   SpO2 97%   BMI 36.48 kg/m    General: No acute distress  HEENT: Sclera anicteric. Oral mucosa pink and moist.  No mucositis  or thrush  Lymph: No lymphadenopathy in neck  Heart: Regular, rate, and rhythm  Lungs: Clear to ascultation bilaterally  Abdomen: Positive bowel sounds. Soft, non-distended, non-tender. No organomegaly or mass.   Extremities: no lower extremity edema  Neuro: Cranial nerves grossly intact  Rash: none  Vascular access: port     LABS REVIEWED:    Latest Reference Range & Units 09/30/24 09:50   Sodium 135 - 145 mmol/L 135   Potassium 3.4 - 5.3 mmol/L 4.2   Chloride 98 - 107 mmol/L 98   Carbon Dioxide (CO2) 22 - 29 mmol/L 30 (H)   Urea Nitrogen 8.0 - 23.0 mg/dL 17.4   Creatinine 0.67 - 1.17 mg/dL 1.32 (H)   GFR Estimate >60 mL/min/1.73m2 56 (L)   Calcium 8.8 - 10.4 mg/dL 9.2   Anion Gap 7 - 15 mmol/L 7   Phosphorus 2.5 - 4.5 mg/dL 3.1   Albumin 3.5 - 5.2 g/dL 4.2   Protein Total 6.4 - 8.3 g/dL 7.2   Alkaline Phosphatase 40 - 150 U/L 74   ALT 0 - 70 U/L 36   AST 0 - 45 U/L 33   Amylase 28 - 100 U/L 88   Bilirubin Total <=1.2 mg/dL 0.7   CRP Inflammation <5.00 mg/L <3.00   GGT 8 - 61 U/L 106 (H)   Glucose 70 - 99 mg/dL 123 (H)   Estimated Average Glucose <117 mg/dL 134 (H)   Hemoglobin A1C <5.7 % 6.3 (H)   Lactate Dehydrogenase 0 - 250 U/L 202   Lipase 13 - 60 U/L 59   T4 Free 0.90 - 1.70 ng/dL 1.17   TSH 0.30 - 4.20 uIU/mL 8.57 (H)   Uric Acid 3.4 - 7.0 mg/dL 6.3   WBC 4.0 - 11.0 10e3/uL 8.2   Hemoglobin 13.3 - 17.7 g/dL 16.1   Hematocrit 40.0 - 53.0 % 45.9   Platelet Count 150 - 450 10e3/uL 224   RBC Count 4.40 - 5.90 10e6/uL 4.88   MCV 78 - 100 fL 94   MCH 26.5 - 33.0 pg 33.0   MCHC 31.5 - 36.5 g/dL 35.1   RDW 10.0 - 15.0 % 12.5   % Neutrophils % 52   % Lymphocytes % 31   % Monocytes % 12   % Eosinophils % 4   % Basophils % 1   Absolute Basophils 0.0 - 0.2 10e3/uL 0.1   Absolute Eosinophils 0.0 - 0.7 10e3/uL 0.3   Absolute Immature Granulocytes <=0.4 10e3/uL 0.0   Absolute Lymphocytes 0.8 - 5.3 10e3/uL 2.5   Absolute Monocytes 0.0 - 1.3 10e3/uL 1.0   % Immature Granulocytes % 0   Absolute Neutrophils 1.6 - 8.3 10e3/uL  4.3   Absolute NRBCs 10e3/uL 0.0   NRBCs per 100 WBC <1 /100 0   Color Urine Colorless, Straw, Light Yellow, Yellow  Colorless   Appearance Urine Clear  Clear   Glucose Urine Negative mg/dL Negative   Bilirubin Urine Negative  Negative   Ketones Urine Negative mg/dL Negative   Specific Gravity Urine 1.001 - 1.030  1.011   pH Urine 5.0 - 7.0  6.5   Protein Albumin Urine Negative mg/dL Negative   Urobilinogen mg/dL <2.0 mg/dL <2.0   Nitrite Urine Negative  Negative   Blood Urine Negative  Negative   Leukocyte Esterase Urine Negative  Negative   WBC Urine <=5 /HPF <1   RBC Urine <=2 /HPF 0   URINE CULTURE  Rpt     I reviewed the above labs today.      IMAGING REVIEWED:   Recent Results (from the past 744 hour(s))   CT Urogram wo & w Contrast    Narrative    EXAM: CT UROGRAM WO and W CONTRAST  LOCATION: [facility]  DATE: 8/16/2024    INDICATION:  Malignant neoplasm of anterior wall of urinary bladder (H)  COMPARISON: Multiple priors with the most recent dated 2/13/2024  TECHNIQUE: CT scan of the abdomen and pelvis using urogram technique with pre contrast, post contrast, and delayed images. Multiplanar reformats were obtained. Dose reduction techniques were used.   CONTRAST: Isovue 370 [id number]    FINDINGS:   LOWER CHEST: Patent central airways. Minimal scattered subpleural scarring. Severe coronary artery calcifications.    HEPATOBILIARY: Hepatic steatosis. No focal hepatic mass, biliary dilatation, or calcified gallstones.    PANCREAS: No focal pancreatic mass, peripancreatic inflammation, or pancreatic ductal dilatation.    SPLEEN: Normal size.    ADRENAL GLANDS: No nodules.    RIGHT KIDNEY/URETER: No radiodense calculus. No suspicious renal mass or hydronephrosis. Appropriate excretion of the contrast into the collecting system and the ureter, which is opacified appropriately without a definite focal filling defect or   stricture. The suboptimal opacification of the distal  ureter is likely due to underlying peristalsis.    LEFT KIDNEY/URETER: Slightly inferiorly ptotic/ectopic left kidney. No radiodense calculus. No suspicious renal mass or hydronephrosis. Appropriate excretion of the contrast into the collecting system and the ureter without a focal filling defect or   suture. The suboptimal opacification of the distal ureter is likely due to underlying peristalsis.    BLADDER: No abnormal focal urinary bladder wall thickening is identified. A small bore catheter is present along the posterior urinary bladder    BOWEL: Unremarkable appearance of the visualized portions of the distal esophagus and stomach. No dilated loops of small bowel are present to suggest an obstruction. No evidence for appendicitis. Unremarkable appearance of the colon.    LYMPH NODES: No abdominal or pelvic lymphadenopathy is identified by size criteria.    VASCULATURE: Normal caliber abdominal aorta. A few scattered atherosclerotic plaques are present. Patent portal, splenic, and mesenteric veins. Patent bilateral renal veins.    PELVIC ORGANS: No large pelvic mass.    MUSCULOSKELETAL: Small right and moderate left fat filled inguinal hernias. Degenerative changes of the spine. Grade 2 anterolisthesis of L4 on L5. Minimal retrolisthesis of L5 on S1.      Impression    IMPRESSION:  1.  No abnormal focal urinary bladder wall thickening.  2.   Small bore catheter along the posterior urinary bladder.  3.  Ptotic/ectatic left kidney.  4.  Hepatic steatosis.  5.  Severe coronary artery calcifications.  6.  No new lymphadenopathy.       IMPRESSION/PLAN: Stage I urothelial carcinoma bladder.  Bladder cancer is a low-grade papillary urothelial carcinoma with invasion of the lamina propria without involvement of the muscularis propria.  Patient underwent maximum TURBT (12/06/2023, 01/17/2024).  [facility] and is randomized to receive cetrelimab 360 mg IV plus intravesical  TAR-200 every 21 days (beginning 05/24/2024).  There is no evidence of disease recurrence or metastases noted on CT urogram (08/16/2024) and cystoscopy and bladder biopsy (08/20/2024).    - He continues to tolerate cetrelimab well. His dose will be administered 1 week early today 10/01 per trial protocol this is within parameters and discussed with Dr. Tobin who is in agreemtThere are no adverse effects.  TSH is mildly elevated, but patient remains clinically euthyroid and the current dose of levothyroxine will be continued.  Cetrelimab/TAR-200 will be continued without modification.  Patient will return to ambulatory infusion center 09/17/2024, for cetrelimab cycle 10.  I reviewed the risks and side effects of cetrelimab with the patient, which include fatigue, weakness, anorexia, weight loss, rash, pruritus, dry eyes, dry mouth, headache, cough, dyspnea, abdominal pain, nausea, vomiting, diarrhea, hypothyroidism, adrenal insufficiency, other endocrine disorders, cardiomyopathy, colitis, nephritis, pancreatitis, myositis, arthritis, neuropathy, cerebritis.  Patient understood the indication and risks and agreed to continue therapy.  Patient will return to clinic in 3 weeks with laboratory testing in accordance with the clinical protocol. The current and past history, clinical evaluation, reviewing diagnostic tests and viewing images with the patient, and assessment and planning occurred over 30 minutes.       Jalil Carbajal MD    cc: MD Harman Stratton MD      Again, thank you for allowing me to participate in the care of your patient.        Sincerely,        Clarita Owen PA-C

## 2024-10-01 NOTE — PROGRESS NOTES
Infusion Nursing Note:  Alek Mcneill presents today for W30 D1 study - cetrelimab IDS #[id number].    Patient seen by provider today: Yes: Clarita Owen   present during visit today: Not Applicable.    Note: Patient presents to the infusion center feeling well today. Does not offer any new questions or concerns after today's provider visit. Patient wishes to proceed with treatment today.     Per Genoveva Franco RN okay to proceed with today's treatment.     Cetrelimab Start Time: 1254  Cetrelimab Stop Time: 1328    Vital signs taken before and after cetrelimab infusion.     Intravenous Access:  Peripheral IV placed.    Treatment Conditions:  Lab Results   Component Value Date    HGB 16.1 09/30/2024    WBC 8.2 09/30/2024    ANEUTAUTO 4.3 09/30/2024     09/30/2024        Lab Results   Component Value Date     09/30/2024    POTASSIUM 4.2 09/30/2024    MAG 2.3 11/19/2023    CR 1.32 (H) 09/30/2024    MILLI 9.2 09/30/2024    BILITOTAL 0.7 09/30/2024    ALBUMIN 4.2 09/30/2024    ALT 36 09/30/2024    AST 33 09/30/2024       Results reviewed, labs MET treatment parameters, ok to proceed with treatment.      Post Infusion Assessment:  Patient tolerated infusion without incident.  Patient observed for 15 minutes post cetrelimab per protocol.  Blood return noted pre and post infusion.  Site patent and intact, free from redness, edema or discomfort.  No evidence of extravasations.  Access discontinued per protocol.       Discharge Plan:   Patient declined prescription refills.  Discharge instructions reviewed with: Patient and Family.  Patient and/or family verbalized understanding of discharge instructions and all questions answered.  AVS to patient via [employer].  Patient will return 10/21/2024 for next appointment.   Patient discharged in stable condition accompanied by: self and wife.  Departure Mode: Ambulatory.      Wendy Richard RN

## 2024-10-16 DIAGNOSIS — C67.3 MALIGNANT NEOPLASM OF ANTERIOR WALL OF URINARY BLADDER (H): Primary | ICD-10-CM

## 2024-10-17 ENCOUNTER — APPOINTMENT (OUTPATIENT)
Dept: LAB | Facility: HOSPITAL | Age: 74
End: 2024-10-17
Payer: COMMERCIAL

## 2024-10-17 LAB
ALBUMIN SERPL BCG-MCNC: 4.3 G/DL (ref 3.5–5.2)
ALBUMIN UR-MCNC: NEGATIVE MG/DL
ALP SERPL-CCNC: 78 U/L (ref 40–150)
ALT SERPL W P-5'-P-CCNC: 35 U/L (ref 0–70)
AMYLASE SERPL-CCNC: 112 U/L (ref 28–100)
ANION GAP SERPL CALCULATED.3IONS-SCNC: 10 MMOL/L (ref 7–15)
APPEARANCE UR: CLEAR
AST SERPL W P-5'-P-CCNC: 31 U/L (ref 0–45)
BASOPHILS # BLD AUTO: 0.1 10E3/UL (ref 0–0.2)
BASOPHILS NFR BLD AUTO: 1 %
BILIRUB SERPL-MCNC: 0.7 MG/DL
BILIRUB UR QL STRIP: NEGATIVE
BUN SERPL-MCNC: 19.2 MG/DL (ref 8–23)
CALCIUM SERPL-MCNC: 9.3 MG/DL (ref 8.8–10.4)
CHLORIDE SERPL-SCNC: 96 MMOL/L (ref 98–107)
COLOR UR AUTO: NORMAL
CREAT SERPL-MCNC: 1.3 MG/DL (ref 0.67–1.17)
CRP SERPL-MCNC: 4.3 MG/L
EGFRCR SERPLBLD CKD-EPI 2021: 57 ML/MIN/1.73M2
EOSINOPHIL # BLD AUTO: 0.3 10E3/UL (ref 0–0.7)
EOSINOPHIL NFR BLD AUTO: 4 %
ERYTHROCYTE [DISTWIDTH] IN BLOOD BY AUTOMATED COUNT: 12.2 % (ref 10–15)
EST. AVERAGE GLUCOSE BLD GHB EST-MCNC: 137 MG/DL
GGT SERPL-CCNC: 104 U/L (ref 8–61)
GLUCOSE SERPL-MCNC: 116 MG/DL (ref 70–99)
GLUCOSE UR STRIP-MCNC: NEGATIVE MG/DL
HBA1C MFR BLD: 6.4 %
HCO3 SERPL-SCNC: 28 MMOL/L (ref 22–29)
HCT VFR BLD AUTO: 44.9 % (ref 40–53)
HGB BLD-MCNC: 15.6 G/DL (ref 13.3–17.7)
HGB UR QL STRIP: NEGATIVE
IMM GRANULOCYTES # BLD: 0 10E3/UL
IMM GRANULOCYTES NFR BLD: 0 %
KETONES UR STRIP-MCNC: NEGATIVE MG/DL
LDH SERPL L TO P-CCNC: 189 U/L (ref 0–250)
LEUKOCYTE ESTERASE UR QL STRIP: NEGATIVE
LIPASE SERPL-CCNC: 75 U/L (ref 13–60)
LYMPHOCYTES # BLD AUTO: 2.3 10E3/UL (ref 0.8–5.3)
LYMPHOCYTES NFR BLD AUTO: 28 %
MCH RBC QN AUTO: 32.8 PG (ref 26.5–33)
MCHC RBC AUTO-ENTMCNC: 34.7 G/DL (ref 31.5–36.5)
MCV RBC AUTO: 94 FL (ref 78–100)
MONOCYTES # BLD AUTO: 0.9 10E3/UL (ref 0–1.3)
MONOCYTES NFR BLD AUTO: 11 %
NEUTROPHILS # BLD AUTO: 4.7 10E3/UL (ref 1.6–8.3)
NEUTROPHILS NFR BLD AUTO: 57 %
NITRATE UR QL: NEGATIVE
NRBC # BLD AUTO: 0 10E3/UL
NRBC BLD AUTO-RTO: 0 /100
PH UR STRIP: 5.5 [PH] (ref 5–7)
PHOSPHATE SERPL-MCNC: 2.9 MG/DL (ref 2.5–4.5)
PLATELET # BLD AUTO: 209 10E3/UL (ref 150–450)
POTASSIUM SERPL-SCNC: 4.1 MMOL/L (ref 3.4–5.3)
PROT SERPL-MCNC: 7.3 G/DL (ref 6.4–8.3)
RBC # BLD AUTO: 4.76 10E6/UL (ref 4.4–5.9)
RBC URINE: <1 /HPF
SODIUM SERPL-SCNC: 134 MMOL/L (ref 135–145)
SP GR UR STRIP: 1.01 (ref 1–1.03)
T4 FREE SERPL-MCNC: 1.25 NG/DL (ref 0.9–1.7)
TSH SERPL DL<=0.005 MIU/L-ACNC: 6.7 UIU/ML (ref 0.3–4.2)
URATE SERPL-MCNC: 6.8 MG/DL (ref 3.4–7)
UROBILINOGEN UR STRIP-MCNC: <2 MG/DL
WBC # BLD AUTO: 8.4 10E3/UL (ref 4–11)
WBC URINE: 1 /HPF

## 2024-10-17 PROCEDURE — 84550 ASSAY OF BLOOD/URIC ACID: CPT | Performed by: INTERNAL MEDICINE

## 2024-10-17 PROCEDURE — 81001 URINALYSIS AUTO W/SCOPE: CPT | Performed by: INTERNAL MEDICINE

## 2024-10-17 PROCEDURE — 86140 C-REACTIVE PROTEIN: CPT | Performed by: INTERNAL MEDICINE

## 2024-10-17 PROCEDURE — 83690 ASSAY OF LIPASE: CPT | Performed by: INTERNAL MEDICINE

## 2024-10-17 PROCEDURE — 84439 ASSAY OF FREE THYROXINE: CPT | Performed by: INTERNAL MEDICINE

## 2024-10-17 PROCEDURE — 84443 ASSAY THYROID STIM HORMONE: CPT | Performed by: INTERNAL MEDICINE

## 2024-10-17 PROCEDURE — 36415 COLL VENOUS BLD VENIPUNCTURE: CPT | Performed by: INTERNAL MEDICINE

## 2024-10-17 PROCEDURE — 82150 ASSAY OF AMYLASE: CPT | Performed by: INTERNAL MEDICINE

## 2024-10-17 PROCEDURE — 87086 URINE CULTURE/COLONY COUNT: CPT | Performed by: INTERNAL MEDICINE

## 2024-10-17 PROCEDURE — 84100 ASSAY OF PHOSPHORUS: CPT | Performed by: INTERNAL MEDICINE

## 2024-10-17 PROCEDURE — 85025 COMPLETE CBC W/AUTO DIFF WBC: CPT | Performed by: INTERNAL MEDICINE

## 2024-10-17 PROCEDURE — 83036 HEMOGLOBIN GLYCOSYLATED A1C: CPT | Performed by: INTERNAL MEDICINE

## 2024-10-17 PROCEDURE — 83615 LACTATE (LD) (LDH) ENZYME: CPT | Performed by: INTERNAL MEDICINE

## 2024-10-17 PROCEDURE — 82977 ASSAY OF GGT: CPT | Performed by: INTERNAL MEDICINE

## 2024-10-17 PROCEDURE — 82040 ASSAY OF SERUM ALBUMIN: CPT | Performed by: INTERNAL MEDICINE

## 2024-10-18 LAB — BACTERIA UR CULT: NORMAL

## 2024-10-18 NOTE — PROGRESS NOTES
"Oct 21, 2024    PRIMARY CARE PHYSICIAN: Margarita BRUNSON MD  UROLOGY: Harman Kaiser MD    HISTORY OF PRESENT ILLNESS: Patient is a 74-year-old male with stage I urothelial carcinoma bladder (cT1, cN0, cM0). Patient presents 11/19/2023, with gross hematuria and blood clots. CT abdomen, pelvis 11/19/2023, revealed several nodular foci of increased density along the inferior bladder measuring up to 1.2 cm there was no hydronephrosis.  There was no lymphadenopathy or metastases. Cystoscopy and TURBT 12/06/2023, revealed multiple 1-2 cm papillary bladder tumors on the anterior wall, some with superficial calcifications. Pathology evaluation revealed a low-grade papillary urothelial carcinoma with invasion of the lamina propria.  Muscularis propria was present and uninvolved.  Repeat cystoscopy and TURBT 01/17/2024, revealed a 3 cm tumor area previously resected on the anterior bladder wall near the dome.  TURBT of the base of the bladder tumor revealed microscopic foci of residual flat urothelial carcinoma in situ without residual invasive carcinoma.  Patient is enrolled in the SUNRISE -3 clinical trial and is randomized to receive cetrelimab 360 mg IV every 21 days, plus intravesical TAR-200 every 21 days x6, then every 3 months beginning 05/24/2024.  Patient received 9 cycles of cetrelimab thus far.     Restaging CT urogram 08/16/2024, was negative for urinary bladder wall thickening or tumor.  There was no lymphadenopathy. Cystoscopy and bladder biopsy 08/20/2024, was negative for recurrent tumor, and anterior wall bladder biopsy revealed urothelium with reactive changes and was negative for malignancy.      INTERVAL HISTORY:  Alek returns to clinic today accompanied by his wife.  Patient reports that he is doing well today.  Things have been \"quiet\".  He has no new pains today.  He does have baseline arthralgias that are unchanged.  No fevers or chills.  No chest pain, shortness of breath, or cough.  No " diarrhea or constipation.  No urinary changes or hematuria.  Denies rash.  No chest pain, shortness of breath, or cough. No abdominal pain, nausea, or vomiting. Eating and drinking well.  He is pleased with how things are going. No new medications.      ROS: 10 point ROS neg other than the symptoms noted above in the HPI.    PAST HISTORY:   -Hypertension.  -Diabetes.  -Hyperlipidemia.  -Stage I urothelial carcinoma bladder (cT1, cN0, cM0).  -Fatty liver.  -Colonoscopy 2019, was negative for polyps.  -Cervical C7-T1 radiculopathy.  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  -Tonsillectomy, 1958.    MEDICATIONS:   Current Outpatient Medications   Medication Sig Dispense Refill    Ascorbic Acid 500 MG CAPS 1 tablet Orally Once a day      aspirin (ASPIRIN LOW DOSE) 81 MG EC tablet Take 81 mg by mouth daily ONCE BEFORE BED      blood glucose (NO BRAND SPECIFIED) lancets standard as directed, to use with his Contour Next test blood sugars daily      CONTOUR NEXT TEST test strip USE TO TEST BLOOD SUGAR DAILY      Ergocalciferol 50 MCG (2000 UT) CAPS Take 50 mcg by mouth daily      fish oil-omega-3 fatty acids 500 MG capsule Take 2 capsules by mouth daily      hydrochlorothiazide (HYDRODIURIL) 25 MG tablet Take 25 mg by mouth daily      ketoconazole (NIZORAL) 2 % external cream Apply topically as needed      levothyroxine (SYNTHROID/LEVOTHROID) 100 MCG tablet Take 1 tablet (100 mcg) by mouth daily 30 tablet 3    Microlet Lancets MISC daily      simvastatin (ZOCOR) 40 MG tablet Take 40 mg by mouth at bedtime      triamcinolone (KENALOG) 0.1 % external cream Apply as needed      Vitamin D3 (VITAMIN D, CHOLECALCIFEROL,) 25 mcg (1000 units) tablet Take 25 mcg by mouth daily         PHYSICAL EXAM:   BP (!) 137/90 (BP Location: Right arm, Patient Position: Sitting, Cuff Size: Adult Large)   Pulse 74   Temp 97.9  F (36.6  C) (Oral)   Resp 12   Wt 109.7 kg (241 lb 12.8 oz)   SpO2 93%    BMI 36.23 kg/m    General: No acute distress  HEENT: Sclera anicteric. Oral mucosa pink and moist.  No mucositis or thrush  Lymph: No lymphadenopathy in neck  Heart: Regular, rate, and rhythm  Lungs: Clear to ascultation bilaterally  Abdomen: Positive bowel sounds. Soft, non-distended, non-tender. No organomegaly or mass.   Extremities: no lower extremity edema  Neuro: Cranial nerves grossly intact  Rash: none    LABS REVIEWED:    Latest Reference Range & Units 10/17/24 10:19   Sodium 135 - 145 mmol/L 134 (L)   Potassium 3.4 - 5.3 mmol/L 4.1   Chloride 98 - 107 mmol/L 96 (L)   Carbon Dioxide (CO2) 22 - 29 mmol/L 28   Urea Nitrogen 8.0 - 23.0 mg/dL 19.2   Creatinine 0.67 - 1.17 mg/dL 1.30 (H)   GFR Estimate >60 mL/min/1.73m2 57 (L)   Calcium 8.8 - 10.4 mg/dL 9.3   Anion Gap 7 - 15 mmol/L 10   Phosphorus 2.5 - 4.5 mg/dL 2.9   Albumin 3.5 - 5.2 g/dL 4.3   Protein Total 6.4 - 8.3 g/dL 7.3   Alkaline Phosphatase 40 - 150 U/L 78   ALT 0 - 70 U/L 35   AST 0 - 45 U/L 31   Amylase 28 - 100 U/L 112 (H)   Bilirubin Total <=1.2 mg/dL 0.7   CRP Inflammation <5.00 mg/L 4.30   GGT 8 - 61 U/L 104 (H)   Glucose 70 - 99 mg/dL 116 (H)   Estimated Average Glucose <117 mg/dL 137 (H)   Hemoglobin A1C <5.7 % 6.4 (H)   Lactate Dehydrogenase 0 - 250 U/L 189   Lipase 13 - 60 U/L 75 (H)   T4 Free 0.90 - 1.70 ng/dL 1.25   TSH 0.30 - 4.20 uIU/mL 6.70 (H)   Uric Acid 3.4 - 7.0 mg/dL 6.8   WBC 4.0 - 11.0 10e3/uL 8.4   Hemoglobin 13.3 - 17.7 g/dL 15.6   Hematocrit 40.0 - 53.0 % 44.9   Platelet Count 150 - 450 10e3/uL 209   RBC Count 4.40 - 5.90 10e6/uL 4.76   MCV 78 - 100 fL 94   MCH 26.5 - 33.0 pg 32.8   MCHC 31.5 - 36.5 g/dL 34.7   RDW 10.0 - 15.0 % 12.2   % Neutrophils % 57   % Lymphocytes % 28   % Monocytes % 11   % Eosinophils % 4   % Basophils % 1   Absolute Basophils 0.0 - 0.2 10e3/uL 0.1   Absolute Eosinophils 0.0 - 0.7 10e3/uL 0.3   Absolute Immature Granulocytes <=0.4 10e3/uL 0.0   Absolute Lymphocytes 0.8 - 5.3 10e3/uL 2.3   Absolute  Monocytes 0.0 - 1.3 10e3/uL 0.9   % Immature Granulocytes % 0   Absolute Neutrophils 1.6 - 8.3 10e3/uL 4.7   Absolute NRBCs 10e3/uL 0.0   NRBCs per 100 WBC <1 /100 0     I reviewed the above labs today.    IMPRESSION/PLAN: Stage I urothelial carcinoma bladder.  Bladder cancer is a low-grade papillary urothelial carcinoma with invasion of the lamina propria without involvement of the muscularis propria.  Patient underwent maximum TURBT (12/06/2023, 01/17/2024).  Patient is enrolled in the SUNRISE -3 clinical trial and is randomized to receive cetrelimab 360 mg IV plus intravesical TAR-200 every 21 days (beginning 05/24/2024).  There is no evidence of disease recurrence or metastases noted on CT urogram (08/16/2024) and cystoscopy and bladder biopsy (08/20/2024).    - He continues to tolerate cetrelimab well. He has grade 1 amylase/lipase elevation today without signs or symptoms of acute pancreatitis. Per NCCN, continue to monitor at this time. He has no new medications that may be contributing. He does consume alcohol. We will proceed with cetrelimab today 10/21/2024 and monitor with subsequent doses. We discussed signs and symptoms of pancreatitis today and patient understands to call in sooner if he develops clinical signs of pancreatitis.   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 T4 WNL. TSH down trending.  - creatinine is mildly elevated but within patients baseline.     RTC in 3 weeks per trial protocol.     Patient was seen and evaluated with study RNCC, Araceli Diaz.      17 minutes spent on the date of the encounter doing chart review, review of test results, interpretation of tests, patient visit, and documentation     Clarita Owen PA-C

## 2024-10-21 ENCOUNTER — ALLIED HEALTH/NURSE VISIT (OUTPATIENT)
Dept: ONCOLOGY | Facility: CLINIC | Age: 74
End: 2024-10-21
Payer: COMMERCIAL

## 2024-10-21 ENCOUNTER — ONCOLOGY VISIT (OUTPATIENT)
Dept: ONCOLOGY | Facility: CLINIC | Age: 74
End: 2024-10-21
Payer: COMMERCIAL

## 2024-10-21 ENCOUNTER — INFUSION THERAPY VISIT (OUTPATIENT)
Dept: ONCOLOGY | Facility: CLINIC | Age: 74
End: 2024-10-21
Payer: COMMERCIAL

## 2024-10-21 VITALS
BODY MASS INDEX: 36.23 KG/M2 | DIASTOLIC BLOOD PRESSURE: 90 MMHG | TEMPERATURE: 97.9 F | WEIGHT: 241.8 LBS | SYSTOLIC BLOOD PRESSURE: 137 MMHG | RESPIRATION RATE: 12 BRPM | HEART RATE: 74 BPM | OXYGEN SATURATION: 93 %

## 2024-10-21 VITALS
RESPIRATION RATE: 16 BRPM | HEART RATE: 73 BPM | TEMPERATURE: 97.8 F | OXYGEN SATURATION: 96 % | DIASTOLIC BLOOD PRESSURE: 86 MMHG | SYSTOLIC BLOOD PRESSURE: 136 MMHG

## 2024-10-21 DIAGNOSIS — C67.3 MALIGNANT NEOPLASM OF ANTERIOR WALL OF URINARY BLADDER (H): Primary | ICD-10-CM

## 2024-10-21 DIAGNOSIS — R74.8 ELEVATED AMYLASE AND LIPASE: ICD-10-CM

## 2024-10-21 PROCEDURE — 96413 CHEMO IV INFUSION 1 HR: CPT

## 2024-10-21 PROCEDURE — G0463 HOSPITAL OUTPT CLINIC VISIT: HCPCS

## 2024-10-21 PROCEDURE — 258N000003 HC RX IP 258 OP 636

## 2024-10-21 RX ORDER — MEPERIDINE HYDROCHLORIDE 25 MG/ML
25 INJECTION INTRAMUSCULAR; INTRAVENOUS; SUBCUTANEOUS EVERY 30 MIN PRN
Status: CANCELLED | OUTPATIENT
Start: 2024-10-21

## 2024-10-21 RX ORDER — ACETAMINOPHEN 325 MG/1
650 TABLET ORAL
Status: CANCELLED
Start: 2024-10-21

## 2024-10-21 RX ORDER — EPINEPHRINE 1 MG/ML
0.3 INJECTION, SOLUTION INTRAMUSCULAR; SUBCUTANEOUS EVERY 5 MIN PRN
Status: CANCELLED | OUTPATIENT
Start: 2024-10-21

## 2024-10-21 RX ORDER — ALBUTEROL SULFATE 0.83 MG/ML
2.5 SOLUTION RESPIRATORY (INHALATION)
Status: CANCELLED | OUTPATIENT
Start: 2024-10-21

## 2024-10-21 RX ORDER — ALBUTEROL SULFATE 90 UG/1
1-2 INHALANT RESPIRATORY (INHALATION)
Status: CANCELLED
Start: 2024-10-21

## 2024-10-21 RX ORDER — DIPHENHYDRAMINE HCL 25 MG
50 CAPSULE ORAL
Status: CANCELLED | OUTPATIENT
Start: 2024-10-21

## 2024-10-21 RX ORDER — DIPHENHYDRAMINE HYDROCHLORIDE 50 MG/ML
50 INJECTION INTRAMUSCULAR; INTRAVENOUS
Status: CANCELLED
Start: 2024-10-21

## 2024-10-21 RX ORDER — METHYLPREDNISOLONE SODIUM SUCCINATE 125 MG/2ML
125 INJECTION INTRAMUSCULAR; INTRAVENOUS
Status: CANCELLED
Start: 2024-10-21

## 2024-10-21 RX ORDER — HEPARIN SODIUM,PORCINE 10 UNIT/ML
5-20 VIAL (ML) INTRAVENOUS DAILY PRN
Status: CANCELLED | OUTPATIENT
Start: 2024-10-21

## 2024-10-21 RX ORDER — LORAZEPAM 2 MG/ML
0.5 INJECTION INTRAMUSCULAR EVERY 4 HOURS PRN
Status: CANCELLED | OUTPATIENT
Start: 2024-10-21

## 2024-10-21 RX ORDER — ACETAMINOPHEN 325 MG/1
650 TABLET ORAL
Status: CANCELLED | OUTPATIENT
Start: 2024-10-21

## 2024-10-21 RX ORDER — HEPARIN SODIUM (PORCINE) LOCK FLUSH IV SOLN 100 UNIT/ML 100 UNIT/ML
5 SOLUTION INTRAVENOUS
Status: CANCELLED | OUTPATIENT
Start: 2024-10-21

## 2024-10-21 RX ADMIN — SODIUM CHLORIDE 50 ML: 9 INJECTION, SOLUTION INTRAVENOUS at 12:47

## 2024-10-21 ASSESSMENT — PAIN SCALES - GENERAL: PAINLEVEL: NO PAIN (0)

## 2024-10-21 NOTE — PATIENT INSTRUCTIONS
[facility] Triage and after hours / weekends / holidays:  [phone]    Please call the triage or after hours line if you experience a temperature greater than or equal to 100.4, shaking chills, have uncontrolled nausea, vomiting and/or diarrhea, dizziness, shortness of breath, chest pain, bleeding, unexplained bruising, or if you have any other new/concerning symptoms, questions or concerns.      If you are having any concerning symptoms or wish to speak to a provider before your next infusion visit, please call triage to notify them so we can adequately serve you.     If you need a refill on a narcotic prescription or other medication, please call before your infusion appointment.

## 2024-10-21 NOTE — PROGRESS NOTES
Infusion Nursing Note:  Alek Mcneill presents today for Week 33, Day 1- study- cetrelimab (IDS #[id number]).    Patient seen by provider today: Yes: JETT Carl   present during visit today: Not Applicable.    Note: Patient reports feeling well on arrival to infusion suite today. Denies the following signs of infection: no fever, cough, chills, rash, congestion, sore throat, chest pain, shortness of breath, diarrhea, or urinary symptoms. No new concerns or questions following ANNY appointment. Wishes to proceed with treatment today.     Per research staff Araceli GARRETT research RN, OK to proceed with study treatment today.       IDS #[id number]     Start Time: 1253  Stop Time: 1331 (following 14mL saline flush as indicated by research RN)     Vital signs take before and after infusion per research RN.     Per Secure Chat: JETT Carl/ Araceli Roberts RN/ Maribell Glass RN   -no need to repeat labs and proceed with labs from 10/17    Intravenous Access:  Peripheral IV placed.    Treatment Conditions:  Lab Results   Component Value Date    HGB 15.6 10/17/2024    WBC 8.4 10/17/2024    ANEUTAUTO 4.7 10/17/2024     10/17/2024        Lab Results   Component Value Date     (L) 10/17/2024    POTASSIUM 4.1 10/17/2024    MAG 2.3 11/19/2023    CR 1.30 (H) 10/17/2024    MILLI 9.3 10/17/2024    BILITOTAL 0.7 10/17/2024    ALBUMIN 4.3 10/17/2024    ALT 35 10/17/2024    AST 31 10/17/2024       Results reviewed, labs MET treatment parameters, ok to proceed with treatment.      Post Infusion Assessment:  Patient tolerated infusion without incident.  Patient observed for 15 minutes post Cetrelimab per protocol.  Blood return noted pre and post infusion.  Site patent and intact, free from redness, edema or discomfort.  No evidence of extravasations.  Access discontinued per protocol.       Discharge Plan:   Patient declined prescription refills.  Discharge instructions reviewed with: Patient and Family.  Patient  and/or family verbalized understanding of discharge instructions and all questions answered.  AVS to patient via [employer].  Patient will return 11/12/24 for next appointment.   Patient discharged in stable condition accompanied by: wife.  Departure Mode: Ambulatory.      Maribell Glass RN

## 2024-10-21 NOTE — Clinical Note
10/21/2024      Alek Mcneill  [address] [place] [address]      Dear Colleague,    Thank you for referring your patient, Alek Mcneill, to the [facility]. Please see a copy of my visit note below.    Oct 21, 2024    PRIMARY CARE PHYSICIAN: Margarita BRUNSON MD  UROLOGY: Harman Kaiser MD    HISTORY OF PRESENT ILLNESS: Patient is a 74-year-old male with stage I urothelial carcinoma bladder (cT1, cN0, cM0). Patient presents 11/19/2023, with gross hematuria and blood clots. CT abdomen, pelvis 11/19/2023, revealed several nodular foci of increased density along the inferior bladder measuring up to 1.2 cm there was no hydronephrosis.  There was no lymphadenopathy or metastases. Cystoscopy and TURBT 12/06/2023, revealed multiple 1-2 cm papillary bladder tumors on the anterior wall, some with superficial calcifications. Pathology evaluation revealed a low-grade papillary urothelial carcinoma with invasion of the lamina propria.  Muscularis propria was present and uninvolved.  Repeat cystoscopy and TURBT 01/17/2024, revealed a 3 cm tumor area previously resected on the anterior bladder wall near the dome.  TURBT of the base of the bladder tumor revealed microscopic foci of residual flat urothelial carcinoma in situ without residual invasive carcinoma.  Patient is enrolled in the SUNRISE -3 clinical trial and is randomized to receive cetrelimab 360 mg IV every 21 days, plus intravesical TAR-200 every 21 days x6, then every 3 months beginning 05/24/2024.  Patient received 9 cycles of cetrelimab thus far.     Restaging CT urogram 08/16/2024, was negative for urinary bladder wall thickening or tumor.  There was no lymphadenopathy. Cystoscopy and bladder biopsy 08/20/2024, was negative for recurrent tumor, and anterior wall bladder biopsy revealed urothelium with reactive changes and was negative for malignancy.      INTERVAL HISTORY:  Alek returns to clinic today accompanied by  his wife.  He reports that he is doing well.  He has no complaints.  He has no new pains in his body.  No arthralgias.  No chest pain, shortness of breath, or cough.  No fevers or chills.  No urinary changes or concerns.  No diarrhea.  He tends to be more on the constipated side and uses senna as needed.  No current rashes but does report that approximately week ago he had groin and itching without a rash.  He believes that this was related to the heat.  He remains on his levothyroxine replacement without challenges.  Eating and drinking well.  No other concerns or complaints today.    PAST HISTORY:   -Hypertension.  -Diabetes.  -Hyperlipidemia.  -Stage I urothelial carcinoma bladder (cT1, cN0, cM0).  -Fatty liver.  -Colonoscopy 2019, was negative for polyps.  -Cervical C7-T1 radiculopathy.  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  -Tonsillectomy, 1958.    MEDICATIONS:   Current Outpatient Medications   Medication Sig Dispense Refill    Ascorbic Acid 500 MG CAPS 1 tablet Orally Once a day      aspirin (ASPIRIN LOW DOSE) 81 MG EC tablet Take 81 mg by mouth daily ONCE BEFORE BED      blood glucose (NO BRAND SPECIFIED) lancets standard as directed, to use with his Contour Next test blood sugars daily      CONTOUR NEXT TEST test strip USE TO TEST BLOOD SUGAR DAILY      Ergocalciferol 50 MCG (2000 UT) CAPS Take 50 mcg by mouth daily      fish oil-omega-3 fatty acids 500 MG capsule Take 2 capsules by mouth daily      hydrochlorothiazide (HYDRODIURIL) 25 MG tablet Take 25 mg by mouth daily      ketoconazole (NIZORAL) 2 % external cream Apply topically as needed      levothyroxine (SYNTHROID/LEVOTHROID) 100 MCG tablet Take 1 tablet (100 mcg) by mouth daily 30 tablet 3    Microlet Lancets MISC daily      simvastatin (ZOCOR) 40 MG tablet Take 40 mg by mouth at bedtime      triamcinolone (KENALOG) 0.1 % external cream Apply as needed      Vitamin D3 (VITAMIN D, CHOLECALCIFEROL,) 25  mcg (1000 units) tablet Take 25 mcg by mouth daily         PHYSICAL EXAM:   There were no vitals taken for this visit.  General: No acute distress  HEENT: Sclera anicteric. Oral mucosa pink and moist.  No mucositis or thrush  Lymph: No lymphadenopathy in neck  Heart: Regular, rate, and rhythm  Lungs: Clear to ascultation bilaterally  Abdomen: Positive bowel sounds. Soft, non-distended, non-tender. No organomegaly or mass.   Extremities: no lower extremity edema  Neuro: Cranial nerves grossly intact  Rash: none  Vascular access: port     LABS REVIEWED:    Latest Reference Range & Units 09/30/24 09:50   Sodium 135 - 145 mmol/L 135   Potassium 3.4 - 5.3 mmol/L 4.2   Chloride 98 - 107 mmol/L 98   Carbon Dioxide (CO2) 22 - 29 mmol/L 30 (H)   Urea Nitrogen 8.0 - 23.0 mg/dL 17.4   Creatinine 0.67 - 1.17 mg/dL 1.32 (H)   GFR Estimate >60 mL/min/1.73m2 56 (L)   Calcium 8.8 - 10.4 mg/dL 9.2   Anion Gap 7 - 15 mmol/L 7   Phosphorus 2.5 - 4.5 mg/dL 3.1   Albumin 3.5 - 5.2 g/dL 4.2   Protein Total 6.4 - 8.3 g/dL 7.2   Alkaline Phosphatase 40 - 150 U/L 74   ALT 0 - 70 U/L 36   AST 0 - 45 U/L 33   Amylase 28 - 100 U/L 88   Bilirubin Total <=1.2 mg/dL 0.7   CRP Inflammation <5.00 mg/L <3.00   GGT 8 - 61 U/L 106 (H)   Glucose 70 - 99 mg/dL 123 (H)   Estimated Average Glucose <117 mg/dL 134 (H)   Hemoglobin A1C <5.7 % 6.3 (H)   Lactate Dehydrogenase 0 - 250 U/L 202   Lipase 13 - 60 U/L 59   T4 Free 0.90 - 1.70 ng/dL 1.17   TSH 0.30 - 4.20 uIU/mL 8.57 (H)   Uric Acid 3.4 - 7.0 mg/dL 6.3   WBC 4.0 - 11.0 10e3/uL 8.2   Hemoglobin 13.3 - 17.7 g/dL 16.1   Hematocrit 40.0 - 53.0 % 45.9   Platelet Count 150 - 450 10e3/uL 224   RBC Count 4.40 - 5.90 10e6/uL 4.88   MCV 78 - 100 fL 94   MCH 26.5 - 33.0 pg 33.0   MCHC 31.5 - 36.5 g/dL 35.1   RDW 10.0 - 15.0 % 12.5   % Neutrophils % 52   % Lymphocytes % 31   % Monocytes % 12   % Eosinophils % 4   % Basophils % 1   Absolute Basophils 0.0 - 0.2 10e3/uL 0.1   Absolute Eosinophils 0.0 - 0.7 10e3/uL  0.3   Absolute Immature Granulocytes <=0.4 10e3/uL 0.0   Absolute Lymphocytes 0.8 - 5.3 10e3/uL 2.5   Absolute Monocytes 0.0 - 1.3 10e3/uL 1.0   % Immature Granulocytes % 0   Absolute Neutrophils 1.6 - 8.3 10e3/uL 4.3   Absolute NRBCs 10e3/uL 0.0   NRBCs per 100 WBC <1 /100 0   Color Urine Colorless, Straw, Light Yellow, Yellow  Colorless   Appearance Urine Clear  Clear   Glucose Urine Negative mg/dL Negative   Bilirubin Urine Negative  Negative   Ketones Urine Negative mg/dL Negative   Specific Gravity Urine 1.001 - 1.030  1.011   pH Urine 5.0 - 7.0  6.5   Protein Albumin Urine Negative mg/dL Negative   Urobilinogen mg/dL <2.0 mg/dL <2.0   Nitrite Urine Negative  Negative   Blood Urine Negative  Negative   Leukocyte Esterase Urine Negative  Negative   WBC Urine <=5 /HPF <1   RBC Urine <=2 /HPF 0   URINE CULTURE  Rpt     I reviewed the above labs today.      IMAGING REVIEWED:   Recent Results (from the past 744 hour(s))   CT Urogram wo & w Contrast    Narrative    EXAM: CT UROGRAM WO and W CONTRAST  LOCATION: [facility]  DATE: 8/16/2024    INDICATION:  Malignant neoplasm of anterior wall of urinary bladder (H)  COMPARISON: Multiple priors with the most recent dated 2/13/2024  TECHNIQUE: CT scan of the abdomen and pelvis using urogram technique with pre contrast, post contrast, and delayed images. Multiplanar reformats were obtained. Dose reduction techniques were used.   CONTRAST: Isovue 370 [id number]    FINDINGS:   LOWER CHEST: Patent central airways. Minimal scattered subpleural scarring. Severe coronary artery calcifications.    HEPATOBILIARY: Hepatic steatosis. No focal hepatic mass, biliary dilatation, or calcified gallstones.    PANCREAS: No focal pancreatic mass, peripancreatic inflammation, or pancreatic ductal dilatation.    SPLEEN: Normal size.    ADRENAL GLANDS: No nodules.    RIGHT KIDNEY/URETER: No radiodense calculus. No suspicious renal mass or hydronephrosis.  Appropriate excretion of the contrast into the collecting system and the ureter, which is opacified appropriately without a definite focal filling defect or   stricture. The suboptimal opacification of the distal ureter is likely due to underlying peristalsis.    LEFT KIDNEY/URETER: Slightly inferiorly ptotic/ectopic left kidney. No radiodense calculus. No suspicious renal mass or hydronephrosis. Appropriate excretion of the contrast into the collecting system and the ureter without a focal filling defect or   suture. The suboptimal opacification of the distal ureter is likely due to underlying peristalsis.    BLADDER: No abnormal focal urinary bladder wall thickening is identified. A small bore catheter is present along the posterior urinary bladder    BOWEL: Unremarkable appearance of the visualized portions of the distal esophagus and stomach. No dilated loops of small bowel are present to suggest an obstruction. No evidence for appendicitis. Unremarkable appearance of the colon.    LYMPH NODES: No abdominal or pelvic lymphadenopathy is identified by size criteria.    VASCULATURE: Normal caliber abdominal aorta. A few scattered atherosclerotic plaques are present. Patent portal, splenic, and mesenteric veins. Patent bilateral renal veins.    PELVIC ORGANS: No large pelvic mass.    MUSCULOSKELETAL: Small right and moderate left fat filled inguinal hernias. Degenerative changes of the spine. Grade 2 anterolisthesis of L4 on L5. Minimal retrolisthesis of L5 on S1.      Impression    IMPRESSION:  1.  No abnormal focal urinary bladder wall thickening.  2.   Small bore catheter along the posterior urinary bladder.  3.  Ptotic/ectatic left kidney.  4.  Hepatic steatosis.  5.  Severe coronary artery calcifications.  6.  No new lymphadenopathy.       IMPRESSION/PLAN: Stage I urothelial carcinoma bladder.  Bladder cancer is a low-grade papillary urothelial carcinoma with invasion of the lamina propria without involvement  of the muscularis propria.  Patient underwent maximum TURBT (12/06/2023, 01/17/2024).  Patient is enrolled in the SUNRISE -3 clinical trial and is randomized to receive cetrelimab 360 mg IV plus intravesical TAR-200 every 21 days (beginning 05/24/2024).  There is no evidence of disease recurrence or metastases noted on CT urogram (08/16/2024) and cystoscopy and bladder biopsy (08/20/2024).    - He continues to tolerate cetrelimab well. His dose will be administered 1 week early today 10/01 per trial protocol this is within parameters and discussed with Dr. Tobin who is in agreement. He has virtually no side effects with the exception on hypothyroidism which he is now on replacement for. TSH is mildly elevated but improved from prior. T4 is WNL. Therefore, we will continue on current dose and monitor.   - creatinine is mildly elevated but within patients baseline.     RTC in 3 weeks per trial protocol.     Patient was seen and evaluated with study RNCC, Araceli Diaz.      20 minutes spent on the date of the encounter doing chart review, review of test results, interpretation of tests, patient visit, and documentation     Clarita Owen PA-C      Oct 21, 2024    PRIMARY CARE PHYSICIAN: Margarita BRUNSON MD  UROLOGY: Harman Kaiser MD    HISTORY OF PRESENT ILLNESS: Patient is a 74-year-old male with stage I urothelial carcinoma bladder (cT1, cN0, cM0). Patient presents 11/19/2023, with gross hematuria and blood clots. CT abdomen, pelvis 11/19/2023, revealed several nodular foci of increased density along the inferior bladder measuring up to 1.2 cm there was no hydronephrosis.  There was no lymphadenopathy or metastases. Cystoscopy and TURBT 12/06/2023, revealed multiple 1-2 cm papillary bladder tumors on the anterior wall, some with superficial calcifications. Pathology evaluation revealed a low-grade papillary urothelial carcinoma with invasion of the lamina propria.  Muscularis propria was present and  "uninvolved.  Repeat cystoscopy and TURBT 01/17/2024, revealed a 3 cm tumor area previously resected on the anterior bladder wall near the dome.  TURBT of the base of the bladder tumor revealed microscopic foci of residual flat urothelial carcinoma in situ without residual invasive carcinoma.  Patient is enrolled in the SUNRISE -3 clinical trial and is randomized to receive cetrelimab 360 mg IV every 21 days, plus intravesical TAR-200 every 21 days x6, then every 3 months beginning 05/24/2024.  Patient received 9 cycles of cetrelimab thus far.     Restaging CT urogram 08/16/2024, was negative for urinary bladder wall thickening or tumor.  There was no lymphadenopathy. Cystoscopy and bladder biopsy 08/20/2024, was negative for recurrent tumor, and anterior wall bladder biopsy revealed urothelium with reactive changes and was negative for malignancy.      INTERVAL HISTORY:  Alek returns to clinic today accompanied by his wife.  Patient reports that he is doing well today.  Things have been \"quiet\".  He has no new pains today.  He does have baseline arthralgias that are unchanged.  No fevers or chills.  No chest pain, shortness of breath, or cough.  No diarrhea or constipation.  No urinary changes or hematuria.  Denies rash.  No chest pain, shortness of breath, or cough. No abdominal pain, nausea, or vomiting. Eating and drinking well.  He is pleased with how things are going. No new medications.      ROS: 10 point ROS neg other than the symptoms noted above in the HPI.    PAST HISTORY:   -Hypertension.  -Diabetes.  -Hyperlipidemia.  -Stage I urothelial carcinoma bladder (cT1, cN0, cM0).  -Fatty liver.  -Colonoscopy 2019, was negative for polyps.  -Cervical C7-T1 radiculopathy.  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  -Tonsillectomy, 1958.    MEDICATIONS:   Current Outpatient Medications   Medication Sig Dispense Refill     Ascorbic Acid 500 MG CAPS 1 tablet Orally Once a day  "      aspirin (ASPIRIN LOW DOSE) 81 MG EC tablet Take 81 mg by mouth daily ONCE BEFORE BED       blood glucose (NO BRAND SPECIFIED) lancets standard as directed, to use with his Contour Next test blood sugars daily       CONTOUR NEXT TEST test strip USE TO TEST BLOOD SUGAR DAILY       Ergocalciferol 50 MCG (2000 UT) CAPS Take 50 mcg by mouth daily       fish oil-omega-3 fatty acids 500 MG capsule Take 2 capsules by mouth daily       hydrochlorothiazide (HYDRODIURIL) 25 MG tablet Take 25 mg by mouth daily       ketoconazole (NIZORAL) 2 % external cream Apply topically as needed       levothyroxine (SYNTHROID/LEVOTHROID) 100 MCG tablet Take 1 tablet (100 mcg) by mouth daily 30 tablet 3     Microlet Lancets MISC daily       simvastatin (ZOCOR) 40 MG tablet Take 40 mg by mouth at bedtime       triamcinolone (KENALOG) 0.1 % external cream Apply as needed       Vitamin D3 (VITAMIN D, CHOLECALCIFEROL,) 25 mcg (1000 units) tablet Take 25 mcg by mouth daily         PHYSICAL EXAM:   BP (!) 137/90 (BP Location: Right arm, Patient Position: Sitting, Cuff Size: Adult Large)   Pulse 74   Temp 97.9  F (36.6  C) (Oral)   Resp 12   Wt 109.7 kg (241 lb 12.8 oz)   SpO2 93%   BMI 36.23 kg/m    General: No acute distress  HEENT: Sclera anicteric. Oral mucosa pink and moist.  No mucositis or thrush  Lymph: No lymphadenopathy in neck  Heart: Regular, rate, and rhythm  Lungs: Clear to ascultation bilaterally  Abdomen: Positive bowel sounds. Soft, non-distended, non-tender. No organomegaly or mass.   Extremities: no lower extremity edema  Neuro: Cranial nerves grossly intact  Rash: none    LABS REVIEWED:    Latest Reference Range & Units 10/17/24 10:19   Sodium 135 - 145 mmol/L 134 (L)   Potassium 3.4 - 5.3 mmol/L 4.1   Chloride 98 - 107 mmol/L 96 (L)   Carbon Dioxide (CO2) 22 - 29 mmol/L 28   Urea Nitrogen 8.0 - 23.0 mg/dL 19.2   Creatinine 0.67 - 1.17 mg/dL 1.30 (H)   GFR Estimate >60 mL/min/1.73m2 57 (L)   Calcium 8.8 - 10.4 mg/dL  9.3   Anion Gap 7 - 15 mmol/L 10   Phosphorus 2.5 - 4.5 mg/dL 2.9   Albumin 3.5 - 5.2 g/dL 4.3   Protein Total 6.4 - 8.3 g/dL 7.3   Alkaline Phosphatase 40 - 150 U/L 78   ALT 0 - 70 U/L 35   AST 0 - 45 U/L 31   Amylase 28 - 100 U/L 112 (H)   Bilirubin Total <=1.2 mg/dL 0.7   CRP Inflammation <5.00 mg/L 4.30   GGT 8 - 61 U/L 104 (H)   Glucose 70 - 99 mg/dL 116 (H)   Estimated Average Glucose <117 mg/dL 137 (H)   Hemoglobin A1C <5.7 % 6.4 (H)   Lactate Dehydrogenase 0 - 250 U/L 189   Lipase 13 - 60 U/L 75 (H)   T4 Free 0.90 - 1.70 ng/dL 1.25   TSH 0.30 - 4.20 uIU/mL 6.70 (H)   Uric Acid 3.4 - 7.0 mg/dL 6.8   WBC 4.0 - 11.0 10e3/uL 8.4   Hemoglobin 13.3 - 17.7 g/dL 15.6   Hematocrit 40.0 - 53.0 % 44.9   Platelet Count 150 - 450 10e3/uL 209   RBC Count 4.40 - 5.90 10e6/uL 4.76   MCV 78 - 100 fL 94   MCH 26.5 - 33.0 pg 32.8   MCHC 31.5 - 36.5 g/dL 34.7   RDW 10.0 - 15.0 % 12.2   % Neutrophils % 57   % Lymphocytes % 28   % Monocytes % 11   % Eosinophils % 4   % Basophils % 1   Absolute Basophils 0.0 - 0.2 10e3/uL 0.1   Absolute Eosinophils 0.0 - 0.7 10e3/uL 0.3   Absolute Immature Granulocytes <=0.4 10e3/uL 0.0   Absolute Lymphocytes 0.8 - 5.3 10e3/uL 2.3   Absolute Monocytes 0.0 - 1.3 10e3/uL 0.9   % Immature Granulocytes % 0   Absolute Neutrophils 1.6 - 8.3 10e3/uL 4.7   Absolute NRBCs 10e3/uL 0.0   NRBCs per 100 WBC <1 /100 0     I reviewed the above labs today.    IMPRESSION/PLAN: Stage I urothelial carcinoma bladder.  Bladder cancer is a low-grade papillary urothelial carcinoma with invasion of the lamina propria without involvement of the muscularis propria.  Patient underwent maximum TURBT (12/06/2023, 01/17/2024).  Patient is enrolled in the SUNRISE -3 clinical trial and is randomized to receive cetrelimab 360 mg IV plus intravesical TAR-200 every 21 days (beginning 05/24/2024).  There is no evidence of disease recurrence or metastases noted on CT urogram (08/16/2024) and cystoscopy and bladder biopsy  (08/20/2024).    - He continues to tolerate cetrelimab well. He has grade 1 amylase/lipase elevation today without signs or symptoms of acute pancreatitis. Per NCCN, continue to monitor at this time. He has no new medications that may be contributing. He does consume alcohol. We will proceed with cetrelimab today 10/21/2024 and monitor with subsequent doses. We discussed signs and symptoms of pancreatitis today and patient understands to call in sooner if he develops clincal signs of pancreatitis.   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 T4 WNL. TSH down trending. His dose will be administered 1 week early today 10/01 per trial protocol this is within parameters and discussed with Dr. Tobin who is in agreement. He has virtually no side effects with the exception on hypothyroidism which he is now on replacement for. TSH is mildly elevated but improved from prior. T4 is WNL. Therefore, we will continue on current dose and monitor.   - creatinine is mildly elevated but within patients baseline.     RTC in 3 weeks per trial protocol.     Patient was seen and evaluated with study RNCC, Araceli Diaz.      20 minutes spent on the date of the encounter doing chart review, review of test results, interpretation of tests, patient visit, and documentation     Clarita Owen PA-C        Again, thank you for allowing me to participate in the care of your patient.        Sincerely,        Clarita Owen PA-C

## 2024-10-21 NOTE — NURSING NOTE
[id number]: Study Visit Note   Subject name: Alek Mcneill     Visit: Week 33    Did the study visit occur within the appropriate window allowed by the protocol? ye    Since the last study visit, He has been doing well. He has no new complaints. Amylase and Lipase slightly elevated however, he is not having any abdominal pain, nausea or vomiting. Understands to call if these symptoms present themself.     Assessed for below symptoms specifically:   Dysuria no  Pollakiuria no  Nocturia no  Urgency no  Incontinence no  Hematuria no  Urinary hesitation no  Bladder pain/spasm no  Urethral pain no   Bladder discomfort no  Feeling of incomplete bladder emptying  no  Lower abdominal pain no  Fever no  Flu-like symptoms no  Fatigue no  Diarrhea no  Nausea no  Rash no  Arthralgia  no      Cetrelimab infusion:  Were premedications given? No     Verbal handover provided to infusion RN; reminded RN to document actual start time of infusion and actual stop time of the infusion. If infusion deviates from protocol directed infusion time, please document rationale in your infusion note.Instructed RN to take VS before the infusion as well as after, patient should have a 15 min observation time. Please use a 0.2 micron filter and flush with 14 mL of NS after the infusion, the end of the flush is considered the end of the infusion.     I have personally interviewed Alek Mcneill and reviewed his medical record for adverse events and concomitant medications and these have been recorded on the corresponding logs in Alek Mcneill's research file.     Alek Mcneill was given the opportunity to ask any trial related questions.  Please see provider progress note for physical exam and other clinical information. Labs were reviewed - any significant lab values were addressed and reviewed.    Araceli Diaz RN

## 2024-10-21 NOTE — NURSING NOTE
"Oncology Rooming Note    October 21, 2024 11:17 AM   Alek Mcneill is a 74 year old male who presents for:    Chief Complaint   Patient presents with    Oncology Clinic Visit     Malignant neoplasm of anterior wall of urinary bladder     Initial Vitals: BP (!) 137/90 (BP Location: Right arm, Patient Position: Sitting, Cuff Size: Adult Large)   Pulse 74   Temp 97.9  F (36.6  C) (Oral)   Resp 12   Wt 109.7 kg (241 lb 12.8 oz)   SpO2 93%   BMI 36.23 kg/m   Estimated body mass index is 36.23 kg/m  as calculated from the following:    Height as of 9/16/24: 1.74 m (5' 8.5\").    Weight as of this encounter: 109.7 kg (241 lb 12.8 oz). Body surface area is 2.3 meters squared.  No Pain (0) Comment: Data Unavailable   No LMP for male patient.  Allergies reviewed: Yes  Medications reviewed: Yes    Medications: Medication refills not needed today.  Pharm[facility] into [employer]: [id number] [place] [address] AT [place] [address]    Frailty Screening:   Is the patient here for a new oncology consult visit in cancer care? 2. No      Clinical concerns:  none      Hoa Bower              "

## 2024-10-24 ENCOUNTER — PRE VISIT (OUTPATIENT)
Dept: UROLOGY | Facility: CLINIC | Age: 74
End: 2024-10-24
Payer: COMMERCIAL

## 2024-11-07 DIAGNOSIS — C67.3 MALIGNANT NEOPLASM OF ANTERIOR WALL OF URINARY BLADDER (H): Primary | ICD-10-CM

## 2024-11-08 ENCOUNTER — APPOINTMENT (OUTPATIENT)
Dept: LAB | Facility: HOSPITAL | Age: 74
End: 2024-11-08
Payer: COMMERCIAL

## 2024-11-08 LAB
ALBUMIN SERPL BCG-MCNC: 4.2 G/DL (ref 3.5–5.2)
ALBUMIN UR-MCNC: NEGATIVE MG/DL
ALP SERPL-CCNC: 88 U/L (ref 40–150)
ALT SERPL W P-5'-P-CCNC: 27 U/L (ref 0–70)
AMYLASE SERPL-CCNC: 102 U/L (ref 28–100)
ANION GAP SERPL CALCULATED.3IONS-SCNC: 10 MMOL/L (ref 7–15)
APPEARANCE UR: CLEAR
APTT PPP: 28 SECONDS (ref 22–38)
AST SERPL W P-5'-P-CCNC: 25 U/L (ref 0–45)
BASOPHILS # BLD AUTO: 0.1 10E3/UL (ref 0–0.2)
BASOPHILS NFR BLD AUTO: 1 %
BILIRUB SERPL-MCNC: 0.6 MG/DL
BILIRUB UR QL STRIP: NEGATIVE
BUN SERPL-MCNC: 14.7 MG/DL (ref 8–23)
CALCIUM SERPL-MCNC: 9.6 MG/DL (ref 8.8–10.4)
CHLORIDE SERPL-SCNC: 96 MMOL/L (ref 98–107)
COLOR UR AUTO: COLORLESS
CREAT SERPL-MCNC: 1.24 MG/DL (ref 0.67–1.17)
CRP SERPL-MCNC: 19.4 MG/L
EGFRCR SERPLBLD CKD-EPI 2021: 61 ML/MIN/1.73M2
EOSINOPHIL # BLD AUTO: 0.7 10E3/UL (ref 0–0.7)
EOSINOPHIL NFR BLD AUTO: 9 %
ERYTHROCYTE [DISTWIDTH] IN BLOOD BY AUTOMATED COUNT: 12 % (ref 10–15)
EST. AVERAGE GLUCOSE BLD GHB EST-MCNC: 143 MG/DL
GGT SERPL-CCNC: 85 U/L (ref 8–61)
GLUCOSE SERPL-MCNC: 113 MG/DL (ref 70–99)
GLUCOSE UR STRIP-MCNC: NEGATIVE MG/DL
HBA1C MFR BLD: 6.6 %
HCO3 SERPL-SCNC: 29 MMOL/L (ref 22–29)
HCT VFR BLD AUTO: 45.6 % (ref 40–53)
HGB BLD-MCNC: 16 G/DL (ref 13.3–17.7)
HGB UR QL STRIP: NEGATIVE
IMM GRANULOCYTES # BLD: 0 10E3/UL
IMM GRANULOCYTES NFR BLD: 0 %
INR PPP: 0.95 (ref 0.85–1.15)
KETONES UR STRIP-MCNC: NEGATIVE MG/DL
LDH SERPL L TO P-CCNC: 212 U/L (ref 0–250)
LEUKOCYTE ESTERASE UR QL STRIP: NEGATIVE
LIPASE SERPL-CCNC: 75 U/L (ref 13–60)
LYMPHOCYTES # BLD AUTO: 1.7 10E3/UL (ref 0.8–5.3)
LYMPHOCYTES NFR BLD AUTO: 20 %
MCH RBC QN AUTO: 32.7 PG (ref 26.5–33)
MCHC RBC AUTO-ENTMCNC: 35.1 G/DL (ref 31.5–36.5)
MCV RBC AUTO: 93 FL (ref 78–100)
MONOCYTES # BLD AUTO: 1 10E3/UL (ref 0–1.3)
MONOCYTES NFR BLD AUTO: 12 %
NEUTROPHILS # BLD AUTO: 4.9 10E3/UL (ref 1.6–8.3)
NEUTROPHILS NFR BLD AUTO: 58 %
NITRATE UR QL: NEGATIVE
NRBC # BLD AUTO: 0 10E3/UL
NRBC BLD AUTO-RTO: 0 /100
PH UR STRIP: 6.5 [PH] (ref 5–7)
PHOSPHATE SERPL-MCNC: 3.3 MG/DL (ref 2.5–4.5)
PLATELET # BLD AUTO: 290 10E3/UL (ref 150–450)
POTASSIUM SERPL-SCNC: 4.1 MMOL/L (ref 3.4–5.3)
PROT SERPL-MCNC: 7.4 G/DL (ref 6.4–8.3)
RBC # BLD AUTO: 4.9 10E6/UL (ref 4.4–5.9)
RBC URINE: 0 /HPF
SODIUM SERPL-SCNC: 135 MMOL/L (ref 135–145)
SP GR UR STRIP: 1.01 (ref 1–1.03)
SQUAMOUS EPITHELIAL: <1 /HPF
T4 FREE SERPL-MCNC: 1.38 NG/DL (ref 0.9–1.7)
TSH SERPL DL<=0.005 MIU/L-ACNC: 5.76 UIU/ML (ref 0.3–4.2)
URATE SERPL-MCNC: 5.6 MG/DL (ref 3.4–7)
UROBILINOGEN UR STRIP-MCNC: <2 MG/DL
WBC # BLD AUTO: 8.5 10E3/UL (ref 4–11)
WBC URINE: 0 /HPF

## 2024-11-08 PROCEDURE — 82947 ASSAY GLUCOSE BLOOD QUANT: CPT | Performed by: INTERNAL MEDICINE

## 2024-11-08 PROCEDURE — 85025 COMPLETE CBC W/AUTO DIFF WBC: CPT | Performed by: INTERNAL MEDICINE

## 2024-11-08 PROCEDURE — 87086 URINE CULTURE/COLONY COUNT: CPT | Performed by: INTERNAL MEDICINE

## 2024-11-08 PROCEDURE — 81001 URINALYSIS AUTO W/SCOPE: CPT | Performed by: INTERNAL MEDICINE

## 2024-11-08 PROCEDURE — 84439 ASSAY OF FREE THYROXINE: CPT | Performed by: INTERNAL MEDICINE

## 2024-11-08 PROCEDURE — 84550 ASSAY OF BLOOD/URIC ACID: CPT | Performed by: INTERNAL MEDICINE

## 2024-11-08 PROCEDURE — 84443 ASSAY THYROID STIM HORMONE: CPT | Performed by: INTERNAL MEDICINE

## 2024-11-08 PROCEDURE — 85610 PROTHROMBIN TIME: CPT | Performed by: INTERNAL MEDICINE

## 2024-11-08 PROCEDURE — 83690 ASSAY OF LIPASE: CPT | Performed by: INTERNAL MEDICINE

## 2024-11-08 PROCEDURE — 86140 C-REACTIVE PROTEIN: CPT | Performed by: INTERNAL MEDICINE

## 2024-11-08 PROCEDURE — 83036 HEMOGLOBIN GLYCOSYLATED A1C: CPT | Performed by: INTERNAL MEDICINE

## 2024-11-08 PROCEDURE — 82977 ASSAY OF GGT: CPT | Performed by: INTERNAL MEDICINE

## 2024-11-08 PROCEDURE — 83615 LACTATE (LD) (LDH) ENZYME: CPT | Performed by: INTERNAL MEDICINE

## 2024-11-08 PROCEDURE — 88112 CYTOPATH CELL ENHANCE TECH: CPT | Mod: TC | Performed by: INTERNAL MEDICINE

## 2024-11-08 PROCEDURE — 82150 ASSAY OF AMYLASE: CPT | Performed by: INTERNAL MEDICINE

## 2024-11-08 PROCEDURE — 84100 ASSAY OF PHOSPHORUS: CPT | Performed by: INTERNAL MEDICINE

## 2024-11-08 PROCEDURE — 84155 ASSAY OF PROTEIN SERUM: CPT | Performed by: INTERNAL MEDICINE

## 2024-11-08 PROCEDURE — 36415 COLL VENOUS BLD VENIPUNCTURE: CPT | Performed by: INTERNAL MEDICINE

## 2024-11-08 PROCEDURE — 85730 THROMBOPLASTIN TIME PARTIAL: CPT | Performed by: INTERNAL MEDICINE

## 2024-11-09 LAB — BACTERIA UR CULT: NORMAL

## 2024-11-11 ENCOUNTER — DOCUMENTATION ONLY (OUTPATIENT)
Dept: ONCOLOGY | Facility: CLINIC | Age: 74
End: 2024-11-11
Payer: COMMERCIAL

## 2024-11-11 ENCOUNTER — ALLIED HEALTH/NURSE VISIT (OUTPATIENT)
Dept: ONCOLOGY | Facility: CLINIC | Age: 74
End: 2024-11-11
Payer: COMMERCIAL

## 2024-11-11 DIAGNOSIS — C67.3 MALIGNANT NEOPLASM OF ANTERIOR WALL OF URINARY BLADDER (H): Primary | ICD-10-CM

## 2024-11-11 PROCEDURE — 88112 CYTOPATH CELL ENHANCE TECH: CPT | Mod: 26 | Performed by: PATHOLOGY

## 2024-11-11 NOTE — NURSING NOTE
Called patient in regards to potential reschedule of appointments for tomorrow 11/12. Patient agreeable.    RN went over lab results which ones were abnormal/normal and which were in normal range. Also went over which ones went with what organ systems. Instructed patient that for interventions for labs I will leave that up to provider.    Araceli Diaz RN   Clinical Research Coordinator Nurse-Solid Tumor   Phone: [phone]

## 2024-11-11 NOTE — NURSING NOTE
[id number]: Study Visit Note   Subject name: Alek Mcneill     Visit: Week 36 11/12/24     Did the study visit occur within the appropriate window allowed by the protocol? yes    Since the last study visit, He has been doing well.    Patient has been having a cough intermittently at night, this has been ongoing for some time now. Patient reports it usually associated with the change of seasons. Patient reports a fall about 1 week ago, it did not result in any injuries or effect ADLs. Patient continues to take synthroid. CRP elevated, per Clarita continue to monitor, denies signs of infection.     Intermittent constipation continues, patient takes stool softeners intermittently.     Assessed for below symptoms specifically:   Dysuria no  Pollakiuria no  Nocturia no  Urgency no  Incontinence no  Hematuria no  Urinary hesitation no  Bladder pain/spasm no  Urethral pain no   Bladder discomfort no  Feeling of incomplete bladder emptying  no  Lower abdominal pain no  Fever no  Flu-like symptoms no  Fatigue no  Diarrhea no  Nausea no  Rash no  Arthralgia  no      Cetrelimab infusion:  Were premedications given? No    Verbal handover provided to infusion RN; reminded RN to document actual start time of infusion and actual stop time of the infusion. If infusion deviates from protocol directed infusion time, please document rationale in your infusion note. .Instructed RN to take VS before the infusion as well as after, patient should have a 15 min observation time. Please use a 0.2 micron filter and flush with 14 mL of NS after the infusion, the end of the flush is considered the end of the infusion.       I have personally interviewed Alek Mcneill and reviewed his medical record for adverse events and concomitant medications and these have been recorded on the corresponding logs in Alek AGOSTO Raheelprincess's research file.     Alek SURENDRA Pickeringprincess was given the opportunity to ask any trial related questions.  Please see provider progress note  for physical exam and other clinical information. Labs were reviewed - any significant lab values were addressed and reviewed.    Araceli Diaz RN    [id number]: Re-Consent Note     New information has been added to the consent form. This was explained to the patient, and all his questions were answered. The patient verbalized understanding and would like to continue participation in the trial. The patient was provided with a signed copy of the IRB-approved consent form.    Consent Version Date: [email or web address]  Consent obtained by: Araceli Diaz  Date: 12NOV2024    Araceli Diaz RN      Form 503.00.02 (Version 1)     Effective date: 01JUL2018     Next Review Date: 01JUL2020

## 2024-11-11 NOTE — PROGRESS NOTES
Nov 12, 2024    PRIMARY CARE PHYSICIAN: Margarita BRUNSON MD  UROLOGY: Harman Kaiser MD    HISTORY OF PRESENT ILLNESS: Patient is a 74-year-old male with stage I urothelial carcinoma bladder (cT1, cN0, cM0). Patient presents 11/19/2023, with gross hematuria and blood clots. CT abdomen, pelvis 11/19/2023, revealed several nodular foci of increased density along the inferior bladder measuring up to 1.2 cm there was no hydronephrosis.  There was no lymphadenopathy or metastases. Cystoscopy and TURBT 12/06/2023, revealed multiple 1-2 cm papillary bladder tumors on the anterior wall, some with superficial calcifications. Pathology evaluation revealed a low-grade papillary urothelial carcinoma with invasion of the lamina propria.  Muscularis propria was present and uninvolved.  Repeat cystoscopy and TURBT 01/17/2024, revealed a 3 cm tumor area previously resected on the anterior bladder wall near the dome.  TURBT of the base of the bladder tumor revealed microscopic foci of residual flat urothelial carcinoma in situ without residual invasive carcinoma.  Patient is enrolled in the SUNRISE -3 clinical trial and is randomized to receive cetrelimab 360 mg IV every 21 days, plus intravesical TAR-200 every 21 days x6, then every 3 months beginning 05/24/2024.  Patient received 9 cycles of cetrelimab thus far.     Restaging CT urogram 08/16/2024, was negative for urinary bladder wall thickening or tumor.  There was no lymphadenopathy. Cystoscopy and bladder biopsy 08/20/2024, was negative for recurrent tumor, and anterior wall bladder biopsy revealed urothelium with reactive changes and was negative for malignancy.      INTERVAL HISTORY:  Alek returns to clinic today for follow up per trial protocol. He is accompanied by his wife. He reports a chronic cough every year in the fall through first of the year where he has a cough at bedtime. He reports this today and feels it is consistent - chronic cough in the fall and  unchanged from prior. He declines shortness of breath or chest pain. No fevers or chills. No urinary changes or hematuria. No diarrhea. Endorses occasional constipation that improves with stool softeners prn. No new pains. He has baseline arthralgias and these are unchanged. He reports he is tolerating cetrelimab well without significant changes from prior.      ROS: 10 point ROS neg other than the symptoms noted above in the HPI.    PAST HISTORY:   -Hypertension.  -Diabetes.  -Hyperlipidemia.  -Stage I urothelial carcinoma bladder (cT1, cN0, cM0).  -Fatty liver.  -Colonoscopy , was negative for polyps.  -Cervical C7-T1 radiculopathy.  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  -Tonsillectomy, .    MEDICATIONS:   Current Outpatient Medications   Medication Sig Dispense Refill    Ascorbic Acid 500 MG CAPS 1 tablet Orally Once a day      aspirin (ASPIRIN LOW DOSE) 81 MG EC tablet Take 81 mg by mouth daily ONCE BEFORE BED      blood glucose (NO BRAND SPECIFIED) lancets standard as directed, to use with his Contour Next test blood sugars daily      CONTOUR NEXT TEST test strip USE TO TEST BLOOD SUGAR DAILY      Ergocalciferol 50 MCG (2000 UT) CAPS Take 50 mcg by mouth daily      fish oil-omega-3 fatty acids 500 MG capsule Take 2 capsules by mouth daily      hydrochlorothiazide (HYDRODIURIL) 25 MG tablet Take 25 mg by mouth daily      ketoconazole (NIZORAL) 2 % external cream Apply topically as needed      levothyroxine (SYNTHROID/LEVOTHROID) 100 MCG tablet Take 1 tablet (100 mcg) by mouth daily 30 tablet 3    Microlet Lancets MISC daily      simvastatin (ZOCOR) 40 MG tablet Take 40 mg by mouth at bedtime      triamcinolone (KENALOG) 0.1 % external cream Apply as needed      Vitamin D3 (VITAMIN D, CHOLECALCIFEROL,) 25 mcg (1000 units) tablet Take 25 mcg by mouth daily         PHYSICAL EXAM:   ECO  /83   Pulse 91   Temp 98.2  F (36.8  C) (Oral)   Resp 20   Wt  108.2 kg (238 lb 8 oz)   SpO2 93%   BMI 35.73 kg/m    General: No acute distress  HEENT: Sclera anicteric. Oral mucosa pink and moist.  No mucositis or thrush  Heart: Regular, rate, and rhythm  Lungs: Clear to ascultation bilaterally  Abdomen: Positive bowel sounds. Soft, non-distended, non-tender. No organomegaly or mass.   Extremities: no lower extremity edema  Neuro: Cranial nerves grossly intact  Rash: none    LABS REVIEWED:    Latest Reference Range & Units 11/08/24 10:46 11/08/24 10:55   Sodium 135 - 145 mmol/L 135    Potassium 3.4 - 5.3 mmol/L 4.1    Chloride 98 - 107 mmol/L 96 (L)    Carbon Dioxide (CO2) 22 - 29 mmol/L 29    Urea Nitrogen 8.0 - 23.0 mg/dL 14.7    Creatinine 0.67 - 1.17 mg/dL 1.24 (H)    GFR Estimate >60 mL/min/1.73m2 61    Calcium 8.8 - 10.4 mg/dL 9.6    Anion Gap 7 - 15 mmol/L 10    Phosphorus 2.5 - 4.5 mg/dL 3.3    Albumin 3.5 - 5.2 g/dL 4.2    Protein Total 6.4 - 8.3 g/dL 7.4    Alkaline Phosphatase 40 - 150 U/L 88    ALT 0 - 70 U/L 27    AST 0 - 45 U/L 25    Amylase 28 - 100 U/L 102 (H)    Bilirubin Total <=1.2 mg/dL 0.6    CRP Inflammation <5.00 mg/L 19.40 (H)    GGT 8 - 61 U/L 85 (H)    Glucose 70 - 99 mg/dL 113 (H)    Estimated Average Glucose <117 mg/dL 143 (H)    Hemoglobin A1C <5.7 % 6.6 (H)    Lactate Dehydrogenase 0 - 250 U/L 212    Lipase 13 - 60 U/L 75 (H)    T4 Free 0.90 - 1.70 ng/dL 1.38    TSH 0.30 - 4.20 uIU/mL 5.76 (H)    Uric Acid 3.4 - 7.0 mg/dL 5.6    WBC 4.0 - 11.0 10e3/uL 8.5    Hemoglobin 13.3 - 17.7 g/dL 16.0    Hematocrit 40.0 - 53.0 % 45.6    Platelet Count 150 - 450 10e3/uL 290    RBC Count 4.40 - 5.90 10e6/uL 4.90    MCV 78 - 100 fL 93    MCH 26.5 - 33.0 pg 32.7    MCHC 31.5 - 36.5 g/dL 35.1    RDW 10.0 - 15.0 % 12.0    % Neutrophils % 58    % Lymphocytes % 20    % Monocytes % 12    % Eosinophils % 9    % Basophils % 1    Absolute Basophils 0.0 - 0.2 10e3/uL 0.1    Absolute Eosinophils 0.0 - 0.7 10e3/uL 0.7    Absolute Immature Granulocytes <=0.4 10e3/uL 0.0     Absolute Lymphocytes 0.8 - 5.3 10e3/uL 1.7    Absolute Monocytes 0.0 - 1.3 10e3/uL 1.0    % Immature Granulocytes % 0    Absolute Neutrophils 1.6 - 8.3 10e3/uL 4.9    Absolute NRBCs 10e3/uL 0.0    NRBCs per 100 WBC <1 /100 0    INR 0.85 - 1.15  0.95    PTT 22 - 38 Seconds 28    Color Urine Colorless, Straw, Light Yellow, Yellow   Colorless   Appearance Urine Clear   Clear   Glucose Urine Negative mg/dL  Negative   Bilirubin Urine Negative   Negative   Ketones Urine Negative mg/dL  Negative   Specific Gravity Urine 1.001 - 1.030   1.009   pH Urine 5.0 - 7.0   6.5   Protein Albumin Urine Negative mg/dL  Negative   Urobilinogen mg/dL <2.0 mg/dL  <2.0   Nitrite Urine Negative   Negative   Blood Urine Negative   Negative   Leukocyte Esterase Urine Negative   Negative   WBC Urine <=5 /HPF  0   RBC Urine <=2 /HPF  0   Squamous Epithelial /HPF Urine <=1 /HPF  <1   (L): Data is abnormally low  (H): Data is abnormally high    I reviewed the above labs today.    IMPRESSION/PLAN: Stage I urothelial carcinoma bladder.  Bladder cancer is a low-grade papillary urothelial carcinoma with invasion of the lamina propria without involvement of the muscularis propria.  Patient underwent maximum TURBT (12/06/2023, 01/17/2024).  Patient is enrolled in the SUNRISE -3 clinical trial and is randomized to receive cetrelimab 360 mg IV plus intravesical TAR-200 every 21 days (beginning 05/24/2024).  There is no evidence of disease recurrence or metastases noted on CT urogram (08/16/2024) and cystoscopy and bladder biopsy (08/20/2024).    - He continues to tolerate cetrelimab well. He has grade 1 amylase/lipase elevation today without signs or symptoms of acute pancreatitis. Per NCCN, continue to monitor at this time. He has no new medications that may be contributing. He does consume alcohol. Additionally, his CRP is mildly elevated without new arthralgias or pains. We will proceed with cetrelimab today 11/12/2024 and monitor with subsequent  doses.   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 T4 WNL. TSH down trending.  - creatinine is mildly elevated but within patients baseline.     #Nighttime cough  - appears to be related to chronic cough secondary to environmental allergens. Continue to monitor closely as pneumonitis can be associated with cetrelimab. We discussed signs and symptoms of pneumonitis and when to seek further care.     RTC in 3 weeks per trial protocol.     Patient was seen and evaluated with study RNCC, Araceli Diaz.      20 minutes spent on the date of the encounter doing chart review, review of test results, interpretation of tests, patient visit, and documentation     Clarita Owen PA-C

## 2024-11-12 ENCOUNTER — INFUSION THERAPY VISIT (OUTPATIENT)
Dept: ONCOLOGY | Facility: CLINIC | Age: 74
End: 2024-11-12
Payer: COMMERCIAL

## 2024-11-12 ENCOUNTER — APPOINTMENT (OUTPATIENT)
Dept: LAB | Facility: CLINIC | Age: 74
End: 2024-11-12
Payer: COMMERCIAL

## 2024-11-12 ENCOUNTER — ONCOLOGY VISIT (OUTPATIENT)
Dept: ONCOLOGY | Facility: CLINIC | Age: 74
End: 2024-11-12
Payer: COMMERCIAL

## 2024-11-12 VITALS
WEIGHT: 238.5 LBS | DIASTOLIC BLOOD PRESSURE: 83 MMHG | HEART RATE: 91 BPM | RESPIRATION RATE: 20 BRPM | BODY MASS INDEX: 35.73 KG/M2 | SYSTOLIC BLOOD PRESSURE: 134 MMHG | OXYGEN SATURATION: 93 % | TEMPERATURE: 98.2 F

## 2024-11-12 VITALS
HEART RATE: 64 BPM | OXYGEN SATURATION: 95 % | TEMPERATURE: 97.4 F | DIASTOLIC BLOOD PRESSURE: 86 MMHG | RESPIRATION RATE: 20 BRPM | SYSTOLIC BLOOD PRESSURE: 137 MMHG

## 2024-11-12 DIAGNOSIS — C67.3 MALIGNANT NEOPLASM OF ANTERIOR WALL OF URINARY BLADDER (H): Primary | ICD-10-CM

## 2024-11-12 PROCEDURE — G0463 HOSPITAL OUTPT CLINIC VISIT: HCPCS | Mod: 25

## 2024-11-12 PROCEDURE — 258N000003 HC RX IP 258 OP 636

## 2024-11-12 PROCEDURE — 36415 COLL VENOUS BLD VENIPUNCTURE: CPT | Performed by: INTERNAL MEDICINE

## 2024-11-12 PROCEDURE — 96413 CHEMO IV INFUSION 1 HR: CPT

## 2024-11-12 PROCEDURE — 300N000004 RESEARCH KIT COLLECTION: Performed by: INTERNAL MEDICINE

## 2024-11-12 RX ORDER — MEPERIDINE HYDROCHLORIDE 25 MG/ML
25 INJECTION INTRAMUSCULAR; INTRAVENOUS; SUBCUTANEOUS
OUTPATIENT
Start: 2024-11-12

## 2024-11-12 RX ORDER — HEPARIN SODIUM (PORCINE) LOCK FLUSH IV SOLN 100 UNIT/ML 100 UNIT/ML
5 SOLUTION INTRAVENOUS
OUTPATIENT
Start: 2024-11-12

## 2024-11-12 RX ORDER — HEPARIN SODIUM,PORCINE 10 UNIT/ML
5-20 VIAL (ML) INTRAVENOUS DAILY PRN
OUTPATIENT
Start: 2024-11-12

## 2024-11-12 RX ORDER — DIPHENHYDRAMINE HYDROCHLORIDE 50 MG/ML
50 INJECTION INTRAMUSCULAR; INTRAVENOUS
Start: 2024-11-12

## 2024-11-12 RX ORDER — LORAZEPAM 2 MG/ML
0.5 INJECTION INTRAMUSCULAR EVERY 4 HOURS PRN
OUTPATIENT
Start: 2024-11-12

## 2024-11-12 RX ORDER — EPINEPHRINE 1 MG/ML
0.3 INJECTION, SOLUTION INTRAMUSCULAR; SUBCUTANEOUS EVERY 5 MIN PRN
OUTPATIENT
Start: 2024-11-12

## 2024-11-12 RX ORDER — LIDOCAINE HYDROCHLORIDE 20 MG/ML
10 JELLY TOPICAL
OUTPATIENT
Start: 2024-11-12

## 2024-11-12 RX ORDER — METHYLPREDNISOLONE SODIUM SUCCINATE 40 MG/ML
40 INJECTION INTRAMUSCULAR; INTRAVENOUS
Start: 2024-11-12

## 2024-11-12 RX ORDER — ACETAMINOPHEN 325 MG/1
650 TABLET ORAL
Start: 2024-11-12

## 2024-11-12 RX ORDER — ALBUTEROL SULFATE 90 UG/1
1-2 INHALANT RESPIRATORY (INHALATION)
Start: 2024-11-12

## 2024-11-12 RX ORDER — ALBUTEROL SULFATE 0.83 MG/ML
2.5 SOLUTION RESPIRATORY (INHALATION)
OUTPATIENT
Start: 2024-11-12

## 2024-11-12 RX ORDER — DIPHENHYDRAMINE HYDROCHLORIDE 50 MG/ML
25 INJECTION INTRAMUSCULAR; INTRAVENOUS
Start: 2024-11-12

## 2024-11-12 RX ORDER — DIPHENHYDRAMINE HCL 25 MG
50 CAPSULE ORAL
OUTPATIENT
Start: 2024-11-12

## 2024-11-12 RX ADMIN — SODIUM CHLORIDE 100 ML: 9 INJECTION, SOLUTION INTRAVENOUS at 11:55

## 2024-11-12 ASSESSMENT — PAIN SCALES - GENERAL: PAINLEVEL_OUTOF10: NO PAIN (0)

## 2024-11-12 NOTE — NURSING NOTE
Chief Complaint   Patient presents with    Oncology Clinic Visit     Prostate Cancer    Blood Draw     Urine sample collected for Research by lab RN       IV placement Urine sample collected by lab RNby lab RN. IB sent to provider and there are no labs ordered. Also confirmed with research that urine sample only needed to be collected. Vitals taken and appointment arrived.    Lidia Syed RN

## 2024-11-12 NOTE — PROGRESS NOTES
Infusion Nursing Note:  Alek Mcneill presents today for  Day 1 week 36 Study Cetrelimab IDS# [id number]  Patient seen by provider today: Yes: Clarita FRAUSTO   present during visit today: Not Applicable.    Note: Patient presents to infusion feeling ok. Pt denies new acute discomfort and states no acute complaints or concerns not addressed by ANNY today.    Per Research RN Araceli, the following was performed:     Pre research drug labs within 30 min prior to the infusion (1153 via right arm)  Post research drug labs drawn at 1238 via left arm  Vital signs taken before the infusion-see graphic   Study drug started: 1156  Study drug stopped: 1232-which included the 14ml flush of NS post drug      Intravenous Access:  Peripheral IV placed.    Treatment Conditions:  Lab Results   Component Value Date    HGB 16.0 11/08/2024    WBC 8.5 11/08/2024    ANEUTAUTO 4.9 11/08/2024     11/08/2024        Lab Results   Component Value Date     11/08/2024    POTASSIUM 4.1 11/08/2024    MAG 2.3 11/19/2023    CR 1.24 (H) 11/08/2024    MILLI 9.6 11/08/2024    BILITOTAL 0.6 11/08/2024    ALBUMIN 4.2 11/08/2024    ALT 27 11/08/2024    AST 25 11/08/2024     Results reviewed, labs MET treatment parameters, ok to proceed with treatment.      Post Infusion Assessment:  Patient tolerated infusion without incident.  Blood return noted pre and post infusion.  Site patent and intact, free from redness, edema or discomfort.  No evidence of extravasations.  Access discontinued per protocol.       Discharge Plan:   Patient declined prescription refills.  Discharge instructions reviewed with: Patient.  Patient and/or family verbalized understanding of discharge instructions and all questions answered.  AVS to patient via [employer].  Patient will return 11/19 for cysto treatment and 12/2 for next appointment with provider and infusion  Patient discharged in stable condition accompanied by: Family.  Departure Mode:  Ambulatory.      Zenon Loera RN

## 2024-11-12 NOTE — PATIENT INSTRUCTIONS
[facility] Triage and after hours / weekends / holidays:  [phone]    Please call the triage or after hours line if you experience a temperature greater than or equal to 100.4, shaking chills, have uncontrolled nausea, vomiting and/or diarrhea, dizziness, shortness of breath, chest pain, bleeding, unexplained bruising, or if you have any other new/concerning symptoms, questions or concerns.      If you are having any concerning symptoms or wish to speak to a provider before your next infusion visit, please call triage to notify them so we can adequately serve you.     If you need a refill on a narcotic prescription or other medication, please call before your infusion appointment.                 November 2024 Sunday Monday Tuesday Wednesday Thursday Friday Saturday                            1     2       3     4     5     6     7     8    LAB  11:15 AM   (15 min.)   [facility]   [facility] 9       10     11     12    LAB PERIPHERAL   9:30 AM   (15 min.)   [facility] DRAW   [facility]    RETURN CCSL   9:45 AM   (45 min.)   Clarita Owen PA-C   [facility]    ONC INFUSION 1.5 HR (90 MIN)  11:00 AM   (90 min.)    ONC INFUSION NURSE   [facility] 13     14     15     16       17     18     19    CYSTOSCOPY   7:45 AM   (30 min.)   Harman Kaiser MD   [facility] [place] 20     21     22     23       24     25     26     27     28     29     30                 December 2024 Sunday Monday Tuesday Wednesday Thursday Friday Saturday   1     2    LAB PERIPHERAL  12:15 PM   (15 min.)   [facility] DRAW   [facility]    RETURN CCSL  12:15 PM   (30 min.)   Julio Tobin MD   [facility]    ONC INFUSION 1.5 HR (90 MIN)   1:30 PM   (90 min.)   [facility] INFUSION NURSE   [facility]  [facility] 3    CYSTOSCOPY  11:15 AM   (30 min.)   Harmna Kaiser MD   [facility] [place] 4     5     6     7       8     9     10     11     12     13     14       15     16     17     18     19     20    LAB PERIPHERAL   1:00 PM   (15 min.)   [facility] DRAW   [facility]    RETURN CCSL   1:15 PM   (45 min.)   Clarita Owen PA-C   [facility]    ONC INFUSION 1.5 HR (90 MIN)   2:30 PM   (90 min.)    ONC INFUSION NURSE   [facility] 21       22     23     24     25     26     27     28       29     30     31                                         Lab Results:  No results found for this or any previous visit (from the past 12 hours).

## 2024-11-12 NOTE — Clinical Note
11/12/2024      Alek Mcneill  [address] [place] [address]      Dear Colleague,    Thank you for referring your patient, Alek Mcneill, to the [facility]. Please see a copy of my visit note below.    Nov 12, 2024    PRIMARY CARE PHYSICIAN: Margarita BRUNSON MD  UROLOGY: Harman Kaiser MD    HISTORY OF PRESENT ILLNESS: Patient is a 74-year-old male with stage I urothelial carcinoma bladder (cT1, cN0, cM0). Patient presents 11/19/2023, with gross hematuria and blood clots. CT abdomen, pelvis 11/19/2023, revealed several nodular foci of increased density along the inferior bladder measuring up to 1.2 cm there was no hydronephrosis.  There was no lymphadenopathy or metastases. Cystoscopy and TURBT 12/06/2023, revealed multiple 1-2 cm papillary bladder tumors on the anterior wall, some with superficial calcifications. Pathology evaluation revealed a low-grade papillary urothelial carcinoma with invasion of the lamina propria.  Muscularis propria was present and uninvolved.  Repeat cystoscopy and TURBT 01/17/2024, revealed a 3 cm tumor area previously resected on the anterior bladder wall near the dome.  TURBT of the base of the bladder tumor revealed microscopic foci of residual flat urothelial carcinoma in situ without residual invasive carcinoma.  Patient is enrolled in the SUNRISE -3 clinical trial and is randomized to receive cetrelimab 360 mg IV every 21 days, plus intravesical TAR-200 every 21 days x6, then every 3 months beginning 05/24/2024.  Patient received 9 cycles of cetrelimab thus far.     Restaging CT urogram 08/16/2024, was negative for urinary bladder wall thickening or tumor.  There was no lymphadenopathy. Cystoscopy and bladder biopsy 08/20/2024, was negative for recurrent tumor, and anterior wall bladder biopsy revealed urothelium with reactive changes and was negative for malignancy.      INTERVAL HISTORY:  Alek returns to clinic today for follow up  per trial protocol. He is accompanied by his wife. He reports a chronic cough every year in the fall through first of the year where he has a cough at bedtime. He reports this today and feels it is consistent - chronic cough in the fall and unchanged from prior. He declines shortness of breath or chest pain. No fevers or chills. No urinary changes or hematuria. No diarrhea. Endorses occasional constipation that improves with stool softeners prn. No new pains. He has baseline arthralgias and these are unchanged. He reports he is tolerating cetrelimab well without significant changes from prior.      ROS: 10 point ROS neg other than the symptoms noted above in the HPI.    PAST HISTORY:   -Hypertension.  -Diabetes.  -Hyperlipidemia.  -Stage I urothelial carcinoma bladder (cT1, cN0, cM0).  -Fatty liver.  -Colonoscopy 2019, was negative for polyps.  -Cervical C7-T1 radiculopathy.  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  -Tonsillectomy, 1958.    MEDICATIONS:   Current Outpatient Medications   Medication Sig Dispense Refill    Ascorbic Acid 500 MG CAPS 1 tablet Orally Once a day      aspirin (ASPIRIN LOW DOSE) 81 MG EC tablet Take 81 mg by mouth daily ONCE BEFORE BED      blood glucose (NO BRAND SPECIFIED) lancets standard as directed, to use with his Contour Next test blood sugars daily      CONTOUR NEXT TEST test strip USE TO TEST BLOOD SUGAR DAILY      Ergocalciferol 50 MCG (2000 UT) CAPS Take 50 mcg by mouth daily      fish oil-omega-3 fatty acids 500 MG capsule Take 2 capsules by mouth daily      hydrochlorothiazide (HYDRODIURIL) 25 MG tablet Take 25 mg by mouth daily      ketoconazole (NIZORAL) 2 % external cream Apply topically as needed      levothyroxine (SYNTHROID/LEVOTHROID) 100 MCG tablet Take 1 tablet (100 mcg) by mouth daily 30 tablet 3    Microlet Lancets MISC daily      simvastatin (ZOCOR) 40 MG tablet Take 40 mg by mouth at bedtime      triamcinolone (KENALOG)  0.1 % external cream Apply as needed      Vitamin D3 (VITAMIN D, CHOLECALCIFEROL,) 25 mcg (1000 units) tablet Take 25 mcg by mouth daily         PHYSICAL EXAM:   /83   Pulse 91   Temp 98.2  F (36.8  C) (Oral)   Resp 20   Wt 108.2 kg (238 lb 8 oz)   SpO2 93%   BMI 35.73 kg/m    General: No acute distress  HEENT: Sclera anicteric. Oral mucosa pink and moist.  No mucositis or thrush  Heart: Regular, rate, and rhythm  Lungs: Clear to ascultation bilaterally  Abdomen: Positive bowel sounds. Soft, non-distended, non-tender. No organomegaly or mass.   Extremities: no lower extremity edema  Neuro: Cranial nerves grossly intact  Rash: none    LABS REVIEWED:    Latest Reference Range & Units 11/08/24 10:46 11/08/24 10:55   Sodium 135 - 145 mmol/L 135    Potassium 3.4 - 5.3 mmol/L 4.1    Chloride 98 - 107 mmol/L 96 (L)    Carbon Dioxide (CO2) 22 - 29 mmol/L 29    Urea Nitrogen 8.0 - 23.0 mg/dL 14.7    Creatinine 0.67 - 1.17 mg/dL 1.24 (H)    GFR Estimate >60 mL/min/1.73m2 61    Calcium 8.8 - 10.4 mg/dL 9.6    Anion Gap 7 - 15 mmol/L 10    Phosphorus 2.5 - 4.5 mg/dL 3.3    Albumin 3.5 - 5.2 g/dL 4.2    Protein Total 6.4 - 8.3 g/dL 7.4    Alkaline Phosphatase 40 - 150 U/L 88    ALT 0 - 70 U/L 27    AST 0 - 45 U/L 25    Amylase 28 - 100 U/L 102 (H)    Bilirubin Total <=1.2 mg/dL 0.6    CRP Inflammation <5.00 mg/L 19.40 (H)    GGT 8 - 61 U/L 85 (H)    Glucose 70 - 99 mg/dL 113 (H)    Estimated Average Glucose <117 mg/dL 143 (H)    Hemoglobin A1C <5.7 % 6.6 (H)    Lactate Dehydrogenase 0 - 250 U/L 212    Lipase 13 - 60 U/L 75 (H)    T4 Free 0.90 - 1.70 ng/dL 1.38    TSH 0.30 - 4.20 uIU/mL 5.76 (H)    Uric Acid 3.4 - 7.0 mg/dL 5.6    WBC 4.0 - 11.0 10e3/uL 8.5    Hemoglobin 13.3 - 17.7 g/dL 16.0    Hematocrit 40.0 - 53.0 % 45.6    Platelet Count 150 - 450 10e3/uL 290    RBC Count 4.40 - 5.90 10e6/uL 4.90    MCV 78 - 100 fL 93    MCH 26.5 - 33.0 pg 32.7    MCHC 31.5 - 36.5 g/dL 35.1    RDW 10.0 - 15.0 % 12.0    %  Neutrophils % 58    % Lymphocytes % 20    % Monocytes % 12    % Eosinophils % 9    % Basophils % 1    Absolute Basophils 0.0 - 0.2 10e3/uL 0.1    Absolute Eosinophils 0.0 - 0.7 10e3/uL 0.7    Absolute Immature Granulocytes <=0.4 10e3/uL 0.0    Absolute Lymphocytes 0.8 - 5.3 10e3/uL 1.7    Absolute Monocytes 0.0 - 1.3 10e3/uL 1.0    % Immature Granulocytes % 0    Absolute Neutrophils 1.6 - 8.3 10e3/uL 4.9    Absolute NRBCs 10e3/uL 0.0    NRBCs per 100 WBC <1 /100 0    INR 0.85 - 1.15  0.95    PTT 22 - 38 Seconds 28    Color Urine Colorless, Straw, Light Yellow, Yellow   Colorless   Appearance Urine Clear   Clear   Glucose Urine Negative mg/dL  Negative   Bilirubin Urine Negative   Negative   Ketones Urine Negative mg/dL  Negative   Specific Gravity Urine 1.001 - 1.030   1.009   pH Urine 5.0 - 7.0   6.5   Protein Albumin Urine Negative mg/dL  Negative   Urobilinogen mg/dL <2.0 mg/dL  <2.0   Nitrite Urine Negative   Negative   Blood Urine Negative   Negative   Leukocyte Esterase Urine Negative   Negative   WBC Urine <=5 /HPF  0   RBC Urine <=2 /HPF  0   Squamous Epithelial /HPF Urine <=1 /HPF  <1   (L): Data is abnormally low  (H): Data is abnormally high    I reviewed the above labs today.    IMPRESSION/PLAN: Stage I urothelial carcinoma bladder.  Bladder cancer is a low-grade papillary urothelial carcinoma with invasion of the lamina propria without involvement of the muscularis propria.  Patient underwent maximum TURBT (12/06/2023, 01/17/2024).  Patient is enrolled in the SUNRISE -3 clinical trial and is randomized to receive cetrelimab 360 mg IV plus intravesical TAR-200 every 21 days (beginning 05/24/2024).  There is no evidence of disease recurrence or metastases noted on CT urogram (08/16/2024) and cystoscopy and bladder biopsy (08/20/2024).    - He continues to tolerate cetrelimab well. He has grade 1 amylase/lipase elevation today without signs or symptoms of acute pancreatitis. Per NCCN, continue to monitor  at this time. He has no new medications that may be contributing. He does consume alcohol. Additionally, his CRP is mildly elevated without new arthralgias or pains. We will proceed with cetrelimab today 11/12/2024 and monitor with subsequent doses.   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 T4 WNL. TSH down trending.  - creatinine is mildly elevated but within patients baseline.     #Nighttime cough  - appears to be related to chronic cough secondary to environmental allergens. Continue to monitor closely as pneumonitis can be associated with cetrelimab. We discussed signs and symptoms of pneumonitis and when to seek further care.     RTC in 3 weeks per trial protocol.     Patient was seen and evaluated with study RNCC, Araceli Diaz.      20 minutes spent on the date of the encounter doing chart review, review of test results, interpretation of tests, patient visit, and documentation     Clarita Owen PA-C        Again, thank you for allowing me to participate in the care of your patient.        Sincerely,        Clarita Owen PA-C

## 2024-11-12 NOTE — NURSING NOTE
"Oncology Rooming Note    November 12, 2024 10:18 AM   Alek Mcneill is a 74 year old male who presents for:    Chief Complaint   Patient presents with    Oncology Clinic Visit     Prostate Cancer    Blood Draw     Urine sample collected for Research by lab RN     Initial Vitals: /83   Pulse 91   Temp 98.2  F (36.8  C) (Oral)   Resp 20   Wt 108.2 kg (238 lb 8 oz)   SpO2 93%   BMI 35.73 kg/m   Estimated body mass index is 35.73 kg/m  as calculated from the following:    Height as of 9/16/24: 1.74 m (5' 8.5\").    Weight as of this encounter: 108.2 kg (238 lb 8 oz). Body surface area is 2.29 meters squared.  No Pain (0) Comment: Data Unavailable   No LMP for male patient.  Allergies reviewed: Yes  Medications reviewed: Yes    Medications: Medication refills not needed today.  Pharmacy name entered into [employer]: [facility] [address] [place] [address] AT [place] [id number]    Frailty Screening:   Is the patient here for a new oncology consult visit in cancer care? 2. No      Clinical concerns: none      Jonathan Washington LPN              "

## 2024-11-18 ENCOUNTER — DOCUMENTATION ONLY (OUTPATIENT)
Dept: ONCOLOGY | Facility: CLINIC | Age: 74
End: 2024-11-18
Payer: COMMERCIAL

## 2024-11-18 NOTE — NURSING NOTE
[id number]: Study Visit Note   Subject name: Alek Mcneill     Patient called in preparation for visit tomorrow. Patient feels well.       Assessed for below symptoms specifically:   Dysuria no  Pollakiuria no  Nocturia no  Urgency no  Incontinence no  Hematuria no  Urinary hesitation no  Bladder pain/spasm no  Urethral pain no   Bladder discomfort no  Feeling of incomplete bladder emptying  no  Lower abdominal pain no  Fever no  Flu-like symptoms no  Fatigue no  Diarrhea no  Nausea no  Rash no  Arthralgia  no- nothing new    I have personally interviewed Alek Mcneill and reviewed his medical record for adverse events and concomitant medications and these have been recorded on the corresponding logs in Alek Mcneill's research file.     Alek Mcneill was given the opportunity to ask any trial related questions.  Please see provider progress note for physical exam and other clinical information. Labs were reviewed - any significant lab values were addressed and reviewed.    Araceli Diaz RN

## 2024-11-19 ENCOUNTER — OFFICE VISIT (OUTPATIENT)
Dept: UROLOGY | Facility: CLINIC | Age: 74
End: 2024-11-19
Payer: COMMERCIAL

## 2024-11-19 VITALS
HEIGHT: 70 IN | OXYGEN SATURATION: 93 % | HEART RATE: 71 BPM | BODY MASS INDEX: 33.36 KG/M2 | WEIGHT: 233 LBS | DIASTOLIC BLOOD PRESSURE: 89 MMHG | SYSTOLIC BLOOD PRESSURE: 147 MMHG

## 2024-11-19 DIAGNOSIS — C67.3 MALIGNANT NEOPLASM OF ANTERIOR WALL OF URINARY BLADDER (H): Primary | ICD-10-CM

## 2024-11-19 LAB
ALBUMIN UR-MCNC: NEGATIVE MG/DL
APPEARANCE UR: CLEAR
BILIRUB UR QL STRIP: NEGATIVE
COLOR UR AUTO: NORMAL
GLUCOSE UR STRIP-MCNC: NEGATIVE MG/DL
HGB UR QL STRIP: NEGATIVE
KETONES UR STRIP-MCNC: NEGATIVE MG/DL
LEUKOCYTE ESTERASE UR QL STRIP: NEGATIVE
NITRATE UR QL: NEGATIVE
PH UR STRIP: 6.5 [PH] (ref 5–7)
RBC URINE: 0 /HPF
SP GR UR STRIP: 1.01 (ref 1–1.03)
SQUAMOUS EPITHELIAL: <1 /HPF
UROBILINOGEN UR STRIP-MCNC: NORMAL MG/DL
WBC URINE: <1 /HPF

## 2024-11-19 PROCEDURE — 88112 CYTOPATH CELL ENHANCE TECH: CPT | Mod: TC | Performed by: UROLOGY

## 2024-11-19 PROCEDURE — 88112 CYTOPATH CELL ENHANCE TECH: CPT | Mod: 26 | Performed by: PATHOLOGY

## 2024-11-19 RX ORDER — SULFAMETHOXAZOLE AND TRIMETHOPRIM 800; 160 MG/1; MG/1
1 TABLET ORAL ONCE
Status: COMPLETED | OUTPATIENT
Start: 2024-11-19 | End: 2024-11-19

## 2024-11-19 RX ORDER — LIDOCAINE HYDROCHLORIDE 20 MG/ML
JELLY TOPICAL ONCE
Status: COMPLETED | OUTPATIENT
Start: 2024-11-19 | End: 2024-11-19

## 2024-11-19 RX ADMIN — LIDOCAINE HYDROCHLORIDE: 20 JELLY TOPICAL at 08:10

## 2024-11-19 RX ADMIN — SULFAMETHOXAZOLE AND TRIMETHOPRIM 1 TABLET: 800; 160 TABLET ORAL at 08:28

## 2024-11-19 ASSESSMENT — PAIN SCALES - GENERAL: PAINLEVEL_OUTOF10: NO PAIN (0)

## 2024-11-19 NOTE — PROGRESS NOTES
"  CHIEF COMPLAINT   It was my pleasure to see Alek Mcneill who is a 74 year old male for follow-up of bladder cancer.      HPI  Alek Mcneill is a very pleasant 74 year old male who presents with a history of bladder cancer. 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      He has enrolled in [employer] 3 and is here today for TAR-200 insertion and surveillance cystoscopy.  No recent hematuria or dysuria.     Now 3 months since last TAR-200. Surveillance biopsy 8/2024 was benign.     Re-staging TURBT 1/17/2024          SPECIMEN   Procedure   Transurethral resection of bladder (TURBT)   TUMOR   Tumor Site   Anterior wall   Histologic Type   Urothelial carcinoma, in situ   Histologic Grade   High-grade   Tumor Extent   Flat carcinoma in situ   Lymphovascular Invasion   Not identified   Tumor Configuration   Flat   Muscularis Propria (detrusor muscle)   Cannot be determined: Biopsy site changes are identified in the current sample; no residual viable invasive carcinoma is identified.  Please see previous biopsy report for further information, case [id number], [facility]..   ADDITIONAL FINDINGS   Associated Epithelial Lesions   Chronic inflammation, histiocytic proliferation, consistent with biopsy site changes.      TURBT 12/6/2023  Final Diagnosis   A) URINARY BLADDER, BASE OF TUMOR, TRANSURETHRAL RESECTION BLADDER TUMOR:  -NONINVASIVE LOW-GRADE PAPILLARY UROTHELIAL CARCINOMA  -MULTIPLE FRAGMENTS OF MUSCULARIS PROPRIA  -SEE SYNOPTIC REPORT     B) URINARY BLADDER, TUMOR, TRANSURETHRAL RESECTION BLADDER TUMOR:  -HIGH-GRADE PAPILLARY UROTHELIAL CARCINOMA WITH LAMINA PROPRIA INVASION  -MUSCULARIS PROPRIA PRESENT AND UNINVOLVED  -SEE SYNOPTIC REPORT     PHYSICAL EXAM  Patient is a 74 year old  male   Vitals: Blood pressure (!) 147/89, pulse 71, height 1.765 m (5' 9.5\"), weight 105.7 kg (233 lb), SpO2 93%.  General Appearance Adult: Body mass index is 33.91 " kg/m .  Alert, no acute distress, oriented  Lungs: no respiratory distress, or pursed lip breathing  Abdomen: soft, nontender, no organomegaly or masses  Back: no CVAT  Neuro: Alert, oriented, speech and mentation normal  Psych: affect and mood normal  : penis, scrotum, testes normal    OFFICE CYSTOSCOPY 11/19/2024     Pre-procedure diagnosis:       Bladder Cancer  Post-procedure diagnosis:      No recurrence  Procedure performed:             Cystourethroscopy with placement of TAR-200  Surgeon:                                 Harman Kaiser MD  Anesthesia:                             Local     Description of procedure:   After fully informed, voluntary consent was obtained, the patient was brought into the procedure room, identified and placed in a supine position on the cystoscopy table.  The groin/scrotum were prepped with betadine and draped in a sterile fashion.  A 15F flexible cystoscope was inserted into the urethra, and the bladder and urethra were examined in a systematic manner. No suspicious lesions noted. TAR-200 was inserted via provided catheter system. The patient tolerated the procedure well and there were no complications.       Cystoscopic findings:  The urethra was normal without strictures.  The prostate was 3 cm long and demonstrated mild bilobar hypertrophy.  There was no median lobe.  The external sphincter coapted well and the bladder neck was open. The bladder was completely surveyed.  There was mild trabeculation.  There were no neoplasms, stones, or diverticula identifed.  The ureteric orifices were normal in position and number and effluxing clear urine. There are no  suspicious findings.     ASSESSMENT and PLAN  74 year old male with history of HG non-muscle-invasive bladder cancer. He has enrolled in the [employer] 3 clinical trial for BCG-naive NMIBC. Cystoscopy today with no evidence of visible papillary disease.     Time of TAR-200 Insertion: 8:42 AM  There were no irregularities  observed during or after removal.  Betzy-procedural antibiotics were given.  Office cysto for TAR-200 removal in 3 weeks.    Harman Kaiser MD  Urology  [facility]

## 2024-11-19 NOTE — Clinical Note
11/19/2024       RE: Alek Mcneill  [address] [place] [address]     Dear Colleague,    Thank you for referring your patient, Alek Mcneill, to the [facility] [place] [facility]. Please see a copy of my visit note below.    The following medication was given:     MEDICATION: Bactrim (Trimethoprim / Sulfamethoxazole)  ROUTE: PO  SITE: Medication was given orally  DOSE: 800mg/160mg  LOT #: [id number]  : [employer]   EXPIRATION DATE: 2026-05-31  NDC#: (45) [id number] 614 11 3   Was there drug waste? No    Prior to medication administration, verified patient identity using patient's name and date of birth.  Due to medication administration, patient instructed to remain in clinic for 15 minutes  afterwards, and to report any adverse reaction to me immediately.        Again, thank you for allowing me to participate in the care of your patient.      Sincerely,    Harman Kaiser MD

## 2024-11-19 NOTE — PROGRESS NOTES
The following medication was given:     MEDICATION: Bactrim (Trimethoprim / Sulfamethoxazole)  ROUTE: PO  SITE: Medication was given orally  DOSE: 800mg/160mg  LOT #: [id number]  : [employer]   EXPIRATION DATE: 2026-05-31  NDC#: ([phone] [id number] 614 11 3   Was there drug waste? No    Prior to medication administration, verified patient identity using patient's name and date of birth.  Due to medication administration, patient instructed to remain in clinic for 15 minutes  afterwards, and to report any adverse reaction to me immediately.

## 2024-11-19 NOTE — NURSING NOTE
"Chief Complaint   Patient presents with    Cystoscopy       Blood pressure (!) 147/89, pulse 71, height 1.765 m (5' 9.5\"), weight 105.7 kg (233 lb), SpO2 93%. Body mass index is 33.91 kg/m .    Patient Active Problem List   Diagnosis    Malignant neoplasm of anterior wall of urinary bladder (H)    Benign hypertension    Hyperlipidemia    Impotence of organic origin    Morbid (severe) obesity due to excess calories (H)    Type 2 diabetes mellitus without complication, without long-term current use of insulin (H)    Spinal stenosis of lumbosacral region    Peripheral vascular disease (H)    Venous stasis dermatitis of left lower extremity    Lumbar radiculopathy    Environmental allergies    Age-related nuclear cataract of right eye       Allergies   Allergen Reactions    Chlorhexidine Dermatitis       Current Outpatient Medications   Medication Sig Dispense Refill    Ascorbic Acid 500 MG CAPS 1 tablet Orally Once a day      aspirin (ASPIRIN LOW DOSE) 81 MG EC tablet Take 81 mg by mouth daily ONCE BEFORE BED      blood glucose (NO BRAND SPECIFIED) lancets standard as directed, to use with his Contour Next test blood sugars daily      CONTOUR NEXT TEST test strip USE TO TEST BLOOD SUGAR DAILY      Ergocalciferol 50 MCG (2000 UT) CAPS Take 50 mcg by mouth daily      fish oil-omega-3 fatty acids 500 MG capsule Take 2 capsules by mouth daily      hydrochlorothiazide (HYDRODIURIL) 25 MG tablet Take 25 mg by mouth daily      ketoconazole (NIZORAL) 2 % external cream Apply topically as needed      levothyroxine (SYNTHROID/LEVOTHROID) 100 MCG tablet Take 1 tablet (100 mcg) by mouth daily 30 tablet 3    Microlet Lancets MISC daily      simvastatin (ZOCOR) 40 MG tablet Take 40 mg by mouth at bedtime      triamcinolone (KENALOG) 0.1 % external cream Apply as needed      Vitamin D3 (VITAMIN D, CHOLECALCIFEROL,) 25 mcg (1000 units) tablet Take 25 mcg by mouth daily         Social History     Tobacco Use    Smoking status: Former "     Current packs/day: 0.00     Types: Cigarettes     Quit date:      Years since quittin.9     Passive exposure: Past   Vaping Use    Vaping status: Never Used   Substance Use Topics    Alcohol use: Yes     Comment: daily    Drug use: Never       Invasive Procedure Safety Checklist:    Procedure: Cystoscopy    Action: Complete sections and checkboxes as appropriate.    Pre-procedure:  1. Patient ID Verified with 2 identifiers (Octavia and  or MRN) : YES    2. Procedure and site verified with patient/designee (when able) : YES    3. Accurate consent documentation in medical record : YES    4. H&P (or appropriate assessment) documented in medical record : N/A  H&P must be up to 30 days prior to procedure an updated within 24 hours of                 Procedure as applicable.     5. Relevant diagnostic and radiology test results appropriately labeled and displayed as applicable : YES    6. Blood products, implants, devices, and/or special equipment available for the procedure as applicable : YES    7. Procedure site(s) marked with provider initials [Exclusions: none] : NO    8. Marking not required. Reason : Yes  Procedure does not require site marking    Time Out:     Time-Out performed immediately prior to starting procedure, including verbal and active participation of all team members addressing: YES    1. Correct patient identity.  2. Confirmed that the correct side and site are marked.  3. An accurate procedure to be done.  4. Agreement on the procedure to be done.  5. Correct patient position.  6. Relevant images and results are properly labeled and appropriately displayed.  7. The need to administer antibiotics or fluids for irrigation purposes during the procedure as applicable.  8. Safety precautions based on patient history or medication use.    During Procedure: Verification of correct person, site, and procedure occurs any time the responsibility for care of the patient is transferred to another  member of the care team.    The following medication was given:     MEDICATION:  Lidocaine without epinephrine 2% jelly  ROUTE: urethral   SITE: urethral   DOSE: 10 mL  LOT #: [id number]  : [employer]  EXPIRATION DATE: 5-26  NDC#: [address]-9   Was there drug waste? No    Prior to med admin, verified patient identity using patient's name and date of birth.  Due to med administration, patient instructed to remain in clinic for 15 minutes  afterwards, and to report any adverse reaction to me immediately.    Drug Amount Wasted:  None.  Vial/Syringe: Syringe      Deedee Dyson  11/19/2024  7:58 AM

## 2024-11-20 ENCOUNTER — TELEPHONE (OUTPATIENT)
Dept: ONCOLOGY | Facility: CLINIC | Age: 74
End: 2024-11-20
Payer: COMMERCIAL

## 2024-11-20 ENCOUNTER — PRE VISIT (OUTPATIENT)
Dept: UROLOGY | Facility: CLINIC | Age: 74
End: 2024-11-20
Payer: COMMERCIAL

## 2024-11-20 NOTE — TELEPHONE ENCOUNTER
Patient called to get refill for synthroid. Refill request sent to Clarita Owen.     Araceli Diaz RN   Clinical Research Coordinator Nurse-Solid Tumor   Phone: [phone]

## 2024-11-21 DIAGNOSIS — E03.2 HYPOTHYROIDISM DUE TO MEDICATION: ICD-10-CM

## 2024-11-21 RX ORDER — LEVOTHYROXINE SODIUM 100 UG/1
100 TABLET ORAL DAILY
Qty: 30 TABLET | Refills: 3 | Status: SHIPPED | OUTPATIENT
Start: 2024-11-21

## 2024-11-26 ENCOUNTER — TELEPHONE (OUTPATIENT)
Dept: UROLOGY | Facility: CLINIC | Age: 74
End: 2024-11-26
Payer: COMMERCIAL

## 2024-11-26 NOTE — TELEPHONE ENCOUNTER
Patient confirmed scheduled appointment:  Date: Feb 4th   Time: 12:30pm  Visit type: Cystoscopy   Provider: Dr. Kaiser  Location: [facility]  Additional notes: [place]rich study

## 2024-11-27 DIAGNOSIS — C67.3 MALIGNANT NEOPLASM OF ANTERIOR WALL OF URINARY BLADDER (H): Primary | ICD-10-CM

## 2024-11-29 ENCOUNTER — APPOINTMENT (OUTPATIENT)
Dept: LAB | Facility: HOSPITAL | Age: 74
End: 2024-11-29
Payer: COMMERCIAL

## 2024-11-29 LAB
ALBUMIN SERPL BCG-MCNC: 4.2 G/DL (ref 3.5–5.2)
ALBUMIN UR-MCNC: NEGATIVE MG/DL
ALP SERPL-CCNC: 90 U/L (ref 40–150)
ALT SERPL W P-5'-P-CCNC: 33 U/L (ref 0–70)
AMYLASE SERPL-CCNC: 82 U/L (ref 28–100)
ANION GAP SERPL CALCULATED.3IONS-SCNC: 10 MMOL/L (ref 7–15)
APPEARANCE UR: CLEAR
AST SERPL W P-5'-P-CCNC: 31 U/L (ref 0–45)
BASOPHILS # BLD AUTO: 0.1 10E3/UL (ref 0–0.2)
BASOPHILS NFR BLD AUTO: 1 %
BILIRUB SERPL-MCNC: 0.6 MG/DL
BILIRUB UR QL STRIP: NEGATIVE
BUN SERPL-MCNC: 16.2 MG/DL (ref 8–23)
CALCIUM SERPL-MCNC: 9.2 MG/DL (ref 8.8–10.4)
CHLORIDE SERPL-SCNC: 96 MMOL/L (ref 98–107)
COLOR UR AUTO: COLORLESS
CREAT SERPL-MCNC: 1.28 MG/DL (ref 0.67–1.17)
CRP SERPL-MCNC: 6.4 MG/L
EGFRCR SERPLBLD CKD-EPI 2021: 58 ML/MIN/1.73M2
EOSINOPHIL # BLD AUTO: 0.6 10E3/UL (ref 0–0.7)
EOSINOPHIL NFR BLD AUTO: 5 %
ERYTHROCYTE [DISTWIDTH] IN BLOOD BY AUTOMATED COUNT: 12.4 % (ref 10–15)
EST. AVERAGE GLUCOSE BLD GHB EST-MCNC: 143 MG/DL
GGT SERPL-CCNC: 85 U/L (ref 8–61)
GLUCOSE SERPL-MCNC: 125 MG/DL (ref 70–99)
GLUCOSE UR STRIP-MCNC: NEGATIVE MG/DL
HBA1C MFR BLD: 6.6 %
HCO3 SERPL-SCNC: 30 MMOL/L (ref 22–29)
HCT VFR BLD AUTO: 47.9 % (ref 40–53)
HGB BLD-MCNC: 16.5 G/DL (ref 13.3–17.7)
HGB UR QL STRIP: NEGATIVE
IMM GRANULOCYTES # BLD: 0.1 10E3/UL
IMM GRANULOCYTES NFR BLD: 1 %
KETONES UR STRIP-MCNC: NEGATIVE MG/DL
LDH SERPL L TO P-CCNC: 233 U/L (ref 0–250)
LEUKOCYTE ESTERASE UR QL STRIP: NEGATIVE
LIPASE SERPL-CCNC: 41 U/L (ref 13–60)
LYMPHOCYTES # BLD AUTO: 2.3 10E3/UL (ref 0.8–5.3)
LYMPHOCYTES NFR BLD AUTO: 21 %
MCH RBC QN AUTO: 32.1 PG (ref 26.5–33)
MCHC RBC AUTO-ENTMCNC: 34.4 G/DL (ref 31.5–36.5)
MCV RBC AUTO: 93 FL (ref 78–100)
MONOCYTES # BLD AUTO: 1.1 10E3/UL (ref 0–1.3)
MONOCYTES NFR BLD AUTO: 10 %
NEUTROPHILS # BLD AUTO: 6.5 10E3/UL (ref 1.6–8.3)
NEUTROPHILS NFR BLD AUTO: 62 %
NITRATE UR QL: NEGATIVE
NRBC # BLD AUTO: 0 10E3/UL
NRBC BLD AUTO-RTO: 0 /100
PH UR STRIP: 5.5 [PH] (ref 5–7)
PHOSPHATE SERPL-MCNC: 2.8 MG/DL (ref 2.5–4.5)
PLATELET # BLD AUTO: 227 10E3/UL (ref 150–450)
POTASSIUM SERPL-SCNC: 4 MMOL/L (ref 3.4–5.3)
PROT SERPL-MCNC: 7.3 G/DL (ref 6.4–8.3)
RBC # BLD AUTO: 5.14 10E6/UL (ref 4.4–5.9)
RBC URINE: 1 /HPF
SODIUM SERPL-SCNC: 136 MMOL/L (ref 135–145)
SP GR UR STRIP: 1.01 (ref 1–1.03)
SQUAMOUS EPITHELIAL: <1 /HPF
T4 FREE SERPL-MCNC: 1.41 NG/DL (ref 0.9–1.7)
TSH SERPL DL<=0.005 MIU/L-ACNC: 3.81 UIU/ML (ref 0.3–4.2)
URATE SERPL-MCNC: 6.7 MG/DL (ref 3.4–7)
UROBILINOGEN UR STRIP-MCNC: <2 MG/DL
WBC # BLD AUTO: 10.5 10E3/UL (ref 4–11)
WBC URINE: <1 /HPF

## 2024-11-29 PROCEDURE — 84443 ASSAY THYROID STIM HORMONE: CPT | Performed by: INTERNAL MEDICINE

## 2024-11-29 PROCEDURE — 83690 ASSAY OF LIPASE: CPT | Performed by: INTERNAL MEDICINE

## 2024-11-29 PROCEDURE — 86140 C-REACTIVE PROTEIN: CPT | Performed by: INTERNAL MEDICINE

## 2024-11-29 PROCEDURE — 85004 AUTOMATED DIFF WBC COUNT: CPT | Performed by: INTERNAL MEDICINE

## 2024-11-29 PROCEDURE — 87086 URINE CULTURE/COLONY COUNT: CPT | Performed by: INTERNAL MEDICINE

## 2024-11-29 PROCEDURE — 82040 ASSAY OF SERUM ALBUMIN: CPT | Performed by: INTERNAL MEDICINE

## 2024-11-29 PROCEDURE — 84550 ASSAY OF BLOOD/URIC ACID: CPT | Performed by: INTERNAL MEDICINE

## 2024-11-29 PROCEDURE — 85041 AUTOMATED RBC COUNT: CPT | Performed by: INTERNAL MEDICINE

## 2024-11-29 PROCEDURE — 81001 URINALYSIS AUTO W/SCOPE: CPT | Performed by: INTERNAL MEDICINE

## 2024-11-29 PROCEDURE — 83615 LACTATE (LD) (LDH) ENZYME: CPT | Performed by: INTERNAL MEDICINE

## 2024-11-29 PROCEDURE — 84439 ASSAY OF FREE THYROXINE: CPT | Performed by: INTERNAL MEDICINE

## 2024-11-29 PROCEDURE — 36415 COLL VENOUS BLD VENIPUNCTURE: CPT | Performed by: INTERNAL MEDICINE

## 2024-11-29 PROCEDURE — 83036 HEMOGLOBIN GLYCOSYLATED A1C: CPT | Performed by: INTERNAL MEDICINE

## 2024-11-29 PROCEDURE — 82150 ASSAY OF AMYLASE: CPT | Performed by: INTERNAL MEDICINE

## 2024-11-29 PROCEDURE — 84100 ASSAY OF PHOSPHORUS: CPT | Performed by: INTERNAL MEDICINE

## 2024-11-29 PROCEDURE — 82977 ASSAY OF GGT: CPT | Performed by: INTERNAL MEDICINE

## 2024-11-30 LAB — BACTERIA UR CULT: NORMAL

## 2024-12-01 NOTE — PROGRESS NOTES
[facility] Medical Oncology Note  Date of visit: Dec 2, 2024  Outpatient Clinic Note        Assessment:     Stage 1 (T1NxMx) superficial poorly differentiated bladder cancer. T1 lesions are high risk. Recurrence rates at one, three, and five years can reach up to 50, 70 to 80, and 90 percent, respectively, and 20 to 25 percent progress to more invasive (T2 or greater) disease. Given the high risk here, therapy is indicated.  Alek is on the SUNRISE-3 study, for patients with T1 disease and has been randomized to the TAR-200 (a device inserted in to the bladder that contains gemcitabine)+ Cetrelimab arm.  S/P 13 doses of cetrelimab, with adequate tolerance and current cystoscopy showing no lesions.  Autoimmune thyroiditis exacerbated by the study drug.  Currently his TSH and T3 are acceptable and he is on 100 mcg of levothyroxine.  No need to change that.  Hemoglobin A1C up trending, but stable and predated his participation in the study.  Creatinine remains mildly elevated but at baseline today.   Hx of elevated uric acid. It is normal today. If it rises above 8, I would recommend getting started on allopurinol to avoid uric acid stones.      Plan:     Next cetrelimab today. TAR-200 with Dr. Kaiser.  Continue levothyroxine to 100 micg daily.  RTC in 3 weeks for his next cycle.    Patient was seen and evaluated with study RNCC, Araceli Diaz (who is very famous).      The longitudinal plan of care for the diagnosis(es)/condition(s) as documented were addressed during this visit. Due to the added complexity in care, I will continue to support Alek in the subsequent management and with ongoing continuity of care.     30 minutes spent on the date of the encounter doing chart review, review of test results, interpretation of tests, patient visit, and documentation     Julio Tobin MD, MSc  Associate Professor of Medicine  [employer]  [facility]  [address]  [place] [address]  [phone]      __________________________________________________________________    DIAGNOSIS:     Recently diagnosed T1 poorly differentiated invasive bladder cancer.    Respiratory symptoms concerning for pneumonitis, but CT chest 3/27/2024 showed \"No new or suspicious pulmonary opacities.\"       History of Present Illness/Therapy to Date:   11/19/2023: Went to ED for subacute onset of gross hematuria, and a passing of clots. Stopped ASA. CT showed multiple indeterminate nodular densities in the bladder suspicious for enhancing urothelial lesions   12/6/2023: Cystoscopy , with finding of multiple bladder tumors appeared papillary and were located on the anterior wall and had some superficial calcifications. \"We resected tissue down to muscle using cut electrocautery. \" Pathology showed high grade papillary carcinoma, that did not invade the muscle.  1/17/2024: Cystoscopy with resection showed CIS, but no invasive cancer.  Alek has agreed to participate in the SUNRISE -3 trial. He has been randomized to receive Cetrelimab and TAR-200 (an investigational intravesical drug delivery system for gemcitabine). His first dose of cetrelimab and TAR-200 was 3/6/2024.   Follows for serial cystoscopies with Dr. Kaiser. Surveillance biopsy 8/2024 was benign. Urine cytology 11/19/2024 was negative for high grade urothelial carcinoma.        Interval history:     Alek is back without Christine here for week 39 of the SUNRISE-3 trial and his 14th dose of cetrelimab.   Cysto 11/9/2024 was negative.  Feels great.  No worries  Has gained a little weight, after he lost some as his own treatment for his known elevated blood sugars. Again, this predated h is enrollment on the trial.  He denies any side effects from his cetrelimab.  Has a little fatigue.  Still with chronic leck pain.  He is able to do all activities that he would like to get done.  He denies any chest pain, shortness of breath. Does have an " occasional non-productive cough today.  No recent fevers or chills.  No rash or skin concerns.  No urinary concerns.  His urine has been clear and he has not noticed any recent hematuria or clots.  No pain.  No headaches or vision concerns.  He has occasional constipation but for the most part his bowels are moving well.  No diarrhea.  He is eating and drinking well.       ROS: 10 point ROS neg other than the symptoms noted above in the HPI.      Past Medical History:   I have reviewed this patient's past medical history   Past Medical History:   Diagnosis Date    Complication of anesthesia     DM2 (diabetes mellitus, type 2) (H)     Hypercholesteremia     Hyperlipidemia     Hypertension           Past Surgical History:    I have reviewed this patient's past surgical history       Social History:   Tobacco, ETOH, and rec drugs reviewed and as noted below with the following exceptions:  Drinks a few beers a day. Quit smoking in .  Alek was born in [place], and graduated from [employer] (no longer exists) in .  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  He is  to Christine, his third wife.  She worked with his sister-in-law and they were introduced.  They have been  for 25 years.  Jose has 5 children, Golden, Cr, Joon, Radha, and Kayla.  Kayla is the youngest and a PA who lives in [place].  The other children are in the service.  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      Family History:     Grandma with skin cancer (s/p removal of her nose)  Uncle with sarcoma  Dad had bone cancer/lung cancer  Brother Casimiro had bladder cancer  Ned had bladder cancer ( in car accident)  Brother Lenny had bladder cancer  No family history on file.         Medications:     Current Outpatient Medications   Medication Sig Dispense Refill     Ascorbic Acid 500 MG CAPS 1 tablet Orally Once a day      aspirin (ASPIRIN LOW DOSE) 81 MG EC tablet Take 81 mg by mouth daily ONCE BEFORE BED      blood glucose (NO BRAND SPECIFIED) lancets standard as directed, to use with his Contour Next test blood sugars daily      CONTOUR NEXT TEST test strip USE TO TEST BLOOD SUGAR DAILY      Ergocalciferol 50 MCG (2000 UT) CAPS Take 50 mcg by mouth daily      fish oil-omega-3 fatty acids 500 MG capsule Take 2 capsules by mouth daily      hydrochlorothiazide (HYDRODIURIL) 25 MG tablet Take 25 mg by mouth daily      ketoconazole (NIZORAL) 2 % external cream Apply topically as needed      levothyroxine (SYNTHROID/LEVOTHROID) 100 MCG tablet Take 1 tablet (100 mcg) by mouth daily. 30 tablet 3    Microlet Lancets MISC daily      simvastatin (ZOCOR) 40 MG tablet Take 40 mg by mouth at bedtime      triamcinolone (KENALOG) 0.1 % external cream Apply as needed      Vitamin D3 (VITAMIN D, CHOLECALCIFEROL,) 25 mcg (1000 units) tablet Take 25 mcg by mouth daily            Physical Exam:   There were no vitals taken for this visit.  ECOG PS 0  General: No acute distress  HEENT: Sclera anicteric. Oral mucosa pink and moist.  No mucositis or thrush  Lymph: No lymphadenopathy in neck  Heart: Regular, rate, and rhythm  Lungs: Clear to ascultation bilaterally  Abdomen: Positive bowel sounds. Soft, non-distended, non-tender. No organomegaly or mass.   Extremities: no lower extremity edema  Neuro: Cranial nerves grossly intact  Rash: none      Data:     T4  1.81   TSH 3.81      Cycstoscopy 11/9/2024  Cystoscopic findings:  The urethra was normal without strictures.  The prostate was 3 cm long and demonstrated mild bilobar hypertrophy.  There was no median lobe.  The external sphincter coapted well and the bladder neck was open. The bladder was completely surveyed.  There was mild trabeculation.  There were no neoplasms, stones, or diverticula identifed.  The ureteric orifices were normal in  position and number and effluxing clear urine. There are no  suspicious findings.    Most Recent 3 CBC's:  Recent Labs   Lab Test 11/29/24  1011 11/08/24  1046 10/17/24  1019   WBC 10.5 8.5 8.4   HGB 16.5 16.0 15.6   MCV 93 93 94    290 209   ANEUTAUTO 6.5 4.9 4.7     Most Recent 3 BMP's:  Recent Labs   Lab Test 11/29/24  1011 11/08/24  1046 10/17/24  1019    135 134*   POTASSIUM 4.0 4.1 4.1   CHLORIDE 96* 96* 96*   CO2 30* 29 28   BUN 16.2 14.7 19.2   CR 1.28* 1.24* 1.30*   ANIONGAP 10 10 10   MILLI 9.2 9.6 9.3   * 113* 116*   PROTTOTAL 7.3 7.4 7.3   ALBUMIN 4.2 4.2 4.3    Most Recent 3 LFT's:  Recent Labs   Lab Test 11/29/24  1011 11/08/24  1046 10/17/24  1019   AST 31 25 31   ALT 33 27 35   ALKPHOS 90 88 78   BILITOTAL 0.6 0.6 0.7    Most Recent 2 TSH and T4:  Recent Labs   Lab Test 11/29/24  1011 11/08/24  1046   TSH 3.81 5.76*   T4 1.41 1.38     I reviewed the above labs today.    The longitudinal plan of care for the diagnosis(es)/condition(s) as documented were addressed during this visit. Due to the added complexity in care, I will continue to support Alek in the subsequent management and with ongoing continuity of care.

## 2024-12-02 ENCOUNTER — ALLIED HEALTH/NURSE VISIT (OUTPATIENT)
Dept: ONCOLOGY | Facility: CLINIC | Age: 74
End: 2024-12-02

## 2024-12-02 ENCOUNTER — ONCOLOGY VISIT (OUTPATIENT)
Dept: ONCOLOGY | Facility: CLINIC | Age: 74
End: 2024-12-02
Attending: INTERNAL MEDICINE
Payer: COMMERCIAL

## 2024-12-02 VITALS
TEMPERATURE: 98 F | HEART RATE: 67 BPM | SYSTOLIC BLOOD PRESSURE: 146 MMHG | DIASTOLIC BLOOD PRESSURE: 76 MMHG | RESPIRATION RATE: 16 BRPM | OXYGEN SATURATION: 95 %

## 2024-12-02 VITALS
WEIGHT: 238.4 LBS | BODY MASS INDEX: 34.7 KG/M2 | HEART RATE: 74 BPM | TEMPERATURE: 98.6 F | OXYGEN SATURATION: 95 % | RESPIRATION RATE: 16 BRPM | DIASTOLIC BLOOD PRESSURE: 83 MMHG | SYSTOLIC BLOOD PRESSURE: 125 MMHG

## 2024-12-02 DIAGNOSIS — C67.3 MALIGNANT NEOPLASM OF ANTERIOR WALL OF URINARY BLADDER (H): Primary | ICD-10-CM

## 2024-12-02 PROCEDURE — 96413 CHEMO IV INFUSION 1 HR: CPT

## 2024-12-02 PROCEDURE — G0463 HOSPITAL OUTPT CLINIC VISIT: HCPCS | Performed by: INTERNAL MEDICINE

## 2024-12-02 PROCEDURE — 258N000003 HC RX IP 258 OP 636: Performed by: INTERNAL MEDICINE

## 2024-12-02 RX ORDER — ALBUTEROL SULFATE 0.83 MG/ML
2.5 SOLUTION RESPIRATORY (INHALATION)
Status: CANCELLED | OUTPATIENT
Start: 2024-12-02

## 2024-12-02 RX ORDER — ALBUTEROL SULFATE 90 UG/1
1-2 INHALANT RESPIRATORY (INHALATION)
Status: CANCELLED
Start: 2024-12-02

## 2024-12-02 RX ORDER — METHYLPREDNISOLONE SODIUM SUCCINATE 40 MG/ML
40 INJECTION INTRAMUSCULAR; INTRAVENOUS
Status: CANCELLED
Start: 2024-12-02

## 2024-12-02 RX ORDER — EPINEPHRINE 1 MG/ML
0.3 INJECTION, SOLUTION INTRAMUSCULAR; SUBCUTANEOUS EVERY 5 MIN PRN
Status: CANCELLED | OUTPATIENT
Start: 2024-12-02

## 2024-12-02 RX ORDER — DIPHENHYDRAMINE HYDROCHLORIDE 50 MG/ML
25 INJECTION INTRAMUSCULAR; INTRAVENOUS
Status: CANCELLED
Start: 2024-12-02

## 2024-12-02 RX ORDER — LORAZEPAM 2 MG/ML
0.5 INJECTION INTRAMUSCULAR EVERY 4 HOURS PRN
Status: CANCELLED | OUTPATIENT
Start: 2024-12-02

## 2024-12-02 RX ORDER — DIPHENHYDRAMINE HCL 25 MG
50 CAPSULE ORAL
Status: CANCELLED | OUTPATIENT
Start: 2024-12-02

## 2024-12-02 RX ORDER — ACETAMINOPHEN 325 MG/1
650 TABLET ORAL
Status: CANCELLED
Start: 2024-12-02

## 2024-12-02 RX ORDER — HEPARIN SODIUM,PORCINE 10 UNIT/ML
5-20 VIAL (ML) INTRAVENOUS DAILY PRN
Status: CANCELLED | OUTPATIENT
Start: 2024-12-02

## 2024-12-02 RX ORDER — DIPHENHYDRAMINE HYDROCHLORIDE 50 MG/ML
50 INJECTION INTRAMUSCULAR; INTRAVENOUS
Status: CANCELLED
Start: 2024-12-02

## 2024-12-02 RX ORDER — HEPARIN SODIUM (PORCINE) LOCK FLUSH IV SOLN 100 UNIT/ML 100 UNIT/ML
5 SOLUTION INTRAVENOUS
Status: CANCELLED | OUTPATIENT
Start: 2024-12-02

## 2024-12-02 RX ORDER — MEPERIDINE HYDROCHLORIDE 25 MG/ML
25 INJECTION INTRAMUSCULAR; INTRAVENOUS; SUBCUTANEOUS
Status: CANCELLED | OUTPATIENT
Start: 2024-12-02

## 2024-12-02 RX ADMIN — SODIUM CHLORIDE 50 ML: 9 INJECTION, SOLUTION INTRAVENOUS at 14:44

## 2024-12-02 ASSESSMENT — PAIN SCALES - GENERAL: PAINLEVEL_OUTOF10: NO PAIN (0)

## 2024-12-02 NOTE — NURSING NOTE
"Oncology Rooming Note    December 2, 2024 12:17 PM   Alek Mcneill is a 74 year old male who presents for:    Chief Complaint   Patient presents with    Oncology Clinic Visit     Prostrate cancer     Initial Vitals: There were no vitals taken for this visit. Estimated body mass index is 33.91 kg/m  as calculated from the following:    Height as of 11/19/24: 1.765 m (5' 9.5\").    Weight as of 11/19/24: 105.7 kg (233 lb). There is no height or weight on file to calculate BSA.  No Pain (0) Comment: Data Unavailable   No LMP for male patient.  Allergies reviewed: Yes  Medications reviewed: Yes    Medications: Medication refills not needed today.  Pharmacy [facility] entered into FANCRU: [id number] [address]  AT [place] [address]    Frailty Screening:   Is the patient here for a new oncology consult visit in cancer care? 2. No      Clinical concerns: none       Smaeera Talbot              "

## 2024-12-02 NOTE — NURSING NOTE
[id number]: Study Visit Note   Subject name: Alek Mcneill     Visit: Week 39     Did the study visit occur within the appropriate window allowed by the protocol? yes    Since the last study visit, He has been doing well. Has no new side effects to report. States that he is no longer having intermittent chills.       Assessed for below symptoms specifically:   Dysuria no  Pollakiuria no  Nocturia no  Urgency no  Incontinence no  Hematuria no  Urinary hesitation no  Bladder pain/spasm no  Urethral pain no   Bladder discomfort no  Feeling of incomplete bladder emptying  no  Lower abdominal pain no  Fever no  Flu-like symptoms no  Fatigue no  Diarrhea no  Nausea no  Rash no  Arthralgia  no      Cetrelimab infusion:  Were premedications given? No    Verbal handover provided to infusion RN; reminded RN to document actual start time of infusion and actual stop time of the infusion. If infusion deviates from protocol directed infusion time, please document rationale in your infusion note. .Instructed RN to take VS before the infusion as well as after, patient should have a 15 min observation time. Please use a 0.2 micron filter and flush with 14 mL of NS after the infusion, the end of the flush is considered the end of the infusion.       I have personally interviewed Alek Mcneill and reviewed his medical record for adverse events and concomitant medications and these have been recorded on the corresponding logs in Alek Mcneill's research file.     Alek Mcneill was given the opportunity to ask any trial related questions.  Please see provider progress note for physical exam and other clinical information. Labs were reviewed - any significant lab values were addressed and reviewed.    Araceli Diaz RN

## 2024-12-02 NOTE — PROGRESS NOTES
Infusion Nursing Note:  Alek Mcneill presents today for Cycle 39, day 1 Study Cetrelimab (IDS #6160).    Patient seen by provider today: Yes: Dr. Tobin    Note: Patient presents to clinic today feeling well with no questions.  Pt would like to proceed with therapy today. Pt did not request or require any intervention for pain today.    Study Cetrelimab (IDS #6160) start time: 1446  Study Cetrelimab (IDS #6160) stop time, including 14ml NS flush: 1521    Intravenous Access:  Peripheral IV placed by vascular access.    Treatment Conditions:  Lab Results   Component Value Date    HGB 16.5 11/29/2024    WBC 10.5 11/29/2024    ANEUTAUTO 6.5 11/29/2024     11/29/2024        Lab Results   Component Value Date     11/29/2024    POTASSIUM 4.0 11/29/2024    MAG 2.3 11/19/2023    CR 1.28 (H) 11/29/2024    MILLI 9.2 11/29/2024    BILITOTAL 0.6 11/29/2024    ALBUMIN 4.2 11/29/2024    ALT 33 11/29/2024    AST 31 11/29/2024     Results reviewed, labs MET treatment parameters, ok to proceed with treatment.    Post Infusion Assessment:  Patient tolerated infusion without incident.  Report given to Deedee BLAIR RN, at 1515 to discharge after observation period.  Patient observed for 15 minutes post Study Cetrelimab (IDS #6160) per protocol.  Blood return noted pre and post infusion.  Site patent and intact, free from redness, edema or discomfort.  No evidence of extravasations.  Access discontinued per protocol.    Discharge Plan:   Patient declined prescription refills.  Discharge instructions reviewed with: Patient.  Patient and/or family verbalized understanding of discharge instructions and all questions answered.  AVS to patient via [employer].  Patient will return 12/20/2024 for next appointment.   Patient discharged in stable condition accompanied by: wife.  Departure Mode: anticipate ambulatory.    Rhonda Yancey RN

## 2024-12-02 NOTE — LETTER
12/2/2024      Alek Mcneill  [address] [place] [address]      Dear Colleague,    Thank you for referring your patient, Alek Mcneill, to the [facility]. Please see a copy of my visit note below.      [facility] Medical Oncology Note  Date of visit: Dec 2, 2024  Outpatient Clinic Note        Assessment:     Stage 1 (T1NxMx) superficial poorly differentiated bladder cancer. T1 lesions are high risk. Recurrence rates at one, three, and five years can reach up to 50, 70 to 80, and 90 percent, respectively, and 20 to 25 percent progress to more invasive (T2 or greater) disease. Given the high risk here, therapy is indicated.  Alek is on the SUNRISE-3 study, for patients with T1 disease and has been randomized to the TAR-200 (a device inserted in to the bladder that contains gemcitabine)+ Cetrelimab arm.  S/P 13 doses of cetrelimab, with adequate tolerance and current cystoscopy showing no lesions.  Autoimmune thyroiditis exacerbated by the study drug.  Currently his TSH and T3 are acceptable and he is on 100 mcg of levothyroxine.  No need to change that.  Hemoglobin A1C up trending, but stable and predated his participation in the study.  Creatinine remains mildly elevated but at baseline today.   Hx of elevated uric acid. It is normal today. If it rises above 8, I would recommend getting started on allopurinol to avoid uric acid stones.      Plan:     Next cetrelimab today. TAR-200 with Dr. Kaiser.  Continue levothyroxine to 100 micg daily.  RTC in 3 weeks for his next cycle.    Patient was seen and evaluated with study RNCC, Araceli Diaz (who is very famous).      The longitudinal plan of care for the diagnosis(es)/condition(s) as documented were addressed during this visit. Due to the added complexity in care, I will continue to support Alek in the subsequent management and with ongoing continuity of care.     30 minutes spent on the date of the encounter doing chart  "review, review of test results, interpretation of tests, patient visit, and documentation     Julio Tobin MD, MSc  Associate Professor of Medicine  [employer]  [facility]  [address] [place] [address]  [phone]      __________________________________________________________________    DIAGNOSIS:     Recently diagnosed T1 poorly differentiated invasive bladder cancer.    Respiratory symptoms concerning for pneumonitis, but CT chest 3/27/2024 showed \"No new or suspicious pulmonary opacities.\"       History of Present Illness/Therapy to Date:   11/19/2023: Went to ED for subacute onset of gross hematuria, and a passing of clots. Stopped ASA. CT showed multiple indeterminate nodular densities in the bladder suspicious for enhancing urothelial lesions   12/6/2023: Cystoscopy , with finding of multiple bladder tumors appeared papillary and were located on the anterior wall and had some superficial calcifications. \"We resected tissue down to muscle using cut electrocautery. \" Pathology showed high grade papillary carcinoma, that did not invade the muscle.  1/17/2024: Cystoscopy with resection showed CIS, but no invasive cancer.  Alek has agreed to participate in the SUNRISE -3 trial. He has been randomized to receive Cetrelimab and TAR-200 (an investigational intravesical drug delivery system for gemcitabine). His first dose of cetrelimab and TAR-200 was 3/6/2024.   Follows for serial cystoscopies with Dr. Kaiser. Surveillance biopsy 8/2024 was benign. Urine cytology 11/19/2024 was negative for high grade urothelial carcinoma.        Interval history:     Alek is back without Christine here for week 39 of the SUNRISE-3 trial and his 14th dose of cetrelimab.   Cysto 11/9/2024 was negative.  Feels great.  No worries  Has gained a little weight, after he lost some as his own treatment for his known elevated blood sugars. Again, this predated h is enrollment on the " trial.  He denies any side effects from his cetrelimab.  Has a little fatigue.  Still with chronic leck pain.  He is able to do all activities that he would like to get done.  He denies any chest pain, shortness of breath. Does have an occasional non-productive cough today.  No recent fevers or chills.  No rash or skin concerns.  No urinary concerns.  His urine has been clear and he has not noticed any recent hematuria or clots.  No pain.  No headaches or vision concerns.  He has occasional constipation but for the most part his bowels are moving well.  No diarrhea.  He is eating and drinking well.       ROS: 10 point ROS neg other than the symptoms noted above in the HPI.      Past Medical History:   I have reviewed this patient's past medical history   Past Medical History:   Diagnosis Date     Complication of anesthesia      DM2 (diabetes mellitus, type 2) (H)      Hypercholesteremia      Hyperlipidemia      Hypertension           Past Surgical History:    I have reviewed this patient's past surgical history       Social History:   Tobacco, ETOH, and rec drugs reviewed and as noted below with the following exceptions:  Drinks a few beers a day. Quit smoking in 1992.  Alek was born in [place], and graduated from [employer] (no longer exists) in 1967.  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  He is  to Christine, his third wife.  She worked with his sister-in-law and they were introduced.  They have been  for 25 years.  Jose has 5 children, Golden, Cr, Joon, Radha, and Kayla.  Kayla is the youngest and a PA who lives in [place].  The other children are in the service.  For fun he works on 2 cars, a 69 [employer] Convertible, and at [employer] [address] that has a V8 engine.      Family History:     Grandma with skin cancer (s/p removal of her nose)  Uncle with sarcoma  Dad had bone cancer/lung  cancer  Brother Casimiro had bladder cancer  Buddy had bladder cancer ( in car accident)  Brother Lenny had bladder cancer  No family history on file.         Medications:     Current Outpatient Medications   Medication Sig Dispense Refill     Ascorbic Acid 500 MG CAPS 1 tablet Orally Once a day       aspirin (ASPIRIN LOW DOSE) 81 MG EC tablet Take 81 mg by mouth daily ONCE BEFORE BED       blood glucose (NO BRAND SPECIFIED) lancets standard as directed, to use with his Contour Next test blood sugars daily       CONTOUR NEXT TEST test strip USE TO TEST BLOOD SUGAR DAILY       Ergocalciferol 50 MCG (2000 UT) CAPS Take 50 mcg by mouth daily       fish oil-omega-3 fatty acids 500 MG capsule Take 2 capsules by mouth daily       hydrochlorothiazide (HYDRODIURIL) 25 MG tablet Take 25 mg by mouth daily       ketoconazole (NIZORAL) 2 % external cream Apply topically as needed       levothyroxine (SYNTHROID/LEVOTHROID) 100 MCG tablet Take 1 tablet (100 mcg) by mouth daily. 30 tablet 3     Microlet Lancets MISC daily       simvastatin (ZOCOR) 40 MG tablet Take 40 mg by mouth at bedtime       triamcinolone (KENALOG) 0.1 % external cream Apply as needed       Vitamin D3 (VITAMIN D, CHOLECALCIFEROL,) 25 mcg (1000 units) tablet Take 25 mcg by mouth daily            Physical Exam:   There were no vitals taken for this visit.  ECOG PS 0  General: No acute distress  HEENT: Sclera anicteric. Oral mucosa pink and moist.  No mucositis or thrush  Lymph: No lymphadenopathy in neck  Heart: Regular, rate, and rhythm  Lungs: Clear to ascultation bilaterally  Abdomen: Positive bowel sounds. Soft, non-distended, non-tender. No organomegaly or mass.   Extremities: no lower extremity edema  Neuro: Cranial nerves grossly intact  Rash: none      Data:     T4  1.81   TSH 3.81      Cycstoscopy 2024  Cystoscopic findings:  The urethra was normal without strictures.  The prostate was 3 cm long and demonstrated mild bilobar hypertrophy.  There was  no median lobe.  The external sphincter coapted well and the bladder neck was open. The bladder was completely surveyed.  There was mild trabeculation.  There were no neoplasms, stones, or diverticula identifed.  The ureteric orifices were normal in position and number and effluxing clear urine. There are no  suspicious findings.    Most Recent 3 CBC's:  Recent Labs   Lab Test 11/29/24  1011 11/08/24  1046 10/17/24  1019   WBC 10.5 8.5 8.4   HGB 16.5 16.0 15.6   MCV 93 93 94    290 209   ANEUTAUTO 6.5 4.9 4.7     Most Recent 3 BMP's:  Recent Labs   Lab Test 11/29/24  1011 11/08/24  1046 10/17/24  1019    135 134*   POTASSIUM 4.0 4.1 4.1   CHLORIDE 96* 96* 96*   CO2 30* 29 28   BUN 16.2 14.7 19.2   CR 1.28* 1.24* 1.30*   ANIONGAP 10 10 10   MILLI 9.2 9.6 9.3   * 113* 116*   PROTTOTAL 7.3 7.4 7.3   ALBUMIN 4.2 4.2 4.3    Most Recent 3 LFT's:  Recent Labs   Lab Test 11/29/24  1011 11/08/24  1046 10/17/24  1019   AST 31 25 31   ALT 33 27 35   ALKPHOS 90 88 78   BILITOTAL 0.6 0.6 0.7    Most Recent 2 TSH and T4:  Recent Labs   Lab Test 11/29/24  1011 11/08/24  1046   TSH 3.81 5.76*   T4 1.41 1.38     I reviewed the above labs today.    The longitudinal plan of care for the diagnosis(es)/condition(s) as documented were addressed during this visit. Due to the added complexity in care, I will continue to support Alek in the subsequent management and with ongoing continuity of care.      Again, thank you for allowing me to participate in the care of your patient.        Sincerely,        Julio Tobin MD

## 2024-12-02 NOTE — PATIENT INSTRUCTIONS
Contact Numbers    [facility] [place] [facility]/TRIAGE: [phone]    Call with chills and/or temperature greater than or equal to 100.5, uncontrolled nausea/vomiting, diarrhea, constipation, dizziness, shortness of breath, chest pain, bleeding, unexplained bruising, or any new/concerning symptoms, questions/concerns.     If you are having any concerning symptoms or wish to speak to a provider before your next infusion visit, please call your care coordinator or triage to notify them so we can adequately serve you.       After Hours: [phone]    If after hours, weekends, or holidays, call main hospital  and ask for Oncology doctor on call.      December 2024 Sunday Monday Tuesday Wednesday Thursday Friday Saturday   1     2    RETURN CCSL  12:15 PM   (30 min.)   Julio Tobin MD   [facility]    ONC INFUSION 1.5 HR (90 MIN)   1:30 PM   (90 min.)   [facility] ONC INFUSION NURSE   [facility] 3    CYSTOSCOPY  11:15 AM   (30 min.)   Harman Kaiser MD   [facility] 4     5     6     7       8     9     10     11     12     13     14       15     16     17     18     19     20    LAB PERIPHERAL   1:00 PM   (15 min.)   [facility] D[facility]   [facility]    RETURN CCSL   1:15 PM   (45 min.)   Clarita Owen PA-C   [facility]    ONC INFUSION 1.5 HR (90 MIN)   2:30 PM   (90 min.)   [facility] ONC INFUSION NURSE   [facility] 21 22     23     24     25     26     27     28       29     30     31 January 2025 Sunday Monday Tuesday Wednesday Thursday Friday Saturday                  1     2     3     4       5     6     7     8     9     10     11       12     13    LAB PERIPHERAL  10:00 AM   (15 min.)   [facility]   [facility]    RETURN CCSL  10:15 AM   (30  min.)   Julio Tobin MD   [facility]    ONC INFUSION 1.5 HR (90 MIN)  11:30 AM   (90 min.)    ONC INFUSION NURSE   [facility] 14     15     16     17     18       19     20     21     22     23     24     25       26     27     28     29     30     31                          Lab Results:  No results found for this or any previous visit (from the past 12 hours).

## 2024-12-03 ENCOUNTER — TELEPHONE (OUTPATIENT)
Dept: ONCOLOGY | Facility: CLINIC | Age: 74
End: 2024-12-03

## 2024-12-03 ENCOUNTER — NURSE TRIAGE (OUTPATIENT)
Dept: ONCOLOGY | Facility: CLINIC | Age: 74
End: 2024-12-03

## 2024-12-03 NOTE — TELEPHONE ENCOUNTER
"Oncology Nurse Triage - Reporting Symptoms  Situation:   Asked by RNCC to call pt who cancelled apt today d/t  flu-like symptoms (cough, runny nose, nausea, upset stomach, malaise, he has temp in the 99s F).    Background:   Treating Provider: Julio Tobin MD    Date of last office visit: 12/2/24 with Dr. Tobin    Recent treatments: Yes: 12/2/24: Cycle 39, day 1 Study Cetrelimab (IDS #[id number]).     Assessment: Pt states he \"just has a head cold and didn't want to come into the clinic with that.\"  Onset of symptoms: 11 pm last night  Took cough medicine and Aleve PM and went to bed.   No fever/chills.  Temp is now 97.6  Coughing up a little bit of phlegm. White colored.  No pain w/urination.  No bowel issues.  Taking about 64 oz clear fluids per day.  No vomiting or nausea.  Eating well. Having soup right now.  No body aches.    Pt reports he was supposed to come into the clinic to have his implant removed. Araceli will reschedule implant removal for next week.     Recommendations:   This writer advised pt to continue to push fluids, at least 64 oz per day, keep up nutrition, rest, tylenol for headaches. Pt should report worsening sx especially fever of 100.3 or greater, chills, SOB, CP, nausea/vomiting, diarrhea, headaches or body aches.   Pt voiced understanding.  Informed pt that this assessment would be sent to Dr. Tobin.    Routed to Care Team.            "

## 2024-12-03 NOTE — TELEPHONE ENCOUNTER
Patient called reporting flu-like symptoms (runny nose, cough, nausea, malaise and low grade fever (in 99s F). He must cancel his appointment for today 12/3. Notified Dr. Kaiser, Dr. Tobin and RNCC.     Araceli Diaz RN   Clinical Research Coordinator Nurse-Solid Tumor   Phone: [phone]

## 2024-12-11 ENCOUNTER — OFFICE VISIT (OUTPATIENT)
Dept: UROLOGY | Facility: CLINIC | Age: 74
End: 2024-12-11
Payer: COMMERCIAL

## 2024-12-11 VITALS
DIASTOLIC BLOOD PRESSURE: 76 MMHG | SYSTOLIC BLOOD PRESSURE: 142 MMHG | BODY MASS INDEX: 33.3 KG/M2 | OXYGEN SATURATION: 97 % | HEIGHT: 70 IN | WEIGHT: 232.6 LBS | HEART RATE: 84 BPM

## 2024-12-11 DIAGNOSIS — C67.9 UROTHELIAL CARCINOMA OF BLADDER WITHOUT INVASION OF MUSCLE (H): Primary | ICD-10-CM

## 2024-12-11 RX ORDER — SULFAMETHOXAZOLE AND TRIMETHOPRIM 800; 160 MG/1; MG/1
1 TABLET ORAL ONCE
Status: COMPLETED | OUTPATIENT
Start: 2024-12-11 | End: 2024-12-11

## 2024-12-11 RX ORDER — LIDOCAINE HYDROCHLORIDE 20 MG/ML
JELLY TOPICAL ONCE
Status: COMPLETED | OUTPATIENT
Start: 2024-12-11 | End: 2024-12-11

## 2024-12-11 RX ADMIN — LIDOCAINE HYDROCHLORIDE 10 ML: 20 JELLY TOPICAL at 15:00

## 2024-12-11 RX ADMIN — SULFAMETHOXAZOLE AND TRIMETHOPRIM 1 TABLET: 800; 160 TABLET ORAL at 15:00

## 2024-12-11 ASSESSMENT — PAIN SCALES - GENERAL: PAINLEVEL_OUTOF10: NO PAIN (0)

## 2024-12-11 NOTE — PATIENT INSTRUCTIONS
"AFTER YOUR CYSTOSCOPY        You have just completed a cystoscopy, or \"cysto\", which allowed your physician to learn more about your bladder (or to remove a stent placed after surgery). We suggest that you continue to avoid caffeine, fruit juice, and alcohol for the next 24 hours, however, you are encouraged to return to your normal activities.         A few things that are considered normal after your cystoscopy:     * Small amount of bleeding (or spotting) that clears within the next 24 hours     * Slight burning sensation with urination     * Sensation to of needing to avoid more frequently     * The feeling of \"air\" in your urine     * Mild discomfort that is relieved with Tylenol        Please contact our office promptly if you:     * Develop a fever above 101 degrees     * Are unable to urinate     * Develop bright red blood that does not stop     * Severe pain or swelling         Please contact our office with any concerns or questions @ [phone]   "

## 2024-12-11 NOTE — LETTER
12/11/2024       RE: Alek Mcneill  [address] [place] [address]     Dear Colleague,    Thank you for referring your patient, Alek Mcneill, to the [facility] [place] [facility]. Please see a copy of my visit note below.    OFFICE CYSTOSCOPY 12/11/24      Pre-procedure diagnosis:       Bladder Cancer  Post-procedure diagnosis:      No recurrence  Procedure performed:             Cystourethroscopy with removal of TAR-200  Surgeon:                                 Bruce Izaguirre MD   Anesthesia:                             Local     Description of procedure:   After fully informed, voluntary consent was obtained, the patient was brought into the procedure room, identified and placed in a supine position on the cystoscopy table.  The groin/scrotum were prepped with betadine and draped in a sterile fashion.  A 15F flexible cystoscope was inserted into the urethra, and the bladder and urethra were examined in a systematic manner.  A flexible grasper was used to remove the pretzel at 2:20 PM.  There were no irregularities noted.  The patient tolerated the procedure well and there were no complications.       Bruce Izaguirre MD   Department of Urology   [facility]      Again, thank you for allowing me to participate in the care of your patient.      Sincerely,    Bruce Izaguirre MD

## 2024-12-11 NOTE — PROGRESS NOTES
OFFICE CYSTOSCOPY 12/11/24      Pre-procedure diagnosis:       Bladder Cancer  Post-procedure diagnosis:      No recurrence  Procedure performed:             Cystourethroscopy with removal of TAR-200  Surgeon:                                 Bruce Izaguirre MD   Anesthesia:                             Local     Description of procedure:   After fully informed, voluntary consent was obtained, the patient was brought into the procedure room, identified and placed in a supine position on the cystoscopy table.  The groin/scrotum were prepped with betadine and draped in a sterile fashion.  A 15F flexible cystoscope was inserted into the urethra, and the bladder and urethra were examined in a systematic manner.  A flexible grasper was used to remove the pretzel at 2:20 PM.  There were no irregularities noted.  The patient tolerated the procedure well and there were no complications.       Bruce Izaguirre MD   Department of Urology   [facility]

## 2024-12-11 NOTE — NURSING NOTE
"Chief Complaint   Patient presents with    Cystoscopy     With sent removal        Blood pressure (!) 142/76, pulse 84, height 1.778 m (5' 10\"), weight 105.5 kg (232 lb 9.6 oz), SpO2 97%. Body mass index is 33.37 kg/m .    Patient Active Problem List   Diagnosis    Malignant neoplasm of anterior wall of urinary bladder (H)    Benign hypertension    Hyperlipidemia    Impotence of organic origin    Morbid (severe) obesity due to excess calories (H)    Type 2 diabetes mellitus without complication, without long-term current use of insulin (H)    Spinal stenosis of lumbosacral region    Peripheral vascular disease (H)    Venous stasis dermatitis of left lower extremity    Lumbar radiculopathy    Environmental allergies    Age-related nuclear cataract of right eye       Allergies   Allergen Reactions    Chlorhexidine Dermatitis       Current Outpatient Medications   Medication Sig Dispense Refill    Ascorbic Acid 500 MG CAPS 1 tablet Orally Once a day      aspirin (ASPIRIN LOW DOSE) 81 MG EC tablet Take 81 mg by mouth daily ONCE BEFORE BED      blood glucose (NO BRAND SPECIFIED) lancets standard as directed, to use with his Contour Next test blood sugars daily      CONTOUR NEXT TEST test strip USE TO TEST BLOOD SUGAR DAILY      Ergocalciferol 50 MCG (2000 UT) CAPS Take 50 mcg by mouth daily      fish oil-omega-3 fatty acids 500 MG capsule Take 2 capsules by mouth daily      hydrochlorothiazide (HYDRODIURIL) 25 MG tablet Take 25 mg by mouth daily      ketoconazole (NIZORAL) 2 % external cream Apply topically as needed      levothyroxine (SYNTHROID/LEVOTHROID) 100 MCG tablet Take 1 tablet (100 mcg) by mouth daily. 30 tablet 3    Microlet Lancets MISC daily      simvastatin (ZOCOR) 40 MG tablet Take 40 mg by mouth at bedtime      triamcinolone (KENALOG) 0.1 % external cream Apply as needed      Vitamin D3 (VITAMIN D, CHOLECALCIFEROL,) 25 mcg (1000 units) tablet Take 25 mcg by mouth daily         Social History     Tobacco " Use    Smoking status: Former     Current packs/day: 0.00     Types: Cigarettes     Quit date:      Years since quittin.9     Passive exposure: Past   Vaping Use    Vaping status: Never Used   Substance Use Topics    Alcohol use: Yes     Comment: daily    Drug use: Never       Invasive Procedure Safety Checklist:    Procedure: Cystoscopy    Action: Complete sections and checkboxes as appropriate.    Pre-procedure:  1. Patient ID Verified with 2 identifiers (Octavia and  or MRN) : YES    2. Procedure and site verified with patient/designee (when able) : YES    3. Accurate consent documentation in medical record : YES    4. H&P (or appropriate assessment) documented in medical record : N/A  H&P must be up to 30 days prior to procedure an updated within 24 hours of                 Procedure as applicable.     5. Relevant diagnostic and radiology test results appropriately labeled and displayed as applicable : YES    6. Blood products, implants, devices, and/or special equipment available for the procedure as applicable : YES    7. Procedure site(s) marked with provider initials [Exclusions: none] : NO    8. Marking not required. Reason : Yes  Procedure does not require site marking    Time Out:     Time-Out performed immediately prior to starting procedure, including verbal and active participation of all team members addressing: YES    1. Correct patient identity.  2. Confirmed that the correct side and site are marked.  3. An accurate procedure to be done.  4. Agreement on the procedure to be done.  5. Correct patient position.  6. Relevant images and results are properly labeled and appropriately displayed.  7. The need to administer antibiotics or fluids for irrigation purposes during the procedure as applicable.  8. Safety precautions based on patient history or medication use.    During Procedure: Verification of correct person, site, and procedure occurs any time the responsibility for care of the  patient is transferred to another member of the care team.    The following medication was given:     MEDICATION:  Lidocaine without epinephrine 2% jelly  ROUTE: urethral   SITE: urethral   DOSE: 10 mL  LOT #: [id number]  : [employer]  EXPIRATION DATE: 04/26  NDC#: [address]-2   Was there drug waste? No    Prior to med admin, verified patient identity using patient's name and date of birth.  Due to med administration, patient instructed to remain in clinic for 15 minutes  afterwards, and to report any adverse reaction to me immediately.    Drug Amount Wasted:  None.  Vial/Syringe: Syringe    The following medication was given:     MEDICATION:  Bactrim   ROUTE: PO  SITE: mouth  DOSE: 800/160mg  LOT #: [id number]  : [employer]  EXPIRATION DATE: 05/31/2026  NDC#: [id number]   Was there drug waste? No    Aleyda Chatman LPN  12/11/2024  2:13 PM

## 2024-12-20 ENCOUNTER — ALLIED HEALTH/NURSE VISIT (OUTPATIENT)
Dept: ONCOLOGY | Facility: CLINIC | Age: 74
End: 2024-12-20

## 2024-12-20 ENCOUNTER — APPOINTMENT (OUTPATIENT)
Dept: LAB | Facility: CLINIC | Age: 74
End: 2024-12-20
Attending: INTERNAL MEDICINE
Payer: COMMERCIAL

## 2024-12-20 DIAGNOSIS — C67.3 MALIGNANT NEOPLASM OF ANTERIOR WALL OF URINARY BLADDER (H): Primary | ICD-10-CM

## 2024-12-20 NOTE — NURSING NOTE
[id number]: Study Visit Note   Subject name: Alek Mcneill     Visit: Week 42    Did the study visit occur within the appropriate window allowed by the protocol? yes    Since the last study visit, He has been doing well. He slipped getting into his truck a couple of weeks ago and sprained his right ankle, continues to have pain rating it a 6/10 but it has been improving. Patient also reports some cold/flu like symptoms earlier in the month that resolved in 3 days.    Discussed need for BG with Dr. Tobin, per protocol patients are to have a fasting glucose under 250 to be treated. The patient was fasting besides 1 cup of coffee with creamer this AM. Dr. Tobin OK with continuing treatment as the BG is well under the 250 range.     Assessed for below symptoms specifically:   Dysuria no  Pollakiuria no  Nocturia no  Urgency no  Incontinence no  Hematuria no  Urinary hesitation no  Bladder pain/spasm no  Urethral pain no   Bladder discomfort no  Feeling of incomplete bladder emptying  no  Lower abdominal pain no  Fever no  Flu-like symptoms no  Fatigue no  Diarrhea no  Nausea no  Rash no  Arthralgia  no      Cetrelimab infusion:  Were premedications given? No    Verbal handover provided to infusion RN; reminded RN to document actual start time of infusion and actual stop time of the infusion. If infusion deviates from protocol directed infusion time, please document rationale in your infusion note. .Instructed RN to take VS before the infusion as well as after, patient should have a 15 min observation time. Please use a 0.2 micron filter and flush with 14 mL of NS after the infusion, the end of the flush is considered the end of the infusion.     I have personally interviewed Alek SURENDRA Pickeringprincess and reviewed his medical record for adverse events and concomitant medications and these have been recorded on the corresponding logs in Alek Mcneill's research file.     Alek Mcneill was given the opportunity to ask any trial related  questions.  Please see provider progress note for physical exam and other clinical information. Labs were reviewed - any significant lab values were addressed and reviewed.    Araceli Diaz RN

## 2025-01-09 DIAGNOSIS — C67.3 MALIGNANT NEOPLASM OF ANTERIOR WALL OF URINARY BLADDER (H): Primary | ICD-10-CM

## 2025-01-10 ENCOUNTER — APPOINTMENT (OUTPATIENT)
Dept: LAB | Facility: HOSPITAL | Age: 75
End: 2025-01-10
Payer: COMMERCIAL

## 2025-01-10 LAB
ALBUMIN SERPL BCG-MCNC: 4.3 G/DL (ref 3.5–5.2)
ALBUMIN UR-MCNC: NEGATIVE MG/DL
ALP SERPL-CCNC: 73 U/L (ref 40–150)
ALT SERPL W P-5'-P-CCNC: 40 U/L (ref 0–70)
AMYLASE SERPL-CCNC: 154 U/L (ref 28–100)
ANION GAP SERPL CALCULATED.3IONS-SCNC: 11 MMOL/L (ref 7–15)
APPEARANCE UR: CLEAR
AST SERPL W P-5'-P-CCNC: 29 U/L (ref 0–45)
BASOPHILS # BLD AUTO: 0.1 10E3/UL (ref 0–0.2)
BASOPHILS NFR BLD AUTO: 1 %
BILIRUB SERPL-MCNC: 0.8 MG/DL
BILIRUB UR QL STRIP: NEGATIVE
BUN SERPL-MCNC: 15.7 MG/DL (ref 8–23)
CALCIUM SERPL-MCNC: 10 MG/DL (ref 8.8–10.4)
CHLORIDE SERPL-SCNC: 98 MMOL/L (ref 98–107)
COLOR UR AUTO: ABNORMAL
CREAT SERPL-MCNC: 1.19 MG/DL (ref 0.67–1.17)
CRP SERPL-MCNC: <3 MG/L
EGFRCR SERPLBLD CKD-EPI 2021: 64 ML/MIN/1.73M2
EOSINOPHIL # BLD AUTO: 0.2 10E3/UL (ref 0–0.7)
EOSINOPHIL NFR BLD AUTO: 3 %
ERYTHROCYTE [DISTWIDTH] IN BLOOD BY AUTOMATED COUNT: 12.6 % (ref 10–15)
EST. AVERAGE GLUCOSE BLD GHB EST-MCNC: 140 MG/DL
GGT SERPL-CCNC: 111 U/L (ref 8–61)
GLUCOSE SERPL-MCNC: 121 MG/DL (ref 70–99)
GLUCOSE UR STRIP-MCNC: NEGATIVE MG/DL
HBA1C MFR BLD: 6.5 %
HCO3 SERPL-SCNC: 27 MMOL/L (ref 22–29)
HCT VFR BLD AUTO: 45.7 % (ref 40–53)
HGB BLD-MCNC: 16.4 G/DL (ref 13.3–17.7)
HGB UR QL STRIP: NEGATIVE
HYALINE CASTS: 8 /LPF
IMM GRANULOCYTES # BLD: 0 10E3/UL
IMM GRANULOCYTES NFR BLD: 0 %
KETONES UR STRIP-MCNC: NEGATIVE MG/DL
LDH SERPL L TO P-CCNC: 175 U/L (ref 0–250)
LEUKOCYTE ESTERASE UR QL STRIP: NEGATIVE
LIPASE SERPL-CCNC: 139 U/L (ref 13–60)
LYMPHOCYTES # BLD AUTO: 2.3 10E3/UL (ref 0.8–5.3)
LYMPHOCYTES NFR BLD AUTO: 29 %
MCH RBC QN AUTO: 32.5 PG (ref 26.5–33)
MCHC RBC AUTO-ENTMCNC: 35.9 G/DL (ref 31.5–36.5)
MCV RBC AUTO: 91 FL (ref 78–100)
MONOCYTES # BLD AUTO: 1 10E3/UL (ref 0–1.3)
MONOCYTES NFR BLD AUTO: 12 %
NEUTROPHILS # BLD AUTO: 4.4 10E3/UL (ref 1.6–8.3)
NEUTROPHILS NFR BLD AUTO: 55 %
NITRATE UR QL: NEGATIVE
NRBC # BLD AUTO: 0 10E3/UL
NRBC BLD AUTO-RTO: 0 /100
PH UR STRIP: 7 [PH] (ref 5–7)
PHOSPHATE SERPL-MCNC: 3 MG/DL (ref 2.5–4.5)
PLATELET # BLD AUTO: 232 10E3/UL (ref 150–450)
POTASSIUM SERPL-SCNC: 4.2 MMOL/L (ref 3.4–5.3)
PROT SERPL-MCNC: 7.1 G/DL (ref 6.4–8.3)
RBC # BLD AUTO: 5.05 10E6/UL (ref 4.4–5.9)
RBC URINE: <1 /HPF
SODIUM SERPL-SCNC: 136 MMOL/L (ref 135–145)
SP GR UR STRIP: 1.02 (ref 1–1.03)
SQUAMOUS EPITHELIAL: <1 /HPF
T4 FREE SERPL-MCNC: 1.36 NG/DL (ref 0.9–1.7)
TSH SERPL DL<=0.005 MIU/L-ACNC: 5.27 UIU/ML (ref 0.3–4.2)
URATE SERPL-MCNC: 6.8 MG/DL (ref 3.4–7)
UROBILINOGEN UR STRIP-MCNC: <2 MG/DL
WBC # BLD AUTO: 8 10E3/UL (ref 4–11)
WBC URINE: <1 /HPF

## 2025-01-10 PROCEDURE — 81001 URINALYSIS AUTO W/SCOPE: CPT | Performed by: INTERNAL MEDICINE

## 2025-01-10 PROCEDURE — 85004 AUTOMATED DIFF WBC COUNT: CPT | Performed by: INTERNAL MEDICINE

## 2025-01-10 PROCEDURE — 84100 ASSAY OF PHOSPHORUS: CPT | Performed by: INTERNAL MEDICINE

## 2025-01-10 PROCEDURE — 36415 COLL VENOUS BLD VENIPUNCTURE: CPT | Performed by: INTERNAL MEDICINE

## 2025-01-10 PROCEDURE — 84550 ASSAY OF BLOOD/URIC ACID: CPT | Performed by: INTERNAL MEDICINE

## 2025-01-10 PROCEDURE — 86140 C-REACTIVE PROTEIN: CPT | Performed by: INTERNAL MEDICINE

## 2025-01-10 PROCEDURE — 82150 ASSAY OF AMYLASE: CPT | Performed by: INTERNAL MEDICINE

## 2025-01-10 PROCEDURE — 84443 ASSAY THYROID STIM HORMONE: CPT | Performed by: INTERNAL MEDICINE

## 2025-01-10 PROCEDURE — 83690 ASSAY OF LIPASE: CPT | Performed by: INTERNAL MEDICINE

## 2025-01-10 PROCEDURE — 84439 ASSAY OF FREE THYROXINE: CPT | Performed by: INTERNAL MEDICINE

## 2025-01-10 PROCEDURE — 83036 HEMOGLOBIN GLYCOSYLATED A1C: CPT | Performed by: INTERNAL MEDICINE

## 2025-01-10 PROCEDURE — 82977 ASSAY OF GGT: CPT | Performed by: INTERNAL MEDICINE

## 2025-01-10 PROCEDURE — 83615 LACTATE (LD) (LDH) ENZYME: CPT | Performed by: INTERNAL MEDICINE

## 2025-01-10 PROCEDURE — 85014 HEMATOCRIT: CPT | Performed by: INTERNAL MEDICINE

## 2025-01-10 PROCEDURE — 87086 URINE CULTURE/COLONY COUNT: CPT | Performed by: INTERNAL MEDICINE

## 2025-01-10 PROCEDURE — 80053 COMPREHEN METABOLIC PANEL: CPT | Performed by: INTERNAL MEDICINE

## 2025-01-11 LAB — BACTERIA UR CULT: NORMAL

## 2025-01-12 NOTE — PROGRESS NOTES
[facility] Medical Oncology Note  Date of visit: Jan 13, 2025  Outpatient Clinic Note        Assessment:     Stage 1 (T1NxMx) superficial poorly differentiated bladder cancer. T1 lesions are high risk. Recurrence rates at one, three, and five years can reach up to 50, 70 to 80, and 90 percent, respectively, and 20 to 25 percent progress to more invasive (T2 or greater) disease.   Alek is on the SUNRISE-3 study, for patients with T1 disease and has been randomized to the TAR-200 (a device inserted in to the bladder that contains gemcitabine)+ Cetrelimab arm. He has one month to go.  He is tolerating cetrelimab well with the exception of hypothyroidism secondary to study drug. His TSH is mildly elevated, T4 is WNL. Will continue on current dose of 100 mg levothyroxine.   Continue follow up with urology an [id number] placement per study protocol.   Creatinine improved today compared to mild elevation on prior labs.   Hyperglycemia, stable but significant.  He will follow-up with his primary care physician.  He carries a diagnosis of diabetes, but has not been on any oral hypoglycemics at this point.      Plan:     Next cetrelimab today. After today he has one to go.  Continue levothyroxine to 100 mcg daily.  RTC in 3 weeks for his last cycle.  After this he will continue the every 3 month  placement with urology.  I will follow him as per the study protocol.    Patient was seen and evaluated with study RNCC, Araceli Diaz.      30 minutes spent on the date of the encounter doing chart review, review of test results, interpretation of tests, patient visit, and documentation     The longitudinal plan of care for the diagnosis(es)/condition(s) as documented were addressed during this visit. Due to the added complexity in care, I will continue to support Alek in the subsequent management and with ongoing continuity of care.     Julio Tobin MD, MSc  Associate Professor of Medicine  [facility]  "Medical School  [facility]  [address] [place] [address]  [phone]    __________________________________________________________________    DIAGNOSIS:     T1 superficial  poorly differentiated invasive bladder cancer, diagnosed at cystoscopies 12/6/2023, and 1/17/2024.  Respiratory symptoms concerning for pneumonitis, but CT chest 3/27/2024 showed \"No new or suspicious pulmonary opacities.\"       History of Present Illness/Therapy to Date:   11/19/2023: Went to ED for subacute onset of gross hematuria, and a passing of clots. Stopped ASA. CT showed multiple indeterminate nodular densities in the bladder suspicious for enhancing urothelial lesions   12/6/2023: Cystoscopy , with finding of multiple bladder tumors appeared papillary and were located on the anterior wall and had some superficial calcifications. \"We resected tissue down to muscle using cut electrocautery. \" Pathology showed high grade papillary carcinoma, that did not invade the muscle.  1/17/2024: Cystoscopy with resection showed CIS, but no invasive cancer.  Alek has agreed to participate in the SUNRISE -3 trial. He has been randomized to receive Cetrelimab and TAR-200 (an investigational intravesical drug delivery system for gemcitabine). His first dose of cetrelimab and TAR-200 was 3/6/2024.   Follows for serial cystoscopies with Dr. Kaiser. Surveillance biopsy 8/2024 was benign. Urine cytology 11/19/2024 was negative for high grade urothelial carcinoma.        Interval history:   Alek returns to clinic today accompanied by his wife.  He reports that he is feeling well today.  He has no concerns.  Energy is good.  He did injure his right ankle after stepping funny onto his truck which seems to finally be improving over the last couple of weeks.  He did not hear a pop.  He still does give his pain a 6 out of 10 today but finds that this is much better.  He does not have any history of recent fevers or chills.    No chest " pain, shortness of breath, cough.  No congestion.  No urinary concerns.  No hematuria.  No diarrhea but does note he occasionally have constipation that is relieved with senna as needed.  No headaches or vision changes.  No skin concerns or rashes.  Eating and drinking well.     ROS: 10 point ROS neg other than the symptoms noted above in the HPI.      Past Medical History:   I have reviewed this patient's past medical history   Past Medical History:   Diagnosis Date    Complication of anesthesia     DM2 (diabetes mellitus, type 2) (H)     Hypercholesteremia     Hyperlipidemia     Hypertension           Past Surgical History:    I have reviewed this patient's past surgical history       Social History:   Tobacco, ETOH, and rec drugs reviewed and as noted below with the following exceptions:  Drinks a few beers a day. Quit smoking in .  Alek was born in [place], and graduated from [employer] (no longer exists) in .  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  He is  to Christine, his third wife.  She worked with his sister-in-law and they were introduced.  They have been  for 25 years.  Jose has 5 children, Golden, Cr, Joon, Radha, and Kayla.  Kayla is the youngest and a PA who lives in [place].  The other children are in the service.  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      Family History:     Grandma with skin cancer (s/p removal of her nose)  Uncle with sarcoma  Dad had bone cancer/lung cancer  Brother Casimiro had bladder cancer  Buddy had bladder cancer ( in car accident)  Brother Lenny had bladder cancer  No family history on file.     Medications:     Current Outpatient Medications   Medication Sig Dispense Refill    Ascorbic Acid 500 MG CAPS 1 tablet Orally Once a day      aspirin (ASPIRIN LOW DOSE) 81 MG EC tablet  Take 81 mg by mouth daily ONCE BEFORE BED      blood glucose (NO BRAND SPECIFIED) lancets standard as directed, to use with his Contour Next test blood sugars daily      CONTOUR NEXT TEST test strip USE TO TEST BLOOD SUGAR DAILY      Ergocalciferol 50 MCG (2000 UT) CAPS Take 50 mcg by mouth daily      fish oil-omega-3 fatty acids 500 MG capsule Take 2 capsules by mouth daily      hydrochlorothiazide (HYDRODIURIL) 25 MG tablet Take 25 mg by mouth daily      ketoconazole (NIZORAL) 2 % external cream Apply topically as needed      levothyroxine (SYNTHROID/LEVOTHROID) 100 MCG tablet Take 1 tablet (100 mcg) by mouth daily. 30 tablet 3    Microlet Lancets MISC daily      simvastatin (ZOCOR) 40 MG tablet Take 40 mg by mouth at bedtime      triamcinolone (KENALOG) 0.1 % external cream Apply as needed (Patient not taking: Reported on 12/20/2024)      Vitamin D3 (VITAMIN D, CHOLECALCIFEROL,) 25 mcg (1000 units) tablet Take 25 mcg by mouth daily            Physical Exam:   There were no vitals taken for this visit.  ECOG PS 0  General: No acute distress  HEENT: Sclera anicteric. Oral mucosa pink and moist.  No mucositis or thrush  Lymph: No lymphadenopathy in neck  Heart: Regular, rate, and rhythm  Lungs: Clear to ascultation bilaterally  Abdomen: Positive bowel sounds. Soft, non-distended, non-tender. No organomegaly or mass.   Extremities: no lower extremity edema  Neuro: Cranial nerves grossly intact  Rash: none  Psych: Normal affect      Data:   Most Recent 3 CBC's:  Recent Labs   Lab Test 01/10/25  1047 12/20/24  1314 11/29/24  1011   WBC 8.0 8.9 10.5   HGB 16.4 16.0 16.5   MCV 91 89 93    248 227   ANEUTAUTO 4.4 5.0 6.5     Most Recent 3 BMP's:  Recent Labs   Lab Test 01/10/25  1047 12/20/24  1314 11/29/24  1011    135 136   POTASSIUM 4.2 4.0 4.0   CHLORIDE 98 96* 96*   CO2 27 27 30*   BUN 15.7 16.3 16.2   CR 1.19* 1.14 1.28*   ANIONGAP 11 12 10   MILLI 10.0 9.6 9.2   * 102* 125*   PROTTOTAL 7.1 7.3 7.3    ALBUMIN 4.3 4.3 4.2    Most Recent 3 LFT's:  Recent Labs   Lab Test 01/10/25  1047 12/20/24  1314 11/29/24  1011   AST 29 38 31   ALT 40 36 33   ALKPHOS 73 76 90   BILITOTAL 0.8 0.8 0.6    Most Recent 2 TSH and T4:  Recent Labs   Lab Test 01/10/25  1047 12/20/24  1314   TSH 5.27* 5.16*   T4 1.36 1.46     Reviewed cystoscopy notes from 12/11/24- no irregularities noted. 11/19/24 urine cytology negative.     I reviewed the above labs today.

## 2025-01-13 ENCOUNTER — ONCOLOGY VISIT (OUTPATIENT)
Dept: ONCOLOGY | Facility: CLINIC | Age: 75
End: 2025-01-13
Attending: INTERNAL MEDICINE
Payer: COMMERCIAL

## 2025-01-13 ENCOUNTER — ALLIED HEALTH/NURSE VISIT (OUTPATIENT)
Dept: ONCOLOGY | Facility: CLINIC | Age: 75
End: 2025-01-13

## 2025-01-13 VITALS
OXYGEN SATURATION: 95 % | DIASTOLIC BLOOD PRESSURE: 87 MMHG | HEART RATE: 77 BPM | SYSTOLIC BLOOD PRESSURE: 129 MMHG | RESPIRATION RATE: 16 BRPM | BODY MASS INDEX: 34.9 KG/M2 | WEIGHT: 243.2 LBS | TEMPERATURE: 97.9 F

## 2025-01-13 VITALS
SYSTOLIC BLOOD PRESSURE: 135 MMHG | RESPIRATION RATE: 16 BRPM | HEART RATE: 72 BPM | OXYGEN SATURATION: 97 % | TEMPERATURE: 97.7 F | DIASTOLIC BLOOD PRESSURE: 89 MMHG

## 2025-01-13 DIAGNOSIS — C67.3 MALIGNANT NEOPLASM OF ANTERIOR WALL OF URINARY BLADDER (H): Primary | ICD-10-CM

## 2025-01-13 PROCEDURE — G0463 HOSPITAL OUTPT CLINIC VISIT: HCPCS | Performed by: INTERNAL MEDICINE

## 2025-01-13 PROCEDURE — 96413 CHEMO IV INFUSION 1 HR: CPT

## 2025-01-13 RX ORDER — EPINEPHRINE 1 MG/ML
0.3 INJECTION, SOLUTION INTRAMUSCULAR; SUBCUTANEOUS EVERY 5 MIN PRN
Status: CANCELLED | OUTPATIENT
Start: 2025-01-13

## 2025-01-13 RX ORDER — METHYLPREDNISOLONE SODIUM SUCCINATE 40 MG/ML
40 INJECTION INTRAMUSCULAR; INTRAVENOUS
Status: CANCELLED
Start: 2025-01-13

## 2025-01-13 RX ORDER — LORAZEPAM 2 MG/ML
0.5 INJECTION INTRAMUSCULAR EVERY 4 HOURS PRN
Status: CANCELLED | OUTPATIENT
Start: 2025-01-13

## 2025-01-13 RX ORDER — MEPERIDINE HYDROCHLORIDE 25 MG/ML
25 INJECTION INTRAMUSCULAR; INTRAVENOUS; SUBCUTANEOUS
Status: CANCELLED | OUTPATIENT
Start: 2025-01-13

## 2025-01-13 RX ORDER — ALBUTEROL SULFATE 90 UG/1
1-2 INHALANT RESPIRATORY (INHALATION)
Status: CANCELLED
Start: 2025-01-13

## 2025-01-13 RX ORDER — DIPHENHYDRAMINE HYDROCHLORIDE 50 MG/ML
25 INJECTION INTRAMUSCULAR; INTRAVENOUS
Status: CANCELLED
Start: 2025-01-13

## 2025-01-13 RX ORDER — DIPHENHYDRAMINE HYDROCHLORIDE 50 MG/ML
50 INJECTION INTRAMUSCULAR; INTRAVENOUS
Status: CANCELLED
Start: 2025-01-13

## 2025-01-13 RX ORDER — ALBUTEROL SULFATE 0.83 MG/ML
2.5 SOLUTION RESPIRATORY (INHALATION)
Status: CANCELLED | OUTPATIENT
Start: 2025-01-13

## 2025-01-13 RX ORDER — HEPARIN SODIUM,PORCINE 10 UNIT/ML
5-20 VIAL (ML) INTRAVENOUS DAILY PRN
Status: CANCELLED | OUTPATIENT
Start: 2025-01-13

## 2025-01-13 RX ORDER — ACETAMINOPHEN 325 MG/1
650 TABLET ORAL
Status: CANCELLED
Start: 2025-01-13

## 2025-01-13 RX ORDER — DIPHENHYDRAMINE HCL 25 MG
50 CAPSULE ORAL
Status: CANCELLED | OUTPATIENT
Start: 2025-01-13

## 2025-01-13 RX ORDER — HEPARIN SODIUM (PORCINE) LOCK FLUSH IV SOLN 100 UNIT/ML 100 UNIT/ML
5 SOLUTION INTRAVENOUS
Status: CANCELLED | OUTPATIENT
Start: 2025-01-13

## 2025-01-13 ASSESSMENT — PAIN SCALES - GENERAL: PAINLEVEL_OUTOF10: NO PAIN (0)

## 2025-01-13 NOTE — PROGRESS NOTES
Infusion Nursing Note:  Alek Mcneill presents today for Week 45 Day 1 Cetrelimab (IDS# [id number]).    Patient seen by provider today: Yes: Dr. Tobin   present during visit today: Not Applicable.    Note: Alek reports to infusion today following his visit with Dr. Tobin. He states he is feeling well and denies any questions or concerns at this time. Wishes to proceed with treatment today.     Vital signs before Cetrelimab infusion: BP - 135/89, HR - 72, RR - 16, Temp - 97.7, SpO2 - 97%.    Infusion start time:1242  Infusion stop time (after 14 mL post-infusion flush): 1321    Intravenous Access:  Peripheral IV placed.    Treatment Conditions:  Lab Results   Component Value Date    HGB 16.4 01/10/2025    WBC 8.0 01/10/2025    ANEUTAUTO 4.4 01/10/2025     01/10/2025        Lab Results   Component Value Date     01/10/2025    POTASSIUM 4.2 01/10/2025    MAG 2.3 11/19/2023    CR 1.19 (H) 01/10/2025    MILLI 10.0 01/10/2025    BILITOTAL 0.8 01/10/2025    ALBUMIN 4.3 01/10/2025    ALT 40 01/10/2025    AST 29 01/10/2025       Results reviewed, labs MET treatment parameters, ok to proceed with treatment.      Post Infusion Assessment:  Patient tolerated infusion without incident.  Patient observed for 15 minutes post cetrelimab per protocol.  Blood return noted pre and post infusion.  Site patent and intact, free from redness, edema or discomfort.  No evidence of extravasations.  Access discontinued per protocol.       Discharge Plan:   Patient declined prescription refills.  Discharge instructions reviewed with: Patient and Family.  Patient and/or family verbalized understanding of discharge instructions and all questions answered.  AVS to patient via [employer].  Patient will return 2/6 for next appointment.   Patient discharged in stable condition accompanied by: self and wife.  Departure Mode: Ambulatory.      Renae Panda RN   not applicable

## 2025-01-13 NOTE — PATIENT INSTRUCTIONS
[facility] Triage and after hours / weekends / holidays:  [phone] option 5, option 2    Please call the triage or after hours line if you experience a temperature greater than or equal to 100.4, shaking chills, have uncontrolled nausea, vomiting and/or diarrhea, dizziness, shortness of breath, chest pain, bleeding, unexplained bruising, or if you have any other new/concerning symptoms, questions or concerns.      If you are having any concerning symptoms or wish to speak to a provider before your next infusion visit, please call triage to notify your care team so we can adequately serve you.     If you need a refill on a narcotic prescription or other medication, please call before your infusion appointment.

## 2025-01-13 NOTE — NURSING NOTE
[id number]: Study Visit Note   Subject name: Alek Mcneill     Visit: Week 45    Did the study visit occur within the appropriate window allowed by the protocol? yes    Since the last study visit, He has been doing well. No new signs or symptoms to report. Sprained his ankle last month and is still dealing with some residual pain due to this, has been applying BenGay and taking tylenol as needed. Denies abdominal pain, nausea, vomiting or diarrhea.      ECOG 1      Assessed for below symptoms specifically:   Dysuria no  Pollakiuria no  Nocturia no  Urgency no  Incontinence no  Hematuria no  Urinary hesitation no  Bladder pain/spasm no  Urethral pain no   Bladder discomfort no  Feeling of incomplete bladder emptying  no  Lower abdominal pain no  Fever no  Flu-like symptoms no  Fatigue no  Diarrhea no  Nausea no  Rash no  Arthralgia  no      Cetrelimab infusion:  Were premedications given? no    Verbal handover provided to infusion RN; reminded RN to document actual start time of infusion and actual stop time of the infusion. If infusion deviates from protocol directed infusion time, please document rationale in your infusion note. Reminded RN to use 0.2 micron filter, take VS before the infusion. 15 min observation time starts after the 14 mL post infusion normal saline flush.       I have personally interviewed Alek Mcneill and reviewed his medical record for adverse events and concomitant medications and these have been recorded on the corresponding logs in Alek Mcneill's research file.     Alek Mcneill was given the opportunity to ask any trial related questions.  Please see provider progress note for physical exam and other clinical information. Labs were reviewed - any significant lab values were addressed and reviewed.    Araceli Diaz RN

## 2025-01-13 NOTE — NURSING NOTE
"Oncology Rooming Note    January 13, 2025 10:21 AM   Alek Mcneill is a 74 year old male who presents for:    Chief Complaint   Patient presents with    Oncology Clinic Visit     RTN Prostate CA      Initial Vitals: /87 (BP Location: Right arm, Patient Position: Sitting, Cuff Size: Adult Large)   Pulse 77   Temp 97.9  F (36.6  C) (Oral)   Resp 16   Wt 110.3 kg (243 lb 3.2 oz)   SpO2 95%   BMI 34.90 kg/m   Estimated body mass index is 34.9 kg/m  as calculated from the following:    Height as of 12/11/24: 1.778 m (5' 10\").    Weight as of this encounter: 110.3 kg (243 lb 3.2 oz). Body surface area is 2.33 meters squared.  No Pain (0) Comment: Data Unavailable   No LMP for male patient.  Allergies reviewed: Yes  Medications reviewed: Yes    Medications: Medication refills not needed today.  Pharmacy name entered into [employer]: [id number] [address]  AT [place] [address]    Frailty Screening:   Is the patient here for a new oncology consult visit in cancer care? 2. No      Clinical concerns: none       Vidya Little             "

## 2025-01-13 NOTE — LETTER
1/13/2025      Alek Mcneill  [address] [place] [address]      Dear Colleague,    Thank you for referring your patient, Alek Mcneill, to the [facility]. Please see a copy of my visit note below.      [facility] Medical Oncology Note  Date of visit: Jan 13, 2025  Outpatient Clinic Note        Assessment:     Stage 1 (T1NxMx) superficial poorly differentiated bladder cancer. T1 lesions are high risk. Recurrence rates at one, three, and five years can reach up to 50, 70 to 80, and 90 percent, respectively, and 20 to 25 percent progress to more invasive (T2 or greater) disease.   Alek is on the SUNRISE-3 study, for patients with T1 disease and has been randomized to the TAR-200 (a device inserted in to the bladder that contains gemcitabine)+ Cetrelimab arm. He has one month to go.  He is tolerating cetrelimab well with the exception of hypothyroidism secondary to study drug. His TSH is mildly elevated, T4 is WNL. Will continue on current dose of 100 mg levothyroxine.   Continue follow up with urology an [id number] placement per study protocol.   Creatinine improved today compared to mild elevation on prior labs.   Hyperglycemia, stable but significant.  He will follow-up with his primary care physician.  He carries a diagnosis of diabetes, but has not been on any oral hypoglycemics at this point.      Plan:     Next cetrelimab today. After today he has one to go.  Continue levothyroxine to 100 mcg daily.  RTC in 3 weeks for his last cycle.  After this he will continue the every 3 month  placement with urology.  I will follow him as per the study protocol.    Patient was seen and evaluated with study RNCC, Araceli Diaz.      30 minutes spent on the date of the encounter doing chart review, review of test results, interpretation of tests, patient visit, and documentation     The longitudinal plan of care for the diagnosis(es)/condition(s) as documented were addressed  "during this visit. Due to the added complexity in care, I will continue to support Alek in the subsequent management and with ongoing continuity of care.     Julio Tobin MD, MSc  Associate Professor of Medicine  [employer] [facility] [employer]  [facility]  [address] [place] [id number]  [phone]    __________________________________________________________________    DIAGNOSIS:     T1 superficial  poorly differentiated invasive bladder cancer, diagnosed at cystoscopies 12/6/2023, and 1/17/2024.  Respiratory symptoms concerning for pneumonitis, but CT chest 3/27/2024 showed \"No new or suspicious pulmonary opacities.\"       History of Present Illness/Therapy to Date:   11/19/2023: Went to ED for subacute onset of gross hematuria, and a passing of clots. Stopped ASA. CT showed multiple indeterminate nodular densities in the bladder suspicious for enhancing urothelial lesions   12/6/2023: Cystoscopy , with finding of multiple bladder tumors appeared papillary and were located on the anterior wall and had some superficial calcifications. \"We resected tissue down to muscle using cut electrocautery. \" Pathology showed high grade papillary carcinoma, that did not invade the muscle.  1/17/2024: Cystoscopy with resection showed CIS, but no invasive cancer.  Alek has agreed to participate in the SUNRISE -3 trial. He has been randomized to receive Cetrelimab and TAR-200 (an investigational intravesical drug delivery system for gemcitabine). His first dose of cetrelimab and TAR-200 was 3/6/2024.   Follows for serial cystoscopies with Dr. Kaiser. Surveillance biopsy 8/2024 was benign. Urine cytology 11/19/2024 was negative for high grade urothelial carcinoma.        Interval history:   Alek returns to clinic today accompanied by his wife.  He reports that he is feeling well today.  He has no concerns.  Energy is good.  He did injure his right ankle after stepping funny onto his truck which " seems to finally be improving over the last couple of weeks.  He did not hear a pop.  He still does give his pain a 6 out of 10 today but finds that this is much better.  He does not have any history of recent fevers or chills.    No chest pain, shortness of breath, cough.  No congestion.  No urinary concerns.  No hematuria.  No diarrhea but does note he occasionally have constipation that is relieved with senna as needed.  No headaches or vision changes.  No skin concerns or rashes.  Eating and drinking well.     ROS: 10 point ROS neg other than the symptoms noted above in the HPI.      Past Medical History:   I have reviewed this patient's past medical history   Past Medical History:   Diagnosis Date     Complication of anesthesia      DM2 (diabetes mellitus, type 2) (H)      Hypercholesteremia      Hyperlipidemia      Hypertension           Past Surgical History:    I have reviewed this patient's past surgical history       Social History:   Tobacco, ETOH, and rec drugs reviewed and as noted below with the following exceptions:  Drinks a few beers a day. Quit smoking in .  Alek was born in [place], and graduated from [employer]) in .  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  He is  to Christine, his third wife.  She worked with his sister-in-law and they were introduced.  They have been  for 25 years.  Jose has 5 children, Gloden, Cr, Joon, Radha, and Kayla.  Kayla is the youngest and a PA who lives in [place].  The other children are in the service.  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      Family History:     Grandma with skin cancer (s/p removal of her nose)  Uncle with sarcoma  Dad had bone cancer/lung cancer  Brother Casimiro had bladder cancer  Buddy had bladder cancer ( in car  accident)  Brother Lenny had bladder cancer  No family history on file.     Medications:     Current Outpatient Medications   Medication Sig Dispense Refill     Ascorbic Acid 500 MG CAPS 1 tablet Orally Once a day       aspirin (ASPIRIN LOW DOSE) 81 MG EC tablet Take 81 mg by mouth daily ONCE BEFORE BED       blood glucose (NO BRAND SPECIFIED) lancets standard as directed, to use with his Contour Next test blood sugars daily       CONTOUR NEXT TEST test strip USE TO TEST BLOOD SUGAR DAILY       Ergocalciferol 50 MCG (2000 UT) CAPS Take 50 mcg by mouth daily       fish oil-omega-3 fatty acids 500 MG capsule Take 2 capsules by mouth daily       hydrochlorothiazide (HYDRODIURIL) 25 MG tablet Take 25 mg by mouth daily       ketoconazole (NIZORAL) 2 % external cream Apply topically as needed       levothyroxine (SYNTHROID/LEVOTHROID) 100 MCG tablet Take 1 tablet (100 mcg) by mouth daily. 30 tablet 3     Microlet Lancets MISC daily       simvastatin (ZOCOR) 40 MG tablet Take 40 mg by mouth at bedtime       triamcinolone (KENALOG) 0.1 % external cream Apply as needed (Patient not taking: Reported on 12/20/2024)       Vitamin D3 (VITAMIN D, CHOLECALCIFEROL,) 25 mcg (1000 units) tablet Take 25 mcg by mouth daily            Physical Exam:   There were no vitals taken for this visit.  ECOG PS 0  General: No acute distress  HEENT: Sclera anicteric. Oral mucosa pink and moist.  No mucositis or thrush  Lymph: No lymphadenopathy in neck  Heart: Regular, rate, and rhythm  Lungs: Clear to ascultation bilaterally  Abdomen: Positive bowel sounds. Soft, non-distended, non-tender. No organomegaly or mass.   Extremities: no lower extremity edema  Neuro: Cranial nerves grossly intact  Rash: none  Psych: Normal affect      Data:   Most Recent 3 CBC's:  Recent Labs   Lab Test 01/10/25  1047 12/20/24  1314 11/29/24  1011   WBC 8.0 8.9 10.5   HGB 16.4 16.0 16.5   MCV 91 89 93    248 227   ANEUTAUTO 4.4 5.0 6.5     Most Recent 3  BMP's:  Recent Labs   Lab Test 01/10/25  1047 12/20/24  1314 11/29/24  1011    135 136   POTASSIUM 4.2 4.0 4.0   CHLORIDE 98 96* 96*   CO2 27 27 30*   BUN 15.7 16.3 16.2   CR 1.19* 1.14 1.28*   ANIONGAP 11 12 10   MILLI 10.0 9.6 9.2   * 102* 125*   PROTTOTAL 7.1 7.3 7.3   ALBUMIN 4.3 4.3 4.2    Most Recent 3 LFT's:  Recent Labs   Lab Test 01/10/25  1047 12/20/24  1314 11/29/24  1011   AST 29 38 31   ALT 40 36 33   ALKPHOS 73 76 90   BILITOTAL 0.8 0.8 0.6    Most Recent 2 TSH and T4:  Recent Labs   Lab Test 01/10/25  1047 12/20/24  1314   TSH 5.27* 5.16*   T4 1.36 1.46     Reviewed cystoscopy notes from 12/11/24- no irregularities noted. 11/19/24 urine cytology negative.     I reviewed the above labs today.        Again, thank you for allowing me to participate in the care of your patient.        Sincerely,        Julio Tobin MD    Electronically signed

## 2025-01-18 ENCOUNTER — HEALTH MAINTENANCE LETTER (OUTPATIENT)
Age: 75
End: 2025-01-18

## 2025-01-27 ENCOUNTER — PRE VISIT (OUTPATIENT)
Dept: UROLOGY | Facility: CLINIC | Age: 75
End: 2025-01-27
Payer: COMMERCIAL

## 2025-01-29 ENCOUNTER — ALLIED HEALTH/NURSE VISIT (OUTPATIENT)
Dept: ONCOLOGY | Facility: CLINIC | Age: 75
End: 2025-01-29
Payer: COMMERCIAL

## 2025-01-29 DIAGNOSIS — C67.3 MALIGNANT NEOPLASM OF ANTERIOR WALL OF URINARY BLADDER (H): Primary | ICD-10-CM

## 2025-01-30 ENCOUNTER — APPOINTMENT (OUTPATIENT)
Dept: LAB | Facility: HOSPITAL | Age: 75
End: 2025-01-30
Payer: COMMERCIAL

## 2025-01-30 LAB
ALBUMIN SERPL BCG-MCNC: 4.5 G/DL (ref 3.5–5.2)
ALBUMIN UR-MCNC: NEGATIVE MG/DL
ALP SERPL-CCNC: 73 U/L (ref 40–150)
ALT SERPL W P-5'-P-CCNC: 34 U/L (ref 0–70)
AMYLASE SERPL-CCNC: 74 U/L (ref 28–100)
ANION GAP SERPL CALCULATED.3IONS-SCNC: 9 MMOL/L (ref 7–15)
APPEARANCE UR: CLEAR
APTT PPP: 26 SECONDS (ref 22–38)
AST SERPL W P-5'-P-CCNC: 31 U/L (ref 0–45)
BASOPHILS # BLD AUTO: 0.1 10E3/UL (ref 0–0.2)
BASOPHILS NFR BLD AUTO: 1 %
BILIRUB SERPL-MCNC: 0.7 MG/DL
BILIRUB UR QL STRIP: NEGATIVE
BUN SERPL-MCNC: 15.4 MG/DL (ref 8–23)
CALCIUM SERPL-MCNC: 10.3 MG/DL (ref 8.8–10.4)
CHLORIDE SERPL-SCNC: 98 MMOL/L (ref 98–107)
COLOR UR AUTO: NORMAL
CREAT SERPL-MCNC: 1.12 MG/DL (ref 0.67–1.17)
CRP SERPL-MCNC: <3 MG/L
EGFRCR SERPLBLD CKD-EPI 2021: 69 ML/MIN/1.73M2
EOSINOPHIL # BLD AUTO: 0.3 10E3/UL (ref 0–0.7)
EOSINOPHIL NFR BLD AUTO: 4 %
ERYTHROCYTE [DISTWIDTH] IN BLOOD BY AUTOMATED COUNT: 12.6 % (ref 10–15)
EST. AVERAGE GLUCOSE BLD GHB EST-MCNC: 134 MG/DL
GGT SERPL-CCNC: 104 U/L (ref 8–61)
GLUCOSE SERPL-MCNC: 97 MG/DL (ref 70–99)
GLUCOSE UR STRIP-MCNC: NEGATIVE MG/DL
HBA1C MFR BLD: 6.3 %
HCO3 SERPL-SCNC: 30 MMOL/L (ref 22–29)
HCT VFR BLD AUTO: 45.1 % (ref 40–53)
HGB BLD-MCNC: 16.3 G/DL (ref 13.3–17.7)
HGB UR QL STRIP: NEGATIVE
HYALINE CASTS: 1 /LPF
IMM GRANULOCYTES # BLD: 0 10E3/UL
IMM GRANULOCYTES NFR BLD: 0 %
INR PPP: 0.99 (ref 0.85–1.15)
KETONES UR STRIP-MCNC: NEGATIVE MG/DL
LDH SERPL L TO P-CCNC: 194 U/L (ref 0–250)
LEUKOCYTE ESTERASE UR QL STRIP: NEGATIVE
LIPASE SERPL-CCNC: 38 U/L (ref 13–60)
LYMPHOCYTES # BLD AUTO: 2.7 10E3/UL (ref 0.8–5.3)
LYMPHOCYTES NFR BLD AUTO: 34 %
MCH RBC QN AUTO: 32.4 PG (ref 26.5–33)
MCHC RBC AUTO-ENTMCNC: 36.1 G/DL (ref 31.5–36.5)
MCV RBC AUTO: 90 FL (ref 78–100)
MONOCYTES # BLD AUTO: 0.8 10E3/UL (ref 0–1.3)
MONOCYTES NFR BLD AUTO: 10 %
NEUTROPHILS # BLD AUTO: 4.1 10E3/UL (ref 1.6–8.3)
NEUTROPHILS NFR BLD AUTO: 51 %
NITRATE UR QL: NEGATIVE
NRBC # BLD AUTO: 0 10E3/UL
NRBC BLD AUTO-RTO: 0 /100
PH UR STRIP: 5.5 [PH] (ref 5–7)
PHOSPHATE SERPL-MCNC: 2.9 MG/DL (ref 2.5–4.5)
PLATELET # BLD AUTO: 219 10E3/UL (ref 150–450)
POTASSIUM SERPL-SCNC: 4.2 MMOL/L (ref 3.4–5.3)
PROT SERPL-MCNC: 7.5 G/DL (ref 6.4–8.3)
RBC # BLD AUTO: 5.03 10E6/UL (ref 4.4–5.9)
RBC URINE: <1 /HPF
SODIUM SERPL-SCNC: 137 MMOL/L (ref 135–145)
SP GR UR STRIP: 1.01 (ref 1–1.03)
SQUAMOUS EPITHELIAL: <1 /HPF
T4 FREE SERPL-MCNC: 1.39 NG/DL (ref 0.9–1.7)
TSH SERPL DL<=0.005 MIU/L-ACNC: 5.59 UIU/ML (ref 0.3–4.2)
URATE SERPL-MCNC: 6 MG/DL (ref 3.4–7)
UROBILINOGEN UR STRIP-MCNC: <2 MG/DL
WBC # BLD AUTO: 8 10E3/UL (ref 4–11)
WBC URINE: 1 /HPF

## 2025-01-30 PROCEDURE — 36415 COLL VENOUS BLD VENIPUNCTURE: CPT | Performed by: UROLOGY

## 2025-01-30 PROCEDURE — 82040 ASSAY OF SERUM ALBUMIN: CPT | Performed by: UROLOGY

## 2025-01-30 PROCEDURE — 83690 ASSAY OF LIPASE: CPT | Performed by: UROLOGY

## 2025-01-30 PROCEDURE — 84439 ASSAY OF FREE THYROXINE: CPT | Performed by: UROLOGY

## 2025-01-30 PROCEDURE — 85610 PROTHROMBIN TIME: CPT | Performed by: UROLOGY

## 2025-01-30 PROCEDURE — 86140 C-REACTIVE PROTEIN: CPT | Performed by: UROLOGY

## 2025-01-30 PROCEDURE — 85041 AUTOMATED RBC COUNT: CPT | Performed by: UROLOGY

## 2025-01-30 PROCEDURE — 84100 ASSAY OF PHOSPHORUS: CPT | Performed by: UROLOGY

## 2025-01-30 PROCEDURE — 88112 CYTOPATH CELL ENHANCE TECH: CPT | Mod: TC | Performed by: UROLOGY

## 2025-01-30 PROCEDURE — 82947 ASSAY GLUCOSE BLOOD QUANT: CPT | Performed by: UROLOGY

## 2025-01-30 PROCEDURE — 82977 ASSAY OF GGT: CPT | Performed by: UROLOGY

## 2025-01-30 PROCEDURE — 84550 ASSAY OF BLOOD/URIC ACID: CPT | Performed by: UROLOGY

## 2025-01-30 PROCEDURE — 87086 URINE CULTURE/COLONY COUNT: CPT | Performed by: UROLOGY

## 2025-01-30 PROCEDURE — 85730 THROMBOPLASTIN TIME PARTIAL: CPT | Performed by: UROLOGY

## 2025-01-30 PROCEDURE — 85025 COMPLETE CBC W/AUTO DIFF WBC: CPT | Performed by: UROLOGY

## 2025-01-30 PROCEDURE — 83615 LACTATE (LD) (LDH) ENZYME: CPT | Performed by: UROLOGY

## 2025-01-30 PROCEDURE — 81001 URINALYSIS AUTO W/SCOPE: CPT | Performed by: UROLOGY

## 2025-01-30 PROCEDURE — 82150 ASSAY OF AMYLASE: CPT | Performed by: UROLOGY

## 2025-01-30 PROCEDURE — 84443 ASSAY THYROID STIM HORMONE: CPT | Performed by: UROLOGY

## 2025-01-30 PROCEDURE — 82435 ASSAY OF BLOOD CHLORIDE: CPT | Performed by: UROLOGY

## 2025-01-30 PROCEDURE — 83036 HEMOGLOBIN GLYCOSYLATED A1C: CPT | Performed by: UROLOGY

## 2025-01-31 LAB
BACTERIA UR CULT: NORMAL
PATH REPORT.COMMENTS IMP SPEC: NORMAL
PATH REPORT.FINAL DX SPEC: NORMAL
PATH REPORT.GROSS SPEC: NORMAL
PATH REPORT.MICROSCOPIC SPEC OTHER STN: NORMAL

## 2025-01-31 PROCEDURE — 88112 CYTOPATH CELL ENHANCE TECH: CPT | Mod: 26 | Performed by: PATHOLOGY

## 2025-01-31 NOTE — PROGRESS NOTES
Anticoagulation: Return Visit      Pt here for routine f/u. Pt is on warfarin for DVT, with INR goal of 2 - 3.    Current warfarin regimen: 5mg every day       Assessment  (-) missed or extra doses  (-) change in medications  (+) change in vitamin K intake: Pt stated that she has not had as much dark greens as she had planned on since last visit   (-) EtOH use  (-) signs/sx of VTE or stroke  (-) new bleeding/bruising  (-) recent falls  (-) upcoming surgeries/procedures    Plan   Pt counseled to seek emergent care in case of excessive bleeding/bruising, or if sx of VTE/stroke occur.     INR = 1.8 (2-3) PT stated that she has had less servings of dark greens than what she had planned on eating throughout the week. Pt stated that she only eats dark greens including brussels sprouts, broccoli, spinach, elijah greens about 2 times a week. However INR is still sub-therapeutic.     As such pt instructed to take a boost dose today of 7.5mg and change of regimen of 7.5mg on Mon, and 5mg ROW. Pt. Instructed to return to clinic in 2 wks. 2/14/25    Given Rx this visit: none at this time     15 minutes were spent with the patient in direct face-to-face contact reviewing PT/INR results and anticoagulation management     CHIEF COMPLAINT   It was my pleasure to see Alek Mcneill who is a 73 year old male for follow-up of bladder cancer post restaging TURBT.      HPI:  Alek Mcneill is a 73 year old male being seen for voiding trial and discussion of further plan.  Duration of problem: 1 month  Previous treatments: TURBT and be staged TURBT for high-grade T1 bladder cancer     accompanied by his spouse  Reviewed previous notes from Dr. Song and Cele  Alek was admitted over the last weekend with hematuria postprocedure and retention  He needed CBI for 24 hours after which he is urine cleared and he was discharged on Radford catheter which he has removed today successfully  He does not have any significant concerns but is interested in further adjuvant treatment options for his high-grade invasive bladder cancer    Exam:  /63 (BP Location: Right arm, Patient Position: Sitting, Cuff Size: Adult Regular)   Pulse 75   Temp 97.8  F (36.6  C) (Tympanic)   General: age-appropriate appearing male in NAD sitting in an exam chair  Resp: no respiratory distress  CV: heart rate regular  Abdomen: Degree of obesity is moderate. Abdomen is soft and nontender. No organomegaly.  : Radford catheter removed  Neuro: grossly non focal. Normal reflexes  Motor: excellent strength throughout    Review of Imaging:  The following imaging exams were independently viewed and interpreted by me and discussed with patient:      Review of Labs:  The following labs were reviewed by me and discussed with the patient:  Surgical pathology: Abnormal: Few foci of carcinoma in situ, no invasive malignancy    Assessment & Plan     Malignant neoplasm of anterior wall of urinary bladder (H)  I discussed in detail with Alek about the adjuvant treatment plan for his bladder cancer which is invasive to the  Propria  We talked about the standard of care being BCG intravesical therapy and how it helps in prevention of recurrences and how successful it is for the first  year after starting treatment  About 60 to 70% of patients so good response with the first year and out of those people who recover further 50% we will have a good response to a second dose of the BCG  I also talked him about the sunrise 3 trial which is actually a good trial with options of treatment and all 3 arms and the standard of care here again the BCG as part of 1 of 3 options and of the 2 other options of treatment 1 involves a intravesical implantation of immune modulating drug with a device from and the second also involves a intravascular injection of her drug on top of the implant.  I encouraged him to hear further about this trial with trial personnel and I will get him in touch with the trial PI so that he gets to hear more about this trial and if he wants to proceed ahead with the same he can do so.  After our discussion he was interested in hearing about sunrise 3 trial and being part of it if possible.  I have sent a message to the trauma personnel and the PI Dr Kaiser so that he can be adequately assessed.  He will do surveillance cystoscopies based on the trial criteria and I will see him as needed          Damien Layton MD  [facility]      ==========================    Additional Billing and Coding Information:  Review of external notes as documented above   Review of the result(s) of each unique test - Surgical pathology    Independent interpretation of a test performed by another physician/other qualified health care professional (not separately reported) - NA    Discussion of management or test interpretation with external physician/other qualified healthcare professional/appropriate source - NA        25 minutes spent by me on the date of the encounter doing chart review, review of test results, interpretation of tests, patient visit, documentation and discussion with family     ==========================

## 2025-02-03 ENCOUNTER — TELEPHONE (OUTPATIENT)
Dept: ONCOLOGY | Facility: CLINIC | Age: 75
End: 2025-02-03

## 2025-02-03 ENCOUNTER — ANCILLARY PROCEDURE (OUTPATIENT)
Dept: CT IMAGING | Facility: CLINIC | Age: 75
End: 2025-02-03
Attending: UROLOGY
Payer: COMMERCIAL

## 2025-02-03 DIAGNOSIS — C67.3 MALIGNANT NEOPLASM OF ANTERIOR WALL OF URINARY BLADDER (H): ICD-10-CM

## 2025-02-03 PROCEDURE — 74178 CT ABD&PLV WO CNTR FLWD CNTR: CPT | Performed by: STUDENT IN AN ORGANIZED HEALTH CARE EDUCATION/TRAINING PROGRAM

## 2025-02-03 RX ORDER — IOPAMIDOL 755 MG/ML
119 INJECTION, SOLUTION INTRAVASCULAR ONCE
Status: COMPLETED | OUTPATIENT
Start: 2025-02-03 | End: 2025-02-03

## 2025-02-03 RX ADMIN — IOPAMIDOL 119 ML: 755 INJECTION, SOLUTION INTRAVASCULAR at 12:43

## 2025-02-03 NOTE — TELEPHONE ENCOUNTER
Patient called wanted to know if TAR-200 insertion was the only procedure tomorrow and that there was no infusion. Confirmed that this is correct with patient, patient agreeable.     Araceli Diaz RN   Clinical Research Coordinator Nurse-Solid Tumor   Phone: [phone]

## 2025-02-03 NOTE — DISCHARGE INSTRUCTIONS

## 2025-02-04 ENCOUNTER — OFFICE VISIT (OUTPATIENT)
Dept: UROLOGY | Facility: CLINIC | Age: 75
End: 2025-02-04
Payer: COMMERCIAL

## 2025-02-04 ENCOUNTER — ALLIED HEALTH/NURSE VISIT (OUTPATIENT)
Dept: ONCOLOGY | Facility: CLINIC | Age: 75
End: 2025-02-04

## 2025-02-04 VITALS
SYSTOLIC BLOOD PRESSURE: 136 MMHG | HEART RATE: 94 BPM | DIASTOLIC BLOOD PRESSURE: 80 MMHG | OXYGEN SATURATION: 96 % | WEIGHT: 234 LBS | HEIGHT: 69 IN | BODY MASS INDEX: 34.66 KG/M2

## 2025-02-04 VITALS — BODY MASS INDEX: 35.35 KG/M2 | WEIGHT: 239.4 LBS | TEMPERATURE: 97.6 F | RESPIRATION RATE: 18 BRPM

## 2025-02-04 DIAGNOSIS — C67.3 MALIGNANT NEOPLASM OF ANTERIOR WALL OF URINARY BLADDER (H): Primary | ICD-10-CM

## 2025-02-04 RX ORDER — LIDOCAINE HYDROCHLORIDE 20 MG/ML
JELLY TOPICAL ONCE
Status: ACTIVE | OUTPATIENT
Start: 2025-02-04

## 2025-02-04 RX ORDER — LIDOCAINE HYDROCHLORIDE 20 MG/ML
10 JELLY TOPICAL
Status: CANCELLED | OUTPATIENT
Start: 2025-02-04

## 2025-02-04 RX ORDER — LIDOCAINE HYDROCHLORIDE 20 MG/ML
10 JELLY TOPICAL
Status: DISCONTINUED | OUTPATIENT
Start: 2025-02-04 | End: 2025-02-04 | Stop reason: HOSPADM

## 2025-02-04 RX ORDER — SULFAMETHOXAZOLE AND TRIMETHOPRIM 800; 160 MG/1; MG/1
1 TABLET ORAL ONCE
Status: COMPLETED | OUTPATIENT
Start: 2025-02-04 | End: 2025-02-04

## 2025-02-04 RX ADMIN — SULFAMETHOXAZOLE AND TRIMETHOPRIM 1 TABLET: 800; 160 TABLET ORAL at 14:08

## 2025-02-04 ASSESSMENT — PAIN SCALES - GENERAL: PAINLEVEL_OUTOF10: NO PAIN (0)

## 2025-02-04 NOTE — PROGRESS NOTES
The following medication was given:     MEDICATION:  Bactrim   ROUTE: PO  SITE: mouth  DOSE: 800mg/160mg  LOT #: [id number]  : [employer]  EXPIRATION DATE: 5/31/2026  NDC#: [id number]   Was there drug waste? No    Prior to medication administration, verified patient identity using patient's name and date of birth. patient instructed to remain in clinic for 15 minutes  afterwards, and to report any adverse reaction to me immediately.      Ginette Sheth RN  February 4, 2025  2:11 PM

## 2025-02-04 NOTE — PROGRESS NOTES
CHIEF COMPLAINT   It was my pleasure to see Alek Mcneill who is a 74 year old male for follow-up of bladder cancer.      HPI  Alek Mcneill is a very pleasant 74 year old male who presents with a history of bladder cancer. 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      He has enrolled in Xand 3 and is here today for TAR-200 insertion and surveillance cystoscopy.  No recent hematuria or dysuria.      Now 3 months since last TAR-200. Surveillance biopsy 8/2024 was benign.    CT Urogram 2/3/2025  IMPRESSION:  1.  No acute or suspicious abnormalities in the abdomen or pelvis.  Specifically, no bladder wall mass or abnormal enhancement.  2.  No lymphadenopathy     Re-staging TURBT 1/17/2024          SPECIMEN   Procedure   Transurethral resection of bladder (TURBT)   TUMOR   Tumor Site   Anterior wall   Histologic Type   Urothelial carcinoma, in situ   Histologic Grade   High-grade   Tumor Extent   Flat carcinoma in situ   Lymphovascular Invasion   Not identified   Tumor Configuration   Flat   Muscularis Propria (detrusor muscle)   Cannot be determined: Biopsy site changes are identified in the current sample; no residual viable invasive carcinoma is identified.  Please see previous biopsy report for further information, case [id number], [facility]..   ADDITIONAL FINDINGS   Associated Epithelial Lesions   Chronic inflammation, histiocytic proliferation, consistent with biopsy site changes.      TURBT 12/6/2023  Final Diagnosis   A) URINARY BLADDER, BASE OF TUMOR, TRANSURETHRAL RESECTION BLADDER TUMOR:  -NONINVASIVE LOW-GRADE PAPILLARY UROTHELIAL CARCINOMA  -MULTIPLE FRAGMENTS OF MUSCULARIS PROPRIA  -SEE SYNOPTIC REPORT     B) URINARY BLADDER, TUMOR, TRANSURETHRAL RESECTION BLADDER TUMOR:  -HIGH-GRADE PAPILLARY UROTHELIAL CARCINOMA WITH LAMINA PROPRIA INVASION  -MUSCULARIS PROPRIA PRESENT AND UNINVOLVED  -SEE SYNOPTIC REPORT     PHYSICAL EXAM  Patient  "is a 74 year old  male   Vitals: Blood pressure 136/80, pulse 94, height 1.753 m (5' 9\"), weight 106.1 kg (234 lb), SpO2 96%.  General Appearance Adult: Body mass index is 34.56 kg/m .  Alert, no acute distress, oriented  Lungs: no respiratory distress, or pursed lip breathing  Abdomen: soft, nontender, no organomegaly or masses  Back: no CVAT  Neuro: Alert, oriented, speech and mentation normal  Psych: affect and mood normal  : penis, scrotum, testes normal    OFFICE CYSTOSCOPY 2/4/2025     Pre-procedure diagnosis:       Bladder Cancer  Post-procedure diagnosis:      No recurrence  Procedure performed:             Cystourethroscopy with placement of TAR-200  Surgeon:                                 Harman Kaiser MD  Anesthesia:                             Local     Description of procedure:   After fully informed, voluntary consent was obtained, the patient was brought into the procedure room, identified and placed in a supine position on the cystoscopy table.  The groin/scrotum were prepped with betadine and draped in a sterile fashion.  A 15F flexible cystoscope was inserted into the urethra, and the bladder and urethra were examined in a systematic manner. No suspicious lesions noted. TAR-200 was inserted via provided catheter system. The patient tolerated the procedure well and there were no complications.       Cystoscopic findings:  The urethra was normal without strictures.  The prostate was 3 cm long and demonstrated mild bilobar hypertrophy.  There was no median lobe.  The external sphincter coapted well and the bladder neck was open. The bladder was completely surveyed.  There was mild trabeculation.  There were no neoplasms, stones, or diverticula identifed.  The ureteric orifices were normal in position and number and effluxing clear urine. There are no suspicious findings.     ASSESSMENT and PLAN  74 year old male with history of HG non-muscle-invasive bladder cancer. He has enrolled in the [employer] " 3 clinical trial for BCG-naive NMIBC. Cystoscopy today with no evidence of recurrence. He continues to have a complete response.     Time of TAR-200 Insertion: 1356  There were no irregularities observed during or after removal.  Betzy-procedural antibiotics were given.  Office cysto for TAR-200 removal in 3 weeks.    Harman Kaiser MD  Urology  [facility]

## 2025-02-04 NOTE — NURSING NOTE
"Chief Complaint   Patient presents with    Consult     Pt states here for a sunrise study       Blood pressure 136/80, pulse 94, height 1.753 m (5' 9\"), weight 106.1 kg (234 lb), SpO2 96%. Body mass index is 34.56 kg/m .    Patient Active Problem List   Diagnosis    Malignant neoplasm of anterior wall of urinary bladder (H)    Benign hypertension    Hyperlipidemia    Impotence of organic origin    Morbid (severe) obesity due to excess calories (H)    Type 2 diabetes mellitus without complication, without long-term current use of insulin (H)    Spinal stenosis of lumbosacral region    Peripheral vascular disease    Venous stasis dermatitis of left lower extremity    Lumbar radiculopathy    Environmental allergies    Age-related nuclear cataract of right eye       Allergies   Allergen Reactions    Chlorhexidine Dermatitis       Current Outpatient Medications   Medication Sig Dispense Refill    triamcinolone (KENALOG) 0.1 % external cream Apply topically. Apply as needed      Ascorbic Acid 500 MG CAPS 1 tablet Orally Once a day      aspirin (ASPIRIN LOW DOSE) 81 MG EC tablet Take 81 mg by mouth daily ONCE BEFORE BED      blood glucose (NO BRAND SPECIFIED) lancets standard as directed, to use with his Contour Next test blood sugars daily      CONTOUR NEXT TEST test strip USE TO TEST BLOOD SUGAR DAILY      Ergocalciferol 50 MCG (2000 UT) CAPS Take 50 mcg by mouth daily      fish oil-omega-3 fatty acids 500 MG capsule Take 2 capsules by mouth daily      hydrochlorothiazide (HYDRODIURIL) 25 MG tablet Take 25 mg by mouth daily      ketoconazole (NIZORAL) 2 % external cream Apply topically as needed      levothyroxine (SYNTHROID/LEVOTHROID) 100 MCG tablet Take 1 tablet (100 mcg) by mouth daily. 30 tablet 3    Microlet Lancets MISC daily      simvastatin (ZOCOR) 40 MG tablet Take 40 mg by mouth at bedtime      Vitamin D3 (VITAMIN D, CHOLECALCIFEROL,) 25 mcg (1000 units) tablet Take 25 mcg by mouth daily. 5000 units   "       Social History     Tobacco Use    Smoking status: Former     Current packs/day: 0.00     Types: Cigarettes     Quit date:      Years since quittin.1     Passive exposure: Past   Vaping Use    Vaping status: Never Used   Substance Use Topics    Alcohol use: Yes     Comment: daily    Drug use: Never       What to expect after the procedure reviewed with patient: yes    Phan Gaxiola  2025  12:02 PM     Invasive Procedure Safety Checklist:    Procedure: cystoscopy    Action: Complete sections and checkboxes as appropriate.    Pre-procedure:  1. Patient ID Verified with 2 identifiers (Octavia and  or MRN) : YES    2. Procedure and site verified with patient/designee (when able) : YES    3. Accurate consent documentation in medical record : YES    4. H&P (or appropriate assessment) documented in medical record : YES  H&P must be up to 30 days prior to procedure an updated within 24 hours of                 Procedure as applicable.     5. Relevant diagnostic and radiology test results appropriately labeled and displayed as applicable : YES    6. Blood products, implants, devices, and/or special equipment available for the procedure as applicable : YES    7. Procedure site(s) marked with provider initials [Exclusions: None] : NO    8. Marking not required. Reason : Yes  Procedure does not require site marking    Time Out:     Time-Out performed immediately prior to starting procedure, including verbal and active participation of all team members addressing: YES    1. Correct patient identity.  2. Confirmed that the correct side and site are marked.  3. An accurate procedure to be done.  4. Agreement on the procedure to be done.  5. Correct patient position.  6. Relevant images and results are properly labeled and appropriately displayed.  7. The need to administer antibiotics or fluids for irrigation purposes during the procedure as applicable.  8. Safety precautions based on patient history or medication  use.    During Procedure: Verification of correct person, site, and procedure occurs any time the responsibility for care of the patient is transferred to another member of the care team.    Phan Gaxiola  February 4, 2025

## 2025-02-04 NOTE — NURSING NOTE
The following medication was given:     MEDICATION:  Lidocaine without epinephrine 2% jelly  ROUTE: urethral   SITE: urethral   DOSE: 10 mL  LOT #: [id number]  : [employer]  EXPIRATION DATE: 8-26  NDC#: [address]-6   Was there drug waste? No    Prior to med admin, verified patient identity using patient's name and date of birth.  Due to med administration, patient instructed to remain in clinic for 15 minutes  afterwards, and to report any adverse reaction to me immediately.    Drug Amount Wasted:  None.  Vial/Syringe: Syringe      Henri De La Cruz  2/4/2025  1:37 PM

## 2025-02-04 NOTE — Clinical Note
2/4/2025       RE: Alek AGOSTO Raheelprincess  [address] [place] [address]     Dear Colleague,    Thank you for referring your patient, Alek Mcneill, to the [facility]. Please see a copy of my visit note below.    No notes on file    Again, thank you for allowing me to participate in the care of your patient.      Sincerely,    Harman Kaiser MD

## 2025-02-04 NOTE — PROGRESS NOTES
"  [facility] Medical Oncology Note  Date of visit: Feb 6, 2025  Outpatient Clinic Note        Assessment:     Stage 1 (T1NxMx) superficial poorly differentiated bladder cancer. T1 lesions are high risk. Recurrence rates at one, three, and five years can reach up to 50, 70 to 80, and 90 percent, respectively, and 20 to 25 percent progress to more invasive (T2 or greater) disease.   Alek is on the SUNRISE-3 study, for patients with T1 disease and has been randomized to the TAR-200 (a device inserted in to the bladder that contains gemcitabine)+ Cetrelimab arm.   He is tolerating cetrelimab well with the exception of hypothyroidism secondary to study drug. His TSH is mildly elevated, T4 is WNL. Will continue on current dose of 100 mg levothyroxine.   Continue follow up with urology an [id number] placement per study protocol. Recent CT urogram and urine cytology negative for disease.     Plan:     Next cetrelimab today 2/6/25.   Continue levothyroxine to 100 mcg daily.  RTC in 3 weeks for his last cycle.  After this he will continue the every 3 month  placement with urology.  I will follow him as per the study protocol.    Patient was seen and evaluated with study RNCC, Araceli Diaz.      20 minutes spent on the date of the encounter doing chart review, review of test results, interpretation of tests, patient visit, and documentation     Clarita Owen PA-C  __________________________________________________________________    DIAGNOSIS:     T1 superficial  poorly differentiated invasive bladder cancer, diagnosed at cystoscopies 12/6/2023, and 1/17/2024.  Respiratory symptoms concerning for pneumonitis, but CT chest 3/27/2024 showed \"No new or suspicious pulmonary opacities.\"       History of Present Illness/Therapy to Date:   11/19/2023: Went to ED for subacute onset of gross hematuria, and a passing of clots. Stopped ASA. CT showed multiple indeterminate nodular densities in the bladder suspicious for " "enhancing urothelial lesions   12/6/2023: Cystoscopy , with finding of multiple bladder tumors appeared papillary and were located on the anterior wall and had some superficial calcifications. \"We resected tissue down to muscle using cut electrocautery. \" Pathology showed high grade papillary carcinoma, that did not invade the muscle.  1/17/2024: Cystoscopy with resection showed CIS, but no invasive cancer.  Alek has agreed to participate in the SUNRISE -3 trial. He has been randomized to receive Cetrelimab and TAR-200 (an investigational intravesical drug delivery system for gemcitabine). His first dose of cetrelimab and TAR-200 was 3/6/2024.   Follows for serial cystoscopies with Dr. Kaiser. Surveillance biopsy 8/2024 was benign. Urine cytology 11/19/2024 was negative for high grade urothelial carcinoma.      Interval history:   Alek returns to clinic today accompanied by his wife.  He is doing very well today. He has no new complaints. Energy is good. Baseline aches and pains are unchanged. Denies any new bone pains or arthralgias. No chest pain, shortness of breath, or cough. No fevers or chills. No diarrhea or constipation. No nausea or vomiting. No rashes, lumps, or bumps. No urinary concerns or hematuria. No new headaches or vision changes. No recent changes to medications or health updates.       ROS: 10 point ROS neg other than the symptoms noted above in the HPI.    Past Medical History:   I have reviewed this patient's past medical history   Past Medical History:   Diagnosis Date    Complication of anesthesia     DM2 (diabetes mellitus, type 2) (H)     Hypercholesteremia     Hyperlipidemia     Hypertension         Past Surgical History:    I have reviewed this patient's past surgical history     Social History:   Tobacco, ETOH, and rec drugs reviewed and as noted below with the following exceptions:  Drinks a few beers a day. Quit smoking in 1992.  Alek was born in [place], and graduated from [employer] (no longer exists) in .  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  He is  to Christine, his third wife.  She worked with his sister-in-law and they were introduced.  They have been  for 25 years.  Jose has 5 children, Golden, Cr, Joon, Radha, and Kayla.  Kayla is the youngest and a PA who lives in [place].  The other children are in the service.  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    Family History:   Grandma with skin cancer (s/p removal of her nose)  Uncle with sarcoma  Dad had bone cancer/lung cancer  Brother Casimiro had bladder cancer  Buddy had bladder cancer ( in car accident)  Brother Lenny had bladder cancer  No family history on file.     Medications:     Current Outpatient Medications   Medication Sig Dispense Refill    Ascorbic Acid 500 MG CAPS 1 tablet Orally Once a day      aspirin (ASPIRIN LOW DOSE) 81 MG EC tablet Take 81 mg by mouth daily ONCE BEFORE BED      blood glucose (NO BRAND SPECIFIED) lancets standard as directed, to use with his Contour Next test blood sugars daily      CONTOUR NEXT TEST test strip USE TO TEST BLOOD SUGAR DAILY      Ergocalciferol 50 MCG (2000 UT) CAPS Take 50 mcg by mouth daily      fish oil-omega-3 fatty acids 500 MG capsule Take 2 capsules by mouth daily      hydrochlorothiazide (HYDRODIURIL) 25 MG tablet Take 25 mg by mouth daily      ketoconazole (NIZORAL) 2 % external cream Apply topically as needed      levothyroxine (SYNTHROID/LEVOTHROID) 100 MCG tablet Take 1 tablet (100 mcg) by mouth daily. 30 tablet 3    Microlet Lancets MISC daily      simvastatin (ZOCOR) 40 MG tablet Take 40 mg by mouth at bedtime      triamcinolone (KENALOG) 0.1 % external cream Apply as needed (Patient not taking: Reported on 2025)      Vitamin D3 (VITAMIN D, CHOLECALCIFEROL,) 25 mcg (1000  units) tablet Take 25 mcg by mouth daily. 5000 units        Physical Exam:   /82 (BP Location: Right arm, Patient Position: Sitting, Cuff Size: Adult Large)   Pulse 86   Temp 97.8  F (36.6  C) (Oral)   Resp 18   Wt 108.3 kg (238 lb 11.2 oz)   SpO2 96%   BMI 35.25 kg/m    ECOG PS 0  General: No acute distress  HEENT: Sclera anicteric. Oral mucosa pink and moist.  No mucositis or thrush  Lymph: No lymphadenopathy in neck  Heart: Regular, rate, and rhythm  Lungs: Clear to ascultation bilaterally  Abdomen: Positive bowel sounds. Soft, non-distended, non-tender. No organomegaly or mass.   Extremities: no lower extremity edema  Neuro: Cranial nerves grossly intact  Rash: none  Psych: Normal affect    Data:   Most Recent 3 CBC's:  Recent Labs   Lab Test 01/30/25  1114 01/10/25  1047 12/20/24  1314   WBC 8.0 8.0 8.9   HGB 16.3 16.4 16.0   MCV 90 91 89    232 248   ANEUTAUTO 4.1 4.4 5.0     Most Recent 3 BMP's:  Recent Labs   Lab Test 01/30/25  1114 01/10/25  1047 12/20/24  1314    136 135   POTASSIUM 4.2 4.2 4.0   CHLORIDE 98 98 96*   CO2 30* 27 27   BUN 15.4 15.7 16.3   CR 1.12 1.19* 1.14   ANIONGAP 9 11 12   MILLI 10.3 10.0 9.6   GLC 97 121* 102*   PROTTOTAL 7.5 7.1 7.3   ALBUMIN 4.5 4.3 4.3    Most Recent 3 LFT's:  Recent Labs   Lab Test 01/30/25  1114 01/10/25  1047 12/20/24  1314   AST 31 29 38   ALT 34 40 36   ALKPHOS 73 73 76   BILITOTAL 0.7 0.8 0.8    Most Recent 2 TSH and T4:  Recent Labs   Lab Test 01/30/25  1114 01/10/25  1047   TSH 5.59* 5.27*   T4 1.39 1.36     I reviewed the above labs today.

## 2025-02-04 NOTE — NURSING NOTE
"[id number]: Study Visit Note   Subject name: Alek Mcneill     Visit: Week 48-TAR-200 Insertion     Did the study visit occur within the appropriate window allowed by the protocol? yes    Since the last study visit, He has been doing well. Yesterday 2/3 after his CT Urogram he felt that his \"kidneys hurt\" the pain was in his lower back, he states that he has not had this before. This lasted the evening then went away. The patient otherwise denies any other symptoms.     ECOG 0    Assessed for below symptoms specifically:   Dysuria no  Pollakiuria no  Nocturia no  Urgency no  Incontinence no  Hematuria no  Urinary hesitation no  Bladder pain/spasm no  Urethral pain no   Bladder discomfort no  Feeling of incomplete bladder emptying  no  Lower abdominal pain no  Fever no  Flu-like symptoms no  Fatigue no  Diarrhea no  Nausea no  Rash no  Arthralgia  no    I have personally interviewed Alek Mcneill and reviewed his medical record for adverse events and concomitant medications and these have been recorded on the corresponding logs in Alek Mcneill's research file.     Alek Mcneill was given the opportunity to ask any trial related questions.  Please see provider progress note for physical exam and other clinical information. Labs were reviewed - any significant lab values were addressed and reviewed.    Araceli Diaz RN    "

## 2025-02-06 ENCOUNTER — INFUSION THERAPY VISIT (OUTPATIENT)
Dept: ONCOLOGY | Facility: CLINIC | Age: 75
End: 2025-02-06
Payer: COMMERCIAL

## 2025-02-06 ENCOUNTER — ONCOLOGY VISIT (OUTPATIENT)
Dept: ONCOLOGY | Facility: CLINIC | Age: 75
End: 2025-02-06
Attending: INTERNAL MEDICINE
Payer: COMMERCIAL

## 2025-02-06 ENCOUNTER — ALLIED HEALTH/NURSE VISIT (OUTPATIENT)
Dept: ONCOLOGY | Facility: CLINIC | Age: 75
End: 2025-02-06
Payer: COMMERCIAL

## 2025-02-06 ENCOUNTER — APPOINTMENT (OUTPATIENT)
Dept: LAB | Facility: CLINIC | Age: 75
End: 2025-02-06
Attending: INTERNAL MEDICINE
Payer: COMMERCIAL

## 2025-02-06 VITALS
SYSTOLIC BLOOD PRESSURE: 127 MMHG | BODY MASS INDEX: 35.25 KG/M2 | WEIGHT: 238.7 LBS | RESPIRATION RATE: 18 BRPM | OXYGEN SATURATION: 96 % | TEMPERATURE: 97.8 F | DIASTOLIC BLOOD PRESSURE: 82 MMHG | HEART RATE: 86 BPM

## 2025-02-06 VITALS
SYSTOLIC BLOOD PRESSURE: 113 MMHG | OXYGEN SATURATION: 94 % | DIASTOLIC BLOOD PRESSURE: 77 MMHG | RESPIRATION RATE: 18 BRPM | TEMPERATURE: 98 F | HEART RATE: 80 BPM

## 2025-02-06 DIAGNOSIS — C67.3 MALIGNANT NEOPLASM OF ANTERIOR WALL OF URINARY BLADDER (H): Primary | ICD-10-CM

## 2025-02-06 PROCEDURE — 300N000004 RESEARCH KIT COLLECTION

## 2025-02-06 PROCEDURE — G0463 HOSPITAL OUTPT CLINIC VISIT: HCPCS

## 2025-02-06 RX ORDER — METHYLPREDNISOLONE SODIUM SUCCINATE 40 MG/ML
40 INJECTION INTRAMUSCULAR; INTRAVENOUS
Status: CANCELLED
Start: 2025-02-06

## 2025-02-06 RX ORDER — LORAZEPAM 2 MG/ML
0.5 INJECTION INTRAMUSCULAR EVERY 4 HOURS PRN
Status: CANCELLED | OUTPATIENT
Start: 2025-02-06

## 2025-02-06 RX ORDER — DIPHENHYDRAMINE HYDROCHLORIDE 50 MG/ML
50 INJECTION INTRAMUSCULAR; INTRAVENOUS
Status: CANCELLED
Start: 2025-02-06

## 2025-02-06 RX ORDER — DIPHENHYDRAMINE HCL 25 MG
50 CAPSULE ORAL
Status: CANCELLED | OUTPATIENT
Start: 2025-02-06

## 2025-02-06 RX ORDER — ALBUTEROL SULFATE 0.83 MG/ML
2.5 SOLUTION RESPIRATORY (INHALATION)
Status: CANCELLED | OUTPATIENT
Start: 2025-02-06

## 2025-02-06 RX ORDER — DIPHENHYDRAMINE HYDROCHLORIDE 50 MG/ML
25 INJECTION INTRAMUSCULAR; INTRAVENOUS
Status: CANCELLED
Start: 2025-02-06

## 2025-02-06 RX ORDER — ACETAMINOPHEN 325 MG/1
650 TABLET ORAL
Status: CANCELLED
Start: 2025-02-06

## 2025-02-06 RX ORDER — MEPERIDINE HYDROCHLORIDE 25 MG/ML
25 INJECTION INTRAMUSCULAR; INTRAVENOUS; SUBCUTANEOUS
Status: CANCELLED | OUTPATIENT
Start: 2025-02-06

## 2025-02-06 RX ORDER — HEPARIN SODIUM (PORCINE) LOCK FLUSH IV SOLN 100 UNIT/ML 100 UNIT/ML
5 SOLUTION INTRAVENOUS
Status: CANCELLED | OUTPATIENT
Start: 2025-02-06

## 2025-02-06 RX ORDER — EPINEPHRINE 1 MG/ML
0.3 INJECTION, SOLUTION INTRAMUSCULAR; SUBCUTANEOUS EVERY 5 MIN PRN
Status: CANCELLED | OUTPATIENT
Start: 2025-02-06

## 2025-02-06 RX ORDER — ALBUTEROL SULFATE 90 UG/1
1-2 INHALANT RESPIRATORY (INHALATION)
Status: CANCELLED
Start: 2025-02-06

## 2025-02-06 RX ORDER — HEPARIN SODIUM,PORCINE 10 UNIT/ML
5-20 VIAL (ML) INTRAVENOUS DAILY PRN
Status: CANCELLED | OUTPATIENT
Start: 2025-02-06

## 2025-02-06 ASSESSMENT — PAIN SCALES - GENERAL: PAINLEVEL_OUTOF10: NO PAIN (0)

## 2025-02-06 NOTE — NURSING NOTE
"Oncology Rooming Note    February 6, 2025 11:37 AM   Alek Mcneill is a 75 year old male who presents for:    Chief Complaint   Patient presents with    Labs Only     PIV placed and urine collected by RN in lab, vitals taken.      Initial Vitals: /82 (BP Location: Right arm, Patient Position: Sitting, Cuff Size: Adult Large)   Pulse 86   Temp 97.8  F (36.6  C) (Oral)   Resp 18   Wt 108.3 kg (238 lb 11.2 oz)   SpO2 96%   BMI 35.25 kg/m   Estimated body mass index is 35.25 kg/m  as calculated from the following:    Height as of 2/4/25: 1.753 m (5' 9\").    Weight as of this encounter: 108.3 kg (238 lb 11.2 oz). Body surface area is 2.3 meters squared.  No Pain (0) Comment: Data Unavailable   No LMP for male patient.  Allergies reviewed: Yes  Medications reviewed: Yes    Medications: Medication refills not needed today.  Pharmacy [facility] entered into [employer]: [id number] [place] [id number] AT [place] [id number]    Frailty Screening:   Is the patient here for a new oncology consult visit in cancer care? 2. No      Clinical concerns: none      Vidya Little             "

## 2025-02-06 NOTE — NURSING NOTE
Chief Complaint   Patient presents with    Labs Only     PIV placed and urine collected by RN in lab, vitals taken.      PIV placed by RN. Line flushed with saline and +BR. No lab orders, single urine lab research kit available and collected. Vitals taken. Pt checked in for appointment(s).    Sydney Contreras RN

## 2025-02-06 NOTE — PROGRESS NOTES
Infusion Nursing Note:  Alek Mcneill presents today for Week 48 Day 1 study cetrelimab (IDS #[id number]).    Patient seen by provider today: Yes: Clarita Owen PA-C   present during visit today: Not Applicable.    Note: Alek presents today feeling well. Denies pain or nausea/vomiting. Offers no concerns since visit with Clarita Owen prior to infusion.    Cetrelimab start time: 1318  Cetrelimab stop time: 1353 (flushed per study protocol)      Intravenous Access:  Peripheral IV placed in lab.    Treatment Conditions:     Latest Reference Range & Units 01/30/25 11:14   Sodium 135 - 145 mmol/L 137   Potassium 3.4 - 5.3 mmol/L 4.2   Chloride 98 - 107 mmol/L 98   Carbon Dioxide (CO2) 22 - 29 mmol/L 30 (H)   Urea Nitrogen 8.0 - 23.0 mg/dL 15.4   Creatinine 0.67 - 1.17 mg/dL 1.12   GFR Estimate >60 mL/min/1.73m2 69   Calcium 8.8 - 10.4 mg/dL 10.3   Anion Gap 7 - 15 mmol/L 9   Phosphorus 2.5 - 4.5 mg/dL 2.9   Albumin 3.5 - 5.2 g/dL 4.5   Protein Total 6.4 - 8.3 g/dL 7.5   Alkaline Phosphatase 40 - 150 U/L 73   ALT 0 - 70 U/L 34   AST 0 - 45 U/L 31   Amylase 28 - 100 U/L 74   Bilirubin Total <=1.2 mg/dL 0.7   CRP Inflammation <5.00 mg/L <3.00   GGT 8 - 61 U/L 104 (H)   Glucose 70 - 99 mg/dL 97   Estimated Average Glucose <117 mg/dL 134 (H)   Hemoglobin A1C <5.7 % 6.3 (H)   Lactate Dehydrogenase 0 - 250 U/L 194   Lipase 13 - 60 U/L 38   T4 Free 0.90 - 1.70 ng/dL 1.39   TSH 0.30 - 4.20 uIU/mL 5.59 (H)   Uric Acid 3.4 - 7.0 mg/dL 6.0   WBC 4.0 - 11.0 10e3/uL 8.0   Hemoglobin 13.3 - 17.7 g/dL 16.3   Hematocrit 40.0 - 53.0 % 45.1   Platelet Count 150 - 450 10e3/uL 219   RBC Count 4.40 - 5.90 10e6/uL 5.03   MCV 78 - 100 fL 90   MCH 26.5 - 33.0 pg 32.4   MCHC 31.5 - 36.5 g/dL 36.1   RDW 10.0 - 15.0 % 12.6   % Neutrophils % 51   % Lymphocytes % 34   % Monocytes % 10   % Eosinophils % 4   % Basophils % 1   Absolute Basophils 0.0 - 0.2 10e3/uL 0.1   Absolute Eosinophils 0.0 - 0.7 10e3/uL 0.3   Absolute Immature Granulocytes  <=0.4 10e3/uL 0.0   Absolute Lymphocytes 0.8 - 5.3 10e3/uL 2.7   Absolute Monocytes 0.0 - 1.3 10e3/uL 0.8   % Immature Granulocytes % 0   Absolute Neutrophils 1.6 - 8.3 10e3/uL 4.1   Absolute NRBCs 10e3/uL 0.0   NRBCs per 100 WBC <1 /100 0   INR 0.85 - 1.15  0.99   PTT 22 - 38 Seconds 26     Results reviewed, labs MET treatment parameters, ok to proceed with treatment.  Vital signs taken pre-infusion per study protocol.  Research labs collected pre- and post-infusion per study protocol.      Post Infusion Assessment:  Patient tolerated infusion without incident.  Patient observed for 15 minutes post infusion per protocol.  Blood return noted pre and post infusion.  Site patent and intact, free from redness, edema or discomfort.  No evidence of extravasations.  Access discontinued per protocol.       Discharge Plan:   Patient declined prescription refills.  Discharge instructions reviewed with: Patient.  Patient and/or family verbalized understanding of discharge instructions and all questions answered.  AVS to patient via [employer].  Patient will return 02/24 for next infusion appointment.   Patient discharged in stable condition accompanied by: wife.  Departure Mode: Ambulatory.      Amy Leos RN

## 2025-02-06 NOTE — NURSING NOTE
[id number]: Study Visit Note   Subject name: Alek Mcneill     Visit: Week 48    Did the study visit occur within the appropriate window allowed by the protocol? yes    Since the last study visit, He has been doing well. No changes since his last visit.     Cetrelimab infusion:  Were premedications given? no    Verbal handover provided to infusion RN; reminded RN to document actual start time of infusion and actual stop time of the infusion. If infusion deviates from protocol directed infusion time, please document rationale in your infusion note. Reminded RN to use 0.2 micron filter, take VS before the infusion. 15 min observation time starts after the 14 mL post infusion normal saline flush. RN was instructed to take research kit within 2 hours prior to infusion and research kit at the end of the infusion (from opposite arm of infusion).     I have personally interviewed Alek Mcneill and reviewed his medical record for adverse events and concomitant medications and these have been recorded on the corresponding logs in Alek Mcneill's research file.     Alek Mcneill was given the opportunity to ask any trial related questions.  Please see provider progress note for physical exam and other clinical information. Labs were reviewed - any significant lab values were addressed and reviewed.    Araceli Diaz RN   Clinical Research Coordinator Nurse-Solid Tumor   Phone: [phone]

## 2025-02-06 NOTE — PATIENT INSTRUCTIONS
[facility] Triage and after hours / weekends / holidays:  [phone]    Please call the triage or after hours line if you experience a temperature greater than or equal to 100.4, shaking chills, have uncontrolled nausea, vomiting and/or diarrhea, dizziness, shortness of breath, chest pain, bleeding, unexplained bruising, or if you have any other new/concerning symptoms, questions, or concerns.      If you are having any concerning symptoms or wish to speak to a provider before your next infusion visit, please call your care coordinator or triage to notify them so we can adequately serve you.     If you need a refill on a narcotic prescription or other medication, please call before your infusion appointment.

## 2025-02-20 ENCOUNTER — APPOINTMENT (OUTPATIENT)
Dept: LAB | Facility: HOSPITAL | Age: 75
End: 2025-02-20
Payer: COMMERCIAL

## 2025-02-20 DIAGNOSIS — C67.3 MALIGNANT NEOPLASM OF ANTERIOR WALL OF URINARY BLADDER (H): Primary | ICD-10-CM

## 2025-02-20 LAB
ALBUMIN SERPL BCG-MCNC: 4.3 G/DL (ref 3.5–5.2)
ALBUMIN UR-MCNC: NEGATIVE MG/DL
ALP SERPL-CCNC: 75 U/L (ref 40–150)
ALT SERPL W P-5'-P-CCNC: 41 U/L (ref 0–70)
AMYLASE SERPL-CCNC: 64 U/L (ref 28–100)
ANION GAP SERPL CALCULATED.3IONS-SCNC: 9 MMOL/L (ref 7–15)
APPEARANCE UR: CLEAR
AST SERPL W P-5'-P-CCNC: 33 U/L (ref 0–45)
BASOPHILS # BLD AUTO: 0.1 10E3/UL (ref 0–0.2)
BASOPHILS NFR BLD AUTO: 1 %
BILIRUB SERPL-MCNC: 0.6 MG/DL
BILIRUB UR QL STRIP: NEGATIVE
BUN SERPL-MCNC: 12.6 MG/DL (ref 8–23)
CALCIUM SERPL-MCNC: 9.7 MG/DL (ref 8.8–10.4)
CHLORIDE SERPL-SCNC: 95 MMOL/L (ref 98–107)
COLOR UR AUTO: NORMAL
CREAT SERPL-MCNC: 1.14 MG/DL (ref 0.67–1.17)
CRP SERPL-MCNC: <3 MG/L
EGFRCR SERPLBLD CKD-EPI 2021: 67 ML/MIN/1.73M2
EOSINOPHIL # BLD AUTO: 0.3 10E3/UL (ref 0–0.7)
EOSINOPHIL NFR BLD AUTO: 4 %
ERYTHROCYTE [DISTWIDTH] IN BLOOD BY AUTOMATED COUNT: 12.8 % (ref 10–15)
EST. AVERAGE GLUCOSE BLD GHB EST-MCNC: 131 MG/DL
GGT SERPL-CCNC: 115 U/L (ref 8–61)
GLUCOSE SERPL-MCNC: 105 MG/DL (ref 70–99)
GLUCOSE UR STRIP-MCNC: NEGATIVE MG/DL
HBA1C MFR BLD: 6.2 %
HCO3 SERPL-SCNC: 32 MMOL/L (ref 22–29)
HCT VFR BLD AUTO: 45 % (ref 40–53)
HGB BLD-MCNC: 16.2 G/DL (ref 13.3–17.7)
HGB UR QL STRIP: NEGATIVE
IMM GRANULOCYTES # BLD: 0 10E3/UL
IMM GRANULOCYTES NFR BLD: 0 %
KETONES UR STRIP-MCNC: NEGATIVE MG/DL
LDH SERPL L TO P-CCNC: 187 U/L (ref 0–250)
LEUKOCYTE ESTERASE UR QL STRIP: NEGATIVE
LIPASE SERPL-CCNC: 33 U/L (ref 13–60)
LYMPHOCYTES # BLD AUTO: 2.4 10E3/UL (ref 0.8–5.3)
LYMPHOCYTES NFR BLD AUTO: 31 %
MCH RBC QN AUTO: 32.6 PG (ref 26.5–33)
MCHC RBC AUTO-ENTMCNC: 36 G/DL (ref 31.5–36.5)
MCV RBC AUTO: 91 FL (ref 78–100)
MONOCYTES # BLD AUTO: 0.8 10E3/UL (ref 0–1.3)
MONOCYTES NFR BLD AUTO: 11 %
NEUTROPHILS # BLD AUTO: 4.1 10E3/UL (ref 1.6–8.3)
NEUTROPHILS NFR BLD AUTO: 53 %
NITRATE UR QL: NEGATIVE
NRBC # BLD AUTO: 0 10E3/UL
NRBC BLD AUTO-RTO: 0 /100
PH UR STRIP: 7 [PH] (ref 5–7)
PHOSPHATE SERPL-MCNC: 3 MG/DL (ref 2.5–4.5)
PLATELET # BLD AUTO: 234 10E3/UL (ref 150–450)
POTASSIUM SERPL-SCNC: 4 MMOL/L (ref 3.4–5.3)
PROT SERPL-MCNC: 7.1 G/DL (ref 6.4–8.3)
RBC # BLD AUTO: 4.97 10E6/UL (ref 4.4–5.9)
RBC URINE: 0 /HPF
SODIUM SERPL-SCNC: 136 MMOL/L (ref 135–145)
SP GR UR STRIP: 1.01 (ref 1–1.03)
T4 FREE SERPL-MCNC: 1.37 NG/DL (ref 0.9–1.7)
TSH SERPL DL<=0.005 MIU/L-ACNC: 5.9 UIU/ML (ref 0.3–4.2)
URATE SERPL-MCNC: 6.4 MG/DL (ref 3.4–7)
UROBILINOGEN UR STRIP-MCNC: <2 MG/DL
WBC # BLD AUTO: 7.8 10E3/UL (ref 4–11)
WBC URINE: 0 /HPF

## 2025-02-20 PROCEDURE — 85041 AUTOMATED RBC COUNT: CPT | Performed by: INTERNAL MEDICINE

## 2025-02-20 PROCEDURE — 83690 ASSAY OF LIPASE: CPT | Performed by: INTERNAL MEDICINE

## 2025-02-20 PROCEDURE — 82310 ASSAY OF CALCIUM: CPT | Performed by: INTERNAL MEDICINE

## 2025-02-20 PROCEDURE — 87086 URINE CULTURE/COLONY COUNT: CPT | Performed by: INTERNAL MEDICINE

## 2025-02-20 PROCEDURE — 84439 ASSAY OF FREE THYROXINE: CPT | Performed by: INTERNAL MEDICINE

## 2025-02-20 PROCEDURE — 81001 URINALYSIS AUTO W/SCOPE: CPT | Performed by: INTERNAL MEDICINE

## 2025-02-20 PROCEDURE — 82040 ASSAY OF SERUM ALBUMIN: CPT | Performed by: INTERNAL MEDICINE

## 2025-02-20 PROCEDURE — 82150 ASSAY OF AMYLASE: CPT | Performed by: INTERNAL MEDICINE

## 2025-02-20 PROCEDURE — 85018 HEMOGLOBIN: CPT | Performed by: INTERNAL MEDICINE

## 2025-02-20 PROCEDURE — 82977 ASSAY OF GGT: CPT | Performed by: INTERNAL MEDICINE

## 2025-02-20 PROCEDURE — 36415 COLL VENOUS BLD VENIPUNCTURE: CPT | Performed by: INTERNAL MEDICINE

## 2025-02-20 PROCEDURE — 83615 LACTATE (LD) (LDH) ENZYME: CPT | Performed by: INTERNAL MEDICINE

## 2025-02-20 PROCEDURE — 84443 ASSAY THYROID STIM HORMONE: CPT | Performed by: INTERNAL MEDICINE

## 2025-02-20 PROCEDURE — 84550 ASSAY OF BLOOD/URIC ACID: CPT | Performed by: INTERNAL MEDICINE

## 2025-02-20 PROCEDURE — 83036 HEMOGLOBIN GLYCOSYLATED A1C: CPT | Performed by: INTERNAL MEDICINE

## 2025-02-20 PROCEDURE — 85004 AUTOMATED DIFF WBC COUNT: CPT | Performed by: INTERNAL MEDICINE

## 2025-02-20 PROCEDURE — 84100 ASSAY OF PHOSPHORUS: CPT | Performed by: INTERNAL MEDICINE

## 2025-02-20 PROCEDURE — 86140 C-REACTIVE PROTEIN: CPT | Performed by: INTERNAL MEDICINE

## 2025-02-21 LAB — BACTERIA UR CULT: NORMAL

## 2025-02-23 NOTE — PROGRESS NOTES
"  [facility] Medical Oncology Note  Date of visit: Feb 24, 2025  Outpatient Clinic Note        Assessment:     Stage 1 (T1NxMx) superficial poorly differentiated bladder cancer. T1 lesions are high risk. Recurrence rates at one, three, and five years can reach up to 50, 70 to 80, and 90 percent, respectively, and 20 to 25 percent progress to more invasive (T2 or greater) disease.   Alek is on the SUNRISE-3 study, for patients with T1 disease and has been randomized to the TAR-200 (a device inserted in to the bladder that contains gemcitabine)+ Cetrelimab arm.   He is tolerated cetrelimab well with the exception of minimal  hypothyroidism secondary to study drug. His TSH is mildly elevated, T4 is WNL. Will continue on current dose of 100 mg levothyroxine.  And after today, we are all done with the immunotherapy.   Continue follow up with urology an [id number] placement per study protocol.    Plan:     Last cetrelimab today   Continue levothyroxine to 100 mcg daily.  Continue the every 3 month  placement with urology.   Medical oncology will continue follow-up on an as-needed basis. We will always be there for this great dean if needed.    Patient was seen and evaluated with study RNCC, Marciano Berrios.      30 minutes spent on the date of the encounter doing chart review, review of test results, interpretation of tests, patient visit, and documentation     Julio Tobin MD, MSc  Associate Professor of Medicine  [employer] [facility] [employer] [facility] [employer]  [facility]  [address] [place] [id number]  [phone]  __________________________________________________________________    DIAGNOSIS:     T1 superficial  poorly differentiated invasive bladder cancer, diagnosed at cystoscopies 12/6/2023, and 1/17/2024.  Respiratory symptoms concerning for pneumonitis, but CT chest 3/27/2024 showed \"No new or suspicious pulmonary opacities.\"       History of Present Illness/Therapy to Date:   11/19/2023: " "Went to ED for subacute onset of gross hematuria, and a passing of clots. Stopped ASA. CT showed multiple indeterminate nodular densities in the bladder suspicious for enhancing urothelial lesions   12/6/2023: Cystoscopy , with finding of multiple bladder tumors appeared papillary and were located on the anterior wall and had some superficial calcifications. \"We resected tissue down to muscle using cut electrocautery. \" Pathology showed high grade papillary carcinoma, that did not invade the muscle.  1/17/2024: Cystoscopy with resection showed CIS, but no invasive cancer.  Alek has agreed to participate in the SUNRISE -3 trial. He has been randomized to receive Cetrelimab and TAR-200 (an investigational intravesical drug delivery system for gemcitabine). His first dose of cetrelimab and TAR-200 was 3/6/2024.   Follows for serial cystoscopies with Dr. Kaiser. Surveillance biopsy 8/2024 was benign. Urine cytology 11/19/2024 was negative for high grade urothelial carcinoma.      Interval history:   Alek is back with Christine, for his final cetrelimab. wife.    He is doing very well today. He has no new complaints.   Energy is good.   Saw that his SBG is somewhat elevated.  Baseline aches and pains are unchanged.   Denies any new bone pains or arthralgias.   No chest pain, shortness of breath, or cough. No fevers or chills.   No diarrhea or constipation.   No nausea or vomiting.   No rashes, lumps, or bumps.   No urinary concerns or hematuria  .No new headaches or vision changes.   No recent changes to medications or health updates.    Has no issues with cystoscopies with Dr. Kaiser. Has a lot of fun with him and enjoys seeing things on the monitor as thery are happening.     ROS: 10 point ROS neg other than the symptoms noted above in the HPI.    Past Medical History:   I have reviewed this patient's past medical history   Past Medical History:   Diagnosis Date    Complication of anesthesia     DM2 (diabetes mellitus, " type 2) (H)     Hypercholesteremia     Hyperlipidemia     Hypertension         Past Surgical History:    I have reviewed this patient's past surgical history     Social History:   Tobacco, ETOH, and rec drugs reviewed and as noted below with the following exceptions:  Drinks a few beers a day. Quit smoking in .  Alek was born in [place], and graduated from [employer] (no longer exists) in .  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  He is  to Christine, his third wife.  She worked with his sister-in-law and they were introduced.  They have been  for 25 years.  Jose has 5 children, Golden, Cr, Joon, Radha, and Kayla.  Kayla is the youngest and a PA who lives in [place].  The other children are in the service.  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    Family History:   Grandma with skin cancer (s/p removal of her nose)  Uncle with sarcoma  Dad had bone cancer/lung cancer  Brother Casimiro had bladder cancer  Buddy had bladder cancer ( in car accident)  Brother Lenny had bladder cancer  No family history on file.     Medications:     Current Outpatient Medications   Medication Sig Dispense Refill    Ascorbic Acid 500 MG CAPS 1 tablet Orally Once a day      aspirin (ASPIRIN LOW DOSE) 81 MG EC tablet Take 81 mg by mouth daily ONCE BEFORE BED      blood glucose (NO BRAND SPECIFIED) lancets standard as directed, to use with his Contour Next test blood sugars daily      CONTOUR NEXT TEST test strip USE TO TEST BLOOD SUGAR DAILY      Ergocalciferol 50 MCG (2000 UT) CAPS Take 50 mcg by mouth daily      fish oil-omega-3 fatty acids 500 MG capsule Take 2 capsules by mouth daily      hydrochlorothiazide (HYDRODIURIL) 25 MG tablet Take 25 mg by mouth daily      ketoconazole (NIZORAL) 2 % external cream Apply topically as needed       levothyroxine (SYNTHROID/LEVOTHROID) 100 MCG tablet Take 1 tablet (100 mcg) by mouth daily. 30 tablet 3    Microlet Lancets MISC daily      simvastatin (ZOCOR) 40 MG tablet Take 40 mg by mouth at bedtime      triamcinolone (KENALOG) 0.1 % external cream Apply topically. Apply as needed      Vitamin D3 (VITAMIN D, CHOLECALCIFEROL,) 25 mcg (1000 units) tablet Take 25 mcg by mouth daily. 5000 units        Physical Exam:   There were no vitals taken for this visit.  ECOG PS 0  General: No acute distress  HEENT: Sclera anicteric. Oral mucosa pink and moist.  No mucositis or thrush  Lymph: No lymphadenopathy in neck  Heart: Regular, rate, and rhythm  Lungs: Clear to ascultation bilaterally  Abdomen: Positive bowel sounds. Soft, non-distended, non-tender. No organomegaly or mass.   Extremities: no lower extremity edema  Neuro: Cranial nerves grossly intact  Rash: none  Psych: Normal affect    Data:   Most Recent 3 CBC's:  Recent Labs   Lab Test 02/20/25 1027 01/30/25  1114 01/10/25  1047   WBC 7.8 8.0 8.0   HGB 16.2 16.3 16.4   MCV 91 90 91    219 232   ANEUTAUTO 4.1 4.1 4.4     Most Recent 3 BMP's:  Recent Labs   Lab Test 02/20/25 1027 01/30/25  1114 01/10/25  1047    137 136   POTASSIUM 4.0 4.2 4.2   CHLORIDE 95* 98 98   CO2 32* 30* 27   BUN 12.6 15.4 15.7   CR 1.14 1.12 1.19*   ANIONGAP 9 9 11   MILLI 9.7 10.3 10.0   * 97 121*   PROTTOTAL 7.1 7.5 7.1   ALBUMIN 4.3 4.5 4.3    Most Recent 3 LFT's:  Recent Labs   Lab Test 02/20/25 1027 01/30/25  1114 01/10/25  1047   AST 33 31 29   ALT 41 34 40   ALKPHOS 75 73 73   BILITOTAL 0.6 0.7 0.8    Most Recent 2 TSH and T4:  Recent Labs   Lab Test 02/20/25 1027 01/30/25  1114   TSH 5.90* 5.59*   T4 1.37 1.39     I reviewed the above labs today.

## 2025-02-24 ENCOUNTER — ONCOLOGY VISIT (OUTPATIENT)
Dept: ONCOLOGY | Facility: CLINIC | Age: 75
End: 2025-02-24
Attending: INTERNAL MEDICINE
Payer: COMMERCIAL

## 2025-02-24 ENCOUNTER — ALLIED HEALTH/NURSE VISIT (OUTPATIENT)
Dept: ONCOLOGY | Facility: CLINIC | Age: 75
End: 2025-02-24

## 2025-02-24 VITALS
HEART RATE: 79 BPM | OXYGEN SATURATION: 95 % | TEMPERATURE: 97.7 F | SYSTOLIC BLOOD PRESSURE: 148 MMHG | RESPIRATION RATE: 16 BRPM | DIASTOLIC BLOOD PRESSURE: 85 MMHG

## 2025-02-24 VITALS
TEMPERATURE: 97.8 F | SYSTOLIC BLOOD PRESSURE: 151 MMHG | WEIGHT: 245.8 LBS | BODY MASS INDEX: 36.3 KG/M2 | HEART RATE: 82 BPM | RESPIRATION RATE: 17 BRPM | DIASTOLIC BLOOD PRESSURE: 79 MMHG

## 2025-02-24 DIAGNOSIS — C67.3 MALIGNANT NEOPLASM OF ANTERIOR WALL OF URINARY BLADDER (H): Primary | ICD-10-CM

## 2025-02-24 DIAGNOSIS — Z00.6 EXAMINATION OF PARTICIPANT IN CLINICAL TRIAL: ICD-10-CM

## 2025-02-24 PROCEDURE — 300N000004 RESEARCH KIT COLLECTION: Performed by: INTERNAL MEDICINE

## 2025-02-24 PROCEDURE — G0463 HOSPITAL OUTPT CLINIC VISIT: HCPCS | Performed by: INTERNAL MEDICINE

## 2025-02-24 PROCEDURE — 258N000003 HC RX IP 258 OP 636: Performed by: INTERNAL MEDICINE

## 2025-02-24 PROCEDURE — 96413 CHEMO IV INFUSION 1 HR: CPT

## 2025-02-24 PROCEDURE — 36415 COLL VENOUS BLD VENIPUNCTURE: CPT | Performed by: INTERNAL MEDICINE

## 2025-02-24 RX ORDER — ALBUTEROL SULFATE 0.83 MG/ML
2.5 SOLUTION RESPIRATORY (INHALATION)
Status: CANCELLED | OUTPATIENT
Start: 2025-02-24

## 2025-02-24 RX ORDER — DIPHENHYDRAMINE HCL 25 MG
50 CAPSULE ORAL
Status: CANCELLED | OUTPATIENT
Start: 2025-02-24

## 2025-02-24 RX ORDER — HEPARIN SODIUM (PORCINE) LOCK FLUSH IV SOLN 100 UNIT/ML 100 UNIT/ML
5 SOLUTION INTRAVENOUS
Status: CANCELLED | OUTPATIENT
Start: 2025-02-24

## 2025-02-24 RX ORDER — DIPHENHYDRAMINE HYDROCHLORIDE 50 MG/ML
50 INJECTION INTRAMUSCULAR; INTRAVENOUS
Status: CANCELLED
Start: 2025-02-24

## 2025-02-24 RX ORDER — EPINEPHRINE 1 MG/ML
0.3 INJECTION, SOLUTION INTRAMUSCULAR; SUBCUTANEOUS EVERY 5 MIN PRN
Status: CANCELLED | OUTPATIENT
Start: 2025-02-24

## 2025-02-24 RX ORDER — LORAZEPAM 2 MG/ML
0.5 INJECTION INTRAMUSCULAR EVERY 4 HOURS PRN
Status: CANCELLED | OUTPATIENT
Start: 2025-02-24

## 2025-02-24 RX ORDER — DIPHENHYDRAMINE HYDROCHLORIDE 50 MG/ML
25 INJECTION INTRAMUSCULAR; INTRAVENOUS
Status: CANCELLED
Start: 2025-02-24

## 2025-02-24 RX ORDER — METHYLPREDNISOLONE SODIUM SUCCINATE 40 MG/ML
40 INJECTION INTRAMUSCULAR; INTRAVENOUS
Status: CANCELLED
Start: 2025-02-24

## 2025-02-24 RX ORDER — MEPERIDINE HYDROCHLORIDE 25 MG/ML
25 INJECTION INTRAMUSCULAR; INTRAVENOUS; SUBCUTANEOUS
Status: CANCELLED | OUTPATIENT
Start: 2025-02-24

## 2025-02-24 RX ORDER — HEPARIN SODIUM,PORCINE 10 UNIT/ML
5-20 VIAL (ML) INTRAVENOUS DAILY PRN
Status: CANCELLED | OUTPATIENT
Start: 2025-02-24

## 2025-02-24 RX ORDER — ACETAMINOPHEN 325 MG/1
650 TABLET ORAL
Status: CANCELLED
Start: 2025-02-24

## 2025-02-24 RX ORDER — ALBUTEROL SULFATE 90 UG/1
1-2 INHALANT RESPIRATORY (INHALATION)
Status: CANCELLED
Start: 2025-02-24

## 2025-02-24 ASSESSMENT — PAIN SCALES - GENERAL: PAINLEVEL_OUTOF10: NO PAIN (0)

## 2025-02-24 NOTE — LETTER
2/24/2025      Alek Mcneill  [address] [place] [address]      Dear Colleague,    Thank you for referring your patient, Alek Mcneill, to the [facility]. Please see a copy of my visit note below.      [facility] Medical Oncology Note  Date of visit: Feb 24, 2025  Outpatient Clinic Note        Assessment:     Stage 1 (T1NxMx) superficial poorly differentiated bladder cancer. T1 lesions are high risk. Recurrence rates at one, three, and five years can reach up to 50, 70 to 80, and 90 percent, respectively, and 20 to 25 percent progress to more invasive (T2 or greater) disease.   Alek is on the SUNRISE-3 study, for patients with T1 disease and has been randomized to the TAR-200 (a device inserted in to the bladder that contains gemcitabine)+ Cetrelimab arm.   He is tolerated cetrelimab well with the exception of minimal  hypothyroidism secondary to study drug. His TSH is mildly elevated, T4 is WNL. Will continue on current dose of 100 mg levothyroxine.  And after today, we are all done with the immunotherapy.   Continue follow up with urology an [id number] placement per study protocol.    Plan:     Last cetrelimab today   Continue levothyroxine to 100 mcg daily.  Continue the every 3 month  placement with urology.   Medical oncology will continue follow-up on an as-needed basis. We will always be there for this great dean if needed.    Patient was seen and evaluated with study RNCC, Marciano Berrios.      30 minutes spent on the date of the encounter doing chart review, review of test results, interpretation of tests, patient visit, and documentation     Julio Tobin MD, MSc  Associate Professor of Medicine  [employer]  [facility]  [address] [place] [id number] [phone]  __________________________________________________________________    DIAGNOSIS:     T1 superficial  poorly differentiated invasive bladder cancer,  "diagnosed at cystoscopies 12/6/2023, and 1/17/2024.  Respiratory symptoms concerning for pneumonitis, but CT chest 3/27/2024 showed \"No new or suspicious pulmonary opacities.\"       History of Present Illness/Therapy to Date:   11/19/2023: Went to ED for subacute onset of gross hematuria, and a passing of clots. Stopped ASA. CT showed multiple indeterminate nodular densities in the bladder suspicious for enhancing urothelial lesions   12/6/2023: Cystoscopy , with finding of multiple bladder tumors appeared papillary and were located on the anterior wall and had some superficial calcifications. \"We resected tissue down to muscle using cut electrocautery. \" Pathology showed high grade papillary carcinoma, that did not invade the muscle.  1/17/2024: Cystoscopy with resection showed CIS, but no invasive cancer.  Alek has agreed to participate in the SUNRISE -3 trial. He has been randomized to receive Cetrelimab and TAR-200 (an investigational intravesical drug delivery system for gemcitabine). His first dose of cetrelimab and TAR-200 was 3/6/2024.   Follows for serial cystoscopies with Dr. Kaiser. Surveillance biopsy 8/2024 was benign. Urine cytology 11/19/2024 was negative for high grade urothelial carcinoma.      Interval history:   Alek is back with Christine, for his final cetrelimab. wife.    He is doing very well today. He has no new complaints.   Energy is good.   Saw that his SBG is somewhat elevated.  Baseline aches and pains are unchanged.   Denies any new bone pains or arthralgias.   No chest pain, shortness of breath, or cough. No fevers or chills.   No diarrhea or constipation.   No nausea or vomiting.   No rashes, lumps, or bumps.   No urinary concerns or hematuria  .No new headaches or vision changes.   No recent changes to medications or health updates.    Has no issues with cystoscopies with Dr. Kaiser. Has a lot of fun with him and enjoys seeing things on the monitor as thery are happening.     ROS: 10 " point ROS neg other than the symptoms noted above in the HPI.    Past Medical History:   I have reviewed this patient's past medical history   Past Medical History:   Diagnosis Date     Complication of anesthesia      DM2 (diabetes mellitus, type 2) (H)      Hypercholesteremia      Hyperlipidemia      Hypertension         Past Surgical History:    I have reviewed this patient's past surgical history     Social History:   Tobacco, ETOH, and rec drugs reviewed and as noted below with the following exceptions:  Drinks a few beers a day. Quit smoking in .  Alek was born in [place], and graduated from [employer] (no longer exists) in .  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  He is  to Christine, his third wife.  She worked with his sister-in-law and they were introduced.  They have been  for 25 years.  Jose has 5 children, Golden, Cr, Joon, Radha, and Kayla.  Kayla is the youngest and a PA who lives in [place].  The other children are in the service.  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 that has a V8 engine.    Family History:   Grandma with skin cancer (s/p removal of her nose)  Uncle with sarcoma  Dad had bone cancer/lung cancer  Brother Casimiro had bladder cancer  Ned had bladder cancer ( in car accident)  Brother Lenny had bladder cancer  No family history on file.     Medications:     Current Outpatient Medications   Medication Sig Dispense Refill     Ascorbic Acid 500 MG CAPS 1 tablet Orally Once a day       aspirin (ASPIRIN LOW DOSE) 81 MG EC tablet Take 81 mg by mouth daily ONCE BEFORE BED       blood glucose (NO BRAND SPECIFIED) lancets standard as directed, to use with his Contour Next test blood sugars daily       CONTOUR NEXT TEST test strip USE TO TEST BLOOD SUGAR DAILY       Ergocalciferol 50 MCG ( UT) CAPS Take 50 mcg by  mouth daily       fish oil-omega-3 fatty acids 500 MG capsule Take 2 capsules by mouth daily       hydrochlorothiazide (HYDRODIURIL) 25 MG tablet Take 25 mg by mouth daily       ketoconazole (NIZORAL) 2 % external cream Apply topically as needed       levothyroxine (SYNTHROID/LEVOTHROID) 100 MCG tablet Take 1 tablet (100 mcg) by mouth daily. 30 tablet 3     Microlet Lancets MISC daily       simvastatin (ZOCOR) 40 MG tablet Take 40 mg by mouth at bedtime       triamcinolone (KENALOG) 0.1 % external cream Apply topically. Apply as needed       Vitamin D3 (VITAMIN D, CHOLECALCIFEROL,) 25 mcg (1000 units) tablet Take 25 mcg by mouth daily. 5000 units        Physical Exam:   There were no vitals taken for this visit.  ECOG PS 0  General: No acute distress  HEENT: Sclera anicteric. Oral mucosa pink and moist.  No mucositis or thrush  Lymph: No lymphadenopathy in neck  Heart: Regular, rate, and rhythm  Lungs: Clear to ascultation bilaterally  Abdomen: Positive bowel sounds. Soft, non-distended, non-tender. No organomegaly or mass.   Extremities: no lower extremity edema  Neuro: Cranial nerves grossly intact  Rash: none  Psych: Normal affect    Data:   Most Recent 3 CBC's:  Recent Labs   Lab Test 02/20/25 1027 01/30/25  1114 01/10/25  1047   WBC 7.8 8.0 8.0   HGB 16.2 16.3 16.4   MCV 91 90 91    219 232   ANEUTAUTO 4.1 4.1 4.4     Most Recent 3 BMP's:  Recent Labs   Lab Test 02/20/25  1027 01/30/25  1114 01/10/25  1047    137 136   POTASSIUM 4.0 4.2 4.2   CHLORIDE 95* 98 98   CO2 32* 30* 27   BUN 12.6 15.4 15.7   CR 1.14 1.12 1.19*   ANIONGAP 9 9 11   MILLI 9.7 10.3 10.0   * 97 121*   PROTTOTAL 7.1 7.5 7.1   ALBUMIN 4.3 4.5 4.3    Most Recent 3 LFT's:  Recent Labs   Lab Test 02/20/25  1027 01/30/25  1114 01/10/25  1047   AST 33 31 29   ALT 41 34 40   ALKPHOS 75 73 73   BILITOTAL 0.6 0.7 0.8    Most Recent 2 TSH and T4:  Recent Labs   Lab Test 02/20/25  1027 01/30/25  1114   TSH 5.90* 5.59*   T4 1.37 1.39      I reviewed the above labs today.      Again, thank you for allowing me to participate in the care of your patient.        Sincerely,        Julio Tobin MD    Electronically signed

## 2025-02-24 NOTE — NURSING NOTE
"Oncology Rooming Note    February 24, 2025 2:11 PM   Alek Mcneill is a 75 year old male who presents for:    Chief Complaint   Patient presents with    Oncology Clinic Visit    Prostate Cancer     Initial Vitals: BP (!) 151/79 (BP Location: Right arm, Patient Position: Sitting, Cuff Size: Adult Regular)   Pulse 82   Temp 97.8  F (36.6  C) (Oral)   Resp 17   Wt 111.5 kg (245 lb 12.8 oz)   BMI 36.30 kg/m   Estimated body mass index is 36.3 kg/m  as calculated from the following:    Height as of 2/4/25: 1.753 m (5' 9\").    Weight as of this encounter: 111.5 kg (245 lb 12.8 oz). Body surface area is 2.33 meters squared.  No Pain (0) Comment: Data Unavailable   No LMP for male patient.  Allergies reviewed: Yes  Medications reviewed: Yes    Medications: Medication refills not needed today.  Pharmacy name entered into [employer]: [facility] #[id number] [place] [address] AT [place] [id number]    Frailty Screening:   Is the patient here for a new oncology consult visit in cancer care? 2. No      Clinical concerns: Pt reports no new concerns today.       Livia Grant, EMT     "

## 2025-02-24 NOTE — PATIENT INSTRUCTIONS
Contact Numbers    [facility] Main Line (for Scheduling/Triage/After Hours Nurse Line): [phone]    Please call the [facility] nurse triage or the after hours nurse line if you experience a temperature greater than or equal to 100.4, shaking chills, have uncontrolled nausea, vomiting and/or diarrhea, dizziness, lightheadedness, shortness of breath, chest pain, bleeding, unexplained bruising, or if you have any other new/concerning symptoms, questions or concerns.     If you are having any concerning symptoms or wish to speak to a provider before your next infusion visit, please call your care coordinator or triage to notify them so we can adequately serve you.     If you need any refills on medications (narcotics or other medications), please call before your infusion appointment.

## 2025-02-24 NOTE — PROGRESS NOTES
Infusion Nursing Note:  Alek Mcneill presents today for Week 51 Day 1 Cetrelimab (IDS#[id number]).    Patient seen by provider today: Yes: Dr. Tobin   present during visit today: Not Applicable.    Note: Patient presents to Infusion Clinic feeling well today. Denies fever, chills, pain, or any other symptoms of infection/illness. Patient wishes to proceed with treatment today.    Per research staff Marciano Berrios, Technician, OK to proceed with study treatment today.   The following items were completed per protocol as directed by research study staff:     vital signs prior to infusion, labs, and observation    IDS #[id number]  Start Time: 1600  Stop Time: 1634    Pre-labs drawn from new RIGHT PIV  Post-labs drawn from LEFT arm    BP (!) 148/85 (BP Location: Left arm, Patient Position: Sitting, Cuff Size: Adult Large)   Pulse 79   Temp 97.7  F (36.5  C) (Oral)   Resp 16   SpO2 95%      Intravenous Access:  Labs drawn without difficulty.  Peripheral IV placed.    Treatment Conditions:  Lab Results   Component Value Date    HGB 16.2 02/20/2025    WBC 7.8 02/20/2025    ANEUTAUTO 4.1 02/20/2025     02/20/2025        Lab Results   Component Value Date     02/20/2025    POTASSIUM 4.0 02/20/2025    MAG 2.3 11/19/2023    CR 1.14 02/20/2025    MILLI 9.7 02/20/2025    BILITOTAL 0.6 02/20/2025    ALBUMIN 4.3 02/20/2025    ALT 41 02/20/2025    AST 33 02/20/2025       Results reviewed, labs MET treatment parameters, ok to proceed with treatment.    Post Infusion Assessment:  Patient tolerated infusion without incident.  Patient observed for 15 minutes post Cetrelimab infusion per protocol.  Blood return noted pre and post infusion.  Site patent and intact, free from redness, edema or discomfort.  No evidence of extravasations.  Access discontinued per protocol.     Discharge Plan:   Patient declined prescription refills.  Discharge instructions reviewed with: Patient and Family.  Patient and/or family verbalized  understanding of discharge instructions and all questions answered.  AVS to patient via [employer].  2/24/25 @ 1518 IB sent to Dr. Tobin/Ratna LOPEZ RN for appointment request for next infusion if needed.    Patient discharged in stable condition accompanied by: self and wife.  Departure Mode: Ambulatory.    Mary Carmichael RN

## 2025-02-24 NOTE — PROGRESS NOTES
[id number]: Study Visit Note   Subject name: Alek Mcneill     Visit: Week 51    Did the study visit occur within the appropriate window allowed by the protocol? yes    Since the last study visit, He has been doing well. No new signs or symptoms to report. Still dealing with some ankle pain but reports it is improving. Denies abdominal pain, nausea, vomiting or diarrhea.      Assessed for below symptoms specifically:   Dysuria no  Pollakiuria no  Nocturia no  Urgency no  Incontinence no  Hematuria no  Urinary hesitation no  Bladder pain/spasm no  Urethral pain no   Bladder discomfort no  Feeling of incomplete bladder emptying  no  Lower abdominal pain no  Fever no  Flu-like symptoms no  Fatigue no  Diarrhea no  Nausea no  Rash no  Arthralgia  no      Cetrelimab infusion:  Were premedications given? no    Verbal handover provided to infusion RN; reminded RN to document actual start time of infusion and actual stop time of the infusion. If infusion deviates from protocol directed infusion time, please document rationale in your infusion note. Reminded RN to use take VS before the infusion. 15 min observation time starts after the 14 mL post infusion normal saline flush.     I have personally interviewed Alek Mcneill and reviewed his medical record for adverse events and concomitant medications and these have been recorded on the corresponding logs in Alek Mcneill's research file.     Alek Mcneill was given the opportunity to ask any trial related questions.  Please see provider progress note for physical exam and other clinical information. Labs were reviewed - any significant lab values were addressed and reviewed.    Marciano Castle  Clinical Research Coordinator III  [phone]  [email or web address]

## 2025-02-25 ENCOUNTER — PRE VISIT (OUTPATIENT)
Dept: UROLOGY | Facility: CLINIC | Age: 75
End: 2025-02-25

## 2025-02-25 ENCOUNTER — OFFICE VISIT (OUTPATIENT)
Dept: UROLOGY | Facility: CLINIC | Age: 75
End: 2025-02-25
Payer: COMMERCIAL

## 2025-02-25 VITALS
BODY MASS INDEX: 36.07 KG/M2 | HEART RATE: 77 BPM | DIASTOLIC BLOOD PRESSURE: 90 MMHG | WEIGHT: 238 LBS | HEIGHT: 68 IN | SYSTOLIC BLOOD PRESSURE: 152 MMHG | OXYGEN SATURATION: 95 %

## 2025-02-25 DIAGNOSIS — C67.3 MALIGNANT NEOPLASM OF ANTERIOR WALL OF URINARY BLADDER (H): Primary | ICD-10-CM

## 2025-02-25 RX ORDER — LIDOCAINE HYDROCHLORIDE 20 MG/ML
JELLY TOPICAL ONCE
Status: ACTIVE | OUTPATIENT
Start: 2025-02-25

## 2025-02-25 RX ORDER — SULFAMETHOXAZOLE AND TRIMETHOPRIM 800; 160 MG/1; MG/1
1 TABLET ORAL ONCE
Status: COMPLETED | OUTPATIENT
Start: 2025-02-25 | End: 2025-02-25

## 2025-02-25 RX ADMIN — LIDOCAINE HYDROCHLORIDE: 20 JELLY TOPICAL at 09:19

## 2025-02-25 RX ADMIN — SULFAMETHOXAZOLE AND TRIMETHOPRIM 1 TABLET: 800; 160 TABLET ORAL at 09:40

## 2025-02-25 ASSESSMENT — PAIN SCALES - GENERAL: PAINLEVEL_OUTOF10: NO PAIN (0)

## 2025-02-25 NOTE — PROGRESS NOTES
CHIEF COMPLAINT   It was my pleasure to see Alek Mcneill who is a 75 year old male for follow-up of bladder cancer.      HPI  Alek Mcneill is a very pleasant 75 year old male who presents with a history of bladder cancer. 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      He has enrolled in [employer] 3 and is here today for TAR-200 removal and surveillance cystoscopy.  No recent hematuria or dysuria.       CT Urogram 2/3/2025  IMPRESSION:  1.  No acute or suspicious abnormalities in the abdomen or pelvis.  Specifically, no bladder wall mass or abnormal enhancement.  2.  No lymphadenopathy     Re-staging TURBT 1/17/2024          SPECIMEN   Procedure   Transurethral resection of bladder (TURBT)   TUMOR   Tumor Site   Anterior wall   Histologic Type   Urothelial carcinoma, in situ   Histologic Grade   High-grade   Tumor Extent   Flat carcinoma in situ   Lymphovascular Invasion   Not identified   Tumor Configuration   Flat   Muscularis Propria (detrusor muscle)   Cannot be determined: Biopsy site changes are identified in the current sample; no residual viable invasive carcinoma is identified.  Please see previous biopsy report for further information, case [id number], [facility]..   ADDITIONAL FINDINGS   Associated Epithelial Lesions   Chronic inflammation, histiocytic proliferation, consistent with biopsy site changes.      TURBT 12/6/2023  Final Diagnosis   A) URINARY BLADDER, BASE OF TUMOR, TRANSURETHRAL RESECTION BLADDER TUMOR:  -NONINVASIVE LOW-GRADE PAPILLARY UROTHELIAL CARCINOMA  -MULTIPLE FRAGMENTS OF MUSCULARIS PROPRIA  -SEE SYNOPTIC REPORT     B) URINARY BLADDER, TUMOR, TRANSURETHRAL RESECTION BLADDER TUMOR:  -HIGH-GRADE PAPILLARY UROTHELIAL CARCINOMA WITH LAMINA PROPRIA INVASION  -MUSCULARIS PROPRIA PRESENT AND UNINVOLVED  -SEE SYNOPTIC REPORT     PHYSICAL EXAM  Patient is a 75 year old  male   Vitals: Blood pressure (!) 152/90, pulse 77,  "height 1.727 m (5' 8\"), weight 108 kg (238 lb), SpO2 95%.  General Appearance Adult: Body mass index is 36.19 kg/m .  Alert, no acute distress, oriented  Lungs: no respiratory distress, or pursed lip breathing  Abdomen: soft, nontender, no organomegaly or masses  Back: no CVAT  Neuro: Alert, oriented, speech and mentation normal  Psych: affect and mood normal  : penis, scrotum, testes normal    OFFICE CYSTOSCOPY 2/25/2025     Pre-procedure diagnosis:       Bladder Cancer  Post-procedure diagnosis:      No recurrence  Procedure performed:             Cystourethroscopy with removal of TAR-200  Surgeon:                                 Harman Kaiser MD  Anesthesia:                             Local     Description of procedure:   After fully informed, voluntary consent was obtained, the patient was brought into the procedure room, identified and placed in a supine position on the cystoscopy table.  The groin/scrotum were prepped with betadine and draped in a sterile fashion.  A 15F flexible cystoscope was inserted into the urethra, and the bladder and urethra were examined in a systematic manner. No suspicious lesions noted. TAR-200 was removed with flexible grasper. The patient tolerated the procedure well and there were no complications.       Cystoscopic findings:  The urethra was normal without strictures.  The prostate was 3 cm long and demonstrated mild bilobar hypertrophy.  There was no median lobe.  The external sphincter coapted well and the bladder neck was open. The bladder was completely surveyed.  There was mild trabeculation.  There were no neoplasms, stones, or diverticula identifed.  The ureteric orifices were normal in position and number and effluxing clear urine. There are no suspicious findings. TAR-200 device removed intact.     ASSESSMENT and PLAN  75 year old male with history of HG non-muscle-invasive bladder cancer. He has enrolled in the [place] 3 clinical trial for BCG-naive NMIBC. " Cystoscopy today with no evidence of recurrence. He continues to have a complete response.     Time of TAR-200 Removal: 0930  There were no irregularities observed during or after removal.  Betzy-procedural antibiotics were given.  Office cysto and TAR-200 placement in 9 weeks    Harman Kaiser MD  Urology  [facility]

## 2025-02-25 NOTE — NURSING NOTE
Chief Complaint   Patient presents with    Cystoscopy      removal, sunrise 3 study, *(cystoscopy plus removal)       There were no vitals taken for this visit. There is no height or weight on file to calculate BMI.    Patient Active Problem List   Diagnosis    Malignant neoplasm of anterior wall of urinary bladder (H)    Benign hypertension    Hyperlipidemia    Impotence of organic origin    Morbid (severe) obesity due to excess calories (H)    Type 2 diabetes mellitus without complication, without long-term current use of insulin (H)    Spinal stenosis of lumbosacral region    Peripheral vascular disease    Venous stasis dermatitis of left lower extremity    Lumbar radiculopathy    Environmental allergies    Age-related nuclear cataract of right eye       Allergies   Allergen Reactions    Chlorhexidine Dermatitis       Current Outpatient Medications   Medication Sig Dispense Refill    Ascorbic Acid 500 MG CAPS 1 tablet Orally Once a day      aspirin (ASPIRIN LOW DOSE) 81 MG EC tablet Take 81 mg by mouth daily ONCE BEFORE BED      blood glucose (NO BRAND SPECIFIED) lancets standard as directed, to use with his Contour Next test blood sugars daily      CONTOUR NEXT TEST test strip USE TO TEST BLOOD SUGAR DAILY      Ergocalciferol 50 MCG (2000 UT) CAPS Take 50 mcg by mouth daily      fish oil-omega-3 fatty acids 500 MG capsule Take 2 capsules by mouth daily      hydrochlorothiazide (HYDRODIURIL) 25 MG tablet Take 25 mg by mouth daily      ketoconazole (NIZORAL) 2 % external cream Apply topically as needed      levothyroxine (SYNTHROID/LEVOTHROID) 100 MCG tablet Take 1 tablet (100 mcg) by mouth daily. 30 tablet 3    Microlet Lancets MISC daily      simvastatin (ZOCOR) 40 MG tablet Take 40 mg by mouth at bedtime      triamcinolone (KENALOG) 0.1 % external cream Apply topically. Apply as needed      Vitamin D3 (VITAMIN D, CHOLECALCIFEROL,) 25 mcg (1000 units) tablet Take 25 mcg by mouth daily. 5000 units          Social History     Tobacco Use    Smoking status: Former     Current packs/day: 0.00     Types: Cigarettes     Quit date:      Years since quittin.1     Passive exposure: Past   Vaping Use    Vaping status: Never Used   Substance Use Topics    Alcohol use: Yes     Comment: daily    Drug use: Never       What to expect after the procedure reviewed with patient: yes    Phan Gaxiola  2025  9:11 AM     Invasive Procedure Safety Checklist:    Procedure: cystoscopy + stent removal (sunrise 3 study removal)    Action: Complete sections and checkboxes as appropriate.    Pre-procedure:  1. Patient ID Verified with 2 identifiers (Octavia and  or MRN) : YES    2. Procedure and site verified with patient/designee (when able) : YES    3. Accurate consent documentation in medical record : YES    4. H&P (or appropriate assessment) documented in medical record : YES  H&P must be up to 30 days prior to procedure an updated within 24 hours of                 Procedure as applicable.     5. Relevant diagnostic and radiology test results appropriately labeled and displayed as applicable : YES    6. Blood products, implants, devices, and/or special equipment available for the procedure as applicable : YES    7. Procedure site(s) marked with provider initials [Exclusions: None] : NO    8. Marking not required. Reason : Yes  Procedure does not require site marking    Time Out:     Time-Out performed immediately prior to starting procedure, including verbal and active participation of all team members addressing: YES    1. Correct patient identity.  2. Confirmed that the correct side and site are marked.  3. An accurate procedure to be done.  4. Agreement on the procedure to be done.  5. Correct patient position.  6. Relevant images and results are properly labeled and appropriately displayed.  7. The need to administer antibiotics or fluids for irrigation purposes during the procedure as applicable.  8. Safety  precautions based on patient history or medication use.    During Procedure: Verification of correct person, site, and procedure occurs any time the responsibility for care of the patient is transferred to another member of the care team.      The following medication was given:     MEDICATION:  Uro-jet  ROUTE: Intra-urethral   SITE: Urethra  DOSE: 10 mL 2% lidocaine  LOT #: [id number]  : [employer]  EXPIRATION DATE: 8-26  NDC#: [address]-07   Was there drug waste? No    Prior to administration, verified patient identity using patient's name and date of birth.  Due to administration, patient instructed to remain in clinic for 15 minutes  afterwards, and to report any adverse reaction to me immediately.      Drug Amount Wasted:  None.  Vial/Syringe: Single dose vial    Phan Gaxiola  February 25, 2025

## 2025-02-25 NOTE — Clinical Note
2/25/2025       RE: Alek AGOSTO Raheelprincess  [address] [place] [address]     Dear Colleague,    Thank you for referring your patient, Alek Mcneill, to the [facility]. Please see a copy of my visit note below.    No notes on file    Again, thank you for allowing me to participate in the care of your patient.      Sincerely,    Harman Kaiser MD

## 2025-03-25 ENCOUNTER — TELEPHONE (OUTPATIENT)
Dept: ONCOLOGY | Facility: CLINIC | Age: 75
End: 2025-03-25
Payer: MEDICARE

## 2025-03-25 NOTE — TELEPHONE ENCOUNTER
Patient called requesting a refill on synthroid. Message sent to Dr. Tobin, Dr. Tobin confirmed he will send refill of medication.     Araceli Diaz RN   Clinical Research Coordinator Nurse-Solid Tumor   Phone: [phone]

## 2025-03-26 DIAGNOSIS — E03.2 HYPOTHYROIDISM DUE TO MEDICATION: ICD-10-CM

## 2025-03-26 RX ORDER — LEVOTHYROXINE SODIUM 100 UG/1
100 TABLET ORAL DAILY
Qty: 30 TABLET | Refills: 3 | Status: SHIPPED | OUTPATIENT
Start: 2025-03-26

## 2025-04-23 ENCOUNTER — TELEPHONE (OUTPATIENT)
Dept: ONCOLOGY | Facility: CLINIC | Age: 75
End: 2025-04-23
Payer: MEDICARE

## 2025-04-23 NOTE — TELEPHONE ENCOUNTER
Patient was called to do labs at [facility], today, tomorrow or Friday. Patient agreeable.     Araceli Banks RN   Clinical Research Coordinator Nurse-Solid Tumor   Phone: [phone]

## 2025-04-24 ENCOUNTER — APPOINTMENT (OUTPATIENT)
Dept: LAB | Facility: HOSPITAL | Age: 75
End: 2025-04-24
Payer: COMMERCIAL

## 2025-04-24 PROCEDURE — 83615 LACTATE (LD) (LDH) ENZYME: CPT | Performed by: UROLOGY

## 2025-04-24 PROCEDURE — 85730 THROMBOPLASTIN TIME PARTIAL: CPT | Performed by: UROLOGY

## 2025-04-24 PROCEDURE — 85004 AUTOMATED DIFF WBC COUNT: CPT | Performed by: UROLOGY

## 2025-04-24 PROCEDURE — 81001 URINALYSIS AUTO W/SCOPE: CPT | Performed by: UROLOGY

## 2025-04-24 PROCEDURE — 82977 ASSAY OF GGT: CPT | Performed by: UROLOGY

## 2025-04-24 PROCEDURE — 82150 ASSAY OF AMYLASE: CPT | Performed by: UROLOGY

## 2025-04-24 PROCEDURE — 86140 C-REACTIVE PROTEIN: CPT | Performed by: UROLOGY

## 2025-04-24 PROCEDURE — 87086 URINE CULTURE/COLONY COUNT: CPT | Performed by: UROLOGY

## 2025-04-24 PROCEDURE — 85610 PROTHROMBIN TIME: CPT | Performed by: UROLOGY

## 2025-04-24 PROCEDURE — 84100 ASSAY OF PHOSPHORUS: CPT | Performed by: UROLOGY

## 2025-04-24 PROCEDURE — 88112 CYTOPATH CELL ENHANCE TECH: CPT | Mod: TC | Performed by: UROLOGY

## 2025-04-24 PROCEDURE — 84550 ASSAY OF BLOOD/URIC ACID: CPT | Performed by: UROLOGY

## 2025-04-24 PROCEDURE — 80053 COMPREHEN METABOLIC PANEL: CPT | Performed by: UROLOGY

## 2025-04-24 PROCEDURE — 83690 ASSAY OF LIPASE: CPT | Performed by: UROLOGY

## 2025-04-25 PROCEDURE — 88112 CYTOPATH CELL ENHANCE TECH: CPT | Mod: 26 | Performed by: PATHOLOGY

## 2025-04-29 ENCOUNTER — LAB (OUTPATIENT)
Dept: LAB | Facility: CLINIC | Age: 75
End: 2025-04-29
Attending: INTERNAL MEDICINE
Payer: COMMERCIAL

## 2025-04-29 ENCOUNTER — OFFICE VISIT (OUTPATIENT)
Dept: UROLOGY | Facility: CLINIC | Age: 75
End: 2025-04-29
Payer: COMMERCIAL

## 2025-04-29 VITALS
SYSTOLIC BLOOD PRESSURE: 147 MMHG | DIASTOLIC BLOOD PRESSURE: 87 MMHG | OXYGEN SATURATION: 94 % | HEIGHT: 68 IN | BODY MASS INDEX: 37.28 KG/M2 | WEIGHT: 246 LBS | HEART RATE: 76 BPM

## 2025-04-29 DIAGNOSIS — C67.3 MALIGNANT NEOPLASM OF ANTERIOR WALL OF URINARY BLADDER (H): Primary | ICD-10-CM

## 2025-04-29 PROCEDURE — 300N000004 RESEARCH KIT COLLECTION: Performed by: INTERNAL MEDICINE

## 2025-04-29 PROCEDURE — 36415 COLL VENOUS BLD VENIPUNCTURE: CPT | Performed by: INTERNAL MEDICINE

## 2025-04-29 RX ORDER — SULFAMETHOXAZOLE AND TRIMETHOPRIM 800; 160 MG/1; MG/1
1 TABLET ORAL ONCE
Status: COMPLETED | OUTPATIENT
Start: 2025-04-29 | End: 2025-04-29

## 2025-04-29 RX ORDER — LIDOCAINE HYDROCHLORIDE 20 MG/ML
10 JELLY TOPICAL
OUTPATIENT
Start: 2025-04-29

## 2025-04-29 RX ORDER — LIDOCAINE HYDROCHLORIDE 20 MG/ML
JELLY TOPICAL ONCE
Status: COMPLETED | OUTPATIENT
Start: 2025-04-29 | End: 2025-04-29

## 2025-04-29 RX ADMIN — SULFAMETHOXAZOLE AND TRIMETHOPRIM 1 TABLET: 800; 160 TABLET ORAL at 12:39

## 2025-04-29 RX ADMIN — LIDOCAINE HYDROCHLORIDE: 20 JELLY TOPICAL at 08:42

## 2025-04-29 ASSESSMENT — PAIN SCALES - GENERAL: PAINLEVEL_OUTOF10: NO PAIN (0)

## 2025-04-29 NOTE — NURSING NOTE
"Chief Complaint   Patient presents with    Cystoscopy     Pt states here for cystoscopy plus tar 200 insertion       Blood pressure (!) 147/87, pulse 76, height 1.72 m (5' 7.72\"), weight 111.6 kg (246 lb), SpO2 94%. Body mass index is 37.72 kg/m .    Patient Active Problem List   Diagnosis    Malignant neoplasm of anterior wall of urinary bladder (H)    Benign hypertension    Hyperlipidemia    Impotence of organic origin    Morbid (severe) obesity due to excess calories (H)    Type 2 diabetes mellitus without complication, without long-term current use of insulin (H)    Spinal stenosis of lumbosacral region    Peripheral vascular disease    Venous stasis dermatitis of left lower extremity    Lumbar radiculopathy    Environmental allergies    Age-related nuclear cataract of right eye       Allergies   Allergen Reactions    Chlorhexidine Dermatitis       Current Outpatient Medications   Medication Sig Dispense Refill    Ascorbic Acid 500 MG CAPS 1 tablet Orally Once a day      aspirin (ASPIRIN LOW DOSE) 81 MG EC tablet Take 81 mg by mouth daily ONCE BEFORE BED      blood glucose (NO BRAND SPECIFIED) lancets standard as directed, to use with his Contour Next test blood sugars daily      CONTOUR NEXT TEST test strip USE TO TEST BLOOD SUGAR DAILY      Ergocalciferol 50 MCG (2000 UT) CAPS Take 50 mcg by mouth daily      fish oil-omega-3 fatty acids 500 MG capsule Take 2 capsules by mouth daily      hydrochlorothiazide (HYDRODIURIL) 25 MG tablet Take 25 mg by mouth daily      ketoconazole (NIZORAL) 2 % external cream Apply topically as needed      levothyroxine (SYNTHROID/LEVOTHROID) 100 MCG tablet Take 1 tablet (100 mcg) by mouth daily. 30 tablet 3    Microlet Lancets MISC daily      simvastatin (ZOCOR) 40 MG tablet Take 40 mg by mouth at bedtime      triamcinolone (KENALOG) 0.1 % external cream Apply topically. Apply as needed      Vitamin D3 (VITAMIN D, CHOLECALCIFEROL,) 25 mcg (1000 units) tablet Take 25 mcg by mouth " daily. 5000 units         Social History     Tobacco Use    Smoking status: Former     Current packs/day: 0.00     Types: Cigarettes     Quit date:      Years since quittin.3     Passive exposure: Past   Vaping Use    Vaping status: Never Used   Substance Use Topics    Alcohol use: Yes     Comment: daily    Drug use: Never       What to expect after the procedure reviewed with patient: yes    Phan Gaxiola  2025  7:58 AM     Invasive Procedure Safety Checklist:    Procedure: cystoscopy +  insertion    Action: Complete sections and checkboxes as appropriate.    Pre-procedure:  1. Patient ID Verified with 2 identifiers (Octavia and  or MRN) : YES    2. Procedure and site verified with patient/designee (when able) : YES    3. Accurate consent documentation in medical record : YES    4. H&P (or appropriate assessment) documented in medical record : YES  H&P must be up to 30 days prior to procedure an updated within 24 hours of                 Procedure as applicable.     5. Relevant diagnostic and radiology test results appropriately labeled and displayed as applicable : YES    6. Blood products, implants, devices, and/or special equipment available for the procedure as applicable : YES    7. Procedure site(s) marked with provider initials [Exclusions: None] : NO    8. Marking not required. Reason : Yes  Procedure does not require site marking    Time Out:     Time-Out performed immediately prior to starting procedure, including verbal and active participation of all team members addressing: YES    1. Correct patient identity.  2. Confirmed that the correct side and site are marked.  3. An accurate procedure to be done.  4. Agreement on the procedure to be done.  5. Correct patient position.  6. Relevant images and results are properly labeled and appropriately displayed.  7. The need to administer antibiotics or fluids for irrigation purposes during the procedure as applicable.  8. Safety  precautions based on patient history or medication use.    During Procedure: Verification of correct person, site, and procedure occurs any time the responsibility for care of the patient is transferred to another member of the care team.      The following medication was given:     MEDICATION:  Uro-jet  ROUTE: Intra-urethral   SITE: Urethra  DOSE: 10 mL 2% lidocaine  LOT #: [id number]  : [employer]  EXPIRATION DATE: 12-26  NDC#: [address]-56   Was there drug waste? No    Prior to administration, verified patient identity using patient's name and date of birth.  Due to administration, patient instructed to remain in clinic for 15 minutes  afterwards, and to report any adverse reaction to me immediately.    The following medication was given:     MEDICATION:  Sulfamethoxazole and trimethoprim  ROUTE:  oral  SITE: mouth  DOSE: 800mg/160mg  LOT #: [id number]  : [employer]  EXPIRATION DATE: 2026/04  NDC#: 9997-1130-01   Was there drug waste? No  Multi-dose vial: No    Drug Amount Wasted:  None.  Vial/Syringe: Single dose vial    Phan Gaxiola  April 29, 2025

## 2025-04-29 NOTE — NURSING NOTE
Chief Complaint   Patient presents with    Labs Only     Labs drawn via  by RN in lab       Labs collected from venipuncture by RN.    Mikayla Contreras RN

## 2025-04-29 NOTE — PROGRESS NOTES
CHIEF COMPLAINT   It was my pleasure to see Alek Mcneill who is a 75 year old male for follow-up of bladder cancer.      HPI  Alek Mcneill is a very pleasant 75 year old male who presents with a history of bladder cancer. 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      He has enrolled in [employer] 3 and is here today for TAR-200 removal and surveillance cystoscopy.  No recent hematuria or dysuria.       CT Urogram 2/3/2025  IMPRESSION:  1.  No acute or suspicious abnormalities in the abdomen or pelvis.  Specifically, no bladder wall mass or abnormal enhancement.  2.  No lymphadenopathy     Re-staging TURBT 1/17/2024          SPECIMEN   Procedure   Transurethral resection of bladder (TURBT)   TUMOR   Tumor Site   Anterior wall   Histologic Type   Urothelial carcinoma, in situ   Histologic Grade   High-grade   Tumor Extent   Flat carcinoma in situ   Lymphovascular Invasion   Not identified   Tumor Configuration   Flat   Muscularis Propria (detrusor muscle)   Cannot be determined: Biopsy site changes are identified in the current sample; no residual viable invasive carcinoma is identified.  Please see previous biopsy report for further information, case [id number], [facility]..   ADDITIONAL FINDINGS   Associated Epithelial Lesions   Chronic inflammation, histiocytic proliferation, consistent with biopsy site changes.      TURBT 12/6/2023  Final Diagnosis   A) URINARY BLADDER, BASE OF TUMOR, TRANSURETHRAL RESECTION BLADDER TUMOR:  -NONINVASIVE LOW-GRADE PAPILLARY UROTHELIAL CARCINOMA  -MULTIPLE FRAGMENTS OF MUSCULARIS PROPRIA  -SEE SYNOPTIC REPORT     B) URINARY BLADDER, TUMOR, TRANSURETHRAL RESECTION BLADDER TUMOR:  -HIGH-GRADE PAPILLARY UROTHELIAL CARCINOMA WITH LAMINA PROPRIA INVASION  -MUSCULARIS PROPRIA PRESENT AND UNINVOLVED  -SEE SYNOPTIC REPORT     PHYSICAL EXAM  Patient is a 75 year old  male   Vitals: Blood pressure (!) 147/87, pulse 76,  "height 1.72 m (5' 7.72\"), weight 111.6 kg (246 lb), SpO2 94%.  General Appearance Adult: Body mass index is 37.72 kg/m .  Alert, no acute distress, oriented  Lungs: no respiratory distress, or pursed lip breathing  Abdomen: soft, nontender, no organomegaly or masses  Back: no CVAT  Neuro: Alert, oriented, speech and mentation normal  Psych: affect and mood normal  : penis, scrotum, testes normal    OFFICE CYSTOSCOPY 4/29/2025     Pre-procedure diagnosis:       Bladder Cancer  Post-procedure diagnosis:      No recurrence  Procedure performed:             Cystourethroscopy with placement of TAR-200  Surgeon:                                 Harman Kaiser MD  Anesthesia:                             Local     Description of procedure:   After fully informed, voluntary consent was obtained, the patient was brought into the procedure room, identified and placed in a supine position on the cystoscopy table.  The groin/scrotum were prepped with betadine and draped in a sterile fashion.  A 15F flexible cystoscope was inserted into the urethra, and the bladder and urethra were examined in a systematic manner. No suspicious lesions noted. TAR-200 was inserted via provided catheter system. The patient tolerated the procedure well and there were no complications.       Cystoscopic findings:  The urethra was normal without strictures.  The prostate was 3 cm long and demonstrated mild bilobar hypertrophy.  There was no median lobe.  The external sphincter coapted well and the bladder neck was open. The bladder was completely surveyed.  There was mild trabeculation.  There were no neoplasms, stones, or diverticula identifed.  The ureteric orifices were normal in position and number and effluxing clear urine. There are no suspicious findings.     ASSESSMENT and PLAN  75 year old male with history of HG non-muscle-invasive bladder cancer. He has enrolled in the [place] 3 clinical trial for BCG-naive NMIBC. Cystoscopy today with no " evidence of recurrence. He continues to have a complete response.     Time of TAR-200 Placement: 1030  Betzy-procedural antibiotics were given.  Office cysto and removal of TAR-200 in 3 weeks.    Harman Kaiser MD  Urology  [facility]

## 2025-04-29 NOTE — Clinical Note
4/29/2025       RE: Alek AGOSTO Raheelprincess  [address] [place] [address]     Dear Colleague,    Thank you for referring your patient, Alek Mcneill, to the [facility]. Please see a copy of my visit note below.    No notes on file    Again, thank you for allowing me to participate in the care of your patient.      Sincerely,    Harman Kaiser MD

## 2025-04-30 NOTE — PROGRESS NOTES
The following medication was given by Dr. Kaiser and double checked with this RN:     MEDICATION:   study - TAR-200 (gemcitabine 225 mg) (IDS# [id number]) intravesical device   ROUTE: In bladder by provider via flexible cystoscope  DOSE:  225mg  Med # [id number]  Ref # [id number]  Protocol # [id number]  :  [employer] [employer] [facility]  EXPIRATION DATE: 1/24/2027  Was there drug waste? No    Prior to medication administration, verified patient identity using patient's name and date of birth.  Due to medication administration, patient instructed to remain in clinic for 15 minutes  afterwards, and to report any adverse reaction to me immediately.    JONO JOLLY RN

## 2025-05-14 ENCOUNTER — PRE VISIT (OUTPATIENT)
Dept: UROLOGY | Facility: CLINIC | Age: 75
End: 2025-05-14
Payer: COMMERCIAL

## 2025-05-14 NOTE — TELEPHONE ENCOUNTER
Reason for visit: cystoscopy + [id number] removal     Relevant information: bladder cancer    Records/imaging/labs/orders: in epic    Pt called: No need for a call    At Rooming: have graspers ready - have yellow bag available in red bin - bio hazard bin for graspers- tall/ long gloves- blue gown- face shields - ask pt if he would like ivan Dos Santos  5/14/2025  2:01 PM

## 2025-05-20 ENCOUNTER — OFFICE VISIT (OUTPATIENT)
Dept: UROLOGY | Facility: CLINIC | Age: 75
End: 2025-05-20
Payer: COMMERCIAL

## 2025-05-20 ENCOUNTER — ALLIED HEALTH/NURSE VISIT (OUTPATIENT)
Dept: ONCOLOGY | Facility: CLINIC | Age: 75
End: 2025-05-20

## 2025-05-20 VITALS
DIASTOLIC BLOOD PRESSURE: 92 MMHG | SYSTOLIC BLOOD PRESSURE: 144 MMHG | HEART RATE: 72 BPM | WEIGHT: 242 LBS | HEIGHT: 68 IN | OXYGEN SATURATION: 96 % | BODY MASS INDEX: 36.68 KG/M2 | TEMPERATURE: 98.6 F | RESPIRATION RATE: 20 BRPM

## 2025-05-20 DIAGNOSIS — Z00.6 EXAMINATION OF PARTICIPANT IN CLINICAL TRIAL: Primary | ICD-10-CM

## 2025-05-20 DIAGNOSIS — C67.3 MALIGNANT NEOPLASM OF ANTERIOR WALL OF URINARY BLADDER (H): Primary | ICD-10-CM

## 2025-05-20 RX ORDER — LIDOCAINE HYDROCHLORIDE 20 MG/ML
JELLY TOPICAL ONCE
Status: COMPLETED | OUTPATIENT
Start: 2025-05-20 | End: 2025-05-20

## 2025-05-20 RX ORDER — SULFAMETHOXAZOLE AND TRIMETHOPRIM 800; 160 MG/1; MG/1
1 TABLET ORAL ONCE
Status: COMPLETED | OUTPATIENT
Start: 2025-05-20 | End: 2025-05-20

## 2025-05-20 RX ORDER — MULTIVITAMIN WITH IRON
1 TABLET ORAL DAILY
COMMUNITY

## 2025-05-20 RX ADMIN — SULFAMETHOXAZOLE AND TRIMETHOPRIM 1 TABLET: 800; 160 TABLET ORAL at 09:20

## 2025-05-20 RX ADMIN — LIDOCAINE HYDROCHLORIDE: 20 JELLY TOPICAL at 08:43

## 2025-05-20 ASSESSMENT — PAIN SCALES - GENERAL: PAINLEVEL_OUTOF10: NO PAIN (0)

## 2025-05-20 NOTE — PROGRESS NOTES
[id number]: Study Visit Note   Subject name: Alek Mcneill     Visit: Week 63    Did the study visit occur within the appropriate window allowed by the protocol? yes    Since the last study visit, He has been doing well. He has no new complaints or concerns today and denies any new symptoms or side effects since his TAR-200 insertion.    Assessed for below symptoms specifically:   Dysuria no  Pollakiuria no  Nocturia no  Urgency no  Incontinence no  Hematuria no  Urinary hesitation no  Bladder pain/spasm no  Urethral pain no   Bladder discomfort no  Feeling of incomplete bladder emptying  no  Lower abdominal pain no  Fever no  Flu-like symptoms no  Fatigue no  Diarrhea no  Nausea no  Rash no  Arthralgia  no      TAR-200 Removal:  Time of TAR-200 Removal: 08:37   Medication #: [id number]   Were any irregularities observed during or after removal? no  Were bernadette-procedural antibiotics given? yes    I have personally interviewed Alek Mcneill and reviewed his medical record for adverse events and concomitant medications and these have been recorded on the corresponding logs in Alek Mcneill's research file.     Alek Mcneill was given the opportunity to ask any trial related questions.  Please see provider progress note for physical exam and other clinical information. Labs were reviewed - any significant lab values were addressed and reviewed.    Marciano Castle  Clinical Research Coordinator III  [phone]  [email or web address]

## 2025-05-20 NOTE — PROGRESS NOTES
CHIEF COMPLAINT   It was my pleasure to see Alek Mcneill who is a 75 year old male for follow-up of bladder cancer.      HPI  Alek Mcneill is a very pleasant 75 year old male who presents with a history of bladder cancer. 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      He has enrolled in [employer] 3 and is here today for TAR-200 removal and surveillance cystoscopy.  No recent hematuria or dysuria.     ECOG - 0      CT Urogram 2/3/2025  IMPRESSION:  1.  No acute or suspicious abnormalities in the abdomen or pelvis.  Specifically, no bladder wall mass or abnormal enhancement.  2.  No lymphadenopathy     Re-staging TURBT 1/17/2024          SPECIMEN   Procedure   Transurethral resection of bladder (TURBT)   TUMOR   Tumor Site   Anterior wall   Histologic Type   Urothelial carcinoma, in situ   Histologic Grade   High-grade   Tumor Extent   Flat carcinoma in situ   Lymphovascular Invasion   Not identified   Tumor Configuration   Flat   Muscularis Propria (detrusor muscle)   Cannot be determined: Biopsy site changes are identified in the current sample; no residual viable invasive carcinoma is identified.  Please see previous biopsy report for further information, case [id number], [facility]..   ADDITIONAL FINDINGS   Associated Epithelial Lesions   Chronic inflammation, histiocytic proliferation, consistent with biopsy site changes.      TURBT 12/6/2023  Final Diagnosis   A) URINARY BLADDER, BASE OF TUMOR, TRANSURETHRAL RESECTION BLADDER TUMOR:  -NONINVASIVE LOW-GRADE PAPILLARY UROTHELIAL CARCINOMA  -MULTIPLE FRAGMENTS OF MUSCULARIS PROPRIA  -SEE SYNOPTIC REPORT     B) URINARY BLADDER, TUMOR, TRANSURETHRAL RESECTION BLADDER TUMOR:  -HIGH-GRADE PAPILLARY UROTHELIAL CARCINOMA WITH LAMINA PROPRIA INVASION  -MUSCULARIS PROPRIA PRESENT AND UNINVOLVED  -SEE SYNOPTIC REPORT     PHYSICAL EXAM  Patient is a 75 year old  male   Vitals: Blood pressure (!) 144/92,  "pulse 72, temperature 98.6  F (37  C), resp. rate 20, height 1.72 m (5' 7.72\"), weight 109.8 kg (242 lb), SpO2 96%.  General Appearance Adult: Body mass index is 37.1 kg/m .  Alert, no acute distress, oriented  Lungs: no respiratory distress, or pursed lip breathing  Abdomen: soft, nontender, no organomegaly or masses  Back: no CVAT  Neuro: Alert, oriented, speech and mentation normal  Psych: affect and mood normal  : penis, scrotum, testes normal    OFFICE CYSTOSCOPY 5/20/2025     Pre-procedure diagnosis:       Bladder Cancer  Post-procedure diagnosis:      No recurrence  Procedure performed:             Cystourethroscopy with removal of TAR-200  Surgeon:                                 Harman Kaiser MD  Anesthesia:                             Local     Description of procedure:   After fully informed, voluntary consent was obtained, the patient was brought into the procedure room, identified and placed in a supine position on the cystoscopy table.  The groin/scrotum were prepped with betadine and draped in a sterile fashion.  A 15F flexible cystoscope was inserted into the urethra, and the bladder and urethra were examined in a systematic manner. No suspicious lesions noted. TAR-200 was removed with flexible grasper. The patient tolerated the procedure well and there were no complications.       Cystoscopic findings:  The urethra was normal without strictures.  The prostate was 3 cm long and demonstrated mild bilobar hypertrophy.  There was no median lobe.  The external sphincter coapted well and the bladder neck was open. The bladder was completely surveyed.  There was mild trabeculation.  There were no neoplasms, stones, or diverticula identifed.  The ureteric orifices were normal in position and number and effluxing clear urine. There are no suspicious findings. TAR-200 device removed intact.     ASSESSMENT and PLAN  75 year old male with history of HG non-muscle-invasive bladder cancer. He has enrolled in " the [employer] 3 clinical trial for BCG-naive NMIBC. Cystoscopy today with no evidence of recurrence. He continues to have a complete response.     Time of TAR-200 Removal: 0837  There were no irregularities observed during or after removal.  Betzy-procedural antibiotics were given.  Office cysto and TAR-200 placement in 9 weeks    Harman Kaiser MD  Urology  [facility]

## 2025-05-20 NOTE — Clinical Note
5/20/2025       RE: Alek AGOSTO Raheelprincess  [address] [place] [address]     Dear Colleague,    Thank you for referring your patient, Alek Mcneill, to the [facility]. Please see a copy of my visit note below.    No notes on file    Again, thank you for allowing me to participate in the care of your patient.      Sincerely,    Harman Kaiser MD

## 2025-05-20 NOTE — NURSING NOTE
Allergies   Allergen Reactions    Chlorhexidine Dermatitis       The following medication was given:     MEDICATION: Bactrim (Trimethoprim / Sulfamethoxazole)  ROUTE: PO  SITE: Medication was given orally  DOSE: 800mg/160mg  LOT #: [id number]  : [employer]  EXPIRATION DATE: 2026/09  NDC#: 9511-8843-14   Was there drug waste? No    Prior to medication administration, verified patient identity using patient's name and date of birth.  Due to medication administration, patient instructed to remain in clinic for 15 minutes  afterwards, and to report any adverse reaction to me immediately.    Henri De La Cruz  May 20, 2025  8:44 AM

## 2025-05-20 NOTE — NURSING NOTE
"Chief Complaint   Patient presents with    Cystoscopy     [place] 3 study, here for removal of TAR-200       Blood pressure (!) 144/92, pulse 72, temperature 98.6  F (37  C), resp. rate 20, height 1.72 m (5' 7.72\"), weight 109.8 kg (242 lb), SpO2 96%. Body mass index is 37.1 kg/m .    Patient Active Problem List   Diagnosis    Malignant neoplasm of anterior wall of urinary bladder (H)    Benign hypertension    Hyperlipidemia    Impotence of organic origin    Morbid (severe) obesity due to excess calories (H)    Type 2 diabetes mellitus without complication, without long-term current use of insulin (H)    Spinal stenosis of lumbosacral region    Peripheral vascular disease    Venous stasis dermatitis of left lower extremity    Lumbar radiculopathy    Environmental allergies    Age-related nuclear cataract of right eye       Allergies   Allergen Reactions    Chlorhexidine Dermatitis       Current Outpatient Medications   Medication Sig Dispense Refill    Ascorbic Acid 500 MG CAPS 1 tablet Orally Once a day      aspirin (ASPIRIN LOW DOSE) 81 MG EC tablet Take 81 mg by mouth daily ONCE BEFORE BED      blood glucose (NO BRAND SPECIFIED) lancets standard as directed, to use with his Contour Next test blood sugars daily      CONTOUR NEXT TEST test strip USE TO TEST BLOOD SUGAR DAILY      Ergocalciferol 50 MCG (2000 UT) CAPS Take 50 mcg by mouth daily      fish oil-omega-3 fatty acids 500 MG capsule Take 2 capsules by mouth daily      hydrochlorothiazide (HYDRODIURIL) 25 MG tablet Take 25 mg by mouth daily      ketoconazole (NIZORAL) 2 % external cream Apply topically as needed      levothyroxine (SYNTHROID/LEVOTHROID) 100 MCG tablet Take 1 tablet (100 mcg) by mouth daily. 30 tablet 3    magnesium 250 MG tablet Take 1 tablet by mouth daily.      Microlet Lancets MISC daily      simvastatin (ZOCOR) 40 MG tablet Take 40 mg by mouth at bedtime      triamcinolone (KENALOG) 0.1 % external cream Apply topically. Apply as needed   "    Vitamin D3 (VITAMIN D, CHOLECALCIFEROL,) 25 mcg (1000 units) tablet Take 25 mcg by mouth daily. 5000 units         Social History     Tobacco Use    Smoking status: Former     Current packs/day: 0.00     Types: Cigarettes     Quit date:      Years since quittin.4     Passive exposure: Past   Vaping Use    Vaping status: Never Used   Substance Use Topics    Alcohol use: Yes     Comment: daily    Drug use: Never       Invasive Procedure Safety Checklist:    Procedure: Cystoscopy    Action: Complete sections and checkboxes as appropriate.    Pre-procedure:  1. Patient ID Verified with 2 identifiers (Octavia and  or MRN) : YES    2. Procedure and site verified with patient/designee (when able) : YES    3. Accurate consent documentation in medical record : YES    4. H&P (or appropriate assessment) documented in medical record : N/A  H&P must be up to 30 days prior to procedure an updated within 24 hours of                 Procedure as applicable.     5. Relevant diagnostic and radiology test results appropriately labeled and displayed as applicable : YES    6. Blood products, implants, devices, and/or special equipment available for the procedure as applicable : YES    7. Procedure site(s) marked with provider initials [Exclusions: none] : NO    8. Marking not required. Reason : Yes  Procedure does not require site marking    Time Out:     Time-Out performed immediately prior to starting procedure, including verbal and active participation of all team members addressing: YES    1. Correct patient identity.  2. Confirmed that the correct side and site are marked.  3. An accurate procedure to be done.  4. Agreement on the procedure to be done.  5. Correct patient position.  6. Relevant images and results are properly labeled and appropriately displayed.  7. The need to administer antibiotics or fluids for irrigation purposes during the procedure as applicable.  8. Safety precautions based on patient history or  medication use.    During Procedure: Verification of correct person, site, and procedure occurs any time the responsibility for care of the patient is transferred to another member of the care team.    The following medication was given:     MEDICATION:  Lidocaine without epinephrine 2% jelly  ROUTE: urethral   SITE: urethral   DOSE: 10 mL  LOT #: [id number]  : [employer]  EXPIRATION DATE: 12-26  NDC#: [address]-5   Was there drug waste? No    Prior to med admin, verified patient identity using patient's name and date of birth.  Due to med administration, patient instructed to remain in clinic for 15 minutes  afterwards, and to report any adverse reaction to me immediately.    Drug Amount Wasted:  None.  Vial/Syringe: Syringe      Henri De La Cruz  5/20/2025  8:27 AM

## 2025-06-07 ENCOUNTER — HEALTH MAINTENANCE LETTER (OUTPATIENT)
Age: 75
End: 2025-06-07

## 2025-07-17 ENCOUNTER — PRE VISIT (OUTPATIENT)
Dept: UROLOGY | Facility: CLINIC | Age: 75
End: 2025-07-17
Payer: COMMERCIAL

## 2025-07-17 DIAGNOSIS — C67.3 MALIGNANT NEOPLASM OF ANTERIOR WALL OF URINARY BLADDER (H): Primary | ICD-10-CM

## 2025-07-17 RX ORDER — SULFAMETHOXAZOLE AND TRIMETHOPRIM 800; 160 MG/1; MG/1
1 TABLET ORAL ONCE
Status: ACTIVE | OUTPATIENT
Start: 2025-07-17

## 2025-07-17 NOTE — TELEPHONE ENCOUNTER
Reason for visit: Cysto+TAR-200 insertion     Relevant information: Malignant neoplasm of anterior wall of urinary bladder    Records/imaging/labs/orders: All records available    Pt called: No need for a call    At Rooming: standard cysto; Blue Chemo gown, Face shield, extended cuff chemo gloves (anyone in room needs these); Large red bin with yellow liner    David Dennison  7/17/2025  10:09 AM

## 2025-07-18 ENCOUNTER — APPOINTMENT (OUTPATIENT)
Dept: LAB | Facility: HOSPITAL | Age: 75
End: 2025-07-18
Payer: COMMERCIAL

## 2025-07-18 ENCOUNTER — ALLIED HEALTH/NURSE VISIT (OUTPATIENT)
Dept: ONCOLOGY | Facility: CLINIC | Age: 75
End: 2025-07-18

## 2025-07-18 DIAGNOSIS — Z00.6 EXAMINATION OF PARTICIPANT IN CLINICAL TRIAL: Primary | ICD-10-CM

## 2025-07-18 LAB
ALBUMIN SERPL BCG-MCNC: 4.1 G/DL (ref 3.5–5.2)
ALBUMIN UR-MCNC: NEGATIVE MG/DL
ALP SERPL-CCNC: 67 U/L (ref 40–150)
ALT SERPL W P-5'-P-CCNC: 45 U/L (ref 0–70)
AMYLASE SERPL-CCNC: 68 U/L (ref 28–100)
ANION GAP SERPL CALCULATED.3IONS-SCNC: 7 MMOL/L (ref 7–15)
APPEARANCE UR: CLEAR
APTT PPP: 25 SECONDS (ref 22–38)
AST SERPL W P-5'-P-CCNC: 35 U/L (ref 0–45)
BASOPHILS # BLD AUTO: 0.1 10E3/UL (ref 0–0.2)
BASOPHILS NFR BLD AUTO: 1 %
BILIRUB SERPL-MCNC: 0.5 MG/DL
BILIRUB UR QL STRIP: NEGATIVE
BUN SERPL-MCNC: 21 MG/DL (ref 8–23)
CALCIUM SERPL-MCNC: 9.7 MG/DL (ref 8.8–10.4)
CHLORIDE SERPL-SCNC: 100 MMOL/L (ref 98–107)
COLOR UR AUTO: COLORLESS
CREAT SERPL-MCNC: 1.1 MG/DL (ref 0.67–1.17)
CRP SERPL-MCNC: <3 MG/L
EGFRCR SERPLBLD CKD-EPI 2021: 70 ML/MIN/1.73M2
EOSINOPHIL # BLD AUTO: 0.2 10E3/UL (ref 0–0.7)
EOSINOPHIL NFR BLD AUTO: 3 %
ERYTHROCYTE [DISTWIDTH] IN BLOOD BY AUTOMATED COUNT: 12.6 % (ref 10–15)
GGT SERPL-CCNC: 104 U/L (ref 8–61)
GLUCOSE SERPL-MCNC: 115 MG/DL (ref 70–99)
GLUCOSE UR STRIP-MCNC: NEGATIVE MG/DL
HCO3 SERPL-SCNC: 30 MMOL/L (ref 22–29)
HCT VFR BLD AUTO: 44 % (ref 40–53)
HGB BLD-MCNC: 15.7 G/DL (ref 13.3–17.7)
HGB UR QL STRIP: NEGATIVE
IMM GRANULOCYTES # BLD: 0 10E3/UL
IMM GRANULOCYTES NFR BLD: 0 %
INR PPP: 0.93 (ref 0.85–1.15)
KETONES UR STRIP-MCNC: NEGATIVE MG/DL
LDH SERPL L TO P-CCNC: 219 U/L (ref 0–250)
LEUKOCYTE ESTERASE UR QL STRIP: NEGATIVE
LIPASE SERPL-CCNC: 36 U/L (ref 13–60)
LYMPHOCYTES # BLD AUTO: 2.2 10E3/UL (ref 0.8–5.3)
LYMPHOCYTES NFR BLD AUTO: 31 %
MCH RBC QN AUTO: 33.1 PG (ref 26.5–33)
MCHC RBC AUTO-ENTMCNC: 35.7 G/DL (ref 31.5–36.5)
MCV RBC AUTO: 93 FL (ref 78–100)
MONOCYTES # BLD AUTO: 0.8 10E3/UL (ref 0–1.3)
MONOCYTES NFR BLD AUTO: 12 %
NEUTROPHILS # BLD AUTO: 3.7 10E3/UL (ref 1.6–8.3)
NEUTROPHILS NFR BLD AUTO: 53 %
NITRATE UR QL: NEGATIVE
NRBC # BLD AUTO: 0 10E3/UL
NRBC BLD AUTO-RTO: 0 /100
PH UR STRIP: 6.5 [PH] (ref 5–7)
PHOSPHATE SERPL-MCNC: 2.7 MG/DL (ref 2.5–4.5)
PLATELET # BLD AUTO: 211 10E3/UL (ref 150–450)
POTASSIUM SERPL-SCNC: 4.3 MMOL/L (ref 3.4–5.3)
PROT SERPL-MCNC: 7.2 G/DL (ref 6.4–8.3)
PROTHROMBIN TIME: 12.7 SECONDS (ref 11.8–14.8)
RBC # BLD AUTO: 4.75 10E6/UL (ref 4.4–5.9)
RBC URINE: <1 /HPF
SODIUM SERPL-SCNC: 137 MMOL/L (ref 135–145)
SP GR UR STRIP: 1.01 (ref 1–1.03)
URATE SERPL-MCNC: 6.4 MG/DL (ref 3.4–7)
UROBILINOGEN UR STRIP-MCNC: NORMAL MG/DL
WBC # BLD AUTO: 7 10E3/UL (ref 4–11)
WBC URINE: 0 /HPF

## 2025-07-18 PROCEDURE — 85610 PROTHROMBIN TIME: CPT | Performed by: UROLOGY

## 2025-07-18 PROCEDURE — 87086 URINE CULTURE/COLONY COUNT: CPT | Performed by: UROLOGY

## 2025-07-18 PROCEDURE — 83690 ASSAY OF LIPASE: CPT | Performed by: UROLOGY

## 2025-07-18 PROCEDURE — 82150 ASSAY OF AMYLASE: CPT | Performed by: UROLOGY

## 2025-07-18 PROCEDURE — 84100 ASSAY OF PHOSPHORUS: CPT | Performed by: UROLOGY

## 2025-07-18 PROCEDURE — 85730 THROMBOPLASTIN TIME PARTIAL: CPT | Performed by: UROLOGY

## 2025-07-18 PROCEDURE — 36415 COLL VENOUS BLD VENIPUNCTURE: CPT | Performed by: UROLOGY

## 2025-07-18 PROCEDURE — 83615 LACTATE (LD) (LDH) ENZYME: CPT | Performed by: UROLOGY

## 2025-07-18 PROCEDURE — 86140 C-REACTIVE PROTEIN: CPT | Performed by: UROLOGY

## 2025-07-18 PROCEDURE — 85004 AUTOMATED DIFF WBC COUNT: CPT | Performed by: UROLOGY

## 2025-07-18 PROCEDURE — 82977 ASSAY OF GGT: CPT | Performed by: UROLOGY

## 2025-07-18 PROCEDURE — 82247 BILIRUBIN TOTAL: CPT | Performed by: UROLOGY

## 2025-07-18 PROCEDURE — 84550 ASSAY OF BLOOD/URIC ACID: CPT | Performed by: UROLOGY

## 2025-07-18 PROCEDURE — 81001 URINALYSIS AUTO W/SCOPE: CPT | Performed by: UROLOGY

## 2025-07-18 PROCEDURE — 88112 CYTOPATH CELL ENHANCE TECH: CPT | Mod: TC | Performed by: UROLOGY

## 2025-07-19 LAB — BACTERIA UR CULT: NORMAL

## 2025-07-21 ENCOUNTER — ANCILLARY PROCEDURE (OUTPATIENT)
Dept: CT IMAGING | Facility: CLINIC | Age: 75
End: 2025-07-21
Attending: UROLOGY
Payer: COMMERCIAL

## 2025-07-21 DIAGNOSIS — C67.3 MALIGNANT NEOPLASM OF ANTERIOR WALL OF URINARY BLADDER (H): ICD-10-CM

## 2025-07-21 PROCEDURE — 74178 CT ABD&PLV WO CNTR FLWD CNTR: CPT | Performed by: RADIOLOGY

## 2025-07-21 RX ORDER — IOPAMIDOL 755 MG/ML
119 INJECTION, SOLUTION INTRAVASCULAR ONCE
Status: COMPLETED | OUTPATIENT
Start: 2025-07-21 | End: 2025-07-21

## 2025-07-21 RX ADMIN — IOPAMIDOL 119 ML: 755 INJECTION, SOLUTION INTRAVASCULAR at 13:47

## 2025-07-22 ENCOUNTER — PRE VISIT (OUTPATIENT)
Dept: UROLOGY | Facility: CLINIC | Age: 75
End: 2025-07-22

## 2025-07-22 ENCOUNTER — OFFICE VISIT (OUTPATIENT)
Dept: UROLOGY | Facility: CLINIC | Age: 75
End: 2025-07-22
Payer: COMMERCIAL

## 2025-07-22 ENCOUNTER — ALLIED HEALTH/NURSE VISIT (OUTPATIENT)
Dept: ONCOLOGY | Facility: CLINIC | Age: 75
End: 2025-07-22

## 2025-07-22 ENCOUNTER — LAB (OUTPATIENT)
Dept: LAB | Facility: CLINIC | Age: 75
End: 2025-07-22
Attending: INTERNAL MEDICINE
Payer: COMMERCIAL

## 2025-07-22 VITALS
DIASTOLIC BLOOD PRESSURE: 79 MMHG | HEIGHT: 68 IN | TEMPERATURE: 98.3 F | HEART RATE: 76 BPM | OXYGEN SATURATION: 95 % | SYSTOLIC BLOOD PRESSURE: 132 MMHG | WEIGHT: 235 LBS | RESPIRATION RATE: 18 BRPM | BODY MASS INDEX: 35.61 KG/M2

## 2025-07-22 DIAGNOSIS — C67.3 MALIGNANT NEOPLASM OF ANTERIOR WALL OF URINARY BLADDER (H): Primary | ICD-10-CM

## 2025-07-22 DIAGNOSIS — Z00.6 EXAMINATION OF PARTICIPANT IN CLINICAL TRIAL: Primary | ICD-10-CM

## 2025-07-22 RX ORDER — LIDOCAINE HYDROCHLORIDE 20 MG/ML
JELLY TOPICAL ONCE
Status: COMPLETED | OUTPATIENT
Start: 2025-07-22 | End: 2025-07-22

## 2025-07-22 RX ORDER — SULFAMETHOXAZOLE AND TRIMETHOPRIM 800; 160 MG/1; MG/1
1 TABLET ORAL ONCE
Status: COMPLETED | OUTPATIENT
Start: 2025-07-22 | End: 2025-07-22

## 2025-07-22 RX ADMIN — SULFAMETHOXAZOLE AND TRIMETHOPRIM 1 TABLET: 800; 160 TABLET ORAL at 10:37

## 2025-07-22 RX ADMIN — LIDOCAINE HYDROCHLORIDE: 20 JELLY TOPICAL at 11:02

## 2025-07-22 ASSESSMENT — PAIN SCALES - GENERAL: PAINLEVEL_OUTOF10: NO PAIN (0)

## 2025-07-22 NOTE — PROGRESS NOTES
[id number]: Study Visit Note   Subject name: Alek Mcneill     Visit: Week 72    Did the study visit occur within the appropriate window allowed by the protocol? yes    Since the last study visit, He has been doing well. He has no new concerns or complaints today. He denies any new symptoms/side effects or changes to his medications.      ECOG 0    Assessed for below symptoms specifically:   Dysuria no  Pollakiuria no  Nocturia no  Urgency no  Incontinence no  Hematuria no  Urinary hesitation no  Bladder pain/spasm no  Urethral pain no   Bladder discomfort no  Feeling of incomplete bladder emptying  no  Lower abdominal pain no  Fever no  Flu-like symptoms no  Fatigue no  Diarrhea no  Nausea no  Rash no  Arthralgia  no      TAR-200 Insertion/Removal:  Time of TAR-200 Insertion: 10:25 AM  Medication #: [id number]    Were bernadette-procedural antibiotics given? yes    I have personally interviewed Alek Mcneill and reviewed his medical record for adverse events and concomitant medications and these have been recorded on the corresponding logs in Alek Mcneill's research file.     Alek Mcneill was given the opportunity to ask any trial related questions.  Please see provider progress note for physical exam and other clinical information. Labs were reviewed - any significant lab values were addressed and reviewed.    [id number]: Informed Consent Note     The consent form, including purpose, risks and benefits, was reviewed with Alek AGOSTO Raheelprincess,. Alek Mcneill was given adequate time to review the consent, and all questions were answered before he signed the consent form. The patient understands that the study involves an active treatment phase as well as a post-treatment follow up phase.     Present during the discussion were myself and the patient. A copy of the signed form was provided to the patient. No procedures specific to this study were performed prior to the patient signing the consent form.    The patient had previously  "signed this consent version but GCP errors were made in the signature section. Per  Work Instruction 417.2 \"Obtaining Informed Consent\" the patient was re-consented to the same version to correct these errors.    Consent Version Date: 3/4/25  Consent obtained by: Marciano Castle    Date: 7/22/25      Marciano Castle  Clinical Research Coordinator III  [phone]  [email or web address]    "

## 2025-07-22 NOTE — NURSING NOTE
"Chief Complaint   Patient presents with    Cystoscopy     [facility] study       Blood pressure 132/79, pulse 76, temperature 98.3  F (36.8  C), resp. rate 18, height 1.72 m (5' 7.72\"), weight 106.6 kg (235 lb), SpO2 95%. Body mass index is 36.03 kg/m .    Patient Active Problem List   Diagnosis    Malignant neoplasm of anterior wall of urinary bladder (H)    Benign hypertension    Hyperlipidemia    Impotence of organic origin    Morbid (severe) obesity due to excess calories (H)    Type 2 diabetes mellitus without complication, without long-term current use of insulin (H)    Spinal stenosis of lumbosacral region    Peripheral vascular disease    Venous stasis dermatitis of left lower extremity    Lumbar radiculopathy    Environmental allergies    Age-related nuclear cataract of right eye       Allergies   Allergen Reactions    Chlorhexidine Dermatitis       Current Outpatient Medications   Medication Sig Dispense Refill    Ascorbic Acid 500 MG CAPS 1 tablet Orally Once a day      aspirin (ASPIRIN LOW DOSE) 81 MG EC tablet Take 81 mg by mouth daily ONCE BEFORE BED      blood glucose (NO BRAND SPECIFIED) lancets standard as directed, to use with his Contour Next test blood sugars daily      CONTOUR NEXT TEST test strip USE TO TEST BLOOD SUGAR DAILY      Ergocalciferol 50 MCG (2000 UT) CAPS Take 50 mcg by mouth daily      fish oil-omega-3 fatty acids 500 MG capsule Take 2 capsules by mouth daily      hydrochlorothiazide (HYDRODIURIL) 25 MG tablet Take 25 mg by mouth daily      ketoconazole (NIZORAL) 2 % external cream Apply topically as needed      levothyroxine (SYNTHROID/LEVOTHROID) 100 MCG tablet Take 1 tablet (100 mcg) by mouth daily. 30 tablet 3    magnesium 250 MG tablet Take 1 tablet by mouth daily.      Microlet Lancets MISC daily      simvastatin (ZOCOR) 40 MG tablet Take 40 mg by mouth at bedtime      triamcinolone (KENALOG) 0.1 % external cream Apply topically. Apply as needed      Vitamin D3 (VITAMIN D, " CHOLECALCIFEROL,) 25 mcg (1000 units) tablet Take 25 mcg by mouth daily. 5000 units         Social History     Tobacco Use    Smoking status: Former     Current packs/day: 0.00     Types: Cigarettes     Quit date:      Years since quittin.5     Passive exposure: Past   Vaping Use    Vaping status: Never Used   Substance Use Topics    Alcohol use: Yes     Comment: daily    Drug use: Never       Invasive Procedure Safety Checklist:    Procedure: Cystoscopy    Action: Complete sections and checkboxes as appropriate.    Pre-procedure:  1. Patient ID Verified with 2 identifiers (Octavia and  or MRN) : YES    2. Procedure and site verified with patient/designee (when able) : YES    3. Accurate consent documentation in medical record : YES    4. H&P (or appropriate assessment) documented in medical record : N/A  H&P must be up to 30 days prior to procedure an updated within 24 hours of                 Procedure as applicable.     5. Relevant diagnostic and radiology test results appropriately labeled and displayed as applicable : YES    6. Blood products, implants, devices, and/or special equipment available for the procedure as applicable : YES    7. Procedure site(s) marked with provider initials [Exclusions: none] : NO    8. Marking not required. Reason : Yes  Procedure does not require site marking    Time Out:     Time-Out performed immediately prior to starting procedure, including verbal and active participation of all team members addressing: YES    1. Correct patient identity.  2. Confirmed that the correct side and site are marked.  3. An accurate procedure to be done.  4. Agreement on the procedure to be done.  5. Correct patient position.  6. Relevant images and results are properly labeled and appropriately displayed.  7. The need to administer antibiotics or fluids for irrigation purposes during the procedure as applicable.  8. Safety precautions based on patient history or medication use.    During  Procedure: Verification of correct person, site, and procedure occurs any time the responsibility for care of the patient is transferred to another member of the care team.    The following medication was given:     MEDICATION:  Lidocaine without epinephrine 2% jelly  ROUTE: urethral   SITE: urethral   DOSE: 10 mL  LOT #: [id number]  : [employer]  EXPIRATION DATE: 2/27  NDC#: [address]-4   Was there drug waste? No    Prior to med admin, verified patient identity using patient's name and date of birth.  Due to med administration, patient instructed to remain in clinic for 15 minutes  afterwards, and to report any adverse reaction to me immediately.    Drug Amount Wasted:  None.  Vial/Syringe: Syringe    Allergies   Allergen Reactions    Chlorhexidine Dermatitis       The following medication was given:     MEDICATION: Bactrim (Trimethoprim / Sulfamethoxazole)  ROUTE: PO  SITE: Medication was given orally  DOSE: 800mg/160mg  LOT #: [id number]  : [employer]  EXPIRATION DATE: 03/27  NDC#: 1996-4477-71   Was there drug waste? No    Prior to medication administration, verified patient identity using patient's name and date of birth.  Due to medication administration, patient instructed to remain in clinic for 15 minutes  afterwards, and to report any adverse reaction to me immediately.    David Dennison  July 22, 2025  10:40 AM     David Dennison  7/22/2025  10:40 AM

## 2025-07-22 NOTE — PROGRESS NOTES
CHIEF COMPLAINT   It was my pleasure to see Alek Mcneill who is a 75 year old male for follow-up of bladder cancer.      HPI  Alek Mcneill is a very pleasant 75 year old male who presents with a history of bladder cancer. 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      He has enrolled in [employer] 3 and is here today for TAR-200 insertion and surveillance cystoscopy.  No recent hematuria or dysuria.     ECOG 0     CT Urogram 7/22/2025  IMPRESSION:   1. No acute or suspicious abnormalities in the abdomen or pelvis. No  abnormal focal urinary bladder wall thickening identified.  2. Hepatic steatosis. Additional ancillary findings as detailed in the  body of the report.     Re-staging TURBT 1/17/2024          SPECIMEN   Procedure   Transurethral resection of bladder (TURBT)   TUMOR   Tumor Site   Anterior wall   Histologic Type   Urothelial carcinoma, in situ   Histologic Grade   High-grade   Tumor Extent   Flat carcinoma in situ   Lymphovascular Invasion   Not identified   Tumor Configuration   Flat   Muscularis Propria (detrusor muscle)   Cannot be determined: Biopsy site changes are identified in the current sample; no residual viable invasive carcinoma is identified.  Please see previous biopsy report for further information, case [id number], [facility]..   ADDITIONAL FINDINGS   Associated Epithelial Lesions   Chronic inflammation, histiocytic proliferation, consistent with biopsy site changes.      TURBT 12/6/2023  Final Diagnosis   A) URINARY BLADDER, BASE OF TUMOR, TRANSURETHRAL RESECTION BLADDER TUMOR:  -NONINVASIVE LOW-GRADE PAPILLARY UROTHELIAL CARCINOMA  -MULTIPLE FRAGMENTS OF MUSCULARIS PROPRIA  -SEE SYNOPTIC REPORT     B) URINARY BLADDER, TUMOR, TRANSURETHRAL RESECTION BLADDER TUMOR:  -HIGH-GRADE PAPILLARY UROTHELIAL CARCINOMA WITH LAMINA PROPRIA INVASION  -MUSCULARIS PROPRIA PRESENT AND UNINVOLVED  -SEE SYNOPTIC REPORT     PHYSICAL  "EXAM  Patient is a 75 year old  male   Vitals: Blood pressure 132/79, pulse 76, temperature 98.3  F (36.8  C), resp. rate 18, height 1.72 m (5' 7.72\"), weight 106.6 kg (235 lb), SpO2 95%.  General Appearance Adult: Body mass index is 36.03 kg/m .  Alert, no acute distress, oriented  Lungs: no respiratory distress, or pursed lip breathing  Abdomen: soft, nontender, no organomegaly or masses  Back: no CVAT  Neuro: Alert, oriented, speech and mentation normal  Psych: affect and mood normal  : penis, scrotum, testes normal    OFFICE CYSTOSCOPY 7/22/2025     Pre-procedure diagnosis:       Bladder Cancer  Post-procedure diagnosis:      No recurrence  Procedure performed:             Cystourethroscopy with placement of TAR-200  Surgeon:                                 Harman Kaiser MD  Anesthesia:                             Local     Description of procedure:   After fully informed, voluntary consent was obtained, the patient was brought into the procedure room, identified and placed in a supine position on the cystoscopy table.  The groin/scrotum were prepped with betadine and draped in a sterile fashion.  A 15F flexible cystoscope was inserted into the urethra, and the bladder and urethra were examined in a systematic manner. No suspicious lesions noted. TAR-200 was inserted via provided catheter system. The patient tolerated the procedure well and there were no complications.       Cystoscopic findings:  The urethra was normal without strictures.  The prostate was 3 cm long and demonstrated mild bilobar hypertrophy.  There was no median lobe.  The external sphincter coapted well and the bladder neck was open. The bladder was completely surveyed.  There was mild trabeculation.  There were no neoplasms, stones, or diverticula identifed.  The ureteric orifices were normal in position and number and effluxing clear urine. There are no suspicious findings.     ASSESSMENT and PLAN  75 year old male with history of HG " non-muscle-invasive bladder cancer. He has enrolled in the [employer] 3 clinical trial for BCG-naive NMIBC. Cystoscopy today with no evidence of recurrence. He continues to have a complete response.     Time of TAR-200 Insertion: 1025  There were no irregularities observed during or after removal.  Betzy-procedural antibiotics were given.  Office cysto for TAR-200 removal in 3 weeks.    Harman Kaiser MD  Urology  [facility]

## 2025-07-22 NOTE — PROGRESS NOTES
[id number] [id number] Study Note   Subject name: Alek Mcneill     Date: 7/18/2025    Alek contacted me requesting assistance with a refill for his synthroid. He told me he has only 2 days worth of medication left. I reminded Alek that our plan was for his primary care physician to manage his synthroid going forward, and Dr. Tobin had communicated this to his PCP. I recommended Alek reach out to his primary care clinic to have them refill his synthroid, but to contact me again if they were unable to help him with this.     Alek acknowledged this and told me he would reach out to his PCP.      Marciano Castle  Clinical Research Coordinator III  [phone]  [email or web address]

## 2025-07-22 NOTE — PROGRESS NOTES
The following medication was given by provider:     MEDICATION: Gemcitabine 225mg  ROUTE: Placed into bladder  SITE: Intravesicle  DOSE: 225mg  MED #: [id number]  : [employer]  EXPIRATION DATE: 01/24/2027  REF#: [id number]   Was there drug waste? No    Prior to medication administration, verified patient identity using patient's name and date of birth.  Due to medication administration, patient instructed to remain in clinic for 15 minutes  afterwards, and to report any adverse reaction to me immediately.

## 2025-07-22 NOTE — NURSING NOTE
Chief Complaint   Patient presents with    Labs Only     Urine collected for research       Mikayla Contreras RN

## 2025-07-22 NOTE — Clinical Note
7/22/2025       RE: Alek AGOSTO Raheelprincess  [address] [place] [address]     Dear Colleague,    Thank you for referring your patient, Alek Mcneill, to the [facility]. Please see a copy of my visit note below.    No notes on file    Again, thank you for allowing me to participate in the care of your patient.      Sincerely,    Harman Kaiser MD

## 2025-07-23 RX ORDER — SULFAMETHOXAZOLE AND TRIMETHOPRIM 800; 160 MG/1; MG/1
1 TABLET ORAL ONCE
Status: ACTIVE | OUTPATIENT
Start: 2025-07-23

## 2025-07-28 ENCOUNTER — PATIENT OUTREACH (OUTPATIENT)
Dept: ONCOLOGY | Facility: CLINIC | Age: 75
End: 2025-07-28
Payer: COMMERCIAL

## 2025-07-28 NOTE — PROGRESS NOTES
[facility]: Cancer Care                                                                                        Completed chart audit to assign Oncology Care Coordination enrollment status.        Ratna HUNTERN, RN, OCN  Care Coordinator  [facility]

## 2025-08-12 ENCOUNTER — ALLIED HEALTH/NURSE VISIT (OUTPATIENT)
Dept: ONCOLOGY | Facility: CLINIC | Age: 75
End: 2025-08-12

## 2025-08-12 ENCOUNTER — OFFICE VISIT (OUTPATIENT)
Dept: UROLOGY | Facility: CLINIC | Age: 75
End: 2025-08-12
Payer: COMMERCIAL

## 2025-08-12 VITALS
HEIGHT: 68 IN | SYSTOLIC BLOOD PRESSURE: 155 MMHG | OXYGEN SATURATION: 93 % | HEART RATE: 79 BPM | WEIGHT: 235 LBS | BODY MASS INDEX: 35.61 KG/M2 | DIASTOLIC BLOOD PRESSURE: 89 MMHG

## 2025-08-12 DIAGNOSIS — Z00.6 EXAMINATION OF PARTICIPANT IN CLINICAL TRIAL: Primary | ICD-10-CM

## 2025-08-12 DIAGNOSIS — C67.3 MALIGNANT NEOPLASM OF ANTERIOR WALL OF URINARY BLADDER (H): Primary | ICD-10-CM

## 2025-08-12 RX ADMIN — SULFAMETHOXAZOLE AND TRIMETHOPRIM 1 TABLET: 800; 160 TABLET ORAL at 11:09

## 2025-08-12 ASSESSMENT — PAIN SCALES - GENERAL: PAINLEVEL_OUTOF10: NO PAIN (0)

## 2025-08-18 ENCOUNTER — ONCOLOGY VISIT (OUTPATIENT)
Dept: ONCOLOGY | Facility: CLINIC | Age: 75
End: 2025-08-18
Attending: INTERNAL MEDICINE
Payer: COMMERCIAL

## 2025-08-18 VITALS
OXYGEN SATURATION: 96 % | SYSTOLIC BLOOD PRESSURE: 144 MMHG | DIASTOLIC BLOOD PRESSURE: 86 MMHG | TEMPERATURE: 98.1 F | HEART RATE: 60 BPM | WEIGHT: 240 LBS | BODY MASS INDEX: 36.8 KG/M2 | RESPIRATION RATE: 18 BRPM

## 2025-08-18 DIAGNOSIS — Z00.6 EXAMINATION OF PARTICIPANT IN CLINICAL TRIAL: ICD-10-CM

## 2025-08-18 DIAGNOSIS — E03.2 HYPOTHYROIDISM DUE TO MEDICATION: ICD-10-CM

## 2025-08-18 DIAGNOSIS — C67.3 MALIGNANT NEOPLASM OF ANTERIOR WALL OF URINARY BLADDER (H): Primary | ICD-10-CM

## 2025-08-18 PROCEDURE — G2211 COMPLEX E/M VISIT ADD ON: HCPCS | Performed by: INTERNAL MEDICINE

## 2025-08-18 PROCEDURE — 99214 OFFICE O/P EST MOD 30 MIN: CPT | Performed by: INTERNAL MEDICINE

## 2025-08-18 PROCEDURE — G0463 HOSPITAL OUTPT CLINIC VISIT: HCPCS | Performed by: INTERNAL MEDICINE

## 2025-08-18 ASSESSMENT — PAIN SCALES - GENERAL: PAINLEVEL_OUTOF10: NO PAIN (0)

## (undated) DEVICE — SOL WATER IRRIG 1000ML BOTTLE 2F7114

## (undated) DEVICE — CUSTOM PACK CYSTO PREFERRED SOT5BCYHEA

## (undated) DEVICE — MAT FLOOR SURGICAL 40X38 0702140238

## (undated) DEVICE — SOLUTION IRRIG 2B7127 .9NS 3000ML BAG

## (undated) DEVICE — GLOVE BIOGEL PI ULTRATOUCH G SZ 7.0 42170

## (undated) DEVICE — PREP DYNA-HEX 4% CHG SCRUB 4OZ BOTTLE MDS098710

## (undated) DEVICE — TUBING SUCTION MEDI-VAC 1/4"X20' N620A

## (undated) DEVICE — GOWN IMPERVIOUS BREATHABLE SMART XLG 89045

## (undated) DEVICE — SUCTION MANIFOLD NEPTUNE 2 SYS 1 PORT 702-025-000

## (undated) DEVICE — CATH FOLEY 18FR 5ML LUBRICATH LATEX 0165L18

## (undated) DEVICE — SPONGE RAY-TEC 4X8" 7318

## (undated) DEVICE — SYR ELLIK EVACUATOR W/ADAPT LATEX

## (undated) DEVICE — ESU ELEC LOOP HF-RESECTION 24FR MED 30 W/CABLE WA22606S

## (undated) RX ORDER — SULFAMETHOXAZOLE AND TRIMETHOPRIM 800; 160 MG/1; MG/1
TABLET ORAL
Status: DISPENSED
Start: 2024-12-11

## (undated) RX ORDER — LIDOCAINE HYDROCHLORIDE 10 MG/ML
INJECTION, SOLUTION EPIDURAL; INFILTRATION; INTRACAUDAL; PERINEURAL
Status: DISPENSED
Start: 2024-01-17

## (undated) RX ORDER — LIDOCAINE HYDROCHLORIDE 10 MG/ML
INJECTION, SOLUTION EPIDURAL; INFILTRATION; INTRACAUDAL; PERINEURAL
Status: DISPENSED
Start: 2023-12-06

## (undated) RX ORDER — PROPOFOL 10 MG/ML
INJECTION, EMULSION INTRAVENOUS
Status: DISPENSED
Start: 2024-01-17

## (undated) RX ORDER — LIDOCAINE HYDROCHLORIDE 20 MG/ML
JELLY TOPICAL
Status: DISPENSED
Start: 2024-03-05

## (undated) RX ORDER — LIDOCAINE HYDROCHLORIDE 20 MG/ML
JELLY TOPICAL
Status: DISPENSED
Start: 2025-02-04

## (undated) RX ORDER — SULFAMETHOXAZOLE/TRIMETHOPRIM 800-160 MG
TABLET ORAL
Status: DISPENSED
Start: 2024-07-30

## (undated) RX ORDER — SULFAMETHOXAZOLE/TRIMETHOPRIM 800-160 MG
TABLET ORAL
Status: DISPENSED
Start: 2024-04-23

## (undated) RX ORDER — SULFAMETHOXAZOLE AND TRIMETHOPRIM 800; 160 MG/1; MG/1
TABLET ORAL
Status: DISPENSED
Start: 2025-05-20

## (undated) RX ORDER — SULFAMETHOXAZOLE/TRIMETHOPRIM 800-160 MG
TABLET ORAL
Status: DISPENSED
Start: 2024-07-09

## (undated) RX ORDER — SULFAMETHOXAZOLE/TRIMETHOPRIM 800-160 MG
TABLET ORAL
Status: DISPENSED
Start: 2024-06-25

## (undated) RX ORDER — LIDOCAINE HYDROCHLORIDE 20 MG/ML
JELLY TOPICAL
Status: DISPENSED
Start: 2024-11-19

## (undated) RX ORDER — LIDOCAINE HYDROCHLORIDE 20 MG/ML
JELLY TOPICAL
Status: DISPENSED
Start: 2024-02-13

## (undated) RX ORDER — LIDOCAINE HYDROCHLORIDE 20 MG/ML
JELLY TOPICAL
Status: DISPENSED
Start: 2024-06-25

## (undated) RX ORDER — LIDOCAINE HYDROCHLORIDE 20 MG/ML
JELLY TOPICAL
Status: DISPENSED
Start: 2024-05-07

## (undated) RX ORDER — SULFAMETHOXAZOLE/TRIMETHOPRIM 800-160 MG
TABLET ORAL
Status: DISPENSED
Start: 2024-02-13

## (undated) RX ORDER — SULFAMETHOXAZOLE AND TRIMETHOPRIM 800; 160 MG/1; MG/1
TABLET ORAL
Status: DISPENSED
Start: 2025-08-12

## (undated) RX ORDER — LIDOCAINE HYDROCHLORIDE 20 MG/ML
JELLY TOPICAL
Status: DISPENSED
Start: 2025-05-20

## (undated) RX ORDER — FENTANYL CITRATE 50 UG/ML
INJECTION, SOLUTION INTRAMUSCULAR; INTRAVENOUS
Status: DISPENSED
Start: 2024-01-17

## (undated) RX ORDER — LIDOCAINE HYDROCHLORIDE 20 MG/ML
JELLY TOPICAL
Status: DISPENSED
Start: 2024-04-23

## (undated) RX ORDER — LIDOCAINE HYDROCHLORIDE 20 MG/ML
JELLY TOPICAL
Status: DISPENSED
Start: 2024-05-28

## (undated) RX ORDER — LIDOCAINE HYDROCHLORIDE 20 MG/ML
JELLY TOPICAL
Status: DISPENSED
Start: 2024-08-20

## (undated) RX ORDER — SULFAMETHOXAZOLE AND TRIMETHOPRIM 800; 160 MG/1; MG/1
TABLET ORAL
Status: DISPENSED
Start: 2025-07-22

## (undated) RX ORDER — LIDOCAINE HYDROCHLORIDE 20 MG/ML
JELLY TOPICAL
Status: DISPENSED
Start: 2025-07-22

## (undated) RX ORDER — SULFAMETHOXAZOLE AND TRIMETHOPRIM 800; 160 MG/1; MG/1
TABLET ORAL
Status: DISPENSED
Start: 2025-02-25

## (undated) RX ORDER — FENTANYL CITRATE 50 UG/ML
INJECTION, SOLUTION INTRAMUSCULAR; INTRAVENOUS
Status: DISPENSED
Start: 2023-12-06

## (undated) RX ORDER — DEXAMETHASONE SODIUM PHOSPHATE 10 MG/ML
INJECTION, SOLUTION INTRAMUSCULAR; INTRAVENOUS
Status: DISPENSED
Start: 2023-12-06

## (undated) RX ORDER — LIDOCAINE HYDROCHLORIDE 20 MG/ML
JELLY TOPICAL
Status: DISPENSED
Start: 2024-12-11

## (undated) RX ORDER — LIDOCAINE HYDROCHLORIDE 20 MG/ML
JELLY TOPICAL
Status: DISPENSED
Start: 2024-07-30

## (undated) RX ORDER — SULFAMETHOXAZOLE/TRIMETHOPRIM 800-160 MG
TABLET ORAL
Status: DISPENSED
Start: 2024-03-27

## (undated) RX ORDER — CIPROFLOXACIN 500 MG/1
TABLET, FILM COATED ORAL
Status: DISPENSED
Start: 2024-08-20

## (undated) RX ORDER — ONDANSETRON 2 MG/ML
INJECTION INTRAMUSCULAR; INTRAVENOUS
Status: DISPENSED
Start: 2024-01-17

## (undated) RX ORDER — LIDOCAINE HYDROCHLORIDE 20 MG/ML
JELLY TOPICAL
Status: DISPENSED
Start: 2024-07-09

## (undated) RX ORDER — SULFAMETHOXAZOLE/TRIMETHOPRIM 800-160 MG
TABLET ORAL
Status: DISPENSED
Start: 2024-03-05

## (undated) RX ORDER — SULFAMETHOXAZOLE/TRIMETHOPRIM 800-160 MG
TABLET ORAL
Status: DISPENSED
Start: 2024-05-28

## (undated) RX ORDER — LIDOCAINE HYDROCHLORIDE 20 MG/ML
JELLY TOPICAL
Status: DISPENSED
Start: 2025-08-12

## (undated) RX ORDER — LIDOCAINE HYDROCHLORIDE 20 MG/ML
JELLY TOPICAL
Status: DISPENSED
Start: 2025-04-29

## (undated) RX ORDER — SULFAMETHOXAZOLE AND TRIMETHOPRIM 800; 160 MG/1; MG/1
TABLET ORAL
Status: DISPENSED
Start: 2024-11-19

## (undated) RX ORDER — SULFAMETHOXAZOLE/TRIMETHOPRIM 800-160 MG
TABLET ORAL
Status: DISPENSED
Start: 2024-05-07

## (undated) RX ORDER — PROPOFOL 10 MG/ML
INJECTION, EMULSION INTRAVENOUS
Status: DISPENSED
Start: 2023-12-06

## (undated) RX ORDER — SULFAMETHOXAZOLE AND TRIMETHOPRIM 800; 160 MG/1; MG/1
TABLET ORAL
Status: DISPENSED
Start: 2025-02-04

## (undated) RX ORDER — SULFAMETHOXAZOLE AND TRIMETHOPRIM 800; 160 MG/1; MG/1
TABLET ORAL
Status: DISPENSED
Start: 2025-04-29

## (undated) RX ORDER — DEXAMETHASONE SODIUM PHOSPHATE 10 MG/ML
INJECTION, SOLUTION INTRAMUSCULAR; INTRAVENOUS
Status: DISPENSED
Start: 2024-01-17

## (undated) RX ORDER — LIDOCAINE HYDROCHLORIDE 20 MG/ML
JELLY TOPICAL
Status: DISPENSED
Start: 2024-03-27

## (undated) RX ORDER — LIDOCAINE HYDROCHLORIDE 20 MG/ML
JELLY TOPICAL
Status: DISPENSED
Start: 2025-02-25